# Patient Record
Sex: FEMALE | Race: WHITE | NOT HISPANIC OR LATINO | Employment: OTHER | URBAN - METROPOLITAN AREA
[De-identification: names, ages, dates, MRNs, and addresses within clinical notes are randomized per-mention and may not be internally consistent; named-entity substitution may affect disease eponyms.]

---

## 2017-02-06 ENCOUNTER — OFFICE VISIT (OUTPATIENT)
Dept: MEDICAL GROUP | Facility: PHYSICIAN GROUP | Age: 72
End: 2017-02-06
Payer: MEDICARE

## 2017-02-06 ENCOUNTER — HOSPITAL ENCOUNTER (OUTPATIENT)
Dept: LAB | Facility: MEDICAL CENTER | Age: 72
End: 2017-02-06
Attending: FAMILY MEDICINE
Payer: MEDICARE

## 2017-02-06 VITALS
BODY MASS INDEX: 35.53 KG/M2 | SYSTOLIC BLOOD PRESSURE: 138 MMHG | TEMPERATURE: 97 F | HEART RATE: 63 BPM | DIASTOLIC BLOOD PRESSURE: 70 MMHG | HEIGHT: 60 IN | OXYGEN SATURATION: 95 % | WEIGHT: 181 LBS

## 2017-02-06 DIAGNOSIS — C91.10 CLL (CHRONIC LYMPHOCYTIC LEUKEMIA) (HCC): ICD-10-CM

## 2017-02-06 DIAGNOSIS — F51.01 PRIMARY INSOMNIA: ICD-10-CM

## 2017-02-06 DIAGNOSIS — I10 ESSENTIAL HYPERTENSION: ICD-10-CM

## 2017-02-06 DIAGNOSIS — Z12.31 ENCOUNTER FOR SCREENING MAMMOGRAM FOR BREAST CANCER: ICD-10-CM

## 2017-02-06 DIAGNOSIS — N95.1 HOT FLASHES, MENOPAUSAL: ICD-10-CM

## 2017-02-06 DIAGNOSIS — F13.20 SEDATIVE, HYPNOTIC OR ANXIOLYTIC DEPENDENCE, UNSPECIFIED: ICD-10-CM

## 2017-02-06 DIAGNOSIS — E66.9 OBESITY (BMI 30-39.9): ICD-10-CM

## 2017-02-06 LAB
25(OH)D3 SERPL-MCNC: 38 NG/ML (ref 30–100)
ALBUMIN SERPL BCP-MCNC: 4.1 G/DL (ref 3.2–4.9)
ALBUMIN/GLOB SERPL: 1.6 G/DL
ALP SERPL-CCNC: 62 U/L (ref 30–99)
ALT SERPL-CCNC: 16 U/L (ref 2–50)
ANION GAP SERPL CALC-SCNC: 8 MMOL/L (ref 0–11.9)
AST SERPL-CCNC: 14 U/L (ref 12–45)
BASOPHILS # BLD AUTO: 0 K/UL (ref 0–0.12)
BASOPHILS NFR BLD AUTO: 0 % (ref 0–1.8)
BILIRUB SERPL-MCNC: 0.4 MG/DL (ref 0.1–1.5)
BUN SERPL-MCNC: 19 MG/DL (ref 8–22)
CALCIUM SERPL-MCNC: 9.6 MG/DL (ref 8.5–10.5)
CHLORIDE SERPL-SCNC: 106 MMOL/L (ref 96–112)
CHOLEST SERPL-MCNC: 154 MG/DL (ref 100–199)
CO2 SERPL-SCNC: 27 MMOL/L (ref 20–33)
CREAT SERPL-MCNC: 0.87 MG/DL (ref 0.5–1.4)
CREAT UR-MCNC: 140.4 MG/DL
EOSINOPHIL # BLD: 0 K/UL (ref 0–0.51)
EOSINOPHIL NFR BLD AUTO: 0 % (ref 0–6.9)
ERYTHROCYTE [DISTWIDTH] IN BLOOD BY AUTOMATED COUNT: 53 FL (ref 35.9–50)
GIANT PLATELETS BLD QL SMEAR: NORMAL
GLOBULIN SER CALC-MCNC: 2.5 G/DL (ref 1.9–3.5)
GLUCOSE SERPL-MCNC: 84 MG/DL (ref 65–99)
HCT VFR BLD AUTO: 42.3 % (ref 37–47)
HDLC SERPL-MCNC: 67 MG/DL
HGB BLD-MCNC: 13.1 G/DL (ref 12–16)
LDLC SERPL CALC-MCNC: 58 MG/DL
LYMPHOCYTES # BLD: 22.74 K/UL (ref 1–4.8)
LYMPHOCYTES NFR BLD AUTO: 82.1 % (ref 22–41)
MANUAL DIFF BLD: NORMAL
MCH RBC QN AUTO: 31.6 PG (ref 27–33)
MCHC RBC AUTO-ENTMCNC: 31 G/DL (ref 33.6–35)
MCV RBC AUTO: 101.9 FL (ref 81.4–97.8)
MICROALBUMIN UR-MCNC: <0.7 MG/DL
MICROALBUMIN/CREAT UR: NORMAL MG/G (ref 0–30)
MONOCYTES # BLD: 0.75 K/UL (ref 0–0.85)
MONOCYTES NFR BLD AUTO: 2.7 % (ref 0–13.4)
MORPHOLOGY BLD-IMP: NORMAL
NEUTROPHILS # BLD: 4.21 K/UL (ref 2–7.15)
NEUTROPHILS NFR BLD AUTO: 14.3 % (ref 44–72)
NEUTS BAND NFR BLD MANUAL: 0.9 % (ref 0–10)
NRBC # BLD AUTO: 0 K/UL
NRBC BLD-RTO: 0 /100 WBC
PLATELET # BLD AUTO: 206 K/UL (ref 164–446)
PMV BLD AUTO: 11.1 FL (ref 9–12.9)
POTASSIUM SERPL-SCNC: 4.2 MMOL/L (ref 3.6–5.5)
PROT SERPL-MCNC: 6.6 G/DL (ref 6–8.2)
RBC # BLD AUTO: 4.15 M/UL (ref 4.2–5.4)
RBC BLD AUTO: PRESENT
SMUDGE CELLS BLD QL SMEAR: NORMAL
SODIUM SERPL-SCNC: 141 MMOL/L (ref 135–145)
TRIGL SERPL-MCNC: 144 MG/DL (ref 0–149)
VARIANT LYMPHS BLD QL SMEAR: NORMAL
WBC # BLD AUTO: 27.7 K/UL (ref 4.8–10.8)

## 2017-02-06 PROCEDURE — 1101F PT FALLS ASSESS-DOCD LE1/YR: CPT | Mod: 8P | Performed by: FAMILY MEDICINE

## 2017-02-06 PROCEDURE — 80061 LIPID PANEL: CPT

## 2017-02-06 PROCEDURE — 82306 VITAMIN D 25 HYDROXY: CPT

## 2017-02-06 PROCEDURE — 85007 BL SMEAR W/DIFF WBC COUNT: CPT

## 2017-02-06 PROCEDURE — 99214 OFFICE O/P EST MOD 30 MIN: CPT | Performed by: FAMILY MEDICINE

## 2017-02-06 PROCEDURE — G8432 DEP SCR NOT DOC, RNG: HCPCS | Performed by: FAMILY MEDICINE

## 2017-02-06 PROCEDURE — 82043 UR ALBUMIN QUANTITATIVE: CPT

## 2017-02-06 PROCEDURE — 82570 ASSAY OF URINE CREATININE: CPT

## 2017-02-06 PROCEDURE — 1036F TOBACCO NON-USER: CPT | Performed by: FAMILY MEDICINE

## 2017-02-06 PROCEDURE — 3014F SCREEN MAMMO DOC REV: CPT | Performed by: FAMILY MEDICINE

## 2017-02-06 PROCEDURE — 3017F COLORECTAL CA SCREEN DOC REV: CPT | Performed by: FAMILY MEDICINE

## 2017-02-06 PROCEDURE — 85027 COMPLETE CBC AUTOMATED: CPT

## 2017-02-06 PROCEDURE — 4040F PNEUMOC VAC/ADMIN/RCVD: CPT | Mod: 8P | Performed by: FAMILY MEDICINE

## 2017-02-06 PROCEDURE — 80053 COMPREHEN METABOLIC PANEL: CPT

## 2017-02-06 PROCEDURE — G8419 CALC BMI OUT NRM PARAM NOF/U: HCPCS | Performed by: FAMILY MEDICINE

## 2017-02-06 PROCEDURE — G8484 FLU IMMUNIZE NO ADMIN: HCPCS | Performed by: FAMILY MEDICINE

## 2017-02-06 PROCEDURE — 36415 COLL VENOUS BLD VENIPUNCTURE: CPT

## 2017-02-06 RX ORDER — ZOLPIDEM TARTRATE 5 MG/1
5 TABLET ORAL NIGHTLY PRN
Qty: 45 TAB | Refills: 3 | Status: SHIPPED
Start: 2017-02-06 | End: 2017-11-09 | Stop reason: SDUPTHER

## 2017-02-06 RX ORDER — ESTRADIOL 0.5 MG/1
0.5 TABLET ORAL DAILY
Qty: 90 TAB | Refills: 3 | Status: ON HOLD | OUTPATIENT
Start: 2017-02-06 | End: 2018-01-17

## 2017-02-06 ASSESSMENT — PAIN SCALES - GENERAL: PAINLEVEL: 1=MINIMAL PAIN

## 2017-02-06 NOTE — ASSESSMENT & PLAN NOTE
Ongoing issue. As stated above, patient currently is on Ambien 2.5 mg at night to help with sleep. She has been maintained on his current dosage for greater than one year. She is here today for follow-up and refill of her prescription.

## 2017-02-06 NOTE — ASSESSMENT & PLAN NOTE
Ongoing issue. Patient reports 100% compliance with medication; she is currently on Ambien 2.5 mg at night. She states that this manages both her sleep maintenance and sleep induction. She denies any side effects such as grogginess the next day.

## 2017-02-06 NOTE — ASSESSMENT & PLAN NOTE
Ongoing issue. The patient reports 100% compliance with medication; she is currently on atenolol 50 mg daily. Patient denies any issues with headache, dizziness, chest pain. Chart review shows that her blood pressure and heart rate both are in the normal range.

## 2017-02-06 NOTE — ASSESSMENT & PLAN NOTE
Ongoing issue. Patient reports 100% compliance with medication; she is currently on estradiol 0.5 mg once daily. She reports that she has had 2 episodes of hot flashes since our last visit. She states that otherwise she is doing well.

## 2017-02-06 NOTE — MR AVS SNAPSHOT
Shanice Vaz Chino   2017 12:20 PM   Office Visit   MRN: [id number]    Department:  [facility]   Dept Phone:  [phone]    Description:  Female : 1945   Provider:  Luz Joshi D.O.           Reason for Visit     Follow-Up 6 mo      Allergies as of 2017     Allergen Noted Reactions    Other Misc 2011       All household chemicals, soap powders    Sulfa Drugs 10/27/2015       Wheat 2011         You were diagnosed with     Primary insomnia   [068355]       CLL (chronic lymphocytic leukemia) (CMS-[facility])   [092140]       Essential hypertension   [3589301]       Hot flashes, menopausal   [320425]       Sedative, hypnotic or anxiolytic dependence, unspecified (CMS-[facility])   [304.10.ICD-9-CM]       Encounter for screening mammogram for breast cancer   [2626082]       Obesity (BMI 30-39.9)   [266185]         Vital Signs     Blood Pressure Pulse Temperature Height Weight Body Mass Index    138/70 mmHg 63 36.1 °C (97 °F) 1.524 m (5') 82.101 kg (181 lb) 35.35 kg/m2    Oxygen Saturation Breastfeeding? Smoking Status             95% No Never Smoker          Basic Information     Date Of Birth Sex Race Ethnicity Preferred Language    1945 Female White Non- English      Your appointments     2018 11:20 AM   Established Patient with Luz Joshi D.O.   [facility] [address] [id number] [phone]           You will be receiving a confirmation call a few days before your appointment from our automated call confirmation system.              Problem List              ICD-10-CM Priority Class Noted - Resolved    CLL (chronic lymphocytic leukemia) (CMS-[facility]) C91.10 High  3/3/2011 - Present    Hypertension I10   3/3/2011 - Present    GERD (gastroesophageal reflux disease) K21.9   3/3/2011 - Present    Hiatal hernia K44.9   3/3/2011 - Present    Insomnia G47.00   3/3/2011 - Present    NORMAN (iron deficiency anemia) D50.9    3/3/2011 - Present    Routine physical examination Z00.00   3/3/2011 - Present    Hot flashes, menopausal N95.1   3/3/2011 - Present    Routine health maintenance Z00.00   6/11/2012 - Present    Hip pain M25.559   6/27/2013 - Present    Cervical radiculopathy M54.12   6/27/2013 - Present    Skin lesions, generalized L98.9   6/27/2013 - Present    BMI 31.0-31.9,adult Z68.31   9/11/2014 - Present    Vaginal vault prolapse N81.9   4/23/2015 - Present    Hypertrophy of breast N62   11/3/2015 - Present    Multiple allergies Z88.9   4/12/2016 - Present    Knee pain, left M25.562   4/12/2016 - Present    Sedative, hypnotic or anxiolytic dependence, unspecified (CMS-HCC) F13.20   2/6/2017 - Present    Obesity (BMI 30-39.9) E66.9   2/6/2017 - Present      Health Maintenance        Date Due Completion Dates    IMM PNEUMOCOCCAL 65+ (ADULT) HIGH/HIGHEST RISK SERIES (1 of 2 - PCV13) 10/27/2010 ---    MAMMOGRAM 8/26/2016 8/26/2015, 8/20/2014, 6/28/2013, 6/27/2012    IMM INFLUENZA (1) 9/1/2016 ---    IMM DTaP/Tdap/Td Vaccine (2 - Td) 3/3/2021 3/3/2011    BONE DENSITY 8/19/2021 8/19/2016, 3/25/2011    COLONOSCOPY 2/3/2025 2/3/2015            Current Immunizations     SHINGLES VACCINE 3/3/2011    Tdap Vaccine 3/3/2011      Below and/or attached are the medications your provider expects you to take. Review all of your home medications and newly ordered medications with your provider and/or pharmacist. Follow medication instructions as directed by your provider and/or pharmacist. Please keep your medication list with you and share with your provider. Update the information when medications are discontinued, doses are changed, or new medications (including over-the-counter products) are added; and carry medication information at all times in the event of emergency situations     Allergies:  OTHER MISC - (reactions not documented)     SULFA DRUGS - (reactions not documented)     WHEAT - (reactions not documented)                  Medications  Valid as of: February 06, 2017 -  1:08 PM    Generic Name Brand Name Tablet Size Instructions for use    Albuterol Sulfate   Inhale  by mouth as needed.        Ascorbic Acid (Tab) Vitamin C 1000 MG Take  by mouth every day.        Atenolol (Tab) TENORMIN 50 MG TAKE 1 TABLET BY MOUTH TWICE A DAY        Atenolol (Tab) TENORMIN 50 MG TAKE 1 TABLET BY MOUTH TWICE A DAY        Cetirizine HCl   Take  by mouth every day.        Estradiol (Tab) ESTRACE 0.5 MG Take 1 Tab by mouth every day.        Estrogens, Conjugated (Cream) PREMARIN 0.625 MG/GM 1.0gms per vagina three times/week        Ferrous Gluconate (Tab) Iron 240 (27 FE) MG Take  by mouth every day.        Lansoprazole (TABLET DISPERSIBLE) PREVACID 30 MG Take 1 Tab by mouth 2 Times a Day.        Multiple Vitamins-Minerals   Take  by mouth every day.        Zolpidem Tartrate (Tab) AMBIEN 5 MG Take 1 Tab by mouth at bedtime as needed for Sleep.        .                 Medicines prescribed today were sent to:     [facility] # [address] [place] [address] - [address] [place] [address]    Phone: [phone] Fax: [phone]    Open 24 Hours?: No      Medication refill instructions:       If your prescription bottle indicates you have medication refills left, it is not necessary to call your provider’s office. Please contact your pharmacy and they will refill your medication.    If your prescription bottle indicates you do not have any refills left, you may request refills at any time through one of the following ways: The online [employer] system (except Urgent Care), by calling your provider’s office, or by asking your pharmacy to contact your provider’s office with a refill request. Medication refills are processed only during regular business hours and may not be available until the next business day. Your provider may request additional information or to have a follow-up visit with you prior to refilling your medication.    *Please Note: Medication refills are assigned a new Rx number when refilled electronically. Your pharmacy may indicate that no refills were authorized even though a new prescription for the same medication is available at the pharmacy. Please request the medicine by name with the pharmacy before contacting your provider for a refill.        Your To Do List     Future Labs/Procedures Complete By Expires    CBC WITH DIFFERENTIAL  As directed 2/7/2018    COMP METABOLIC PANEL  As directed 2/7/2018    LIPID PROFILE  As directed 2/7/2018    MA-SCREEN MAMMO W/CAD-BILAT  As directed 3/10/2018    MICROALBUMIN CREAT RATIO URINE  As directed 2/7/2018    [facility] PAIN MANAGEMENT SCREEN  As directed 2/6/2018    Comments:    Current Meds (name, sig, last dose):   Current outpatient prescriptions:   •  estradiol, 0.5 mg, Oral, DAILY  •  zolpidem, 5 mg, Oral, HS PRN  •  atenolol, TAKE 1 TABLET BY MOUTH TWICE A DAY  •  atenolol, TAKE 1 TABLET BY MOUTH TWICE A DAY  •  Albuterol Sulfate (PROAIR HFA INH), Inhale  by mouth as needed., prn  •  conjugated estrogen, 1.0gms per vagina three times/week  •  Cetirizine HCl (KLS ALLER-JAYE PO), Take  by mouth every day.  •  Vitamin C, Take  by mouth every day.  •  Multiple Vitamins-Minerals (MULTIVITAMIN PO), Take  by mouth every day.  •  Iron, Take  by mouth every day.  •  lansoprazole, 30 mg, Oral, BID          VITAMIN D,25 HYDROXY  As directed 2/7/2018         Oversi Access Code: Activation code not generated  Current Oversi Status: Active

## 2017-02-06 NOTE — PROGRESS NOTES
Subjective:   Shanice Magana is a 71 y.o. female here today for HTN; CLL; menopause; insomnia; obesity    CLL (chronic lymphocytic leukemia)  Long-standing history. Patient denies any symptoms such as fever, chills, enlarged lymph nodes. She is currently being followed with blood work and by a hematologist every 6 months.    Hot flashes, menopausal  Ongoing issue. Patient reports 100% compliance with medication; she is currently on estradiol 0.5 mg once daily. She reports that she has had 2 episodes of hot flashes since our last visit. She states that otherwise she is doing well.    Hypertension  Ongoing issue. The patient reports 100% compliance with medication; she is currently on atenolol 50 mg daily. Patient denies any issues with headache, dizziness, chest pain. Chart review shows that her blood pressure and heart rate both are in the normal range.    Insomnia  Ongoing issue. Patient reports 100% compliance with medication; she is currently on Ambien 2.5 mg at night. She states that this manages both her sleep maintenance and sleep induction. She denies any side effects such as grogginess the next day.    Obesity (BMI 30-39.9)  Ongoing issue. Patient reports that she is working diligently to a healthy get some regular exercise.    Sedative, hypnotic or anxiolytic dependence, unspecified (CMS-HCC)  Ongoing issue. As stated above, patient currently is on Ambien 2.5 mg at night to help with sleep. She has been maintained on his current dosage for greater than one year. She is here today for follow-up and refill of her prescription.         Current medicines (including changes today)  Current Outpatient Prescriptions   Medication Sig Dispense Refill   • estradiol (ESTRACE) 0.5 MG tablet Take 1 Tab by mouth every day. 90 Tab 3   • zolpidem (AMBIEN) 5 MG Tab Take 1 Tab by mouth at bedtime as needed for Sleep. 45 Tab 3   • atenolol (TENORMIN) 50 MG Tab TAKE 1 TABLET BY MOUTH TWICE A  Tab 3   • atenolol  (TENORMIN) 50 MG Tab TAKE 1 TABLET BY MOUTH TWICE A  Tab 3   • Albuterol Sulfate (PROAIR HFA INH) Inhale  by mouth as needed.     • conjugated estrogen (PREMARIN) 0.625 MG/GM CREA 1.0gms per vagina three times/week 1 Tube 3   • Cetirizine HCl (KLS ALLER-JAYE PO) Take  by mouth every day.     • Ascorbic Acid (VITAMIN C) 1000 MG TABS Take  by mouth every day.     • Multiple Vitamins-Minerals (MULTIVITAMIN PO) Take  by mouth every day.     • Ferrous Gluconate (IRON) 240 (27 FE) MG TABS Take  by mouth every day.     • lansoprazole (PREVACID) 30 MG TBDP Take 1 Tab by mouth 2 Times a Day. 90 Each 3     No current facility-administered medications for this visit.     She  has a past medical history of Hiatal hernia (3/3/2011); Insomnia (3/3/2011); NORMAN (iron deficiency anemia) (3/3/2011); Hot flashes, menopausal (3/3/2011); Hypertension; Indigestion; Dental disorder; BMI 34.0-34.9,adult (9/11/2014); Arthritis; Pain; Heart burn; Cancer (CMS-HCC) (2010); Renal disorder; Celiac disease; and CATARACT. She also has no past medical history of Diabetes (CMS-HCC).    ROS   No chest pain, no shortness of breath, no abdominal pain  +hot flashes     Objective:     Blood pressure 138/70, pulse 63, temperature 36.1 °C (97 °F), height 1.524 m (5'), weight 82.101 kg (181 lb), SpO2 95 %, not currently breastfeeding. Body mass index is 35.35 kg/(m^2).   Physical Exam:  Alert, oriented in no acute distress.  Eye contact is good, speech goal directed, affect calm  HEENT: conjunctiva non-injected, sclera non-icteric.  Pinna normal. TM pearly gray.   Oral mucous membranes pink and moist with no lesions.  Neck No adenopathy or masses in the neck or supraclavicular regions.  Lungs: clear to auscultation bilaterally with good excursion.  CV: regular rate and rhythm. Moderate systolic ejection murmur noted  Abdomen: soft, nontender, No CVAT; obese  Ext: no edema, color normal, vascularity normal, temperature normal        Assessment and Plan:    The following treatment plan was discussed     1. Primary insomnia      Stable. Continue current medications; refill provided. Monitor   2. CLL (chronic lymphocytic leukemia) (CMS-HCC)  CBC WITH DIFFERENTIAL    Stable. Sent for blood work; continue follow with hematology. Monitor   3. Essential hypertension  COMP METABOLIC PANEL    LIPID PROFILE    VITAMIN D,25 HYDROXY    MICROALBUMIN CREAT RATIO URINE    Stable. Continue current medication; monitor   4. Sedative, hypnotic or anxiolytic dependence, unspecified (CMS-HCC)  [facility] PAIN MANAGEMENT SCREEN    Controlled Substance Treatment Agreement    Stable. Patient assigned control substance agreement; urine collected for urine drug screen; monitor for results.   5. Hot flashes, menopausal      Stable. Continue current medication; refill provided. Monitor   6. Encounter for screening mammogram for breast cancer  MA-SCREEN MAMMO W/CAD-BILAT    Screening mammogram ordered; patient denies any breast issues today.   7. Obesity (BMI 30-39.9)  Patient identified as having weight management issue.  Appropriate orders and counseling given.    Uncontrolled. Continue to encourage healthy eating and regular daily exercise. Monitor       Followup: Return in about 1 year (around 2/6/2018) for HTN/insomnia/labs, Short.

## 2017-02-06 NOTE — ASSESSMENT & PLAN NOTE
Ongoing issue. Patient reports that she is working diligently to a healthy get some regular exercise.

## 2017-02-06 NOTE — ASSESSMENT & PLAN NOTE
Long-standing history. Patient denies any symptoms such as fever, chills, enlarged lymph nodes. She is currently being followed with blood work and by a hematologist every 6 months.

## 2017-02-07 ENCOUNTER — TELEPHONE (OUTPATIENT)
Dept: MEDICAL GROUP | Facility: PHYSICIAN GROUP | Age: 72
End: 2017-02-07

## 2017-02-07 NOTE — Clinical Note
February 7, 2017         Shanice Vaz Chino  [address] [place] [address]        Dear Shanice:      Below are the results from your recent visit:    Resulted Orders   COMP METABOLIC PANEL   Result Value Ref Range    Sodium 141 135 - 145 mmol/L    Potassium 4.2 3.6 - 5.5 mmol/L    Chloride 106 96 - 112 mmol/L    Co2 27 20 - 33 mmol/L    Anion Gap 8.0 0.0 - 11.9    Glucose 84 65 - 99 mg/dL    Bun 19 8 - 22 mg/dL    Creatinine 0.87 0.50 - 1.40 mg/dL    Calcium 9.6 8.5 - 10.5 mg/dL    AST(SGOT) 14 12 - 45 U/L    ALT(SGPT) 16 2 - 50 U/L    Alkaline Phosphatase 62 30 - 99 U/L    Total Bilirubin 0.4 0.1 - 1.5 mg/dL    Albumin 4.1 3.2 - 4.9 g/dL    Total Protein 6.6 6.0 - 8.2 g/dL    Globulin 2.5 1.9 - 3.5 g/dL    A-G Ratio 1.6 g/dL    Narrative    Request patient fasting?->Yes   LIPID PROFILE   Result Value Ref Range    Cholesterol,Tot 154 100 - 199 mg/dL    Triglycerides 144 0 - 149 mg/dL    HDL 67 >=40 mg/dL    LDL 58 <100 mg/dL    Narrative    Request patient fasting?->Yes   VITAMIN D,25 HYDROXY   Result Value Ref Range    25-Hydroxy   Vitamin D 25 38 30 - 100 ng/mL      Comment:      Adult Ranges:   <20 ng/mL - Deficiency  20-29 ng/mL - Insufficiency   ng/mL - Sufficiency  The [employer]ia Centaur Vitamin D Assay is standardized to the  [facility] reference measurement procedures, a  reference method for the Vitamin D Standardization Program  (VDSP).  The VDSP aligns patient results among 25 (OH)  Vitamin D methods.      Narrative    Request patient fasting?->Yes   MICROALBUMIN CREAT RATIO URINE   Result Value Ref Range    Creatinine, Urine 140.40 mg/dL    Microalbumin, Urine Random <0.7 mg/dL    Micro Alb Creat Ratio see below 0 - 30 mg/g      Comment:      Unable to calculate the microalbumin/creatinine ratio due to  the microalbumin result or the urine creatinine result being  outside the measurement range of the analyzer.     CBC WITH DIFFERENTIAL   Result Value Ref Range    WBC 27.7 (H) 4.8 - 10.8  K/uL    RBC 4.15 (L) 4.20 - 5.40 M/uL    Hemoglobin 13.1 12.0 - 16.0 g/dL    Hematocrit 42.3 37.0 - 47.0 %    .9 (H) 81.4 - 97.8 fL    MCH 31.6 27.0 - 33.0 pg    MCHC 31.0 (L) 33.6 - 35.0 g/dL    RDW 53.0 (H) 35.9 - 50.0 fL    Platelet Count 206 164 - 446 K/uL    MPV 11.1 9.0 - 12.9 fL    Nucleated RBC 0.00 /100 WBC    NRBC (Absolute) 0.00 K/uL    Neutrophils-Polys 14.30 (L) 44.00 - 72.00 %    Lymphocytes 82.10 (H) 22.00 - 41.00 %    Monocytes 2.70 0.00 - 13.40 %    Eosinophils 0.00 0.00 - 6.90 %    Basophils 0.00 0.00 - 1.80 %    Neutrophils (Absolute) 4.21 2.00 - 7.15 K/uL      Comment:      Includes immature neutrophils, if present.    Lymphs (Absolute) 22.74 (H) 1.00 - 4.80 K/uL    Monos (Absolute) 0.75 0.00 - 0.85 K/uL    Eos (Absolute) 0.00 0.00 - 0.51 K/uL    Baso (Absolute) 0.00 0.00 - 0.12 K/uL   ESTIMATED GFR   Result Value Ref Range    GFR If African American >60 >60 mL/min/1.73 m 2    GFR If Non African American >60 >60 mL/min/1.73 m 2    Narrative    Request patient fasting?->Yes   DIFFERENTIAL MANUAL   Result Value Ref Range    Bands-Stabs 0.90 0.00 - 10.00 %    Manual Diff Status PERFORMED    PERIPHERAL SMEAR REVIEW   Result Value Ref Range    Peripheral Smear Review see below       Comment:      Due to instrument suspect flags, further review of peripheral smear is  indicated on this patient sample. This review may or may not result in  abnormal findings.     MORPHOLOGY   Result Value Ref Range    RBC Morphology Present       Comment:      WBC: Moderate Atypical Lymphocyte  See  Previous Pathology Report    Giant Platelets 1+     Smudge Cells Many     Reactive Lymphocytes Moderate      The test results show that all the labs that I have ordered are in a normal/acceptable range.  Please keep your appointment with Dr. Davidson to go over the labs that he has ordered. .    If you have any questions or concerns, please don't hesitate to call.        Sincerely,      Luz Joshi,  [email or web address].    Electronically Signed

## 2017-02-07 NOTE — TELEPHONE ENCOUNTER
----- Message from Luz Joshi D.O. sent at 2/7/2017  7:32 AM PST -----  Please advise pt that all the labs that I have ordered are in a normal/acceptable range.  Please keep your appointment with Dr. Davidson to go over the labs that he has ordered.    Luz Joshi D.O.

## 2017-05-12 ENCOUNTER — TELEPHONE (OUTPATIENT)
Dept: OBGYN | Facility: CLINIC | Age: 72
End: 2017-05-12

## 2017-05-12 NOTE — TELEPHONE ENCOUNTER
Patient called and has a bladder infection wants to know if we can call in RX and then scheduled a follow up next week. Please call

## 2017-06-06 ENCOUNTER — HOSPITAL ENCOUNTER (OUTPATIENT)
Dept: LAB | Facility: MEDICAL CENTER | Age: 72
End: 2017-06-06
Attending: INTERNAL MEDICINE
Payer: MEDICARE

## 2017-06-06 LAB
ALBUMIN SERPL BCP-MCNC: 4.1 G/DL (ref 3.2–4.9)
ALBUMIN/GLOB SERPL: 1.7 G/DL
ALP SERPL-CCNC: 58 U/L (ref 30–99)
ALT SERPL-CCNC: 12 U/L (ref 2–50)
ANION GAP SERPL CALC-SCNC: 7 MMOL/L (ref 0–11.9)
AST SERPL-CCNC: 18 U/L (ref 12–45)
BASOPHILS # BLD AUTO: 0 % (ref 0–1.8)
BASOPHILS # BLD: 0 K/UL (ref 0–0.12)
BILIRUB SERPL-MCNC: 0.5 MG/DL (ref 0.1–1.5)
BUN SERPL-MCNC: 19 MG/DL (ref 8–22)
CALCIUM SERPL-MCNC: 9.6 MG/DL (ref 8.5–10.5)
CHLORIDE SERPL-SCNC: 106 MMOL/L (ref 96–112)
CO2 SERPL-SCNC: 27 MMOL/L (ref 20–33)
CREAT SERPL-MCNC: 0.94 MG/DL (ref 0.5–1.4)
EOSINOPHIL # BLD AUTO: 0 K/UL (ref 0–0.51)
EOSINOPHIL NFR BLD: 0 % (ref 0–6.9)
ERYTHROCYTE [DISTWIDTH] IN BLOOD BY AUTOMATED COUNT: 42.8 FL (ref 35.9–50)
GFR SERPL CREATININE-BSD FRML MDRD: 59 ML/MIN/1.73 M 2
GLOBULIN SER CALC-MCNC: 2.4 G/DL (ref 1.9–3.5)
GLUCOSE SERPL-MCNC: 91 MG/DL (ref 65–99)
HCT VFR BLD AUTO: 39.9 % (ref 37–47)
HGB BLD-MCNC: 12.9 G/DL (ref 12–16)
LYMPHOCYTES # BLD AUTO: 15.23 K/UL (ref 1–4.8)
LYMPHOCYTES NFR BLD: 87 % (ref 22–41)
MANUAL DIFF BLD: NORMAL
MCH RBC QN AUTO: 30.6 PG (ref 27–33)
MCHC RBC AUTO-ENTMCNC: 32.3 G/DL (ref 33.6–35)
MCV RBC AUTO: 94.8 FL (ref 81.4–97.8)
MONOCYTES # BLD AUTO: 0.75 K/UL (ref 0–0.85)
MONOCYTES NFR BLD AUTO: 4.3 % (ref 0–13.4)
MORPHOLOGY BLD-IMP: NORMAL
NEUTROPHILS # BLD AUTO: 1.52 K/UL (ref 2–7.15)
NEUTROPHILS NFR BLD: 8.7 % (ref 44–72)
NRBC # BLD AUTO: 0 K/UL
NRBC BLD AUTO-RTO: 0 /100 WBC
PLATELET # BLD AUTO: 211 K/UL (ref 164–446)
PLATELET BLD QL SMEAR: NORMAL
PMV BLD AUTO: 10.7 FL (ref 9–12.9)
POTASSIUM SERPL-SCNC: 4.1 MMOL/L (ref 3.6–5.5)
PROT SERPL-MCNC: 6.5 G/DL (ref 6–8.2)
RBC # BLD AUTO: 4.21 M/UL (ref 4.2–5.4)
RBC BLD AUTO: NORMAL
SODIUM SERPL-SCNC: 140 MMOL/L (ref 135–145)
WBC # BLD AUTO: 17.5 K/UL (ref 4.8–10.8)

## 2017-06-06 PROCEDURE — 36415 COLL VENOUS BLD VENIPUNCTURE: CPT

## 2017-06-06 PROCEDURE — 85007 BL SMEAR W/DIFF WBC COUNT: CPT

## 2017-06-06 PROCEDURE — 80053 COMPREHEN METABOLIC PANEL: CPT

## 2017-06-06 PROCEDURE — 85027 COMPLETE CBC AUTOMATED: CPT

## 2017-09-22 ENCOUNTER — PATIENT MESSAGE (OUTPATIENT)
Dept: HEALTH INFORMATION MANAGEMENT | Facility: OTHER | Age: 72
End: 2017-09-22

## 2017-09-27 ENCOUNTER — PATIENT OUTREACH (OUTPATIENT)
Dept: HEALTH INFORMATION MANAGEMENT | Facility: OTHER | Age: 72
End: 2017-09-27

## 2017-09-27 NOTE — PROGRESS NOTES
WebIZ Checked & Epic Updated: Yes  · WebIZ Recommendations: FLU and PREVNAR (PCV13)   · Is patient due for Tdap? NO  · Is patient due for Shingles? NO  [facility]Connect Verified: yes  Verify PCP: yes    Communication Preference Obtained: yes     Review Care Team: yes    Annual Wellness Visit Scheduling  1. Scheduling Status:Scheduled     Care Gap Scheduling (Attempt to Schedule EACH Overdue Care Gap!)     Health Maintenance Due   Topic Date Due   • Annual Wellness Visit  1945   • IMM PNEUMOCOCCAL 65+ (ADULT) HIGH/HIGHEST RISK SERIES (1 of 2 - PCV13) 10/27/2010   • MAMMOGRAM  08/26/2016   • IMM INFLUENZA (1) 09/01/2017        Scheduled patient for Annual Wellness Visit, flu shot and Pneumococcal vaccine.       Glanse Activation: already active  Glanse Nithya: yes  Virtual Visits: yes  Opt In to Text Messages: yes

## 2017-10-04 DIAGNOSIS — I10 ESSENTIAL HYPERTENSION: ICD-10-CM

## 2017-10-04 NOTE — TELEPHONE ENCOUNTER
Please change medication. Thank you    Was the patient seen in the last year in this department? Yes     Does patient have an active prescription for medications requested? No     Received Request Via: Pharmacy

## 2017-10-05 RX ORDER — ATENOLOL 50 MG/1
TABLET ORAL
Qty: 180 TAB | Refills: 1 | Status: SHIPPED | OUTPATIENT
Start: 2017-10-05 | End: 2017-10-17

## 2017-10-06 DIAGNOSIS — I10 ESSENTIAL HYPERTENSION: ICD-10-CM

## 2017-10-06 RX ORDER — ATENOLOL 50 MG/1
TABLET ORAL
Qty: 180 TAB | Refills: 3 | Status: SHIPPED | OUTPATIENT
Start: 2017-10-06 | End: 2017-10-17

## 2017-10-11 ENCOUNTER — TELEPHONE (OUTPATIENT)
Dept: MEDICAL GROUP | Facility: PHYSICIAN GROUP | Age: 72
End: 2017-10-11

## 2017-10-11 NOTE — TELEPHONE ENCOUNTER
ANNUAL WELLNESS VISIT PRE-VISIT PLANNING     1.  Reviewed note from last office visit with PCP: YES    2.  If any orders were placed at last visit, do we have Results/Consult Notes?        •  Labs - Labs ordered, completed on 02/06/2017 and results are in chart.   Note: If patient appointment is for lab review and patient did not complete labs,                check with provider if OK to reschedule patient until labs completed.       •  Imaging - Imaging ordered, NOT completed. Patient advised to complete prior to next appointment.       •  Referrals - No referrals were ordered at last office visit.    3.  Immunizations were updated in [place] using WebIZ?: Yes       •  WebIZ Recommendations: FLU and PREVNAR (PCV13)        •  Is patient due for Tdap? NO       •  Is patient due for Shingles? NO     4.  Patient is due for the following Health Maintenance Topics:   Health Maintenance Due   Topic Date Due   • Annual Wellness Visit  1945   • IMM PNEUMOCOCCAL 65+ (ADULT) HIGH/HIGHEST RISK SERIES (1 of 2 - PCV13) 10/27/2010   • MAMMOGRAM  08/26/2016   • IMM INFLUENZA (1) 09/01/2017       - Patient already has appointment scheduled for Mammogram.      5.  Reviewed/Updated the following with patient:       •   Preferred Pharmacy? YES       •   Preferred Lab? YES       •   Preferred Communication? YES       •   Allergies? YES       •   Medications? YES. Was Abstract Encounter opened and chart updated? YES       •   Social History? YES. Was Abstract Encounter opened and chart updated? YES       •   Family History (document living status of immediate family members and if + hx of cancer, diabetes, hypertension, hyperlipidemia, heart attack, stroke) YES. Was Abstract Encounter opened and chart updated? YES    6.  Care Team Updated:       •   DME Company (gait device, O2, CPAP, etc.): N\A       •   Other Specialists (eye doctor, derm, GYN, cardiology, endo, etc): N\A    7.  Patient has the following Care Path diagnoses on  Problem List:  NONE    8.  Specialty Comments was updated with diagnosis information provided by SCP: N\A    9.  Patient was advised: “This is a free wellness visit. The provider will screen for medical conditions to help you stay healthy. If you have other concerns to address you may be asked to discuss these at a separate visit or there may be an additional fee.”     6.  Patient was informed to arrive 15 min prior to their scheduled appointment and bring in their medication bottles.

## 2017-10-17 ENCOUNTER — OFFICE VISIT (OUTPATIENT)
Dept: MEDICAL GROUP | Facility: PHYSICIAN GROUP | Age: 72
End: 2017-10-17
Payer: MEDICARE

## 2017-10-17 VITALS
OXYGEN SATURATION: 96 % | HEART RATE: 64 BPM | SYSTOLIC BLOOD PRESSURE: 138 MMHG | BODY MASS INDEX: 34.36 KG/M2 | DIASTOLIC BLOOD PRESSURE: 76 MMHG | HEIGHT: 60 IN | WEIGHT: 175 LBS | TEMPERATURE: 97.8 F

## 2017-10-17 DIAGNOSIS — K21.9 GASTROESOPHAGEAL REFLUX DISEASE WITHOUT ESOPHAGITIS: ICD-10-CM

## 2017-10-17 DIAGNOSIS — Z88.9 MULTIPLE ALLERGIES: ICD-10-CM

## 2017-10-17 DIAGNOSIS — N81.9 VAGINAL VAULT PROLAPSE: ICD-10-CM

## 2017-10-17 DIAGNOSIS — Z23 NEED FOR VACCINATION: ICD-10-CM

## 2017-10-17 DIAGNOSIS — F13.20 SEDATIVE, HYPNOTIC OR ANXIOLYTIC DEPENDENCE (HCC): ICD-10-CM

## 2017-10-17 DIAGNOSIS — M54.12 CERVICAL RADICULOPATHY: ICD-10-CM

## 2017-10-17 DIAGNOSIS — I10 ESSENTIAL HYPERTENSION: ICD-10-CM

## 2017-10-17 DIAGNOSIS — E66.9 OBESITY (BMI 30-39.9): ICD-10-CM

## 2017-10-17 DIAGNOSIS — Z00.00 MEDICARE ANNUAL WELLNESS VISIT, SUBSEQUENT: ICD-10-CM

## 2017-10-17 DIAGNOSIS — C91.10 CLL (CHRONIC LYMPHOCYTIC LEUKEMIA) (HCC): ICD-10-CM

## 2017-10-17 DIAGNOSIS — M25.562 CHRONIC PAIN OF LEFT KNEE: ICD-10-CM

## 2017-10-17 DIAGNOSIS — G89.29 CHRONIC PAIN OF LEFT KNEE: ICD-10-CM

## 2017-10-17 DIAGNOSIS — F51.01 PRIMARY INSOMNIA: ICD-10-CM

## 2017-10-17 DIAGNOSIS — N95.1 HOT FLASHES, MENOPAUSAL: ICD-10-CM

## 2017-10-17 DIAGNOSIS — R79.89 LOW SERUM VITAMIN D: ICD-10-CM

## 2017-10-17 PROCEDURE — G0439 PPPS, SUBSEQ VISIT: HCPCS | Mod: 25 | Performed by: FAMILY MEDICINE

## 2017-10-17 PROCEDURE — G0008 ADMIN INFLUENZA VIRUS VAC: HCPCS | Performed by: FAMILY MEDICINE

## 2017-10-17 PROCEDURE — G0009 ADMIN PNEUMOCOCCAL VACCINE: HCPCS | Performed by: FAMILY MEDICINE

## 2017-10-17 PROCEDURE — 90662 IIV NO PRSV INCREASED AG IM: CPT | Performed by: FAMILY MEDICINE

## 2017-10-17 PROCEDURE — 90670 PCV13 VACCINE IM: CPT | Performed by: FAMILY MEDICINE

## 2017-10-17 ASSESSMENT — PAIN SCALES - GENERAL: PAINLEVEL: NO PAIN

## 2017-10-17 ASSESSMENT — PATIENT HEALTH QUESTIONNAIRE - PHQ9: CLINICAL INTERPRETATION OF PHQ2 SCORE: 0

## 2017-10-17 NOTE — PROGRESS NOTES
Chief Complaint   Patient presents with   • Annual Wellness Visit         HPI:  Shanice Magana is a 71 y.o. here for Medicare Annual Wellness Visit     Patient Active Problem List    Diagnosis Date Noted   • CLL (chronic lymphocytic leukemia) (CMS-[facility]) 03/03/2011     Priority: High   • Low serum vitamin D 10/17/2017   • Sedative, hypnotic or anxiolytic dependence, unspecified 02/06/2017   • Obesity (BMI 30-39.9) 02/06/2017   • Multiple allergies 04/12/2016   • Knee pain, left 04/12/2016   • Vaginal vault prolapse 04/23/2015   • Cervical radiculopathy 06/27/2013   • Routine health maintenance 06/11/2012   • Hypertension 03/03/2011   • GERD (gastroesophageal reflux disease) 03/03/2011   • Insomnia 03/03/2011   • Routine physical examination 03/03/2011   • Hot flashes, menopausal 03/03/2011       Current Outpatient Prescriptions   Medication Sig Dispense Refill   • estradiol (ESTRACE) 0.5 MG tablet Take 1 Tab by mouth every day. 90 Tab 3   • zolpidem (AMBIEN) 5 MG Tab Take 1 Tab by mouth at bedtime as needed for Sleep. 45 Tab 3   • atenolol (TENORMIN) 50 MG Tab TAKE 1 TABLET BY MOUTH TWICE A  Tab 3   • Albuterol Sulfate (PROAIR HFA INH) Inhale  by mouth as needed.     • Cetirizine HCl (KLS ALLER-JAYE PO) Take  by mouth every day.     • lansoprazole (PREVACID) 30 MG TBDP Take 1 Tab by mouth 2 Times a Day. 90 Each 3   • conjugated estrogen (PREMARIN) 0.625 MG/GM CREA 1.0gms per vagina three times/week 1 Tube 3     No current facility-administered medications for this visit.             Current supplements as per medication list.       Allergies: Other misc; Sulfa drugs; and Wheat    Current social contact/activities: Family dinners, Friend     She  reports that she has never smoked. She has never used smokeless tobacco. She reports that she drinks alcohol. She reports that she does not use drugs.  Counseling given: Not Answered        DPA/Advanced Directive:  Patient does not have an Advanced Directive.  A  packet and workshop information was given on Advanced Directives.      ROS:    Gait: Uses no assistive device   Ostomy: yes   Other tubes: no   Amputations: no   Chronic oxygen use: no   Last eye exam:06/05/17  : Denies incontinence.           Depression Screening    Little interest or pleasure in doing things?  0 - not at all  Feeling down, depressed , or hopeless? 0 - not at all  Patient Health Questionnaire Score: 0     If depressive symptoms identified deferred to follow up visit unless specifically addressed in assessment and plan.    Interpretation of PHQ-9 Total Score   Score Severity   1-4 No Depression   5-9 Mild Depression   10-14 Moderate Depression   15-19 Moderately Severe Depression   20-27 Severe Depression    Screening for Cognitive Impairment    Three Minute Recall (apple, watch, aditi)  3/3    Draw clock face with all 12 numbers set to the hand to show 10 minutes past 11 o'clock  1    Cognitive concerns identified deferred for follow up unless specifically addressed in assessment and plan.    Fall Risk Assessment    Has the patient had two or more falls in the last year or any fall with injury in the last year?  No    Safety Assessment    Throw rugs on floor.  Yes  Handrails on all stairs.  No  Good lighting in all hallways.  Yes  Difficulty hearing.  No  Patient counseled about all safety risks that were identified.    Functional Assessment ADLs    Are there any barriers preventing you from cooking for yourself or meeting nutritional needs?  No.    Are there any barriers preventing you from driving safely or obtaining transportation?  No.    Are there any barriers preventing you from using a telephone or calling for help?  No.    Are there any barriers preventing you from shopping?  No.    Are there any barriers preventing you from taking care of your own finances?  No.    Are there any barriers preventing you from managing your medications?  No.    Are currently engaging any exercise or  physical activity?  Yes.  Walking some what    Health Maintenance Summary                Annual Wellness Visit Overdue 1945     IMM PNEUMOCOCCAL 65+ (ADULT) HIGH/HIGHEST RISK SERIES Overdue 10/27/2010     MAMMOGRAM Overdue 8/26/2016      Done 8/26/2015 MA-SCREEN MAMMO W/CAD-BILAT     Patient has more history with this topic...    IMM INFLUENZA Overdue 9/1/2017     IMM DTaP/Tdap/Td Vaccine Next Due 3/3/2021      Done 3/3/2011 Imm Admin: Tdap Vaccine    BONE DENSITY Next Due 8/19/2021      Done 8/19/2016 DS-BONE DENSITY STUDY (DEXA)     Patient has more history with this topic...    COLONOSCOPY Next Due 2/3/2025      Done 2/3/2015 AMB REFERRAL TO GI FOR COLONOSCOPY          Patient Care Team:  Luz Joshi D.O. as PCP - General (Family Medicine)  Mg Evans M.D. as Consulting Physician (Gastroenterology)  Brian Davidson M.D. as Consulting Physician (Oncology)  Jamar Anderson O.D. as Consulting Physician (Optometry)      Social History   Substance Use Topics   • Smoking status: Never Smoker   • Smokeless tobacco: Never Used   • Alcohol use 0.0 oz/week      Comment: twice a month     Family History   Problem Relation Age of Onset   • Heart Disease Mother    • Hypertension Mother    • Hyperlipidemia Mother    • Arthritis Father    • Hypertension     • Cancer       She  has a past medical history of Arthritis; BMI 34.0-34.9,adult (9/11/2014); Cancer (CMS-[facility]) (2010); CATARACT; Celiac disease; Dental disorder; Heart burn; Hiatal hernia (3/3/2011); Hot flashes, menopausal (3/3/2011); Hypertension; NORMAN (iron deficiency anemia) (3/3/2011); Indigestion; Insomnia (3/3/2011); Pain; and Renal disorder. She also has no past medical history of Diabetes (CMS-HCC).   Past Surgical History:   Procedure Laterality Date   • MAMMOPLASTY REDUCTION Bilateral 11/3/2015    Procedure: MAMMOPLASTY REDUCTION;  Surgeon: Talia Barton M.D.;  Location: SURGERY [facility];  Service:    • KNEE ARTHROSCOPY   9/17/2014    Performed by Dany Owens M.D. at [facility]   • MENISCECTOMY  9/17/2014    Performed by Dany Owens M.D. at [facility]   • SHOULDER ARTHROSCOPY W/ ROTATOR CUFF REPAIR  8/22/2013    Performed by Dany Owens M.D. at [facility]   • SHOULDER DECOMPRESSION ARTHROSCOPIC  8/22/2013    Performed by Dany Owens M.D. at [facility]   • CLAVICLE DISTAL EXCISION  8/22/2013    Performed by Dany Owens M.D. at [facility]   • OTHER  7/2013    laser procedure right eye   • CATARACT EXTRACTION WITH IOL  5/29/13    right eye   • ABDOMINAL HYSTERECTOMY TOTAL  1975   • CHOLECYSTECTOMY  1973    open   • TONSILLECTOMY  1950   • BLADDER SUSPENSION  1973, 1978    x2       Exam:     Blood pressure 138/76, pulse 64, temperature 36.6 °C (97.8 °F), height 1.524 m (5'), weight 79.4 kg (175 lb), SpO2 96 %, not currently breastfeeding. Body mass index is 34.18 kg/m².    Hearing excellent.    Dentition good  Alert, oriented in no acute distress.  Eye contact is good, speech goal directed, affect calm      Assessment and Plan. The following treatment and monitoring plan is recommended:    1. Medicare annual wellness visit, subsequent      Chart reviewed and updated with the patient.   2. Need for vaccination  INFLUENZA VACCINE, HIGH DOSE (65+ ONLY)    Pneumococcal Conjugate Vaccine 13-Valent <4yo IM    Age appropriate immunization (flu and pneumonia) provided; patient tolerated procedure well.   3. Vaginal vault prolapse      Stable. Monitor   4. Obesity (BMI 30-39.9)  LIPID PROFILE    Stable. Monitor   5. Multiple allergies      Stable. Monitor   6. Chronic pain of left knee      Stable. Monitor   7. Primary insomnia      Stable. Monitor   8. Hot flashes, menopausal      Stable. Monitor   9. Essential hypertension  COMP METABOLIC PANEL    MICROALBUMIN CREAT RATIO URINE    Stable. Monitor   10. Gastroesophageal reflux disease without esophagitis       Stable. Monitor   11. CLL (chronic lymphocytic leukemia) (CMS-[facility])      Stable. Monitor   12. Cervical radiculopathy      Stable. Monitor   13. Sedative, hypnotic or anxiolytic dependence, unspecified      Stable. Monitor   14. Low serum vitamin D  VITAMIN D,25 HYDROXY    Stable. Monitor         Services suggested: No services needed at this time  Health Care Screening: Age-appropriate preventive services Medicare covers discussed today and ordered if indicated.  Referrals offered: Community-based lifestyle interventions to reduce health risks and promote self-management and wellness, fall prevention, nutrition, physical activity, tobacco-use cessation, weight loss, and mental health services as per orders if indicated.    Discussion today about general wellness and lifestyle habits:    · Prevent falls and reduce trip hazards; Cautioned about securing or removing rugs.  · Have a working fire alarm and carbon monoxide detector;   · Engage in regular physical activity and social activities       Follow-up: Return if symptoms worsen or fail to improve.

## 2017-10-17 NOTE — LETTER
[facility]  Luz Joshi D.O.  [address] [place] [address]  Fax: [phone]   Authorization for Release/Disclosure of   Protected Health Information   Name: FÁTIMA COOPER : 1945 SSN: [id number]   Address: [address]  [place] [address] Phone:    [phone] (home)    I authorize the entity listed below to release/disclose the PHI below to:   [facility]/Luz Joshi D.O. and Luz Joshi D.O.   Provider or Entity Name:  Jamar Anderson   [facility]   Address   City, State, [facility] Phone:[phone]      Fax:     Reason for request: continuity of care   Information to be released:    [  ] LAST COLONOSCOPY,  including any PATH REPORT and follow-up  [  ] LAST FIT/COLOGUARD RESULT [  ] LAST DEXA  [  ] LAST MAMMOGRAM  [  ] LAST PAP  [  ] LAST LABS [  xx] RETINA EXAM REPORT  [  ] IMMUNIZATION RECORDS  [  ] Release all info      [  ] Check here and initial the line next to each item to release ALL health information INCLUDING  _____ Care and treatment for drug and / or alcohol abuse  _____ HIV testing, infection status, or AIDS  _____ Genetic Testing    DATES OF SERVICE OR TIME PERIOD TO BE DISCLOSED: _____________  I understand and acknowledge that:  * This Authorization may be revoked at any time by you in writing, except if your health information has already been used or disclosed.  * Your health information that will be used or disclosed as a result of you signing this authorization could be re-disclosed by the recipient. If this occurs, your re-disclosed health information may no longer be protected by State or Federal laws.  * You may refuse to sign this Authorization. Your refusal will not affect your ability to obtain treatment.  * This Authorization becomes effective upon signing and will  on (date) __________.      If no date is indicated, this Authorization will  one (1) year from the signature date.    Name: Fátima Vaz  [place]    Signature:   Date:     10/17/2017       PLEASE FAX REQUESTED RECORDS BACK TO: [phone]

## 2017-10-19 ENCOUNTER — HOSPITAL ENCOUNTER (OUTPATIENT)
Dept: RADIOLOGY | Facility: MEDICAL CENTER | Age: 72
End: 2017-10-19
Attending: FAMILY MEDICINE
Payer: MEDICARE

## 2017-10-19 DIAGNOSIS — Z12.31 ENCOUNTER FOR SCREENING MAMMOGRAM FOR BREAST CANCER: ICD-10-CM

## 2017-10-19 PROCEDURE — G0202 SCR MAMMO BI INCL CAD: HCPCS

## 2017-10-20 ENCOUNTER — TELEPHONE (OUTPATIENT)
Dept: MEDICAL GROUP | Facility: PHYSICIAN GROUP | Age: 72
End: 2017-10-20

## 2017-10-20 NOTE — TELEPHONE ENCOUNTER
----- Message from Luz Joshi D.O. sent at 10/19/2017  6:15 PM PDT -----  Please advise pt that the mammogram does show dense breast tissue but is otherwise normal. Recommend repeat mammogram in one year.    Luz Joshi D.O.

## 2017-11-09 RX ORDER — ZOLPIDEM TARTRATE 5 MG/1
5 TABLET ORAL NIGHTLY PRN
Qty: 45 TAB | Refills: 0 | Status: SHIPPED
Start: 2017-11-09 | End: 2018-11-13

## 2017-12-12 ENCOUNTER — HOSPITAL ENCOUNTER (OUTPATIENT)
Dept: LAB | Facility: MEDICAL CENTER | Age: 72
End: 2017-12-12
Attending: FAMILY MEDICINE
Payer: MEDICARE

## 2017-12-12 ENCOUNTER — HOSPITAL ENCOUNTER (OUTPATIENT)
Dept: LAB | Facility: MEDICAL CENTER | Age: 72
End: 2017-12-12
Attending: INTERNAL MEDICINE
Payer: MEDICARE

## 2017-12-12 DIAGNOSIS — E66.9 OBESITY (BMI 30-39.9): ICD-10-CM

## 2017-12-12 DIAGNOSIS — I10 ESSENTIAL HYPERTENSION: ICD-10-CM

## 2017-12-12 DIAGNOSIS — R79.89 LOW SERUM VITAMIN D: ICD-10-CM

## 2017-12-12 LAB
ALBUMIN SERPL BCP-MCNC: 4.5 G/DL (ref 3.2–4.9)
ALBUMIN/GLOB SERPL: 1.9 G/DL
ALP SERPL-CCNC: 58 U/L (ref 30–99)
ALT SERPL-CCNC: 14 U/L (ref 2–50)
ANION GAP SERPL CALC-SCNC: 10 MMOL/L (ref 0–11.9)
AST SERPL-CCNC: 21 U/L (ref 12–45)
BILIRUB SERPL-MCNC: 0.6 MG/DL (ref 0.1–1.5)
BUN SERPL-MCNC: 14 MG/DL (ref 8–22)
CALCIUM SERPL-MCNC: 9.9 MG/DL (ref 8.5–10.5)
CHLORIDE SERPL-SCNC: 107 MMOL/L (ref 96–112)
CHOLEST SERPL-MCNC: 118 MG/DL (ref 100–199)
CO2 SERPL-SCNC: 26 MMOL/L (ref 20–33)
CREAT SERPL-MCNC: 0.87 MG/DL (ref 0.5–1.4)
CREAT UR-MCNC: 85.6 MG/DL
FERRITIN SERPL-MCNC: 439.1 NG/ML (ref 10–291)
GFR SERPL CREATININE-BSD FRML MDRD: >60 ML/MIN/1.73 M 2
GLOBULIN SER CALC-MCNC: 2.4 G/DL (ref 1.9–3.5)
GLUCOSE SERPL-MCNC: 87 MG/DL (ref 65–99)
HDLC SERPL-MCNC: 77 MG/DL
IRON SATN MFR SERPL: 30 % (ref 15–55)
IRON SERPL-MCNC: 103 UG/DL (ref 40–170)
LDH SERPL-CCNC: 137 U/L (ref 107–266)
LDLC SERPL CALC-MCNC: 27 MG/DL
MAGNESIUM SERPL-MCNC: 2.1 MG/DL (ref 1.5–2.5)
MICROALBUMIN UR-MCNC: <0.7 MG/DL
MICROALBUMIN/CREAT UR: NORMAL MG/G (ref 0–30)
POTASSIUM SERPL-SCNC: 4.1 MMOL/L (ref 3.6–5.5)
PROT SERPL-MCNC: 6.9 G/DL (ref 6–8.2)
SODIUM SERPL-SCNC: 143 MMOL/L (ref 135–145)
TIBC SERPL-MCNC: 343 UG/DL (ref 250–450)
TRIGL SERPL-MCNC: 69 MG/DL (ref 0–149)
VIT B12 SERPL-MCNC: 239 PG/ML (ref 211–911)

## 2017-12-12 PROCEDURE — 85007 BL SMEAR W/DIFF WBC COUNT: CPT

## 2017-12-12 PROCEDURE — 36415 COLL VENOUS BLD VENIPUNCTURE: CPT

## 2017-12-12 PROCEDURE — 83615 LACTATE (LD) (LDH) ENZYME: CPT

## 2017-12-12 PROCEDURE — 82570 ASSAY OF URINE CREATININE: CPT

## 2017-12-12 PROCEDURE — 83735 ASSAY OF MAGNESIUM: CPT

## 2017-12-12 PROCEDURE — 80053 COMPREHEN METABOLIC PANEL: CPT

## 2017-12-12 PROCEDURE — 80061 LIPID PANEL: CPT

## 2017-12-12 PROCEDURE — 82607 VITAMIN B-12: CPT

## 2017-12-12 PROCEDURE — 82728 ASSAY OF FERRITIN: CPT

## 2017-12-12 PROCEDURE — 83550 IRON BINDING TEST: CPT

## 2017-12-12 PROCEDURE — 82306 VITAMIN D 25 HYDROXY: CPT

## 2017-12-12 PROCEDURE — 85027 COMPLETE CBC AUTOMATED: CPT

## 2017-12-12 PROCEDURE — 83540 ASSAY OF IRON: CPT

## 2017-12-12 PROCEDURE — 80500 HCHG CLINICAL PATH CONSULT-LIMITED: CPT

## 2017-12-12 PROCEDURE — 82043 UR ALBUMIN QUANTITATIVE: CPT

## 2017-12-13 LAB
25(OH)D3 SERPL-MCNC: 40 NG/ML (ref 30–100)
BASOPHILS # BLD AUTO: 0 % (ref 0–1.8)
BASOPHILS # BLD: 0 K/UL (ref 0–0.12)
EOSINOPHIL # BLD AUTO: 0.31 K/UL (ref 0–0.51)
EOSINOPHIL NFR BLD: 1.8 % (ref 0–6.9)
ERYTHROCYTE [DISTWIDTH] IN BLOOD BY AUTOMATED COUNT: 49.6 FL (ref 35.9–50)
HCT VFR BLD AUTO: 41 % (ref 37–47)
HGB BLD-MCNC: 13.2 G/DL (ref 12–16)
LYMPHOCYTES # BLD AUTO: 13.68 K/UL (ref 1–4.8)
LYMPHOCYTES NFR BLD: 80 % (ref 22–41)
MANUAL DIFF BLD: NORMAL
MCH RBC QN AUTO: 31.2 PG (ref 27–33)
MCHC RBC AUTO-ENTMCNC: 32.2 G/DL (ref 33.6–35)
MCV RBC AUTO: 96.9 FL (ref 81.4–97.8)
MONOCYTES # BLD AUTO: 0.29 K/UL (ref 0–0.85)
MONOCYTES NFR BLD AUTO: 1.7 % (ref 0–13.4)
MORPHOLOGY BLD-IMP: NORMAL
NEUTROPHILS # BLD AUTO: 1.78 K/UL (ref 2–7.15)
NEUTROPHILS NFR BLD: 10.4 % (ref 44–72)
NRBC # BLD AUTO: 0 K/UL
NRBC BLD AUTO-RTO: 0 /100 WBC
PATH REV: NORMAL
PATH REV: NORMAL
PLATELET # BLD AUTO: 204 K/UL (ref 164–446)
PLATELET BLD QL SMEAR: NORMAL
PMV BLD AUTO: 10.9 FL (ref 9–12.9)
RBC # BLD AUTO: 4.23 M/UL (ref 4.2–5.4)
RBC BLD AUTO: PRESENT
SMUDGE CELLS BLD QL SMEAR: NORMAL
WBC # BLD AUTO: 17.1 K/UL (ref 4.8–10.8)
WBC OTHER NFR BLD MANUAL: 6.1 %

## 2018-01-17 ENCOUNTER — HOSPITAL ENCOUNTER (OUTPATIENT)
Facility: MEDICAL CENTER | Age: 73
End: 2018-01-17
Attending: OPHTHALMOLOGY | Admitting: OPHTHALMOLOGY
Payer: MEDICARE

## 2018-01-17 VITALS
BODY MASS INDEX: 32.88 KG/M2 | HEIGHT: 61 IN | RESPIRATION RATE: 16 BRPM | WEIGHT: 174.16 LBS | OXYGEN SATURATION: 94 % | TEMPERATURE: 97.5 F | SYSTOLIC BLOOD PRESSURE: 145 MMHG | HEART RATE: 67 BPM | DIASTOLIC BLOOD PRESSURE: 73 MMHG

## 2018-01-17 LAB
ANION GAP SERPL CALC-SCNC: 8 MMOL/L (ref 0–11.9)
ANISOCYTOSIS BLD QL SMEAR: ABNORMAL
BASOPHILS # BLD AUTO: 0 % (ref 0–1.8)
BASOPHILS # BLD: 0 K/UL (ref 0–0.12)
BUN SERPL-MCNC: 19 MG/DL (ref 8–22)
CALCIUM SERPL-MCNC: 9.4 MG/DL (ref 8.5–10.5)
CHLORIDE SERPL-SCNC: 110 MMOL/L (ref 96–112)
CO2 SERPL-SCNC: 20 MMOL/L (ref 20–33)
CREAT SERPL-MCNC: 0.88 MG/DL (ref 0.5–1.4)
EKG IMPRESSION: NORMAL
EOSINOPHIL # BLD AUTO: 0 K/UL (ref 0–0.51)
EOSINOPHIL NFR BLD: 0 % (ref 0–6.9)
ERYTHROCYTE [DISTWIDTH] IN BLOOD BY AUTOMATED COUNT: 46.1 FL (ref 35.9–50)
GLUCOSE SERPL-MCNC: 95 MG/DL (ref 65–99)
HCT VFR BLD AUTO: 39.6 % (ref 37–47)
HGB BLD-MCNC: 12.9 G/DL (ref 12–16)
LYMPHOCYTES # BLD AUTO: 16.13 K/UL (ref 1–4.8)
LYMPHOCYTES NFR BLD: 83.6 % (ref 22–41)
MANUAL DIFF BLD: NORMAL
MCH RBC QN AUTO: 30.9 PG (ref 27–33)
MCHC RBC AUTO-ENTMCNC: 32.6 G/DL (ref 33.6–35)
MCV RBC AUTO: 94.7 FL (ref 81.4–97.8)
MICROCYTES BLD QL SMEAR: ABNORMAL
MONOCYTES # BLD AUTO: 0 K/UL (ref 0–0.85)
MONOCYTES NFR BLD AUTO: 0 % (ref 0–13.4)
MORPHOLOGY BLD-IMP: NORMAL
NEUTROPHILS # BLD AUTO: 3.17 K/UL (ref 2–7.15)
NEUTROPHILS NFR BLD: 16.4 % (ref 44–72)
NRBC # BLD AUTO: 0 K/UL
NRBC BLD-RTO: 0 /100 WBC
PLATELET # BLD AUTO: 172 K/UL (ref 164–446)
PMV BLD AUTO: 10.3 FL (ref 9–12.9)
POTASSIUM SERPL-SCNC: 4.2 MMOL/L (ref 3.6–5.5)
RBC # BLD AUTO: 4.18 M/UL (ref 4.2–5.4)
RBC BLD AUTO: PRESENT
SMUDGE CELLS BLD QL SMEAR: NORMAL
SODIUM SERPL-SCNC: 138 MMOL/L (ref 135–145)
WBC # BLD AUTO: 19.3 K/UL (ref 4.8–10.8)

## 2018-01-17 PROCEDURE — 85027 COMPLETE CBC AUTOMATED: CPT

## 2018-01-17 PROCEDURE — 160009 HCHG ANES TIME/MIN: Performed by: OPHTHALMOLOGY

## 2018-01-17 PROCEDURE — 160002 HCHG RECOVERY MINUTES (STAT): Performed by: OPHTHALMOLOGY

## 2018-01-17 PROCEDURE — 160036 HCHG PACU - EA ADDL 30 MINS PHASE I: Performed by: OPHTHALMOLOGY

## 2018-01-17 PROCEDURE — 85007 BL SMEAR W/DIFF WBC COUNT: CPT

## 2018-01-17 PROCEDURE — A9270 NON-COVERED ITEM OR SERVICE: HCPCS

## 2018-01-17 PROCEDURE — A6402 STERILE GAUZE <= 16 SQ IN: HCPCS | Performed by: OPHTHALMOLOGY

## 2018-01-17 PROCEDURE — 500558 HCHG EYE SHIELD W/GARTER (FOX): Performed by: OPHTHALMOLOGY

## 2018-01-17 PROCEDURE — 80048 BASIC METABOLIC PNL TOTAL CA: CPT

## 2018-01-17 PROCEDURE — 501836 HCHG SUTURE EYE: Performed by: OPHTHALMOLOGY

## 2018-01-17 PROCEDURE — 160041 HCHG SURGERY MINUTES - EA ADDL 1 MIN LEVEL 4: Performed by: OPHTHALMOLOGY

## 2018-01-17 PROCEDURE — A6410 STERILE EYE PAD: HCPCS | Performed by: OPHTHALMOLOGY

## 2018-01-17 PROCEDURE — 160048 HCHG OR STATISTICAL LEVEL 1-5: Performed by: OPHTHALMOLOGY

## 2018-01-17 PROCEDURE — 160029 HCHG SURGERY MINUTES - 1ST 30 MINS LEVEL 4: Performed by: OPHTHALMOLOGY

## 2018-01-17 PROCEDURE — 700111 HCHG RX REV CODE 636 W/ 250 OVERRIDE (IP)

## 2018-01-17 PROCEDURE — 93010 ELECTROCARDIOGRAM REPORT: CPT | Performed by: INTERNAL MEDICINE

## 2018-01-17 PROCEDURE — 700101 HCHG RX REV CODE 250

## 2018-01-17 PROCEDURE — 93005 ELECTROCARDIOGRAM TRACING: CPT | Performed by: OPHTHALMOLOGY

## 2018-01-17 PROCEDURE — 500290 HCHG CAUTERY, DISP (EYE): Performed by: OPHTHALMOLOGY

## 2018-01-17 PROCEDURE — 700102 HCHG RX REV CODE 250 W/ 637 OVERRIDE(OP)

## 2018-01-17 PROCEDURE — 160035 HCHG PACU - 1ST 60 MINS PHASE I: Performed by: OPHTHALMOLOGY

## 2018-01-17 RX ORDER — OXYCODONE HCL 5 MG/5 ML
SOLUTION, ORAL ORAL
Status: COMPLETED
Start: 2018-01-17 | End: 2018-01-17

## 2018-01-17 RX ORDER — DEXAMETHASONE SODIUM PHOSPHATE 4 MG/ML
INJECTION, SOLUTION INTRA-ARTICULAR; INTRALESIONAL; INTRAMUSCULAR; INTRAVENOUS; SOFT TISSUE
Status: DISCONTINUED | OUTPATIENT
Start: 2018-01-17 | End: 2018-01-17 | Stop reason: HOSPADM

## 2018-01-17 RX ORDER — SODIUM CHLORIDE, SODIUM LACTATE, POTASSIUM CHLORIDE, CALCIUM CHLORIDE 600; 310; 30; 20 MG/100ML; MG/100ML; MG/100ML; MG/100ML
INJECTION, SOLUTION INTRAVENOUS CONTINUOUS
Status: DISCONTINUED | OUTPATIENT
Start: 2018-01-17 | End: 2018-01-17 | Stop reason: HOSPADM

## 2018-01-17 RX ORDER — CEFAZOLIN SODIUM 1 G/3ML
INJECTION, POWDER, FOR SOLUTION INTRAMUSCULAR; INTRAVENOUS
Status: DISCONTINUED | OUTPATIENT
Start: 2018-01-17 | End: 2018-01-17 | Stop reason: HOSPADM

## 2018-01-17 RX ORDER — BALANCED SALT SOLUTION 6.4; .75; .48; .3; 3.9; 1.7 MG/ML; MG/ML; MG/ML; MG/ML; MG/ML; MG/ML
SOLUTION OPHTHALMIC
Status: DISCONTINUED | OUTPATIENT
Start: 2018-01-17 | End: 2018-01-17 | Stop reason: HOSPADM

## 2018-01-17 RX ORDER — BALANCED SALT SOLUTION ENRICHED WITH BICARBONATE, DEXTROSE, AND GLUTATHIONE
KIT INTRAOCULAR
Status: DISCONTINUED | OUTPATIENT
Start: 2018-01-17 | End: 2018-01-17 | Stop reason: HOSPADM

## 2018-01-17 RX ORDER — CYCLOPENTOLATE HYDROCHLORIDE 10 MG/ML
SOLUTION/ DROPS OPHTHALMIC
Status: COMPLETED
Start: 2018-01-17 | End: 2018-01-17

## 2018-01-17 RX ORDER — BALANCED SALT SOLUTION ENRICHED WITH BICARBONATE, DEXTROSE, AND GLUTATHIONE
KIT INTRAOCULAR
Status: DISCONTINUED
Start: 2018-01-17 | End: 2018-01-17 | Stop reason: HOSPADM

## 2018-01-17 RX ORDER — NEOMYCIN SULFATE, POLYMYXIN B SULFATE, AND DEXAMETHASONE 3.5; 10000; 1 MG/G; [USP'U]/G; MG/G
OINTMENT OPHTHALMIC
Status: DISCONTINUED | OUTPATIENT
Start: 2018-01-17 | End: 2018-01-17 | Stop reason: HOSPADM

## 2018-01-17 RX ORDER — PHENYLEPHRINE HYDROCHLORIDE 25 MG/ML
SOLUTION/ DROPS OPHTHALMIC
Status: COMPLETED
Start: 2018-01-17 | End: 2018-01-17

## 2018-01-17 RX ADMIN — CYCLOPENTOLATE HYDROCHLORIDE 3 DROP: 10 SOLUTION/ DROPS OPHTHALMIC at 11:20

## 2018-01-17 RX ADMIN — SODIUM CHLORIDE, SODIUM LACTATE, POTASSIUM CHLORIDE, CALCIUM CHLORIDE: 600; 310; 30; 20 INJECTION, SOLUTION INTRAVENOUS at 11:30

## 2018-01-17 RX ADMIN — PHENYLEPHRINE HYDROCHLORIDE 3 DROP: 2.5 SOLUTION/ DROPS OPHTHALMIC at 11:20

## 2018-01-17 RX ADMIN — OXYCODONE HYDROCHLORIDE 5 MG: 5 SOLUTION ORAL at 14:00

## 2018-01-17 ASSESSMENT — PAIN SCALES - GENERAL
PAINLEVEL_OUTOF10: 3
PAINLEVEL_OUTOF10: 0
PAINLEVEL_OUTOF10: 0
PAINLEVEL_OUTOF10: 2
PAINLEVEL_OUTOF10: 3
PAINLEVEL_OUTOF10: 0
PAINLEVEL_OUTOF10: 2
PAINLEVEL_OUTOF10: 2
PAINLEVEL_OUTOF10: 0

## 2018-01-17 NOTE — DISCHARGE INSTRUCTIONS
ACTIVITY: Rest and take it easy for the first 24 hours.  A responsible adult is recommended to remain with you during that time.  It is normal to feel sleepy.  We encourage you to not do anything that requires balance, judgment or coordination.    MILD FLU-LIKE SYMPTOMS ARE NORMAL. YOU MAY EXPERIENCE GENERALIZED MUSCLE ACHES, THROAT IRRITATION, HEADACHE AND/OR SOME NAUSEA.    FOR 24 HOURS DO NOT:  Drive, operate machinery or run household appliances.  Drink beer or alcoholic beverages.   Make important decisions or sign legal documents.    SPECIAL INSTRUCTIONS: **LEFT SIDE DOWN WHILE ASLEEP, FACE DOWN WHILE AWAKE  continue medications as previously ordered    DIET: To avoid nausea, slowly advance diet as tolerated, avoiding spicy or greasy foods for the first day.  Add more substantial food to your diet according to your physician's instructions.  Babies can be fed formula or breast milk as soon as they are hungry.  INCREASE FLUIDS AND FIBER TO AVOID CONSTIPATION.    SURGICAL DRESSING/BATHING: *keep eye dry    FOLLOW-UP APPOINTMENT:  A follow-up appointment should be arranged with your doctor in **8:45am 1/18/18*; call to schedule.    You should CALL YOUR PHYSICIAN if you develop:  Fever greater than 101 degrees F.  Pain not relieved by medication, or persistent nausea or vomiting.  Excessive bleeding (blood soaking through dressing) or unexpected drainage from the wound.  Extreme redness or swelling around the incision site, drainage of pus or foul smelling drainage.  Inability to urinate or empty your bladder within 8 hours.  Problems with breathing or chest pain.    You should call 911 if you develop problems with breathing or chest pain.  If you are unable to contact your doctor or surgical center, you should go to the nearest emergency room or urgent care center.  Physician's telephone #: *  Dr Gupta [phone]**    If any questions arise, call your doctor.  If your doctor is not available, please feel free to  call the Surgical Center at [phone].  The Center is open Monday through Friday from 7AM to 7PM.  You can also call the HEALTH HOTLINE open 24 hours/day, 7 days/week and speak to a nurse at   [phone], or toll free at [phone].    A registered nurse may call you a few days after your surgery to see how you are doing after your procedure.    MEDICATIONS: Resume taking daily medication.  Take prescribed pain medication with food.  If no medication is prescribed, you may take non-aspirin pain medication if needed.  PAIN MEDICATION CAN BE VERY CONSTIPATING.  Take a stool softener or laxative such as senokot, pericolace, or milk of magnesia if needed.    Prescription given for *none**.  Last pain medication given at **2pm .    If your physician has prescribed pain medication that includes Acetaminophen (Tylenol), do not take additional Acetaminophen (Tylenol) while taking the prescribed medication.    Depression / Suicide Risk    As you are discharged from this [facility] facility, it is important to learn how to keep safe from harming yourself.    Recognize the warning signs:  · Abrupt changes in personality, positive or negative- including increase in energy   · Giving away possessions  · Change in eating patterns- significant weight changes-  positive or negative  · Change in sleeping patterns- unable to sleep or sleeping all the time   · Unwillingness or inability to communicate  · Depression  · Unusual sadness, discouragement and loneliness  · Talk of wanting to die  · Neglect of personal appearance   · Rebelliousness- reckless behavior  · Withdrawal from people/activities they love  · Confusion- inability to concentrate     If you or a loved one observes any of these behaviors or has concerns about self-harm, here's what you can do:  · Talk about it- your feelings and reasons for harming yourself  · Remove any means that you might use to hurt yourself (examples: pills, rope, extension cords,  firearm)  · Get professional help from the community (Mental Health, Substance Abuse, psychological counseling)  · Do not be alone:Call your Safe Contact- someone whom you trust who will be there for you.  · Call your local CRISIS HOTLINE 775-0128 or 415-860-7963  · Call your local Children's Mobile Crisis Response Team [place] (544) 915-6064 or www.[employer]  · Call the toll free National Suicide Prevention Hotlines   · National Suicide Prevention Lifeline 839-040-JNTQ (4848)  · National Hope Line Network 800-SUICIDE (795-3969)

## 2018-01-17 NOTE — OR NURSING
1400 more awake, c/o scratchy discomfort, meds given, tolerating sips of water, daughter at bedside  1500 pt expressing readiness to go home & that she has had very good care today, instructions given, iv d/c'd, home ambulatory & steady on feet

## 2018-01-17 NOTE — OR NURSING
1312 pt adm to PACU from OR via [facility] by RN and , report received and care assumed. Rt eye covered with tape and shield. VSS - breathing even and unlabored, denies pain or nausea

## 2018-01-17 NOTE — OP REPORT
DATE OF SERVICE:  01/17/2018    PREOPERATIVE DIAGNOSIS:  Vitreous hemorrhage and retinal detachment involving   the macula, right eye.    POSTOPERATIVE DIAGNOSES:  1.  Vitreous hemorrhage and retinal detachment involving the macula, right   eye.  2.  Giant tear, right eye.    SURGEON:  Sea Franklin MD    ASSISTANT:  None.    ANESTHESIA:  General local.    ANESTHESIOLOGIST:  Jose Samuels MD    PROCEDURE PERFORMED:  Pars plana vitrectomy, air fluid gas exchange with 20%   SF6, endolaser, cryotherapy.    INDICATION FOR SURGERY:  This patient presented with the findings noted above.    We discussed in detail the risks, benefits and alternatives regarding   surgery.  The risks include but are not limited to the following, high eye   pressure, low eye pressure, bleeding, infection, lens changes, persistent   detachment, recurrent detachment, infection, need for further surgery, pain,   induced refractive error, complete and irreversible loss of all vision, loss   of the eye.  The patient understood there were no structural or visual   guarantees.  She understood that the postoperative positioning would be   important to the success of the surgery.  She understood that he would be   prohibited from going up in elevation as long as any gas remained in the eye.    She had a chance of all of her questions answered.  She consented to   procedure.    PROCEDURE IN DETAIL:  The patient was prepped and draped in the usual sterile   manner.  Attention was directed toward the right eye.  A 23 gauge trocars were   placed 3.5 mm posterior to limbus at 8:30, 9:30, and 2:30 o'clock.  The   infusion cannula was placed in the inferotemporal sclerotomy and secured and   once it was verified to be correctly positioned, central vitrectomy was then   performed.  We could see the patient had at the equatorial level an area of   different lattice resulting in a giant tear and another large horseshoe tear   adjacent to it.  These were  at the superior quadrants.  We made sure we   trimmed the vitreous around this area to the greatest extent possible.  We   then performed a drainage retinotomy superior to the optic nerve with   endodiathermy.  An air fluid exchange was performed and endolaser was applied   to the retinotomy site, endolaser and cryotherapy were applied to the giant   tear as the adjacent horseshoe tears.  In addition, we applied a little bit of   barrier laser inferiorly as there was still some blood in the vitreous base   inferiorly, we could determine first with certainty that there were not any   inferior tears.  The air was then exchanged with approximately 15 mL of 20%   SF6 through the infusion cannula.  There was a slight leak at the   inferotemporal sclerotomy site.  This was closed with 6-0 gut.  The pressure   was approximately 20 at the end of the case.  Subconjunctival Kefzol and   Decadron were injected.  Sub-Tenon Marcaine was instilled.  The patient   tolerated the procedure well and was taken to the recovery room in good and   stable condition.       ____________________________________     MD LAKESHA BENDER / JHONNY    DD:  01/17/2018 13:33:31  DT:  01/17/2018 14:07:11    D#:  [id number]  Job#:  [id number]

## 2018-01-18 NOTE — DISCHARGE PLANNING
Late Entry from 1/17/18 - Received referral to see patient re: Advance Directives from [place], [facility]. Met with patient - answered questions, completed document and notarized. Will scan into Epic. VIK Sarabia - [phone] or [phone]

## 2018-01-31 ENCOUNTER — PATIENT OUTREACH (OUTPATIENT)
Dept: HEALTH INFORMATION MANAGEMENT | Facility: OTHER | Age: 73
End: 2018-01-31

## 2018-01-31 NOTE — PROGRESS NOTES
1. Attempt #: 1    2. HealthConnect Verified: yes    3. Verify PCP: yes    4. Care Team Updated:       •   DME Company (gait device, O2, CPAP, etc.): YES       •   Other Specialists (eye doctor, derm, GYN, cardiology, endo, etc): YES    5.  Reviewed/Updated the following with patient:       •   Communication Preference Obtained? YES       •   Preferred Pharmacy? YES       •   Preferred Lab? YES       •   Family History (document living status of immediate family members and if + hx of cancer, diabetes, hypertension, hyperlipidemia, heart attack, stroke) YES. Was Abstract Encounter opened and chart updated? YES    6. [employer] Activation: already active    7. [employer] Nithya: no    8. Annual Wellness Visit Scheduling  Scheduling Status:Scheduled      9. Care Gap Scheduling (Attempt to Schedule EACH Overdue Care Gap!)     Health Maintenance Due   Topic Date Due   • IMM PNEUMOCOCCAL 65+ (ADULT) HIGH/HIGHEST RISK SERIES (2 of 2 - PPSV23) 12/12/2017        Scheduled patient for Annual Wellness Visit    10. Patient was advised: “This is a free wellness visit. The provider will screen for medical conditions to help you stay healthy. If you have other concerns to address you may be asked to discuss these at a separate visit or there may be an additional fee.”     11. Patient was informed to arrive 15 min prior to their scheduled appointment and bring in their medication bottles.

## 2018-02-06 ENCOUNTER — OFFICE VISIT (OUTPATIENT)
Dept: MEDICAL GROUP | Facility: PHYSICIAN GROUP | Age: 73
End: 2018-02-06
Payer: MEDICARE

## 2018-02-06 VITALS
SYSTOLIC BLOOD PRESSURE: 124 MMHG | DIASTOLIC BLOOD PRESSURE: 82 MMHG | OXYGEN SATURATION: 94 % | WEIGHT: 175 LBS | BODY MASS INDEX: 32.2 KG/M2 | HEIGHT: 62 IN | TEMPERATURE: 98.7 F | HEART RATE: 69 BPM

## 2018-02-06 DIAGNOSIS — F13.20 SEDATIVE, HYPNOTIC OR ANXIOLYTIC DEPENDENCE (HCC): ICD-10-CM

## 2018-02-06 DIAGNOSIS — I10 ESSENTIAL HYPERTENSION: ICD-10-CM

## 2018-02-06 DIAGNOSIS — H33.21 DETACHED RETINA, RIGHT: ICD-10-CM

## 2018-02-06 DIAGNOSIS — F51.01 PRIMARY INSOMNIA: ICD-10-CM

## 2018-02-06 DIAGNOSIS — K21.9 GASTROESOPHAGEAL REFLUX DISEASE WITHOUT ESOPHAGITIS: ICD-10-CM

## 2018-02-06 DIAGNOSIS — C91.10 CLL (CHRONIC LYMPHOCYTIC LEUKEMIA) (HCC): ICD-10-CM

## 2018-02-06 PROCEDURE — 99214 OFFICE O/P EST MOD 30 MIN: CPT | Performed by: FAMILY MEDICINE

## 2018-02-06 NOTE — ASSESSMENT & PLAN NOTE
New problem. Patient reports that the day after [place] she started having issues with her vision in her right eye. She has been evaluated and was determined to have a detached retina. She has had surgery. She is doing better. She does have close follow-up with ophthalmology.

## 2018-02-06 NOTE — ASSESSMENT & PLAN NOTE
Ongoing issue. Patient continues to take Ambien 2.5 mg to help with sleep. She is working to wean herself off of this medication.

## 2018-02-06 NOTE — ASSESSMENT & PLAN NOTE
Ongoing issue. Patient continues to take atenolol 50 mg daily; she denies any new issues with headache, dizziness, chest pain. Chart review shows that her blood pressure and heart rate both are in a normal range

## 2018-02-06 NOTE — ASSESSMENT & PLAN NOTE
Ongoing issue. Patient continues to take Prevacid 30 mg twice a day; she denies any new issues with heartburn. She continues to see a GI specialist.

## 2018-02-06 NOTE — ASSESSMENT & PLAN NOTE
Ongoing issue. Patient reports she continues to take Ambien 2.5 mg at night to help with sleep. She is interested in weaning herself off of this medication. She has been adding melatonin to help with sleep and this has been successful.

## 2018-02-06 NOTE — ASSESSMENT & PLAN NOTE
Ongoing issue. Patient continues to see hematology for this issues. She currently denies any enlarged lymph nodes, fatigue, weakness.

## 2018-02-06 NOTE — PROGRESS NOTES
Chief Complaint   Patient presents with   • Annual Exam         HPI:  Shanice is a 72 y.o. here for Medicare Annual Wellness Visit    Patient Active Problem List    Diagnosis Date Noted   • CLL (chronic lymphocytic leukemia) (CMS-[facility]) 03/03/2011     Priority: High   • Low serum vitamin D 10/17/2017   • Sedative, hypnotic or anxiolytic dependence, unspecified 02/06/2017   • Obesity (BMI 30-39.9) 02/06/2017   • Multiple allergies 04/12/2016   • Knee pain, left 04/12/2016   • Vaginal vault prolapse 04/23/2015   • Cervical radiculopathy 06/27/2013   • Hypertension 03/03/2011   • GERD (gastroesophageal reflux disease) 03/03/2011   • Insomnia 03/03/2011   • Hot flashes, menopausal 03/03/2011       Current Outpatient Prescriptions   Medication Sig Dispense Refill   • zolpidem (AMBIEN) 5 MG Tab Take 1 Tab by mouth at bedtime as needed for Sleep. 45 Tab 0   • atenolol (TENORMIN) 50 MG Tab TAKE 1 TABLET BY MOUTH TWICE A  Tab 3   • conjugated estrogen (PREMARIN) 0.625 MG/GM CREA 1.0gms per vagina three times/week 1 Tube 3   • Cetirizine HCl (KLS ALLER-JAYE PO) Take  by mouth every day.     • lansoprazole (PREVACID) 30 MG TBDP Take 1 Tab by mouth 2 Times a Day. 90 Each 3     No current facility-administered medications for this visit.         Patient is taking medications as noted in medication list.  Current supplements as per medication list.     Allergies: Other misc; Sulfa drugs; and Wheat             Patient Care Team:  Luz Joshi D.O. as PCP - General (Family Medicine)  Mg Evans M.D. as Consulting Physician (Gastroenterology)  Brian Davidson M.D. as Consulting Physician (Oncology)  Jamar Anderson O.D. as Consulting Physician (Optometry)  Sea Franklin M.D. as Consulting Physician (Ophthalmology)    Social History   Substance Use Topics   • Smoking status: Never Smoker   • Smokeless tobacco: Never Used   • Alcohol use 0.0 oz/week      Comment: twice a month     Family History   Problem  "Relation Age of Onset   • Heart Disease Mother    • Hypertension Mother    • Arthritis Father    • Cancer Father    • Hyperlipidemia Father    • Hypertension     • Cancer       She  has a past medical history of Arthritis; BMI 34.0-34.9,adult (9/11/2014); Cancer (CMS-[facility]) (2010); CATARACT; Celiac disease; Dental disorder; Heart burn; Hiatal hernia (3/3/2011); Hot flashes, menopausal (3/3/2011); Hypertension; NORMAN (iron deficiency anemia) (3/3/2011); Indigestion; Insomnia (3/3/2011); Pain; and Renal disorder. She also has no past medical history of Diabetes (CMS-HCC).   Past Surgical History:   Procedure Laterality Date   • VITRECTOMY POSTERIOR Right 1/17/2018    Procedure: VITRECTOMY POSTERIOR W/AIR FLUID EXCHANGE, ENDO LASER, 23 GAUGE SF6 GAS, CRYOTHERAPY;  Surgeon: Sea Franklin M.D.;  Location: SURGERY SAME DAY [facility];  Service: Ophthalmology   • MAMMOPLASTY REDUCTION Bilateral 11/3/2015    Procedure: MAMMOPLASTY REDUCTION;  Surgeon: Talia Barton M.D.;  Location: SURGERY [facility];  Service:    • KNEE ARTHROSCOPY  9/17/2014    Performed by Dany Owens M.D. at [facility]   • MENISCECTOMY  9/17/2014    Performed by Dany Owens M.D. at [facility]   • SHOULDER ARTHROSCOPY W/ ROTATOR CUFF REPAIR  8/22/2013    Performed by Dany Owens M.D. at [facility]   • SHOULDER DECOMPRESSION ARTHROSCOPIC  8/22/2013    Performed by Dany Owens M.D. at [facility]   • CLAVICLE DISTAL EXCISION  8/22/2013    Performed by Dany Owens M.D. at [facility]   • OTHER  7/2013    laser procedure right eye   • CATARACT EXTRACTION WITH IOL  5/29/13    right eye   • ABDOMINAL HYSTERECTOMY TOTAL  1975   • CHOLECYSTECTOMY  1973    open   • TONSILLECTOMY  1950   • BLADDER SUSPENSION  1973, 1978    x2           Exam:     Blood pressure 124/82, pulse 69, temperature 37.1 °C (98.7 °F), height 1.562 m (5' 1.5\"), weight 79.4 kg (175 lb), " SpO2 94 %. Body mass index is 32.53 kg/m².    Hearing {GOOD/FAIR/POOR/EXCELLENT:24874}.    Dentition {DENTITION:56005}  Alert, oriented in no acute distress.  Eye contact is good, speech goal directed, affect calm  ***    Assessment and Plan. The following treatment and monitoring plan is recommended:  ***  No diagnosis found.      Services suggested: { AWV COORDINATION OF SERVICES:31384}  Health Care Screening recommendations as per orders if indicated.  Referrals offered: PT/OT/Nutrition counseling/Behavioral Health/Smoking cessation as per orders if indicated.    Discussion today about general wellness and lifestyle habits:    · Prevent falls and reduce trip hazards; Cautioned about securing or removing rugs.  · Have a working fire alarm and carbon monoxide detector;   · Engage in regular physical activity and social activities       Follow-up: No Follow-up on file.

## 2018-02-07 NOTE — PROGRESS NOTES
Subjective:   Shanice Magana is a 72 y.o. female here today for leukemia, hypertension, reflux, insomnia, detached retina    CLL (chronic lymphocytic leukemia)  Ongoing issue. Patient continues to see hematology for this issues. She currently denies any enlarged lymph nodes, fatigue, weakness.    Hypertension  Ongoing issue. Patient continues to take atenolol 50 mg daily; she denies any new issues with headache, dizziness, chest pain. Chart review shows that her blood pressure and heart rate both are in a normal range    GERD (gastroesophageal reflux disease)  Ongoing issue. Patient continues to take Prevacid 30 mg twice a day; she denies any new issues with heartburn. She continues to see a GI specialist.    Insomnia  Ongoing issue. Patient reports she continues to take Ambien 2.5 mg at night to help with sleep. She is interested in weaning herself off of this medication. She has been adding melatonin to help with sleep and this has been successful.    Sedative, hypnotic or anxiolytic dependence, unspecified  Ongoing issue. Patient continues to take Ambien 2.5 mg to help with sleep. She is working to wean herself off of this medication.    Detached retina, right  New problem. Patient reports that the day after [place] she started having issues with her vision in her right eye. She has been evaluated and was determined to have a detached retina. She has had surgery. She is doing better. She does have close follow-up with ophthalmology.         Current medicines (including changes today)  Current Outpatient Prescriptions   Medication Sig Dispense Refill   • lansoprazole (PREVACID) 30 MG TABLET DISPERSIBLE Take 1 Tab by mouth 2 Times a Day. 90 Tab 3   • zolpidem (AMBIEN) 5 MG Tab Take 1 Tab by mouth at bedtime as needed for Sleep. 45 Tab 0   • atenolol (TENORMIN) 50 MG Tab TAKE 1 TABLET BY MOUTH TWICE A  Tab 3   • conjugated estrogen (PREMARIN) 0.625 MG/GM CREA 1.0gms per vagina three times/week 1  "Tube 3   • Cetirizine HCl (KLS ALLER-JAYE PO) Take  by mouth every day.       No current facility-administered medications for this visit.      She  has a past medical history of Arthritis; BMI 34.0-34.9,adult (9/11/2014); Cancer (CMS-HCC) (2010); CATARACT; Celiac disease; Dental disorder; Heart burn; Hiatal hernia (3/3/2011); Hot flashes, menopausal (3/3/2011); Hypertension; NORMAN (iron deficiency anemia) (3/3/2011); Indigestion; Insomnia (3/3/2011); Pain; and Renal disorder. She also has no past medical history of Diabetes (CMS-HCC).    ROS   No chest pain, no shortness of breath, no abdominal pain  + Decreased vision in right eye     Objective:     Blood pressure 124/82, pulse 69, temperature 37.1 °C (98.7 °F), height 1.562 m (5' 1.5\"), weight 79.4 kg (175 lb), SpO2 94 %. Body mass index is 32.53 kg/m².   Physical Exam:  Alert, oriented in no acute distress.  Eye contact is good, speech goal directed, affect calm  HEENT: conjunctiva non-injected, sclera non-icteric.  Pinna normal. TM pearly gray.   Oral mucous membranes pink and moist with no lesions.  Neck No adenopathy or masses in the neck or supraclavicular regions.  Lungs: clear to auscultation bilaterally with good excursion.  CV: regular rate and rhythm.  Abdomen: soft, nontender, No CVAT  Ext: no edema, color normal, vascularity normal, temperature normal        Assessment and Plan:   The following treatment plan was discussed     1. Detached retina, right      Improving. Continue follow with ophthalmology as directed   2. Essential hypertension      Stable. Continue current medications; monitor   3. CLL (chronic lymphocytic leukemia) (CMS-HCC)      Stable. Continue follow-up with hematology as directed   4. Gastroesophageal reflux disease without esophagitis      Stable. Continue current medications; continue follow-up with GI as directed   5. Primary insomnia      Stable. Continue to wean off Ambien; monitor   6. Sedative, hypnotic or anxiolytic " dependence, unspecified      Weaning off Ambien       Followup: Return in about 8 months (around 10/6/2018) for HRA.

## 2018-04-13 DIAGNOSIS — I10 ESSENTIAL HYPERTENSION: ICD-10-CM

## 2018-04-13 RX ORDER — ATENOLOL 50 MG/1
TABLET ORAL
Qty: 180 TAB | Refills: 1 | Status: SHIPPED | OUTPATIENT
Start: 2018-04-13 | End: 2018-10-18 | Stop reason: SDUPTHER

## 2018-04-26 RX ORDER — ESTRADIOL 0.5 MG/1
TABLET ORAL
Qty: 90 TAB | Refills: 0 | Status: SHIPPED | OUTPATIENT
Start: 2018-04-26 | End: 2018-11-01 | Stop reason: SDUPTHER

## 2018-04-26 NOTE — TELEPHONE ENCOUNTER
Was the patient seen in the last year in this department? Yes     Does patient have an active prescription for medications requested? No  Discontinued    Received Request Via: Pharmacy

## 2018-06-21 ENCOUNTER — HOSPITAL ENCOUNTER (OUTPATIENT)
Dept: LAB | Facility: MEDICAL CENTER | Age: 73
End: 2018-06-21
Attending: OPHTHALMOLOGY
Payer: MEDICARE

## 2018-06-21 LAB
EST. AVERAGE GLUCOSE BLD GHB EST-MCNC: 114 MG/DL
HBA1C MFR BLD: 5.6 % (ref 0–5.6)
T3 SERPL-MCNC: 77.5 NG/DL (ref 60–181)
T4 FREE SERPL-MCNC: 0.81 NG/DL (ref 0.53–1.43)
TSH SERPL DL<=0.005 MIU/L-ACNC: 2.71 UIU/ML (ref 0.38–5.33)

## 2018-06-21 PROCEDURE — 83036 HEMOGLOBIN GLYCOSYLATED A1C: CPT

## 2018-06-21 PROCEDURE — 84480 ASSAY TRIIODOTHYRONINE (T3): CPT

## 2018-06-21 PROCEDURE — 84439 ASSAY OF FREE THYROXINE: CPT

## 2018-06-21 PROCEDURE — 83520 IMMUNOASSAY QUANT NOS NONAB: CPT

## 2018-06-21 PROCEDURE — 36415 COLL VENOUS BLD VENIPUNCTURE: CPT

## 2018-06-21 PROCEDURE — 84443 ASSAY THYROID STIM HORMONE: CPT

## 2018-06-21 PROCEDURE — 84445 ASSAY OF TSI GLOBULIN: CPT

## 2018-06-21 PROCEDURE — 83519 RIA NONANTIBODY: CPT | Mod: XU

## 2018-06-23 LAB
ACHR BIND AB SER-SCNC: 0 NMOL/L (ref 0–0.4)
TSH RECEP AB SER-ACNC: <0.9 IU/L

## 2018-06-27 LAB — TSI ACT/NOR SER: 91 %

## 2018-09-27 ENCOUNTER — TELEPHONE (OUTPATIENT)
Dept: MEDICAL GROUP | Facility: PHYSICIAN GROUP | Age: 73
End: 2018-09-27

## 2018-10-01 ENCOUNTER — PATIENT OUTREACH (OUTPATIENT)
Dept: HEALTH INFORMATION MANAGEMENT | Facility: OTHER | Age: 73
End: 2018-10-01

## 2018-10-01 NOTE — PROGRESS NOTES
Outcome: No Answer / No Voicemail     Please transfer to Patient Outreach Team at [phone] when patient returns call.    WebIZ Checked & Epic Updated:  yes    HealthConnect Verified: yes    Attempt # 1

## 2018-10-18 DIAGNOSIS — I10 ESSENTIAL HYPERTENSION: ICD-10-CM

## 2018-10-18 RX ORDER — ATENOLOL 50 MG/1
TABLET ORAL
Qty: 180 TAB | Refills: 0 | Status: SHIPPED | OUTPATIENT
Start: 2018-10-18 | End: 2018-11-13

## 2018-10-18 NOTE — TELEPHONE ENCOUNTER
Was the patient seen in the last year in this department? Yes LOV 02/06/18 Pt is scheduled to establish with Dr. Anderson on 11/05/18    Does patient have an active prescription for medications requested? No     Received Request Via: Pharmacy

## 2018-10-26 NOTE — PROGRESS NOTES
Outcome: Left Message    Please transfer to Patient Outreach Team at [phone] when patient returns call.      Attempt # 2

## 2018-11-01 RX ORDER — ESTRADIOL 0.5 MG/1
TABLET ORAL
Qty: 60 TAB | Refills: 0 | Status: SHIPPED | OUTPATIENT
Start: 2018-11-01 | End: 2018-11-13

## 2018-11-08 NOTE — PROGRESS NOTES
Outcome: Left Message    Please transfer to Patient Outreach Team at [phone] when patient returns call.    Attempt # 3

## 2018-11-09 ENCOUNTER — HOSPITAL ENCOUNTER (OUTPATIENT)
Dept: LAB | Facility: MEDICAL CENTER | Age: 73
End: 2018-11-09
Attending: INTERNAL MEDICINE
Payer: MEDICARE

## 2018-11-09 LAB
ALBUMIN SERPL BCP-MCNC: 4.3 G/DL (ref 3.2–4.9)
ALBUMIN/GLOB SERPL: 1.8 G/DL
ALP SERPL-CCNC: 62 U/L (ref 30–99)
ALT SERPL-CCNC: 13 U/L (ref 2–50)
ANION GAP SERPL CALC-SCNC: 8 MMOL/L (ref 0–11.9)
AST SERPL-CCNC: 18 U/L (ref 12–45)
BASOPHILS # BLD AUTO: 0 % (ref 0–1.8)
BASOPHILS # BLD: 0 K/UL (ref 0–0.12)
BILIRUB SERPL-MCNC: 0.5 MG/DL (ref 0.1–1.5)
BUN SERPL-MCNC: 19 MG/DL (ref 8–22)
CALCIUM SERPL-MCNC: 9.5 MG/DL (ref 8.5–10.5)
CHLORIDE SERPL-SCNC: 109 MMOL/L (ref 96–112)
CO2 SERPL-SCNC: 26 MMOL/L (ref 20–33)
CREAT SERPL-MCNC: 0.92 MG/DL (ref 0.5–1.4)
EOSINOPHIL # BLD AUTO: 0 K/UL (ref 0–0.51)
EOSINOPHIL NFR BLD: 0 % (ref 0–6.9)
ERYTHROCYTE [DISTWIDTH] IN BLOOD BY AUTOMATED COUNT: 49.2 FL (ref 35.9–50)
FERRITIN SERPL-MCNC: 269.5 NG/ML (ref 10–291)
GLOBULIN SER CALC-MCNC: 2.4 G/DL (ref 1.9–3.5)
GLUCOSE SERPL-MCNC: 89 MG/DL (ref 65–99)
HCT VFR BLD AUTO: 38.7 % (ref 37–47)
HGB BLD-MCNC: 12.4 G/DL (ref 12–16)
IRON SATN MFR SERPL: 35 % (ref 15–55)
IRON SERPL-MCNC: 127 UG/DL (ref 40–170)
LYMPHOCYTES # BLD AUTO: 15.54 K/UL (ref 1–4.8)
LYMPHOCYTES NFR BLD: 79.3 % (ref 22–41)
MAGNESIUM SERPL-MCNC: 2.1 MG/DL (ref 1.5–2.5)
MANUAL DIFF BLD: NORMAL
MCH RBC QN AUTO: 30.8 PG (ref 27–33)
MCHC RBC AUTO-ENTMCNC: 32 G/DL (ref 33.6–35)
MCV RBC AUTO: 96.3 FL (ref 81.4–97.8)
MONOCYTES # BLD AUTO: 0.35 K/UL (ref 0–0.85)
MONOCYTES NFR BLD AUTO: 1.8 % (ref 0–13.4)
MORPHOLOGY BLD-IMP: NORMAL
NEUTROPHILS # BLD AUTO: 3.7 K/UL (ref 2–7.15)
NEUTROPHILS NFR BLD: 18.9 % (ref 44–72)
NRBC # BLD AUTO: 0.04 K/UL
NRBC BLD-RTO: 0.2 /100 WBC
PLATELET # BLD AUTO: 197 K/UL (ref 164–446)
PLATELET BLD QL SMEAR: NORMAL
PMV BLD AUTO: 10.2 FL (ref 9–12.9)
POTASSIUM SERPL-SCNC: 4.1 MMOL/L (ref 3.6–5.5)
PROT SERPL-MCNC: 6.7 G/DL (ref 6–8.2)
RBC # BLD AUTO: 4.02 M/UL (ref 4.2–5.4)
RBC BLD AUTO: PRESENT
SMUDGE CELLS BLD QL SMEAR: NORMAL
SODIUM SERPL-SCNC: 143 MMOL/L (ref 135–145)
TIBC SERPL-MCNC: 358 UG/DL (ref 250–450)
VIT B12 SERPL-MCNC: 193 PG/ML (ref 211–911)
WBC # BLD AUTO: 19.6 K/UL (ref 4.8–10.8)

## 2018-11-09 PROCEDURE — 85007 BL SMEAR W/DIFF WBC COUNT: CPT

## 2018-11-09 PROCEDURE — 85027 COMPLETE CBC AUTOMATED: CPT

## 2018-11-09 PROCEDURE — 82728 ASSAY OF FERRITIN: CPT

## 2018-11-09 PROCEDURE — 80053 COMPREHEN METABOLIC PANEL: CPT

## 2018-11-09 PROCEDURE — 36415 COLL VENOUS BLD VENIPUNCTURE: CPT

## 2018-11-09 PROCEDURE — 83735 ASSAY OF MAGNESIUM: CPT

## 2018-11-09 PROCEDURE — 85027 COMPLETE CBC AUTOMATED: CPT | Mod: 91

## 2018-11-09 PROCEDURE — 82607 VITAMIN B-12: CPT

## 2018-11-09 PROCEDURE — 83540 ASSAY OF IRON: CPT

## 2018-11-09 PROCEDURE — 83615 LACTATE (LD) (LDH) ENZYME: CPT

## 2018-11-09 PROCEDURE — 85007 BL SMEAR W/DIFF WBC COUNT: CPT | Mod: 91

## 2018-11-09 PROCEDURE — 83550 IRON BINDING TEST: CPT

## 2018-11-09 NOTE — PROGRESS NOTES
Outcome: Left Message    Please transfer to Patient Outreach Team at [phone] when patient returns call.      Attempt # 4th and final

## 2018-11-10 LAB
BASOPHILS # BLD AUTO: 0 % (ref 0–1.8)
BASOPHILS # BLD: 0 K/UL (ref 0–0.12)
EOSINOPHIL # BLD AUTO: 0.36 K/UL (ref 0–0.51)
EOSINOPHIL NFR BLD: 1.8 % (ref 0–6.9)
ERYTHROCYTE [DISTWIDTH] IN BLOOD BY AUTOMATED COUNT: 49.1 FL (ref 35.9–50)
HCT VFR BLD AUTO: 39.1 % (ref 37–47)
HGB BLD-MCNC: 12.5 G/DL (ref 12–16)
LDH SERPL L TO P-CCNC: 163 U/L (ref 107–266)
LYMPHOCYTES # BLD AUTO: 16.81 K/UL (ref 1–4.8)
LYMPHOCYTES NFR BLD: 84.9 % (ref 22–41)
MANUAL DIFF BLD: NORMAL
MCH RBC QN AUTO: 30.8 PG (ref 27–33)
MCHC RBC AUTO-ENTMCNC: 32 G/DL (ref 33.6–35)
MCV RBC AUTO: 96.3 FL (ref 81.4–97.8)
MONOCYTES # BLD AUTO: 0.36 K/UL (ref 0–0.85)
MONOCYTES NFR BLD AUTO: 1.8 % (ref 0–13.4)
MORPHOLOGY BLD-IMP: NORMAL
NEUTROPHILS # BLD AUTO: 2.28 K/UL (ref 2–7.15)
NEUTROPHILS NFR BLD: 11.5 % (ref 44–72)
NRBC # BLD AUTO: 0 K/UL
NRBC BLD-RTO: 0 /100 WBC
PLATELET # BLD AUTO: 197 K/UL (ref 164–446)
PLATELET BLD QL SMEAR: NORMAL
PMV BLD AUTO: 10.4 FL (ref 9–12.9)
RBC # BLD AUTO: 4.06 M/UL (ref 4.2–5.4)
RBC BLD AUTO: PRESENT
SMUDGE CELLS BLD QL SMEAR: NORMAL
WBC # BLD AUTO: 19.8 K/UL (ref 4.8–10.8)

## 2018-11-13 ENCOUNTER — APPOINTMENT (OUTPATIENT)
Dept: ADMISSIONS | Facility: MEDICAL CENTER | Age: 73
End: 2018-11-13
Attending: OPHTHALMOLOGY
Payer: MEDICARE

## 2018-11-13 DIAGNOSIS — Z01.810 PRE-OPERATIVE CARDIOVASCULAR EXAMINATION: ICD-10-CM

## 2018-11-13 LAB — EKG IMPRESSION: NORMAL

## 2018-11-13 RX ORDER — CETIRIZINE HYDROCHLORIDE 10 MG/1
10 TABLET ORAL
COMMUNITY
End: 2022-05-05

## 2018-11-13 RX ORDER — ESTRADIOL 0.5 MG/1
0.5 TABLET ORAL
COMMUNITY
End: 2018-12-19

## 2018-11-15 ENCOUNTER — HOSPITAL ENCOUNTER (OUTPATIENT)
Facility: MEDICAL CENTER | Age: 73
End: 2018-11-15
Attending: OPHTHALMOLOGY | Admitting: OPHTHALMOLOGY
Payer: MEDICARE

## 2018-11-15 VITALS
OXYGEN SATURATION: 91 % | DIASTOLIC BLOOD PRESSURE: 79 MMHG | HEIGHT: 62 IN | HEART RATE: 73 BPM | BODY MASS INDEX: 32.46 KG/M2 | TEMPERATURE: 97.2 F | SYSTOLIC BLOOD PRESSURE: 143 MMHG | RESPIRATION RATE: 15 BRPM | WEIGHT: 176.37 LBS

## 2018-11-15 PROCEDURE — 160048 HCHG OR STATISTICAL LEVEL 1-5: Performed by: OPHTHALMOLOGY

## 2018-11-15 PROCEDURE — 160025 RECOVERY II MINUTES (STATS): Performed by: OPHTHALMOLOGY

## 2018-11-15 PROCEDURE — 160036 HCHG PACU - EA ADDL 30 MINS PHASE I: Performed by: OPHTHALMOLOGY

## 2018-11-15 PROCEDURE — 502585 HCHG PACK, MUSCLE: Performed by: OPHTHALMOLOGY

## 2018-11-15 PROCEDURE — 700102 HCHG RX REV CODE 250 W/ 637 OVERRIDE(OP)

## 2018-11-15 PROCEDURE — A9270 NON-COVERED ITEM OR SERVICE: HCPCS | Performed by: ANESTHESIOLOGY

## 2018-11-15 PROCEDURE — 700102 HCHG RX REV CODE 250 W/ 637 OVERRIDE(OP): Performed by: ANESTHESIOLOGY

## 2018-11-15 PROCEDURE — 160035 HCHG PACU - 1ST 60 MINS PHASE I: Performed by: OPHTHALMOLOGY

## 2018-11-15 PROCEDURE — 160029 HCHG SURGERY MINUTES - 1ST 30 MINS LEVEL 4: Performed by: OPHTHALMOLOGY

## 2018-11-15 PROCEDURE — 500447 HCHG DRESSING, TEGADERM 8X12: Performed by: OPHTHALMOLOGY

## 2018-11-15 PROCEDURE — 160041 HCHG SURGERY MINUTES - EA ADDL 1 MIN LEVEL 4: Performed by: OPHTHALMOLOGY

## 2018-11-15 PROCEDURE — 700101 HCHG RX REV CODE 250

## 2018-11-15 PROCEDURE — 160046 HCHG PACU - 1ST 60 MINS PHASE II: Performed by: OPHTHALMOLOGY

## 2018-11-15 PROCEDURE — 700111 HCHG RX REV CODE 636 W/ 250 OVERRIDE (IP)

## 2018-11-15 PROCEDURE — 160009 HCHG ANES TIME/MIN: Performed by: OPHTHALMOLOGY

## 2018-11-15 PROCEDURE — 501425 HCHG SPONGE, WECKCEL SPEAR: Performed by: OPHTHALMOLOGY

## 2018-11-15 PROCEDURE — 501836 HCHG SUTURE EYE: Performed by: OPHTHALMOLOGY

## 2018-11-15 PROCEDURE — 160002 HCHG RECOVERY MINUTES (STAT): Performed by: OPHTHALMOLOGY

## 2018-11-15 PROCEDURE — A9270 NON-COVERED ITEM OR SERVICE: HCPCS

## 2018-11-15 PROCEDURE — 160047 HCHG PACU  - EA ADDL 30 MINS PHASE II: Performed by: OPHTHALMOLOGY

## 2018-11-15 RX ORDER — HALOPERIDOL 5 MG/ML
1 INJECTION INTRAMUSCULAR
Status: DISCONTINUED | OUTPATIENT
Start: 2018-11-15 | End: 2018-11-15 | Stop reason: HOSPADM

## 2018-11-15 RX ORDER — BALANCED SALT SOLUTION 6.4; .75; .48; .3; 3.9; 1.7 MG/ML; MG/ML; MG/ML; MG/ML; MG/ML; MG/ML
SOLUTION OPHTHALMIC
Status: DISCONTINUED | OUTPATIENT
Start: 2018-11-15 | End: 2018-11-15 | Stop reason: HOSPADM

## 2018-11-15 RX ORDER — TOBRAMYCIN AND DEXAMETHASONE 3; 1 MG/ML; MG/ML
SUSPENSION/ DROPS OPHTHALMIC
Status: DISCONTINUED
Start: 2018-11-15 | End: 2018-11-15 | Stop reason: HOSPADM

## 2018-11-15 RX ORDER — ONDANSETRON 2 MG/ML
4 INJECTION INTRAMUSCULAR; INTRAVENOUS
Status: DISCONTINUED | OUTPATIENT
Start: 2018-11-15 | End: 2018-11-15 | Stop reason: HOSPADM

## 2018-11-15 RX ORDER — SODIUM CHLORIDE, SODIUM LACTATE, POTASSIUM CHLORIDE, CALCIUM CHLORIDE 600; 310; 30; 20 MG/100ML; MG/100ML; MG/100ML; MG/100ML
INJECTION, SOLUTION INTRAVENOUS CONTINUOUS
Status: DISCONTINUED | OUTPATIENT
Start: 2018-11-15 | End: 2018-11-15 | Stop reason: HOSPADM

## 2018-11-15 RX ORDER — GABAPENTIN 100 MG/1
100 CAPSULE ORAL ONCE
Status: DISCONTINUED | OUTPATIENT
Start: 2018-11-15 | End: 2018-11-15

## 2018-11-15 RX ORDER — PROPARACAINE HYDROCHLORIDE 5 MG/ML
1 SOLUTION/ DROPS OPHTHALMIC ONCE
Status: DISCONTINUED | OUTPATIENT
Start: 2018-11-15 | End: 2018-11-15 | Stop reason: HOSPADM

## 2018-11-15 RX ORDER — LABETALOL HYDROCHLORIDE 5 MG/ML
5 INJECTION, SOLUTION INTRAVENOUS
Status: DISCONTINUED | OUTPATIENT
Start: 2018-11-15 | End: 2018-11-15 | Stop reason: HOSPADM

## 2018-11-15 RX ORDER — TETRACAINE HYDROCHLORIDE 5 MG/ML
1 SOLUTION OPHTHALMIC ONCE
Status: DISCONTINUED | OUTPATIENT
Start: 2018-11-15 | End: 2018-11-15 | Stop reason: HOSPADM

## 2018-11-15 RX ORDER — ERYTHROMYCIN 5 MG/G
OINTMENT OPHTHALMIC
Status: DISCONTINUED
Start: 2018-11-15 | End: 2018-11-15 | Stop reason: HOSPADM

## 2018-11-15 RX ORDER — ACETAMINOPHEN 500 MG
1000 TABLET ORAL ONCE
Status: COMPLETED | OUTPATIENT
Start: 2018-11-15 | End: 2018-11-15

## 2018-11-15 RX ORDER — PHENYLEPHRINE HYDROCHLORIDE 25 MG/ML
SOLUTION/ DROPS OPHTHALMIC
Status: COMPLETED
Start: 2018-11-15 | End: 2018-11-15

## 2018-11-15 RX ORDER — BALANCED SALT SOLUTION 6.4; .75; .48; .3; 3.9; 1.7 MG/ML; MG/ML; MG/ML; MG/ML; MG/ML; MG/ML
SOLUTION OPHTHALMIC ONCE
Status: DISCONTINUED | OUTPATIENT
Start: 2018-11-15 | End: 2018-11-15 | Stop reason: HOSPADM

## 2018-11-15 RX ORDER — ACETAMINOPHEN 325 MG/1
650 TABLET ORAL
Status: DISCONTINUED | OUTPATIENT
Start: 2018-11-15 | End: 2018-11-15 | Stop reason: HOSPADM

## 2018-11-15 RX ORDER — CELECOXIB 200 MG/1
200 CAPSULE ORAL ONCE
Status: COMPLETED | OUTPATIENT
Start: 2018-11-15 | End: 2018-11-15

## 2018-11-15 RX ORDER — ERYTHROMYCIN 5 MG/G
OINTMENT OPHTHALMIC
Status: DISCONTINUED | OUTPATIENT
Start: 2018-11-15 | End: 2018-11-15 | Stop reason: HOSPADM

## 2018-11-15 RX ORDER — HYDRALAZINE HYDROCHLORIDE 20 MG/ML
5 INJECTION INTRAMUSCULAR; INTRAVENOUS
Status: DISCONTINUED | OUTPATIENT
Start: 2018-11-15 | End: 2018-11-15 | Stop reason: HOSPADM

## 2018-11-15 RX ORDER — SODIUM CHLORIDE, SODIUM LACTATE, POTASSIUM CHLORIDE, CALCIUM CHLORIDE 600; 310; 30; 20 MG/100ML; MG/100ML; MG/100ML; MG/100ML
INJECTION, SOLUTION INTRAVENOUS ONCE
Status: COMPLETED | OUTPATIENT
Start: 2018-11-15 | End: 2018-11-15

## 2018-11-15 RX ADMIN — HYDROCODONE BITARTRATE AND ACETAMINOPHEN 15 ML: 2.5; 108 SOLUTION ORAL at 19:35

## 2018-11-15 RX ADMIN — CELECOXIB 200 MG: 200 CAPSULE ORAL at 13:50

## 2018-11-15 RX ADMIN — PHENYLEPHRINE HYDROCHLORIDE 1 DROP: 2.5 SOLUTION/ DROPS OPHTHALMIC at 13:20

## 2018-11-15 RX ADMIN — Medication 15 ML: at 19:35

## 2018-11-15 RX ADMIN — SODIUM CHLORIDE, SODIUM LACTATE, POTASSIUM CHLORIDE, CALCIUM CHLORIDE: 600; 310; 30; 20 INJECTION, SOLUTION INTRAVENOUS at 13:49

## 2018-11-15 RX ADMIN — ACETAMINOPHEN 1000 MG: 500 TABLET, FILM COATED ORAL at 13:50

## 2018-11-15 ASSESSMENT — PAIN SCALES - GENERAL
PAINLEVEL_OUTOF10: 0
PAINLEVEL_OUTOF10: 0
PAINLEVEL_OUTOF10: 2
PAINLEVEL_OUTOF10: 4
PAINLEVEL_OUTOF10: 2
PAINLEVEL_OUTOF10: 0
PAINLEVEL_OUTOF10: 2

## 2018-11-16 NOTE — OR NURSING
1903 Report from ELIEL Pizarro  1930 Dr. Larsen done with procedure. Eye patch no longer in place, incision open to air, no drainage noted. Verbal order with readback for hycet 7.5 mg/325 mg per 15 mL, verbal order with readback for discharge today.  1935 pt c/o 4/10 pain, Hycet given to patient  2019 D/c criteria met. D/c instructions reviewed with pt and daughter, copy given. PIV out, discharged via wheelchair.

## 2018-11-16 NOTE — OR NURSING
Pt to PACU from OR. Report from anesthesia and OR RN. Pt maintaining own airway. Respirations even and unlabored. VSS. Dressing R eye CDI.

## 2018-11-16 NOTE — OR NURSING
Took pt to the bathroom via gurney. Able to void, tolerated well. Moved pt and all belongings to cubicle 10.

## 2018-11-16 NOTE — DISCHARGE INSTRUCTIONS
ACTIVITY: Rest and take it easy for the first 24 hours.  A responsible adult is recommended to remain with you during that time.  It is normal to feel sleepy.  We encourage you to not do anything that requires balance, judgment or coordination.    MILD FLU-LIKE SYMPTOMS ARE NORMAL. YOU MAY EXPERIENCE GENERALIZED MUSCLE ACHES, THROAT IRRITATION, HEADACHE AND/OR SOME NAUSEA.    FOR 24 HOURS DO NOT:  Drive, operate machinery or run household appliances.  Drink beer or alcoholic beverages.   Make important decisions or sign legal documents.    SPECIAL INSTRUCTIONS: Keep incision clean, dry, intact. Follow doctor Larsen's instructions regarding eye ointment. Avoid heavy lifting (>15 lbs), avoid strenous activities until cleared by doctor.     DIET: To avoid nausea, slowly advance diet as tolerated, avoiding spicy or greasy foods for the first day.  Add more substantial food to your diet according to your physician's instructions.  Babies can be fed formula or breast milk as soon as they are hungry.  INCREASE FLUIDS AND FIBER TO AVOID CONSTIPATION.    SURGICAL DRESSING/BATHING: May shower tomorrow after follow-up appointment    FOLLOW-UP APPOINTMENT:  A follow-up appointment should be arranged with your doctor in tomorrow; call to schedule.    You should CALL YOUR PHYSICIAN if you develop:  Fever greater than 101 degrees F.  Pain not relieved by medication, or persistent nausea or vomiting.  Excessive bleeding (blood soaking through dressing) or unexpected drainage from the wound.  Extreme redness or swelling around the incision site, drainage of pus or foul smelling drainage.  Inability to urinate or empty your bladder within 8 hours.  Problems with breathing or chest pain.    You should call 911 if you develop problems with breathing or chest pain.  If you are unable to contact your doctor or surgical center, you should go to the nearest emergency room or urgent care center.  Physician's telephone #: Dr. Larsen  [phone]    If any questions arise, call your doctor.  If your doctor is not available, please feel free to call the Surgical Center at [phone].  The Center is open Monday through Friday from 7AM to 7PM.  You can also call the HEALTH HOTLINE open 24 hours/day, 7 days/week and speak to a nurse at [phone], or toll free at [phone].    A registered nurse may call you a few days after your surgery to see how you are doing after your procedure.    MEDICATIONS: Resume taking daily medication.  Take prescribed pain medication with food.  If no medication is prescribed, you may take non-aspirin pain medication if needed.  PAIN MEDICATION CAN BE VERY CONSTIPATING.  Take a stool softener or laxative such as senokot, pericolace, or milk of magnesia if needed.    Last pain medication given at 7:35 pm.    If your physician has prescribed pain medication that includes Acetaminophen (Tylenol), do not take additional Acetaminophen (Tylenol) while taking the prescribed medication.    Depression / Suicide Risk    As you are discharged from this [facility] facility, it is important to learn how to keep safe from harming yourself.    Recognize the warning signs:  · Abrupt changes in personality, positive or negative- including increase in energy   · Giving away possessions  · Change in eating patterns- significant weight changes-  positive or negative  · Change in sleeping patterns- unable to sleep or sleeping all the time   · Unwillingness or inability to communicate  · Depression  · Unusual sadness, discouragement and loneliness  · Talk of wanting to die  · Neglect of personal appearance   · Rebelliousness- reckless behavior  · Withdrawal from people/activities they love  · Confusion- inability to concentrate     If you or a loved one observes any of these behaviors or has concerns about self-harm, here's what you can do:  · Talk about it- your feelings and reasons for harming yourself  · Remove any means  that you might use to hurt yourself (examples: pills, rope, extension cords, firearm)  · Get professional help from the community (Mental Health, Substance Abuse, psychological counseling)  · Do not be alone:Call your Safe Contact- someone whom you trust who will be there for you.  · Call your local CRISIS HOTLINE 413-9522 or 545-401-4894  · Call your local Children's Mobile Crisis Response Team [place] (452) 585-3574 or www.[place]  · Call the toll free National Suicide Prevention Hotlines   · National Suicide Prevention Lifeline 965-432-WKXT (4876)  · National Hope Line Network 800-SUICIDE (864-7897)

## 2018-11-16 NOTE — OR NURSING
Pt resting, appears comfortable. Respirations even and unlabored. No needs at this time. Dr. Larsen will return at 1830 to adjust suture R eye.

## 2018-11-28 NOTE — OP REPORT
DATE OF SERVICE:  11/15/2018    SURGEON:  Jane Larsen MD    ASSISTANT:  None.    ANESTHESIA:  General.    ANESTHESIOLOGIST:  ____.    COMPLICATIONS:  None.    PREOPERATIVE DIAGNOSES:  1.  Right hypertropia.  2.  Exotropia.    POSTOPERATIVE DIAGNOSES:  1.  Right hypertropia.  2.  Exotropia.    PROCEDURE PERFORMED:  1.  Forced ductions, right eye.  2.  Right superior rectus recession, 8.0 mm.  3.  Right inferior rectus plication, 4.0 mm.    DESCRIPTION OF PROCEDURE:  The risks, benefits, and alternatives to the   procedure were discussed with the patient and she elected to proceed.  The   patient was brought to the operating room suite in stable condition and   inducted under general anesthesia without complications.  Both eyes were   prepped and draped in the usual sterile ophthalmic fashion.  Forced ductions   were carried down showing limitation to depression for the right eye.  A   superotemporal fornix incision was created to enter conjunctiva and Tenon's   capsule of the right eye.  A Mooney hook followed by a [place] hook was used   to hook the superior rectus muscle.  Anterior Tenon's capsule was sharply   dissected against the muscle belly going 14 mm from the insertion site.  A 6-0   Vicryl double-armed suture on S29 spatula needles was used to imbricate the   muscle belly at its insertion site using a locking bite at either edge of the   muscle.  The muscle was disinserted from the globe, then recessed 8 mm and   nasally transpose 1/4 tendon width.  A 6-0 plain gut sutures were used to   close the Tenon's capsule and conjunctiva.  An inferotemporal fornix incision   was created to enter conjunctiva and Tenon's capsule of the right eye.  A   Mooney hook followed by a Tushar hook was used to hook the inferior rectus   muscle.  Anterior Tenon's capsule was sharply dissected against the muscle   belly going 14 mm posterior to the insertion site.  A 5-0 M[place]ilene suture on   S14 spatula needles was used  to imbricate the muscle belly 4 mm posterior to   the insertion site.  The muscle was then plicated 4 mm.  However, the temporal   aspect of the muscle was nasally transposed.  A 6-0 plain gut sutures were   used to close Tenon's capsule and conjunctiva.  Maxitrol ointment was placed   on both eyes.  Patient tolerated the procedure well.  There were no   complications.       ____________________________________     SELMA CORBIN MD    [facility]    DD:  11/28/2018 13:00:20  DT:  11/28/2018 13:17:42    D#:  [id number]  Job#:  [id number]

## 2018-12-18 ENCOUNTER — TELEPHONE (OUTPATIENT)
Dept: MEDICAL GROUP | Facility: PHYSICIAN GROUP | Age: 73
End: 2018-12-18

## 2018-12-18 NOTE — TELEPHONE ENCOUNTER
Future Appointments       Provider Department Center    12/19/2018 10:55 AM Barrington Anderson M.D. [facility]        ESTABLISHED PATIENT PRE-VISIT PLANNING     Patient was NOT contacted to complete PVP.       1.  Reviewed notes from the last few office visits within the medical group: Yes    2.  If any orders were placed at last visit or intended to be done for this visit (i.e. 6 mos follow-up), do we have Results/Consult Notes?        •  Labs - Labs ordered, completed on 11/09/2018 and results are in chart.       •  Imaging - Imaging was not ordered at last office visit.       •  Referrals - No referrals were ordered at last office visit.    3. Is this appointment scheduled as a Hospital Follow-Up? No    4.  Immunizations were updated in whodoyou using WebIZ?: Yes       •  Web Iz Recommendations: FLU, PNEUMOVAX (PPSV23), TD and SHINGRIX (Shingles)    5.  Patient is due for the following Health Maintenance Topics:   Health Maintenance Due   Topic Date Due   • IMM HEP B VACCINE (1 of 3 - Risk 3-dose series) 10/27/1964   • IMM ZOSTER VACCINES (2 of 3) 04/28/2011   • IMM PNEUMOCOCCAL 65+ (ADULT) HIGH/HIGHEST RISK SERIES (2 of 2 - PPSV23) 12/12/2017   • IMM INFLUENZA (1) 09/01/2018   • Annual Wellness Visit  10/18/2018   • MAMMOGRAM  10/19/2018     6.  MDX printed for Provider? NO    7.  Patient was NOT informed to arrive 15 min prior to their scheduled appointment and bring in their medication bottles.

## 2018-12-19 ENCOUNTER — OFFICE VISIT (OUTPATIENT)
Dept: MEDICAL GROUP | Facility: PHYSICIAN GROUP | Age: 73
End: 2018-12-19
Payer: MEDICARE

## 2018-12-19 VITALS
OXYGEN SATURATION: 97 % | RESPIRATION RATE: 16 BRPM | HEIGHT: 62 IN | BODY MASS INDEX: 32.2 KG/M2 | TEMPERATURE: 99.1 F | DIASTOLIC BLOOD PRESSURE: 74 MMHG | WEIGHT: 175 LBS | SYSTOLIC BLOOD PRESSURE: 114 MMHG | HEART RATE: 60 BPM

## 2018-12-19 DIAGNOSIS — C91.10 CLL (CHRONIC LYMPHOCYTIC LEUKEMIA) (HCC): ICD-10-CM

## 2018-12-19 DIAGNOSIS — Z23 NEED FOR VACCINATION: ICD-10-CM

## 2018-12-19 DIAGNOSIS — N95.1 HOT FLASHES, MENOPAUSAL: ICD-10-CM

## 2018-12-19 DIAGNOSIS — K21.9 GASTROESOPHAGEAL REFLUX DISEASE WITHOUT ESOPHAGITIS: ICD-10-CM

## 2018-12-19 DIAGNOSIS — I10 ESSENTIAL HYPERTENSION: ICD-10-CM

## 2018-12-19 DIAGNOSIS — L98.9 SKIN LESIONS: ICD-10-CM

## 2018-12-19 DIAGNOSIS — Z12.39 BREAST CANCER SCREENING: ICD-10-CM

## 2018-12-19 PROCEDURE — G0009 ADMIN PNEUMOCOCCAL VACCINE: HCPCS | Performed by: INTERNAL MEDICINE

## 2018-12-19 PROCEDURE — 90732 PPSV23 VACC 2 YRS+ SUBQ/IM: CPT | Performed by: INTERNAL MEDICINE

## 2018-12-19 PROCEDURE — 99204 OFFICE O/P NEW MOD 45 MIN: CPT | Mod: 25 | Performed by: INTERNAL MEDICINE

## 2018-12-19 ASSESSMENT — PATIENT HEALTH QUESTIONNAIRE - PHQ9: CLINICAL INTERPRETATION OF PHQ2 SCORE: 0

## 2018-12-19 NOTE — PROGRESS NOTES
PRIMARY CARE CLINIC NEW PATIENT H&P  Chief Complaint   Patient presents with   • Hypertension     atenolol (TENORMIN)   • Menopause     estradiol (ESTRACE)   • Gastrophageal Reflux     lansoprazole (PREVACID)   • Skin Discoloration     History of Present Illness     GERD (gastroesophageal reflux disease)  On prevacid. Has B12 deficiency from the prevacid and is now on B12 shots. Has only 1 B12 shot with plans for monthly injections.     Hot flashes, menopausal  Taking only 1/2 tablet of estradiol 0.5 mg daily. Has a history of hysterectomy.     CLL (chronic lymphocytic leukemia)  Following with Dr. Davidson, on surveillance.     Hypertension  Controlled on atenolol 50 mg daily.     Skin lesions  Has been noting enlarging and changing skin lesions of her upper extremities.     Current Outpatient Prescriptions   Medication Sig Dispense Refill   • Cyanocobalamin (B-12) 1000 MCG/ML Kit by Injection route.     • estradiol (ESTRACE) 0.5 MG tablet TAKE ONE TABLET BY MOUTH ONE TIME DAILY  60 Tab 0   • cetirizine (ZYRTEC) 10 MG Tab Take 10 mg by mouth every bedtime.     • Cholecalciferol (VITAMIN D3) 5000 units Cap Take 1 Cap by mouth every morning.     • lansoprazole (PREVACID) 30 MG TABLET DISPERSIBLE Take 1 Tab by mouth 2 Times a Day. 90 Tab 3   • atenolol (TENORMIN) 50 MG Tab TAKE 1 TABLET BY MOUTH TWICE A  Tab 3   • Cetirizine HCl (KLS ALLER-JAYE PO) Take  by mouth every day.       No current facility-administered medications for this visit.        Past Medical History:   Diagnosis Date   • Arthritis     slight   • BMI 34.0-34.9,adult 9/11/2014   • CATARACT     surgical correction margo    • Celiac disease    • CLL (chronic lymphocytic leukemia) (HCC) 3/3/2011    Asymptomatic being monitored/Dr Davidson, Q 6 months. Not on medications.    • GERD (gastroesophageal reflux disease) 3/3/2011    Sees GI specialist    • Hiatal hernia 3/3/2011   • Hot flashes, menopausal 3/3/2011   • Hypertension 3/3/2011     Past Surgical  History:   Procedure Laterality Date   • RECTUS REPAIR Right 11/15/2018    Procedure: RECTUS REPAIR- SUPERIOR RECTUS RECESSION, ADJUSTABLE SUTURE INFERIOR RECTUS PLICATION/RESECTION;  Surgeon: Jane Larsen M.D.;  Location: SURGERY SAME DAY [facility];  Service: Ophthalmology   • VITRECTOMY POSTERIOR Right 1/17/2018    Procedure: VITRECTOMY POSTERIOR W/AIR FLUID EXCHANGE, ENDO LASER, 23 GAUGE SF6 GAS, CRYOTHERAPY;  Surgeon: Sea Franklin M.D.;  Location: SURGERY SAME DAY [facility];  Service: Ophthalmology   • MAMMOPLASTY REDUCTION Bilateral 11/3/2015    Procedure: MAMMOPLASTY REDUCTION;  Surgeon: Talia Barton M.D.;  Location: SURGERY [facility];  Service:    • KNEE ARTHROSCOPY  9/17/2014    Performed by Dany Owens M.D. at [facility]   • MENISCECTOMY  9/17/2014    Performed by Dany Owens M.D. at [facility]   • SHOULDER ARTHROSCOPY W/ ROTATOR CUFF REPAIR  8/22/2013    Performed by Dany Owens M.D. at [facility]   • SHOULDER DECOMPRESSION ARTHROSCOPIC  8/22/2013    Performed by Dany Oewns M.D. at [facility]   • CLAVICLE DISTAL EXCISION  8/22/2013    Performed by Dany Owens M.D. at [facility]   • OTHER  7/2013    laser procedure right eye   • CATARACT EXTRACTION WITH IOL  5/29/13    right eye   • ABDOMINAL HYSTERECTOMY TOTAL  1975   • CHOLECYSTECTOMY  1973    open   • TONSILLECTOMY  1950   • BLADDER SUSPENSION  1973, 1978    x2     Social History   Substance Use Topics   • Smoking status: Former Smoker     Packs/day: 0.00     Years: 2.00     Quit date: 1/1/1969   • Smokeless tobacco: Never Used   • Alcohol use 0.0 oz/week      Comment: twice a month     Social History     Social History Narrative    Retired from medical billing      Family History   Problem Relation Age of Onset   • Heart Disease Mother    • Hypertension Mother    • Arthritis Father    • Cancer Father    • Hyperlipidemia Father    •  "Hypertension Unknown    • Cancer Unknown      Family Status   Relation Status   • Mo         pancreatitis   • Fa    • Unknown (Not Specified)   • Sis Alive     Allergies: Other misc and Wheat    ROS  Constitutional: Negative for fatigue/generalized weakness.   HEENT: Negative for  vision changes, hearing changes    Respiratory: Negative for shortness of breath  Cardiovascular: Negative for chest pain, palpitations  Gastrointestinal: Negative for blood in stool, constipation, diarrhea  Genitourinary: Negative for dysuria, polyuria  Musculoskeletal: Negative for myalgias, back pain, and joint pain.   Skin: Negative for rash  Neurological: Negative for numbness, tingling  Psychiatric/Behavioral: Negative for depression, anxiety       Objective   Blood pressure 114/74, pulse 60, temperature 37.3 °C (99.1 °F), resp. rate 16, height 1.562 m (5' 1.5\"), weight 79.4 kg (175 lb), SpO2 97 %, not currently breastfeeding. Body mass index is 32.53 kg/m².    General: Alert, oriented. In no acute distress   HEET: EOMI, PERRL, conjunctiva non-injected, sclera non-icteric.  Nares patent with no significant congestion or drainage.  Nighat pinnae, external auditory canals, TM pearly gray with normal light reflex bilaterally.Oral mucous membranes pink and moist with no lesions.  Neck: supple with no cervical, subclavicular lymphadenopathy, JVD, palpable thyroid nodules   Lungs: clear to auscultation bilaterally with good excursion.  CV: regular rate and rhythm.  Abdomen soft, non-distended, non-tender with normal bowel sounds. No hepatosplenomegaly, no masses palpated  Skin: flesh colored symmetric lesions of bilateral forearms   Psychiatric: appropriate mood and affect       Assessment and Plan   The following treatment plan was discussed     1. Gastroesophageal reflux disease without esophagitis  Controlled on prevacid.     2. Hot flashes, menopausal  Taking 1/2 tab of estradiol 0.5 mg daily.     3. CLL (chronic " lymphocytic leukemia) (HCC)  On surveillance with Dr. Davidson.     4. Essential hypertension  At goal on atenolol 50 mg daily.     5. Skin lesions  - REFERRAL TO DERMATOLOGY    6. Need for vaccination  - PneumoVax PPV23 =>3yo    7. Breast cancer screening  - MA-SCREENING MAMMO BILAT W/TOMOSYNTHESIS W/CAD; Future    8. BMI 32.0-32.9,adult  - Patient identified as having weight management issue.  Appropriate orders and counseling given.      Return in about 6 months (around 6/19/2019).    Health Maintenance      Health Maintenance Due   Topic Date Due   • IMM HEP B VACCINE (1 of 3 - Risk 3-dose series) 10/27/1964   • IMM PNEUMOCOCCAL 65+ (ADULT) HIGH/HIGHEST RISK SERIES (2 of 2 - PPSV23) 12/12/2017   • Annual Wellness Visit  10/18/2018   • MAMMOGRAM  10/19/2018       Barrington Anderson MD  Internal Medicine  [facility]

## 2018-12-19 NOTE — ASSESSMENT & PLAN NOTE
On prevacid. Has B12 deficiency from the prevacid and is now on B12 shots. Has only 1 B12 shot with plans for monthly injections.

## 2019-04-10 ENCOUNTER — TELEPHONE (OUTPATIENT)
Dept: MEDICAL GROUP | Facility: PHYSICIAN GROUP | Age: 74
End: 2019-04-10

## 2019-04-10 NOTE — TELEPHONE ENCOUNTER
1. Caller Name: Shanice                                           Call Back Number: [phone] (home)         Patient approves a detailed voicemail message: no    Spoke with pt. to schedule AWV and reminded that her mammo that her ordered still needs to be completed. Pt is now scheduled for her AWV and pt stated they will be calling to schedule mammo

## 2019-04-10 NOTE — TELEPHONE ENCOUNTER
ANNUAL WELLNESS VISIT PRE-VISIT PLANNING WITHOUT OUTREACH    1.  Reviewed note from last office visit with PCP: YES    2.  If any orders were placed at last visit, do we have Results/Consult Notes?        •  Labs - Labs were not ordered at last office visit.       •  Imaging - Imaging ordered, NOT completed. Patient advised to complete prior to next appointment.       •  Referrals - No referrals were ordered at last office visit.    3.  Immunizations were updated in [facility] using WebIZ?: Yes       •  WebIZ Recommendations: TD, SHINGRIX (Shingles) and Varicella        •  Is patient due for Tdap? NO       •  Is patient due for Shingrix? YES. Patient was not notified of copay/out of pocket cost.     4.  Patient is due for the following Health Maintenance Topics:   Health Maintenance Due   Topic Date Due   • IMM ZOSTER VACCINES (2 of 3) 04/28/2011   • Annual Wellness Visit  10/18/2018   • MAMMOGRAM  10/19/2018     5.  Reviewed/Updated the following with patient:       •   Preferred Pharmacy? YES       •   Preferred Lab? YES       •   Preferred Communication? YES       •   Allergies? YES       •   Medications? YES. Was Abstract Encounter opened and chart updated? YES       •   Social History? YES. Was Abstract Encounter opened and chart updated? YES       •   Family History (document living status of immediate family members and if + hx of  cancer, diabetes, hypertension, hyperlipidemia, heart attack, stroke) YES. Was Abstract Encounter opened and chart updated? YES    6.  Care Team Updated:       •   DME Company (gait device, O2, CPAP, etc.): YES       •   Other Specialists (eye doctor, derm, GYN, cardiology, endo, etc): YES    7. Orders for overdue Health Maintenance topics pended in Pre-Charting? NO    8.  Patient has the following Care Path diagnoses on Problem List:  NONE    9.  Patient was advised: “This is a free wellness visit. The provider will screen for medical conditions to help you stay healthy. If you have  other concerns to address you may be asked to discuss these at a separate visit or there may be an additional fee.”     10.  Patient was informed to arrive 15 min prior to their scheduled appointment and bring in their medication bottles.

## 2019-04-10 NOTE — LETTER
[employer] [facility]  Barrington Anderson M.D.  [address]  [place] [address]  Fax: [phone]   Authorization for Release/Disclosure of   Protected Health Information   Name: SHANICE COOPER : 1945 SSN: [id number]   Address: [address] [place], [place] [address]  [place] [address] Phone:    [phone] (home)    I authorize the entity listed below to release/disclose the PHI below to:   [facility]/Barrington Anderson M.D. and Barrington Anderson M.D.   Provider or Entity Name:  [facility]   Address   City, [facility], [facility]   Phone:      Fax:  [phone]      Reason for request: continuity of care   Information to be released:    [  ] LAST COLONOSCOPY,  including any PATH REPORT and follow-up  [  ] LAST FIT/COLOGUARD RESULT [  ] LAST DEXA  [  ] LAST MAMMOGRAM  [  ] LAST PAP  [  ] LAST LABS [  ] RETINA EXAM REPORT  [  ] IMMUNIZATION RECORDS  [ xx ] Release all info      [  ] Check here and initial the line next to each item to release ALL health information INCLUDING  _____ Care and treatment for drug and / or alcohol abuse  _____ HIV testing, infection status, or AIDS  _____ Genetic Testing    DATES OF SERVICE OR TIME PERIOD TO BE DISCLOSED: _____________  I understand and acknowledge that:  * This Authorization may be revoked at any time by you in writing, except if your health information has already been used or disclosed.  * Your health information that will be used or disclosed as a result of you signing this authorization could be re-disclosed by the recipient. If this occurs, your re-disclosed health information may no longer be protected by State or Federal laws.  * You may refuse to sign this Authorization. Your refusal will not affect your ability to obtain treatment.  * This Authorization becomes effective upon signing and will  on (date) __________.      If no date is indicated, this Authorization will  one (1) year from the signature date.    Name: Shanice Vaz  [place]    Signature: Continuity of Care   Date:     4/10/2019       PLEASE FAX REQUESTED RECORDS BACK TO: [phone]

## 2019-04-19 ENCOUNTER — PATIENT OUTREACH (OUTPATIENT)
Dept: HEALTH INFORMATION MANAGEMENT | Facility: OTHER | Age: 74
End: 2019-04-19

## 2019-04-19 NOTE — PROGRESS NOTES
Outcome: no answer no VM    Please transfer to Patient Outreach Team at [phone] when patient returns call.    WebIZ Checked & Epic Updated:  yes  Td (adult), adsorbed   CPOX (Varicella)   Zoster Gama (Shingrix)       [facility]Connect Verified: yes    Attempt # 1

## 2019-04-25 DIAGNOSIS — I10 ESSENTIAL HYPERTENSION: ICD-10-CM

## 2019-04-25 RX ORDER — ATENOLOL 50 MG/1
TABLET ORAL
Qty: 180 TAB | Refills: 1 | Status: SHIPPED | OUTPATIENT
Start: 2019-04-25 | End: 2019-10-09 | Stop reason: SDUPTHER

## 2019-04-25 NOTE — TELEPHONE ENCOUNTER
Was the patient seen in the last year in this department? Yes    Does patient have an active prescription for medications requested? No     Received Request Via: Pharmacy      Pt met protocol?: Yes, OV 12/18   BP Readings from Last 1 Encounters:   12/19/18 114/74

## 2019-04-25 NOTE — TELEPHONE ENCOUNTER
Refill X 6 months, sent to pharmacy.Pt. Seen in the last 6 months per protocol.   Lab Results   Component Value Date/Time    SODIUM 143 11/09/2018 11:47 AM    POTASSIUM 4.1 11/09/2018 11:47 AM    CHLORIDE 109 11/09/2018 11:47 AM    CO2 26 11/09/2018 11:47 AM    GLUCOSE 89 11/09/2018 11:47 AM    BUN 19 11/09/2018 11:47 AM    CREATININE 0.92 11/09/2018 11:47 AM    CREATININE 0.83 03/25/2011 11:05 AM    BUNCREATRAT 20 03/25/2011 11:05 AM    GLOMRATE >59 03/25/2011 11:05 AM

## 2019-04-30 ENCOUNTER — TELEPHONE (OUTPATIENT)
Dept: MEDICAL GROUP | Facility: PHYSICIAN GROUP | Age: 74
End: 2019-04-30

## 2019-04-30 DIAGNOSIS — I10 ESSENTIAL HYPERTENSION: ICD-10-CM

## 2019-04-30 RX ORDER — LANSOPRAZOLE 30 MG/1
CAPSULE, DELAYED RELEASE ORAL
COMMUNITY
Start: 2019-02-16 | End: 2019-04-15

## 2019-04-30 NOTE — TELEPHONE ENCOUNTER
Was the patient seen in the last year in this department? Yes    Does patient have an active prescription for medications requested? Yes    Received Request Via: Patient     Please send RX to Atenolol to [facility] in [place].

## 2019-04-30 NOTE — TELEPHONE ENCOUNTER
ANNUAL WELLNESS VISIT PRE-VISIT PLANNING WITHOUT OUTREACH    1.  Reviewed note from last office visit with PCP: YES    2.  If any orders were placed at last visit, do we have Results/Consult Notes?        •  Labs - Labs were not ordered at last office visit.       •  Imaging - Imaging ordered, NOT completed. Patient advised to complete prior to next appointment.       •  Referrals - Referral ordered, patient has NOT been seen.    3.  Immunizations were updated in [facility] using WebIZ?: Yes       •  WebIZ Recommendations: TD, SHINGRIX (Shingles) and Varicella.        •  Is patient due for Tdap? NO       •  Is patient due for Shingrix? YES. Patient was not notified of copay/out of pocket cost.     4.  Patient is due for the following Health Maintenance Topics:   Health Maintenance Due   Topic Date Due   • IMM ZOSTER VACCINES (2 of 3) 04/28/2011   • Annual Wellness Visit  10/18/2018   • MAMMOGRAM  10/19/2018     5.  Reviewed/Updated the following with patient:       •   Preferred Pharmacy? YES       •   Preferred Lab? YES       •   Preferred Communication? YES       •   Allergies? YES       •   Medications? YES. Was Abstract Encounter opened and chart updated? YES       •   Social History? YES. Was Abstract Encounter opened and chart updated? YES       •   Family History (document living status of immediate family members and if + hx of  cancer, diabetes, hypertension, hyperlipidemia, heart attack, stroke) YES. Was Abstract Encounter opened and chart updated? YES    6.  Care Team Updated:       •   DME Company (gait device, O2, CPAP, etc.): YES       •   Other Specialists (eye doctor, derm, GYN, cardiology, endo, etc): YES    7. Orders for overdue Health Maintenance topics pended in Pre-Charting? NO    8.  Patient has the following Care Path diagnoses on Problem List:  NONE    9.  Patient was advised: “This is a free wellness visit. The provider will screen for medical conditions to help you stay healthy. If you have other  concerns to address you may be asked to discuss these at a separate visit or there may be an additional fee.”     10.  Patient was informed to arrive 15 min prior to their scheduled appointment and bring in their medication bottles.

## 2019-05-01 RX ORDER — ATENOLOL 50 MG/1
TABLET ORAL
Qty: 200 TAB | Refills: 0 | OUTPATIENT
Start: 2019-05-01

## 2019-05-09 ENCOUNTER — OFFICE VISIT (OUTPATIENT)
Dept: MEDICAL GROUP | Facility: PHYSICIAN GROUP | Age: 74
End: 2019-05-09
Payer: MEDICARE

## 2019-05-09 VITALS
TEMPERATURE: 98 F | WEIGHT: 184.2 LBS | DIASTOLIC BLOOD PRESSURE: 70 MMHG | HEART RATE: 63 BPM | BODY MASS INDEX: 33.9 KG/M2 | RESPIRATION RATE: 16 BRPM | HEIGHT: 62 IN | OXYGEN SATURATION: 96 % | SYSTOLIC BLOOD PRESSURE: 122 MMHG

## 2019-05-09 DIAGNOSIS — C91.10 CLL (CHRONIC LYMPHOCYTIC LEUKEMIA) (HCC): ICD-10-CM

## 2019-05-09 DIAGNOSIS — K21.9 GASTROESOPHAGEAL REFLUX DISEASE WITHOUT ESOPHAGITIS: ICD-10-CM

## 2019-05-09 DIAGNOSIS — Z12.39 BREAST CANCER SCREENING: ICD-10-CM

## 2019-05-09 DIAGNOSIS — R79.89 LOW SERUM VITAMIN D: ICD-10-CM

## 2019-05-09 DIAGNOSIS — I10 ESSENTIAL HYPERTENSION: ICD-10-CM

## 2019-05-09 DIAGNOSIS — N95.1 HOT FLASHES, MENOPAUSAL: ICD-10-CM

## 2019-05-09 DIAGNOSIS — M79.672 PAIN IN BOTH FEET: ICD-10-CM

## 2019-05-09 DIAGNOSIS — M79.671 PAIN IN BOTH FEET: ICD-10-CM

## 2019-05-09 PROCEDURE — G0439 PPPS, SUBSEQ VISIT: HCPCS | Performed by: INTERNAL MEDICINE

## 2019-05-09 PROCEDURE — 8041 PR SCP AHA: Performed by: INTERNAL MEDICINE

## 2019-05-09 RX ORDER — LANSOPRAZOLE 30 MG/1
1 CAPSULE, DELAYED RELEASE ORAL 2 TIMES DAILY
COMMUNITY
Start: 2019-05-07 | End: 2019-05-09

## 2019-05-09 ASSESSMENT — PATIENT HEALTH QUESTIONNAIRE - PHQ9: CLINICAL INTERPRETATION OF PHQ2 SCORE: 0

## 2019-05-09 ASSESSMENT — ACTIVITIES OF DAILY LIVING (ADL): BATHING_REQUIRES_ASSISTANCE: 0

## 2019-05-09 ASSESSMENT — ENCOUNTER SYMPTOMS: GENERAL WELL-BEING: GOOD

## 2019-05-09 NOTE — PROGRESS NOTES
Chief Complaint   Patient presents with   • Annual Wellness Visit         HPI:  Shanice is a 73 y.o. here for Medicare Annual Wellness Visit      Patient Active Problem List    Diagnosis Date Noted   • CLL (chronic lymphocytic leukemia) ([facility]) 03/03/2011     Priority: High   • Skin lesions 12/19/2018   • Detached retina, right 02/06/2018   • Low serum vitamin D 10/17/2017   • Obesity (BMI 30-39.9) 02/06/2017   • Multiple allergies 04/12/2016   • Cervical radiculopathy 06/27/2013   • Hypertension 03/03/2011   • GERD (gastroesophageal reflux disease) 03/03/2011   • Insomnia 03/03/2011   • Hot flashes, menopausal 03/03/2011       Current Outpatient Prescriptions   Medication Sig Dispense Refill   • atenolol (TENORMIN) 50 MG Tab TAKE 1 TABLET BY MOUTH TWICE A  Tab 1   • Cyanocobalamin (B-12) 1000 MCG/ML Kit by Injection route.     • estradiol (ESTRACE) 0.5 MG tablet TAKE ONE TABLET BY MOUTH ONE TIME DAILY  60 Tab 0   • cetirizine (ZYRTEC) 10 MG Tab Take 10 mg by mouth every bedtime.     • Cholecalciferol (VITAMIN D3) 5000 units Cap Take 1 Cap by mouth every morning.     • lansoprazole (PREVACID) 30 MG TABLET DISPERSIBLE Take 1 Tab by mouth 2 Times a Day. 90 Tab 3   • lansoprazole (PREVACID) 30 MG CAPSULE DELAYED RELEASE Take 1 Cap by mouth 2 Times a Day.     • Cetirizine HCl (KLS ALLER-JAYE PO) Take  by mouth every day.       No current facility-administered medications for this visit.         Patient is taking medications as noted in medication list.  Current supplements as per medication list.     Allergies: Other misc and Wheat    Current social contact/activities: Visiting with Friends and Family, Knitting Group, Lunch Dates, [employer], Classes, Travel Club, Cruises     Is patient current with immunizations? No, due for SHINGRIX (Shingles). Patient is interested in receiving NONE today.    She  reports that she quit smoking about 50 years ago. Her smoking use included Cigarettes. She smoked 0.00 packs per  day for 2.00 years. She has never used smokeless tobacco. She reports that she drinks about 0.6 - 1.2 oz of alcohol per week . She reports that she does not use drugs.  Counseling given: Not Answered        DPA/Advanced directive: Patient has Advanced Directive on file.     ROS:    Gait: Uses no assistive device   Ostomy: No   Other tubes: No   Amputations: No   Chronic oxygen use No   Last eye exam 03/2019   Wears hearing aids: No   : Denies any urinary leakage during the last 6 months    Annual Health Assessment Questions:    1.  Are you currently engaging in any exercise or physical activity? No    2.  How would you describe your mood or emotional well-being today? good    3.  Have you had any falls in the last year? No    4.  Have you noticed any problems with your balance or had difficulty walking? No    5.  In the last six months have you experienced any leakage of urine? No    6. DPA/Advanced Directive: Patient has Advanced Directive on file.     Screening:        Depression Screening    Little interest or pleasure in doing things?  0 - not at all  Feeling down, depressed, or hopeless? 0 - not at all  Patient Health Questionnaire Score: 0    If depressive symptoms identified deferred to follow up visit unless specifically addressed in assessment and plan.    Interpretation of PHQ-9 Total Score   Score Severity   1-4 No Depression   5-9 Mild Depression   10-14 Moderate Depression   15-19 Moderately Severe Depression   20-27 Severe Depression    Screening for Cognitive Impairment    Three Minute Recall (village, kitchen, baby)  3/3 Village Kitchen Baby  Mihai clock face with all 12 numbers and set the hands to show 10 past 10.  Yes 10:10  5/5    If cognitive concerns identified, deferred for follow up unless specifically addressed in assessment and plan.    Fall Risk Assessment    Has the patient had two or more falls in the last year or any fall with injury in the last year?  No  If fall risk identified,  deferred for follow up unless specifically addressed in assessment and plan.    Safety Assessment    Throw rugs on floor.  Yes  Handrails on all stairs.  Yes  Good lighting in all hallways.  Yes  Difficulty hearing.  No  Patient counseled about all safety risks that were identified.    Functional Assessment ADLs    Are there any barriers preventing you from cooking for yourself or meeting nutritional needs?  No.    Are there any barriers preventing you from driving safely or obtaining transportation?  No.    Are there any barriers preventing you from using a telephone or calling for help?  No.    Are there any barriers preventing you from shopping?  No.    Are there any barriers preventing you from taking care of your own finances?  No.    Are there any barriers preventing you from managing your medications?  No.    Are there any barriers preventing you from showering, bathing or dressing yourself?  No.    Are you currently engaging in any exercise or physical activity?  No.     What is your perception of your health?  Good.    Health Maintenance Summary                IMM ZOSTER VACCINES Overdue 4/28/2011      Done 3/3/2011 Imm Admin: Zoster Vaccine Live (ZVL) (Zostavax)     Patient has more history with this topic...    Annual Wellness Visit Overdue 10/18/2018      Done 10/17/2017 Visit Dx: Medicare annual wellness visit, subsequent    MAMMOGRAM Overdue 10/19/2018      Done 10/19/2017 MA-MAMMO SCREENING BILAT W/TOMOSYNTHESIS W/CAD     Patient has more history with this topic...    IMM DTaP/Tdap/Td Vaccine Next Due 3/3/2021      Done 3/3/2011 Imm Admin: Tdap Vaccine    BONE DENSITY Next Due 8/19/2021      Done 8/19/2016 DS-BONE DENSITY STUDY (DEXA)     Patient has more history with this topic...    COLONOSCOPY Next Due 2/3/2025      Done 2/3/2015 AMB REFERRAL TO GI FOR COLONOSCOPY          Patient Care Team:  Barrington Anderson M.D. as PCP - General (Internal Medicine)  Mg Evans M.D. as Consulting Physician  (Gastroenterology)  Brian Davidson M.D. as Consulting Physician (Oncology)  Jamar Anderson O.D. as Consulting Physician (Optometry)  Sea Franklin M.D. as Consulting Physician (Ophthalmology)  Jane Larsen M.D. (Ophthalmology)  Leeann Atkins M.D. as Consulting Physician (OB/Gyn)  Carlos Baum Ass't as      Social History   Substance Use Topics   • Smoking status: Former Smoker     Packs/day: 0.00     Years: 2.00     Types: Cigarettes     Quit date: 1/1/1969   • Smokeless tobacco: Never Used   • Alcohol use 0.6 - 1.2 oz/week     1 - 2 Glasses of wine per week      Comment: Several times a week     Family History   Problem Relation Age of Onset   • Heart Disease Mother    • Hypertension Mother    • Other Mother         pancreatitis   • Arthritis Father    • Cancer Father         Lymphoma, colon, skin   • Hyperlipidemia Father    • Other Father         Clogged artery in leg   • Heart Disease Father         A Fib   • Diabetes Sister         Pre   • Other Sister         Obesity   • Other Daughter         Gluten allergies   • Other Son         Suicide     She  has a past medical history of Arthritis; BMI 34.0-34.9,adult (9/11/2014); CATARACT; Celiac disease; CLL (chronic lymphocytic leukemia) (H[facility]) (3/3/2011); GERD (gastroesophageal reflux disease) (3/3/2011); Hiatal hernia (3/3/2011); Hot flashes, menopausal (3/3/2011); and Hypertension (3/3/2011).   Past Surgical History:   Procedure Laterality Date   • RECTUS REPAIR Right 11/15/2018    Procedure: RECTUS REPAIR- SUPERIOR RECTUS RECESSION, ADJUSTABLE SUTURE INFERIOR RECTUS PLICATION/RESECTION;  Surgeon: Jane Larsen M.D.;  Location: SURGERY SAME DAY [facility];  Service: Ophthalmology   • VITRECTOMY POSTERIOR Right 1/17/2018    Procedure: VITRECTOMY POSTERIOR W/AIR FLUID EXCHANGE, ENDO LASER, 23 GAUGE SF6 GAS, CRYOTHERAPY;  Surgeon: Sea Franklin M.D.;  Location: SURGERY SAME DAY [facility];  Service:  Ophthalmology   • MAMMOPLASTY REDUCTION Bilateral 11/3/2015    Procedure: MAMMOPLASTY REDUCTION;  Surgeon: Talia Barton M.D.;  Location: [facility];  Service:    • KNEE ARTHROSCOPY  9/17/2014    Performed by Dany Owens M.D. at [facility]   • MENISCECTOMY  9/17/2014    Performed by Dany Owens M.D. at [facility]   • SHOULDER ARTHROSCOPY W/ ROTATOR CUFF REPAIR  8/22/2013    Performed by Dany Owens M.D. at [facility]   • SHOULDER DECOMPRESSION ARTHROSCOPIC  8/22/2013    Performed by Dany Owens M.D. at [facility]   • CLAVICLE DISTAL EXCISION  8/22/2013    Performed by Dany Owens M.D. at [facility]   • OTHER  7/2013    laser procedure right eye   • CATARACT EXTRACTION WITH IOL  5/29/13    right eye   • ABDOMINAL HYSTERECTOMY TOTAL  1975   • CHOLECYSTECTOMY  1973    open   • TONSILLECTOMY  1950   • BLADDER SUSPENSION  1973, 1978    x2           Exam:     There were no vitals taken for this visit. There is no height or weight on file to calculate BMI.    Hearing good.    Dentition good  Alert, oriented in no acute distress.  Eye contact is good, speech goal directed, affect calm      Assessment and Plan. The following treatment and monitoring plan is recommended:    CLL (chronic lymphocytic leukemia)  Following with Dr. Davidson, q6 months.     GERD (gastroesophageal reflux disease)  Following with Dr. Evans, on prevacid. She has been taking oral B12 supplementation while being on the prevacid.     Hypertension  Controlled on atenolol 50 mg daily.     Hot flashes, menopausal  Take 1/2 tablet of estradiol 0.5 mg daily, has a history of a hysterectomy.     Low serum vitamin D  Being checked by Dr. Evans, her gastroenterologist.     Services suggested: No services needed at this time  Health Care Screening recommendations as per orders if indicated.  Referrals offered: PT/OT/Nutrition  counseling/Behavioral Health/Smoking cessation as per orders if indicated.    Discussion today about general wellness and lifestyle habits:    · Prevent falls and reduce trip hazards; Cautioned about securing or removing rugs.  · Have a working fire alarm and carbon monoxide detector;   · Engage in regular physical activity and social activities       Follow-up: No Follow-up on file.

## 2019-05-09 NOTE — ASSESSMENT & PLAN NOTE
Following with Dr. Evans, on prevacid. She has been taking oral B12 supplementation while being on the prevacid.

## 2019-05-15 ENCOUNTER — TELEPHONE (OUTPATIENT)
Dept: MEDICAL GROUP | Facility: PHYSICIAN GROUP | Age: 74
End: 2019-05-15

## 2019-05-15 NOTE — TELEPHONE ENCOUNTER
Future Appointments       Provider Department Center    5/16/2019 2:15 PM Barrington Anderson M.D. [facility]        ESTABLISHED PATIENT PRE-VISIT PLANNING     Patient was contacted to complete PVP.       1.  Reviewed notes from the last few office visits within the medical group: Yes    2.  If any orders were placed at last visit or intended to be done for this visit (i.e. 6 mos follow-up), do we have Results/Consult Notes?        •  Labs - Labs were not ordered at last office visit.       •  Imaging - Imaging ordered, NOT completed. Patient advised to complete prior to next appointment. - Pt will call and schedule.        •  Referrals - Referral ordered, patient has NOT been seen.    3. Is this appointment scheduled as a Hospital Follow-Up? No    4.  Immunizations were updated in [place] using WebIZ?: Yes       •  Web Iz Recommendations: TD, VARICELLA (Chicken Pox)  and SHINGRIX (Shingles)    5.  Patient is due for the following Health Maintenance Topics:   Health Maintenance Due   Topic Date Due   • MAMMOGRAM  10/19/2018     6. Orders for overdue Health Maintenance topics pended in Pre-Charting? NO    7.  AHA (MDX) form printed for Provider? No, already completed    8.  Patient was informed to arrive 15 min prior to their scheduled appointment and bring in their medication bottles.  Called and talked with pt, she has not had time to schedule mammo yet. Also let her know the referral for Podiatry went through, pt will check her my chart.

## 2019-05-16 ENCOUNTER — OFFICE VISIT (OUTPATIENT)
Dept: MEDICAL GROUP | Facility: PHYSICIAN GROUP | Age: 74
End: 2019-05-16
Payer: MEDICARE

## 2019-05-16 VITALS
SYSTOLIC BLOOD PRESSURE: 128 MMHG | OXYGEN SATURATION: 96 % | RESPIRATION RATE: 12 BRPM | HEIGHT: 62 IN | HEART RATE: 69 BPM | TEMPERATURE: 98.6 F | BODY MASS INDEX: 32.9 KG/M2 | DIASTOLIC BLOOD PRESSURE: 64 MMHG | WEIGHT: 178.8 LBS

## 2019-05-16 DIAGNOSIS — E66.9 OBESITY (BMI 30-39.9): ICD-10-CM

## 2019-05-16 DIAGNOSIS — H43.11 VITREOUS HEMORRHAGE OF RIGHT EYE (HCC): ICD-10-CM

## 2019-05-16 DIAGNOSIS — I10 ESSENTIAL HYPERTENSION: ICD-10-CM

## 2019-05-16 PROCEDURE — 99213 OFFICE O/P EST LOW 20 MIN: CPT | Performed by: INTERNAL MEDICINE

## 2019-05-16 PROCEDURE — 8041 PR SCP AHA: Performed by: INTERNAL MEDICINE

## 2019-05-16 NOTE — ASSESSMENT & PLAN NOTE
Has been gluten free and has been trying to abide to nutritional guidelines. Has foot pain which precludes her from being able to walk. Isn't sure if she has bursitis of her feet joints, spent 40 years in 4 inch heels. Her foot pain does limit her physical activity.

## 2019-05-16 NOTE — PROGRESS NOTES
PRIMARY CARE CLINIC FOLLOW UP VISIT  Chief Complaint   Patient presents with   • Hypertension     f/v   • Nutrition Counseling     History of Present Illness     Hypertension  Well controlled on atenolol 50 mg daily. Has a bit of a hemorrhage of her right eye and her ophthalmologist had noted that HTN can contribute to this issue.     Obesity (BMI 30-39.9)  Has been gluten free and has been trying to abide to nutritional guidelines. Has foot pain which precludes her from being able to walk. Isn't sure if she has bursitis of her feet joints, spent 40 years in 4 inch heels. Her foot pain does limit her physical activity.     Vitreous hemorrhage of right eye (HCC)  Blood pressure well controlled, following with ophthalmology.     Current Outpatient Prescriptions   Medication Sig Dispense Refill   • atenolol (TENORMIN) 50 MG Tab TAKE 1 TABLET BY MOUTH TWICE A  Tab 1   • Cyanocobalamin (B-12) 1000 MCG/ML Kit by Injection route.     • estradiol (ESTRACE) 0.5 MG tablet TAKE ONE TABLET BY MOUTH ONE TIME DAILY  60 Tab 0   • Cholecalciferol (VITAMIN D3) 5000 units Cap Take 1 Cap by mouth every morning.     • lansoprazole (PREVACID) 30 MG TABLET DISPERSIBLE Take 1 Tab by mouth 2 Times a Day. 90 Tab 3   • cetirizine (ZYRTEC) 10 MG Tab Take 10 mg by mouth every bedtime.     • Cetirizine HCl (KLS ALLER-JAYE PO) Take  by mouth every day.       No current facility-administered medications for this visit.      Past Medical History:   Diagnosis Date   • Arthritis     slight   • BMI 34.0-34.9,adult 9/11/2014   • CATARACT     surgical correction margo    • Celiac disease    • CLL (chronic lymphocytic leukemia) ([facility]) 3/3/2011    Asymptomatic being monitored/Dr Davidson, Q 6 months. Not on medications.    • GERD (gastroesophageal reflux disease) 3/3/2011    Sees GI specialist    • Hiatal hernia 3/3/2011   • Hot flashes, menopausal 3/3/2011   • Hypertension 3/3/2011     Past Surgical History:   Procedure Laterality Date   • RECTUS  REPAIR Right 11/15/2018    Procedure: RECTUS REPAIR- SUPERIOR RECTUS RECESSION, ADJUSTABLE SUTURE INFERIOR RECTUS PLICATION/RESECTION;  Surgeon: Jane Larsen M.D.;  Location: SURGERY SAME DAY [facility];  Service: Ophthalmology   • VITRECTOMY POSTERIOR Right 1/17/2018    Procedure: VITRECTOMY POSTERIOR W/AIR FLUID EXCHANGE, ENDO LASER, 23 GAUGE SF6 GAS, CRYOTHERAPY;  Surgeon: Sea Franklin M.D.;  Location: SURGERY SAME DAY [facility];  Service: Ophthalmology   • MAMMOPLASTY REDUCTION Bilateral 11/3/2015    Procedure: MAMMOPLASTY REDUCTION;  Surgeon: Talia Barton M.D.;  Location: SURGERY [facility];  Service:    • KNEE ARTHROSCOPY  9/17/2014    Performed by Dany Owens M.D. at [facility]   • MENISCECTOMY  9/17/2014    Performed by Dany Owens M.D. at [facility]   • SHOULDER ARTHROSCOPY W/ ROTATOR CUFF REPAIR  8/22/2013    Performed by Dany Owens M.D. at [facility]   • SHOULDER DECOMPRESSION ARTHROSCOPIC  8/22/2013    Performed by Dany Owens M.D. at [facility]   • CLAVICLE DISTAL EXCISION  8/22/2013    Performed by Dany Owens M.D. at [facility]   • OTHER  7/2013    laser procedure right eye   • CATARACT EXTRACTION WITH IOL  5/29/13    right eye   • ABDOMINAL HYSTERECTOMY TOTAL  1975   • CHOLECYSTECTOMY  1973    open   • TONSILLECTOMY  1950   • BLADDER SUSPENSION  1973, 1978    x2     Social History   Substance Use Topics   • Smoking status: Former Smoker     Packs/day: 0.00     Years: 2.00     Types: Cigarettes     Quit date: 1/1/1969   • Smokeless tobacco: Never Used   • Alcohol use 0.6 - 1.2 oz/week     1 - 2 Glasses of wine per week      Comment: Several times a week     Social History     Social History Narrative    Retired from medical billing      Family History   Problem Relation Age of Onset   • Heart Disease Mother    • Hypertension Mother    • Other Mother         pancreatitis   • Arthritis  "Father    • Cancer Father         Lymphoma, colon, skin   • Hyperlipidemia Father    • Other Father         Clogged artery in leg   • Heart Disease Father         A Fib   • Diabetes Sister         Pre   • Other Sister         Obesity   • Other Daughter         Gluten allergies   • Other Son         Suicide     Family Status   Relation Status   • Mo         pancreatitis   • Fa    • Sis Alive   • Misty Alive   • Son      Allergies: Other misc and Wheat    ROS  As per HPI above. All other systems reviewed and negative.        Objective   /64 (BP Location: Right arm, Patient Position: Sitting, BP Cuff Size: Large adult)   Pulse 69   Temp 37 °C (98.6 °F) (Temporal)   Resp 12   Ht 1.562 m (5' 1.5\")   Wt 81.1 kg (178 lb 12.8 oz)   SpO2 96%  Body mass index is 33.24 kg/m².    General: alert and oriented, pleasant, cooperative  HEENT: Normocephalic, atraumatic.   Psychiatric: appropriate mood and affect. Good insight and appropriate judgment     Assessment and Plan   The following treatment plan was discussed     1. Essential hypertension  Well controlled on present regimen.     2. Obesity (BMI 30-39.9)  Suggested water aerobics, recumbent bike. She does like swimming, will look into water aerobics. Also interested in RD consultation.   - REFERRAL TO [facility] HEALTH IMPROVEMENT PROGRAMS (HIP) Services Requested: Registered Dietitian for Medical Nutrition Therapy; Reason for Visit: Overweight/Obesity    3. Vitreous hemorrhage of right eye (HCC)  Blood pressure well controlled, following with Dr. Franklin.     Healthcare maintenance     Health Maintenance Due   Topic Date Due   • MAMMOGRAM  10/19/2018       No Follow-up on file.    Barrington Anderson MD  Internal Medicine  [facility]                   "

## 2019-05-16 NOTE — ASSESSMENT & PLAN NOTE
Well controlled on atenolol 50 mg daily. Has a bit of a hemorrhage of her right eye and her ophthalmologist had noted that HTN can contribute to this issue.

## 2019-05-30 ENCOUNTER — TELEPHONE (OUTPATIENT)
Dept: MEDICAL GROUP | Facility: PHYSICIAN GROUP | Age: 74
End: 2019-05-30

## 2019-05-30 NOTE — TELEPHONE ENCOUNTER
----- Message from Dennise Wells R.D. sent at 5/30/2019 11:01 AM PDT -----  Regarding: New Referral  Dr. Anderson,    Could you please place a new referral to Health Improvement Programs for Medicare Obesity Counseling using the dx code Z68.33?  Medicare/[facility] requires this specific dx code.    Thank you!  Dennise Wells RD, LD, CDE  [phone]

## 2019-06-20 NOTE — TELEPHONE ENCOUNTER
Was the patient seen in the last year in this department? Yes    Does patient have an active prescription for medications requested? No     Received Request Via: Pharmacy      Pt met protocol?: Yes  Ov pcp 5/19      Jocelynnds

## 2019-06-21 RX ORDER — ESTRADIOL 0.5 MG/1
TABLET ORAL
Qty: 90 TAB | Refills: 1 | Status: SHIPPED | OUTPATIENT
Start: 2019-06-21 | End: 2019-10-09 | Stop reason: SDUPTHER

## 2019-09-20 ENCOUNTER — OFFICE VISIT (OUTPATIENT)
Dept: URGENT CARE | Facility: PHYSICIAN GROUP | Age: 74
End: 2019-09-20
Payer: MEDICARE

## 2019-09-20 VITALS
BODY MASS INDEX: 32.76 KG/M2 | DIASTOLIC BLOOD PRESSURE: 72 MMHG | HEIGHT: 62 IN | OXYGEN SATURATION: 94 % | HEART RATE: 57 BPM | WEIGHT: 178 LBS | RESPIRATION RATE: 18 BRPM | TEMPERATURE: 97.8 F | SYSTOLIC BLOOD PRESSURE: 124 MMHG

## 2019-09-20 DIAGNOSIS — H66.001 NON-RECURRENT ACUTE SUPPURATIVE OTITIS MEDIA OF RIGHT EAR WITHOUT SPONTANEOUS RUPTURE OF TYMPANIC MEMBRANE: ICD-10-CM

## 2019-09-20 PROCEDURE — 99214 OFFICE O/P EST MOD 30 MIN: CPT | Performed by: FAMILY MEDICINE

## 2019-09-20 RX ORDER — AMOXICILLIN 500 MG/1
500 CAPSULE ORAL 3 TIMES DAILY
Qty: 30 CAP | Refills: 0 | Status: SHIPPED | OUTPATIENT
Start: 2019-09-20 | End: 2019-10-09

## 2019-09-20 RX ORDER — AMOXICILLIN 500 MG/1
500 CAPSULE ORAL 3 TIMES DAILY
Qty: 30 CAP | Refills: 0 | Status: SHIPPED | OUTPATIENT
Start: 2019-09-20 | End: 2019-09-20 | Stop reason: SDUPTHER

## 2019-09-20 ASSESSMENT — ENCOUNTER SYMPTOMS
NAUSEA: 0
SORE THROAT: 0
VOMITING: 0
MYALGIAS: 0
COUGH: 1
CHILLS: 0
SHORTNESS OF BREATH: 0
EYE PAIN: 0
WHEEZING: 0
FEVER: 0
DIZZINESS: 0

## 2019-09-21 NOTE — PROGRESS NOTES
"  Subjective:     Shanice Magana is a 73 y.o. female who presents for Cough (with congestion, \"Head Cold\", loss of voice and sore throat x 1 week )       Cough   This is a new problem. The current episode started 1 to 4 weeks ago. The problem has been gradually worsening. The problem occurs every few minutes. The cough is non-productive. Associated symptoms include ear pain and nasal congestion. Pertinent negatives include no chest pain, chills, fever, myalgias, rash, sore throat, shortness of breath or wheezing.     Past Medical History:   Diagnosis Date   • Arthritis     slight   • BMI 34.0-34.9,adult 9/11/2014   • CATARACT     surgical correction margo    • Celiac disease    • CLL (chronic lymphocytic leukemia) (HCC) 3/3/2011    Asymptomatic being monitored/Dr Davidson, Q 6 months. Not on medications.    • GERD (gastroesophageal reflux disease) 3/3/2011    Sees GI specialist    • Hiatal hernia 3/3/2011   • Hot flashes, menopausal 3/3/2011   • Hypertension 3/3/2011     Past Surgical History:   Procedure Laterality Date   • RECTUS REPAIR Right 11/15/2018    Procedure: RECTUS REPAIR- SUPERIOR RECTUS RECESSION, ADJUSTABLE SUTURE INFERIOR RECTUS PLICATION/RESECTION;  Surgeon: Jane Larsen M.D.;  Location: SURGERY SAME DAY [facility];  Service: Ophthalmology   • VITRECTOMY POSTERIOR Right 1/17/2018    Procedure: VITRECTOMY POSTERIOR W/AIR FLUID EXCHANGE, ENDO LASER, 23 GAUGE SF6 GAS, CRYOTHERAPY;  Surgeon: Sea Franklin M.D.;  Location: SURGERY SAME DAY [facility];  Service: Ophthalmology   • MAMMOPLASTY REDUCTION Bilateral 11/3/2015    Procedure: MAMMOPLASTY REDUCTION;  Surgeon: Talia Barton M.D.;  Location: SURGERY [facility];  Service:    • KNEE ARTHROSCOPY  9/17/2014    Performed by Dany Owens M.D. at [facility]   • MENISCECTOMY  9/17/2014    Performed by Dany Owens M.D. at [facility]   • SHOULDER ARTHROSCOPY W/ ROTATOR CUFF REPAIR  8/22/2013    " Performed by Dany Owens M.D. at SURGERY [facility]   • SHOULDER DECOMPRESSION ARTHROSCOPIC  2013    Performed by Dany Owens M.D. at SURGERY [facility]   • CLAVICLE DISTAL EXCISION  2013    Performed by Dany Owens M.D. at SURGERY [facility]   • OTHER  2013    laser procedure right eye   • CATARACT EXTRACTION WITH IOL  13    right eye   • ABDOMINAL HYSTERECTOMY TOTAL     • CHOLECYSTECTOMY      open   • TONSILLECTOMY     • BLADDER SUSPENSION  1973, 1978    x2     Social History     Socioeconomic History   • Marital status: Single     Spouse name: Not on file   • Number of children: Not on file   • Years of education: Not on file   • Highest education level: Not on file   Occupational History   • Not on file   Social Needs   • Financial resource strain: Not on file   • Food insecurity:     Worry: Not on file     Inability: Not on file   • Transportation needs:     Medical: Not on file     Non-medical: Not on file   Tobacco Use   • Smoking status: Former Smoker     Packs/day: 0.00     Years: 2.00     Pack years: 0.00     Types: Cigarettes     Last attempt to quit: 1969     Years since quittin.7   • Smokeless tobacco: Never Used   Substance and Sexual Activity   • Alcohol use: Yes     Alcohol/week: 0.6 - 1.2 oz     Types: 1 - 2 Glasses of wine per week     Comment: Several times a week   • Drug use: No   • Sexual activity: Not Currently   Lifestyle   • Physical activity:     Days per week: Not on file     Minutes per session: Not on file   • Stress: Not on file   Relationships   • Social connections:     Talks on phone: Not on file     Gets together: Not on file     Attends Je[employer]'s witness service: Not on file     Active member of club or organization: Not on file     Attends meetings of clubs or organizations: Not on file     Relationship status: Not on file   • Intimate partner violence:     Fear of current or ex partner: Not on file     Emotionally  "abused: Not on file     Physically abused: Not on file     Forced sexual activity: Not on file   Other Topics Concern   • Not on file   Social History Narrative    Retired from medical billing       Family History   Problem Relation Age of Onset   • Heart Disease Mother    • Hypertension Mother    • Other Mother         pancreatitis   • Arthritis Father    • Cancer Father         Lymphoma, colon, skin   • Hyperlipidemia Father    • Other Father         Clogged artery in leg   • Heart Disease Father         A Fib   • Diabetes Sister         Pre   • Other Sister         Obesity   • Other Daughter         Gluten allergies   • Other Son         Suicide    Review of Systems   Constitutional: Negative for chills and fever.   HENT: Positive for ear pain. Negative for sore throat.    Eyes: Negative for pain.   Respiratory: Positive for cough. Negative for shortness of breath and wheezing.    Cardiovascular: Negative for chest pain.   Gastrointestinal: Negative for nausea and vomiting.   Genitourinary: Negative for hematuria.   Musculoskeletal: Negative for myalgias.   Skin: Negative for rash.   Neurological: Negative for dizziness.     Allergies   Allergen Reactions   • Other Misc      All household chemicals, soap powders, perfumes. Tongue and lips go numb, itching.   • Wheat      Gluten. Stomach ache.       Objective:   /72 (BP Location: Right arm, Patient Position: Sitting, BP Cuff Size: Large adult)   Pulse (!) 57   Temp 36.6 °C (97.8 °F) (Temporal)   Resp 18   Ht 1.562 m (5' 1.5\")   Wt 80.7 kg (178 lb)   SpO2 94%   BMI 33.09 kg/m²   Physical Exam   Constitutional: She is oriented to person, place, and time. She appears well-developed and well-nourished. No distress.   HENT:   Head: Normocephalic and atraumatic.   Right Ear: There is tenderness. No drainage. A middle ear effusion is present.   Eyes: Pupils are equal, round, and reactive to light. Conjunctivae and EOM are normal.   Cardiovascular: Normal " rate and regular rhythm.   No murmur heard.  Pulmonary/Chest: Effort normal and breath sounds normal. No respiratory distress.   Abdominal: Soft. She exhibits no distension. There is no tenderness.   Neurological: She is alert and oriented to person, place, and time. She has normal reflexes. No sensory deficit.   Skin: Skin is warm and dry.   Psychiatric: She has a normal mood and affect.         Assessment/Plan:   Assessment    1. Non-recurrent acute suppurative otitis media of right ear without spontaneous rupture of tympanic membrane  - amoxicillin (AMOXIL) 500 MG Cap; Take 1 Cap by mouth 3 times a day.  Dispense: 30 Cap; Refill: 0    Differential diagnosis, natural history, supportive care, and indications for immediate follow-up discussed.

## 2019-09-21 NOTE — PATIENT INSTRUCTIONS
"Upper Respiratory Infection, Adult  Most upper respiratory infections (URIs) are a viral infection of the air passages leading to the lungs. A URI affects the nose, throat, and upper air passages. The most common type of URI is nasopharyngitis and is typically referred to as \"the common cold.\"  URIs run their course and usually go away on their own. Most of the time, a URI does not require medical attention, but sometimes a bacterial infection in the upper airways can follow a viral infection. This is called a secondary infection. Sinus and middle ear infections are common types of secondary upper respiratory infections.  Bacterial pneumonia can also complicate a URI. A URI can worsen asthma and chronic obstructive pulmonary disease (COPD). Sometimes, these complications can require emergency medical care and may be life threatening.  What are the causes?  Almost all URIs are caused by viruses. A virus is a type of germ and can spread from one person to another.  What increases the risk?  You may be at risk for a URI if:  · You smoke.  · You have chronic heart or lung disease.  · You have a weakened defense (immune) system.  · You are very young or very old.  · You have nasal allergies or asthma.  · You work in crowded or poorly ventilated areas.  · You work in health care facilities or schools.  What are the signs or symptoms?  Symptoms typically develop 2-3 days after you come in contact with a cold virus. Most viral URIs last 7-10 days. However, viral URIs from the influenza virus (flu virus) can last 14-18 days and are typically more severe. Symptoms may include:  · Runny or stuffy (congested) nose.  · Sneezing.  · Cough.  · Sore throat.  · Headache.  · Fatigue.  · Fever.  · Loss of appetite.  · Pain in your forehead, behind your eyes, and over your cheekbones (sinus pain).  · Muscle aches.  How is this diagnosed?  Your health care provider may diagnose a URI by:  · Physical exam.  · Tests to check that your " symptoms are not due to another condition such as:  ¨ Strep throat.  ¨ Sinusitis.  ¨ Pneumonia.  ¨ Asthma.  How is this treated?  A URI goes away on its own with time. It cannot be cured with medicines, but medicines may be prescribed or recommended to relieve symptoms. Medicines may help:  · Reduce your fever.  · Reduce your cough.  · Relieve nasal congestion.  Follow these instructions at home:  · Take medicines only as directed by your health care provider.  · Gargle warm saltwater or take cough drops to comfort your throat as directed by your health care provider.  · Use a warm mist humidifier or inhale steam from a shower to increase air moisture. This may make it easier to breathe.  · Drink enough fluid to keep your urine clear or pale yellow.  · Eat soups and other clear broths and maintain good nutrition.  · Rest as needed.  · Return to work when your temperature has returned to normal or as your health care provider advises. You may need to stay home longer to avoid infecting others. You can also use a face mask and careful hand washing to prevent spread of the virus.  · Increase the usage of your inhaler if you have asthma.  · Do not use any tobacco products, including cigarettes, chewing tobacco, or electronic cigarettes. If you need help quitting, ask your health care provider.  How is this prevented?  The best way to protect yourself from getting a cold is to practice good hygiene.  · Avoid oral or hand contact with people with cold symptoms.  · Wash your hands often if contact occurs.  There is no clear evidence that vitamin C, vitamin E, echinacea, or exercise reduces the chance of developing a cold. However, it is always recommended to get plenty of rest, exercise, and practice good nutrition.  Contact a health care provider if:  · You are getting worse rather than better.  · Your symptoms are not controlled by medicine.  · You have chills.  · You have worsening shortness of breath.  · You have brown  or red mucus.  · You have yellow or brown nasal discharge.  · You have pain in your face, especially when you bend forward.  · You have a fever.  · You have swollen neck glands.  · You have pain while swallowing.  · You have white areas in the back of your throat.  Get help right away if:  · You have severe or persistent:  ¨ Headache.  ¨ Ear pain.  ¨ Sinus pain.  ¨ Chest pain.  · You have chronic lung disease and any of the following:  ¨ Wheezing.  ¨ Prolonged cough.  ¨ Coughing up blood.  ¨ A change in your usual mucus.  · You have a stiff neck.  · You have changes in your:  ¨ Vision.  ¨ Hearing.  ¨ Thinking.  ¨ Mood.  This information is not intended to replace advice given to you by your health care provider. Make sure you discuss any questions you have with your health care provider.  Document Released: 06/13/2002 Document Revised: 08/20/2017 Document Reviewed: 03/25/2015  [employer] Interactive Patient Education © 2017 [employer]

## 2019-10-07 ENCOUNTER — TELEPHONE (OUTPATIENT)
Dept: MEDICAL GROUP | Facility: PHYSICIAN GROUP | Age: 74
End: 2019-10-07

## 2019-10-07 NOTE — TELEPHONE ENCOUNTER
Future Appointments       Provider Department Center    10/9/2019 11:30 AM Devante Mcrae M.D. [facility]    1/30/2020 1:30 PM Devante Mcrae M.D. [facility]        ESTABLISHED PATIENT PRE-VISIT PLANNING     Patient was contacted to complete PVP.       1.  Reviewed notes from the last few office visits within the medical group: Yes    2.  If any orders were placed at last visit or intended to be done for this visit (i.e. 6 mos follow-up), do we have Results/Consult Notes?        •  Labs - Labs were not ordered at last office visit.       •  Imaging - Imaging ordered, NOT completed. Patient advised to complete prior to next appointment.- Reminded pt to have Mammogram done.       •  Referrals - Referral ordered, patient has NOT been seen. -Pt No showed Appointment    3. Is this appointment scheduled as a Hospital Follow-Up? No    4.  Immunizations were updated in [employer] using Web[employer]?: Yes       •  Web [employer] Recommendations: FLU, TD, VARICELLA (Chicken Pox)  and SHINGRIX (Shingles)    5.  Patient is due for the following Health Maintenance Topics:   Health Maintenance Due   Topic Date Due   • HEPATITIS C SCREENING  1945   • MAMMOGRAM  10/19/2018   • IMM INFLUENZA (1) 09/01/2019     6. Orders for overdue Health Maintenance topics pended in Pre-Charting? NO    7.  AHA (MDX) form printed for Provider? No, already completed    8.  Patient was informed to arrive 15 min prior to their scheduled appointment and bring in their medication bottles.

## 2019-10-09 ENCOUNTER — TELEPHONE (OUTPATIENT)
Dept: MEDICAL GROUP | Facility: PHYSICIAN GROUP | Age: 74
End: 2019-10-09

## 2019-10-09 ENCOUNTER — OFFICE VISIT (OUTPATIENT)
Dept: MEDICAL GROUP | Facility: PHYSICIAN GROUP | Age: 74
End: 2019-10-09
Payer: MEDICARE

## 2019-10-09 VITALS
DIASTOLIC BLOOD PRESSURE: 74 MMHG | HEART RATE: 76 BPM | BODY MASS INDEX: 32.76 KG/M2 | SYSTOLIC BLOOD PRESSURE: 122 MMHG | OXYGEN SATURATION: 95 % | HEIGHT: 62 IN | TEMPERATURE: 97.3 F | RESPIRATION RATE: 16 BRPM | WEIGHT: 178 LBS

## 2019-10-09 DIAGNOSIS — I10 ESSENTIAL HYPERTENSION: ICD-10-CM

## 2019-10-09 DIAGNOSIS — Z13.220 SCREENING FOR HYPERLIPIDEMIA: ICD-10-CM

## 2019-10-09 DIAGNOSIS — Z13.21 ENCOUNTER FOR VITAMIN DEFICIENCY SCREENING: ICD-10-CM

## 2019-10-09 DIAGNOSIS — N95.1 HOT FLASHES, MENOPAUSAL: ICD-10-CM

## 2019-10-09 DIAGNOSIS — K21.9 GASTROESOPHAGEAL REFLUX DISEASE WITHOUT ESOPHAGITIS: ICD-10-CM

## 2019-10-09 PROCEDURE — 99213 OFFICE O/P EST LOW 20 MIN: CPT | Performed by: INTERNAL MEDICINE

## 2019-10-09 RX ORDER — ESTRADIOL 0.5 MG/1
TABLET ORAL
Qty: 45 TAB | Refills: 3 | Status: SHIPPED | OUTPATIENT
Start: 2019-10-09 | End: 2020-10-07

## 2019-10-09 RX ORDER — ATENOLOL 50 MG/1
TABLET ORAL
Qty: 180 TAB | Refills: 3 | Status: SHIPPED | OUTPATIENT
Start: 2019-10-09 | End: 2020-10-07

## 2019-10-09 NOTE — PROGRESS NOTES
PRIMARY CARE CLINIC FOLLOW UP VISIT  Chief Complaint   Patient presents with   • Gastrophageal Reflux     med management   • Other     Hot flashes    • Hypertension     History of Present Illness     Hypertension  Well controlled on 50 mg twice daily.     Hot flashes, menopausal  Stable on 1/2 tablet of estradiol 0.5 mg daily.     GERD (gastroesophageal reflux disease)  Chronic, stable on prevacid.     Current Outpatient Medications   Medication Sig Dispense Refill   • estradiol (ESTRACE) 0.5 MG tablet TAKE ONE HALF TABLET BY MOUTH ONE TIME DAILY 45 Tab 3   • atenolol (TENORMIN) 50 MG Tab TAKE 1 TABLET BY MOUTH TWICE A  Tab 3   • lansoprazole (PREVACID) 30 MG TABLET DISPERSIBLE Take 1 Tab by mouth 2 Times a Day. 90 Tab 3   • Cyanocobalamin (B-12) 1000 MCG/ML Kit by Injection route.     • cetirizine (ZYRTEC) 10 MG Tab Take 10 mg by mouth every bedtime.     • Cholecalciferol (VITAMIN D3) 5000 units Cap Take 1 Cap by mouth every morning.     • Cetirizine HCl (KLS ALLER-JAYE PO) Take  by mouth every day.       No current facility-administered medications for this visit.      Past Medical History:   Diagnosis Date   • Arthritis     slight   • BMI 34.0-34.9,adult 9/11/2014   • CATARACT     surgical correction margo    • Celiac disease    • CLL (chronic lymphocytic leukemia) (HCC) 3/3/2011    Asymptomatic being monitored/Dr Davidson, Q 6 months. Not on medications.    • GERD (gastroesophageal reflux disease) 3/3/2011    Sees GI specialist    • Hiatal hernia 3/3/2011   • Hot flashes, menopausal 3/3/2011   • Hypertension 3/3/2011     Past Surgical History:   Procedure Laterality Date   • RECTUS REPAIR Right 11/15/2018    Procedure: RECTUS REPAIR- SUPERIOR RECTUS RECESSION, ADJUSTABLE SUTURE INFERIOR RECTUS PLICATION/RESECTION;  Surgeon: Jane Larsen M.D.;  Location: SURGERY SAME DAY [facility];  Service: Ophthalmology   • VITRECTOMY POSTERIOR Right 1/17/2018    Procedure: VITRECTOMY POSTERIOR W/AIR FLUID EXCHANGE,  ENDO LASER, 23 GAUGE SF6 GAS, CRYOTHERAPY;  Surgeon: Sea Franklin M.D.;  Location: SURGERY SAME DAY [facility];  Service: Ophthalmology   • MAMMOPLASTY REDUCTION Bilateral 11/3/2015    Procedure: MAMMOPLASTY REDUCTION;  Surgeon: Talia Barton M.D.;  Location: SURGERY [facility];  Service:    • KNEE ARTHROSCOPY  2014    Performed by Dany Owens M.D. at [facility]   • MENISCECTOMY  2014    Performed by Dany Owens M.D. at [facility]   • SHOULDER ARTHROSCOPY W/ ROTATOR CUFF REPAIR  2013    Performed by Dany Owens M.D. at [facility]   • SHOULDER DECOMPRESSION ARTHROSCOPIC  2013    Performed by Dany Owens M.D. at [facility]   • CLAVICLE DISTAL EXCISION  2013    Performed by Dany Owens M.D. at [facility]   • OTHER  2013    laser procedure right eye   • CATARACT EXTRACTION WITH IOL  13    right eye   • ABDOMINAL HYSTERECTOMY TOTAL     • CHOLECYSTECTOMY      open   • TONSILLECTOMY     • BLADDER SUSPENSION  1973, 1978    x2     Social History     Tobacco Use   • Smoking status: Former Smoker     Packs/day: 0.00     Years: 2.00     Pack years: 0.00     Types: Cigarettes     Last attempt to quit: 1969     Years since quittin.8   • Smokeless tobacco: Never Used   Substance Use Topics   • Alcohol use: Yes     Alcohol/week: 0.6 - 1.2 oz     Types: 1 - 2 Glasses of wine per week     Comment: Several times a week   • Drug use: No     Social History     Social History Narrative    Retired from medical billing      Family History   Problem Relation Age of Onset   • Heart Disease Mother    • Hypertension Mother    • Other Mother         pancreatitis   • Arthritis Father    • Cancer Father         Lymphoma, colon, skin   • Hyperlipidemia Father    • Other Father         Clogged artery in leg   • Heart Disease Father         A Fib   • Diabetes Sister         Pre   • Other  "Sister         Obesity   • Other Daughter         Gluten allergies   • Other Son         Suicide     Family Status   Relation Name Status   • Mo 86         pancreatitis   • Fa 92    • Sis 75 Alive   • Misty 50 Alive   • Son 17      Allergies: Other misc and Wheat    ROS  As per HPI above. All other systems reviewed and negative.        Objective   /74 (BP Cuff Size: Large adult)   Pulse 76   Temp 36.3 °C (97.3 °F)   Resp 16   Ht 1.562 m (5' 1.5\")   Wt 80.7 kg (178 lb)   SpO2 95%  Body mass index is 33.09 kg/m².    General: alert and oriented, pleasant, cooperative  HEENT: Normocephalic, atraumatic. No thyroid masses. Oropharynx clear without exudate or injection.   Cardiovascular: regular rate and rhythm, normal S1/S2  Pulmonary: lungs clear to auscultation bilaterally  Gastrointestinal: no tenderness to palpation. No hepatosplenomegaly. Bowel sounds normoactive  Lymphatics: no cervical or supraclavicular lymphadenopathy   Skin: warm and dry, no lesions or rashes  Psychiatric: appropriate mood and affect. Good insight and appropriate judgment     Assessment and Plan   The following treatment plan was discussed     1. Essential hypertension    - atenolol (TENORMIN) 50 MG Tab; TAKE 1 TABLET BY MOUTH TWICE A DAY  Dispense: 180 Tab; Refill: 3    2. Hot flashes, menopausal  Chronic, stable on 1/2 tab of estradiol 0.5 mg daily.     3. Gastroesophageal reflux disease without esophagitis  Chronic, stable on prevacid.     4. Screening for hyperlipidemia    - CBC WITH DIFFERENTIAL; Future  - Comp Metabolic Panel; Future  - Lipid Profile; Future    5. Encounter for vitamin deficiency screening    - VITAMIN D,25 HYDROXY; Future    Healthcare maintenance     Health Maintenance Due   Topic Date Due   • HEPATITIS C SCREENING  1945   • MAMMOGRAM  10/19/2018   • IMM INFLUENZA (1) 2019     No follow-ups on file.    Barrington Anderson MD  Internal Medicine  [facility] "

## 2019-10-09 NOTE — TELEPHONE ENCOUNTER
DOCUMENTATION OF PAR STATUS:    1. Name of Medication & Dose: lansoprazole     2. Name of Prescription Coverage Company & phone #: [id number]    3. Date Prior Auth Submitted: 10/09/19    4. What information was given to obtain insurance decision? Cover My Meds    5. Prior Auth Status? Pending    6. Patient Notified: yes

## 2019-10-14 NOTE — TELEPHONE ENCOUNTER
Dr Mcrae- Prevacid not covered by insurance, not sure if you would like to do a PA or change to another PPI.

## 2019-10-14 NOTE — TELEPHONE ENCOUNTER
Pt has rx on file at pharmacy from 10/9/19. Pharmacy is asking for alternative drug as the rx requires a prior auth

## 2019-10-15 NOTE — TELEPHONE ENCOUNTER
I have never seen this patient before so she would need to establish care with me or have an appointment to see if she could take another proton pump inhibitor or if we need to do a prior Auth.  Thanks.

## 2019-10-15 NOTE — TELEPHONE ENCOUNTER
FINAL PRIOR AUTHORIZATION STATUS:    1.  Name of Medication & Dose: lansoprazole 30 mg     2. Prior Auth Status: Denied.  Reason: pt needs to have 2 tried formulary alternative such as omeprazole oral capsule, pantoprazole oral tablet or famotidine oral tablet.    3. Action Taken: Pharmacy Notified: no Patient Notified: yes

## 2019-10-30 NOTE — PROGRESS NOTES
Called member to wish her a happy birthday and introduce [employer] intro. She let me know that her lansoprazole was no longer being covered and she needed an alternative. Looking at the formulary it looks like the medication is covered but only at 60 capsules per 30 days. I will call her pharmacy to clarify as she was told she needed an alternative medication

## 2019-10-30 NOTE — PROGRESS NOTES
Spoke with [facility] pharmacy and I was told the reason the pt is no longer able to stay on Prevacid is because it is not on the formulary. I was able to pull up the medication on the 2019 and 2020 formulary. I will speak with Yadi ISAACS And have her look at the medication to see why it wouldn't be covered. The pharmacy does have an alternative Pantoprazole ready for her to , I called the pt to let her know and she had no other questions or concerns at this time.

## 2020-01-02 ENCOUNTER — TELEPHONE (OUTPATIENT)
Dept: MEDICAL GROUP | Facility: PHYSICIAN GROUP | Age: 75
End: 2020-01-02

## 2020-02-20 ENCOUNTER — HOSPITAL ENCOUNTER (OUTPATIENT)
Dept: LAB | Facility: MEDICAL CENTER | Age: 75
End: 2020-02-20
Attending: INTERNAL MEDICINE
Payer: MEDICARE

## 2020-02-20 ENCOUNTER — OFFICE VISIT (OUTPATIENT)
Dept: MEDICAL GROUP | Facility: PHYSICIAN GROUP | Age: 75
End: 2020-02-20
Payer: MEDICARE

## 2020-02-20 VITALS
DIASTOLIC BLOOD PRESSURE: 68 MMHG | HEIGHT: 62 IN | HEART RATE: 62 BPM | OXYGEN SATURATION: 92 % | WEIGHT: 183 LBS | TEMPERATURE: 97 F | RESPIRATION RATE: 16 BRPM | BODY MASS INDEX: 33.68 KG/M2 | SYSTOLIC BLOOD PRESSURE: 120 MMHG

## 2020-02-20 DIAGNOSIS — Z80.8 FAMILY HISTORY OF SKIN CANCER: ICD-10-CM

## 2020-02-20 DIAGNOSIS — H43.11 VITREOUS HEMORRHAGE OF RIGHT EYE (HCC): ICD-10-CM

## 2020-02-20 DIAGNOSIS — I10 ESSENTIAL HYPERTENSION: ICD-10-CM

## 2020-02-20 DIAGNOSIS — C91.10 CLL (CHRONIC LYMPHOCYTIC LEUKEMIA) (HCC): ICD-10-CM

## 2020-02-20 DIAGNOSIS — L85.3 DRY SKIN: ICD-10-CM

## 2020-02-20 DIAGNOSIS — H33.21 DETACHED RETINA, RIGHT: ICD-10-CM

## 2020-02-20 DIAGNOSIS — S09.90XA CLOSED HEAD INJURY, INITIAL ENCOUNTER: ICD-10-CM

## 2020-02-20 DIAGNOSIS — R51.9 NONINTRACTABLE HEADACHE, UNSPECIFIED CHRONICITY PATTERN, UNSPECIFIED HEADACHE TYPE: ICD-10-CM

## 2020-02-20 LAB
ALBUMIN SERPL BCP-MCNC: 4.7 G/DL (ref 3.2–4.9)
ALBUMIN/GLOB SERPL: 1.8 G/DL
ALP SERPL-CCNC: 66 U/L (ref 30–99)
ALT SERPL-CCNC: 14 U/L (ref 2–50)
ANION GAP SERPL CALC-SCNC: 7 MMOL/L (ref 0–11.9)
AST SERPL-CCNC: 17 U/L (ref 12–45)
BASOPHILS # BLD AUTO: 0 % (ref 0–1.8)
BASOPHILS # BLD: 0 K/UL (ref 0–0.12)
BILIRUB SERPL-MCNC: 0.5 MG/DL (ref 0.1–1.5)
BUN SERPL-MCNC: 25 MG/DL (ref 8–22)
CALCIUM SERPL-MCNC: 9.6 MG/DL (ref 8.5–10.5)
CHLORIDE SERPL-SCNC: 107 MMOL/L (ref 96–112)
CO2 SERPL-SCNC: 28 MMOL/L (ref 20–33)
CREAT SERPL-MCNC: 1.22 MG/DL (ref 0.5–1.4)
EOSINOPHIL # BLD AUTO: 0.42 K/UL (ref 0–0.51)
EOSINOPHIL NFR BLD: 1.8 % (ref 0–6.9)
ERYTHROCYTE [DISTWIDTH] IN BLOOD BY AUTOMATED COUNT: 45.7 FL (ref 35.9–50)
FASTING STATUS PATIENT QL REPORTED: NORMAL
GLOBULIN SER CALC-MCNC: 2.6 G/DL (ref 1.9–3.5)
GLUCOSE SERPL-MCNC: 92 MG/DL (ref 65–99)
HCT VFR BLD AUTO: 41.3 % (ref 37–47)
HGB BLD-MCNC: 13 G/DL (ref 12–16)
IRON SATN MFR SERPL: 25 % (ref 15–55)
IRON SERPL-MCNC: 99 UG/DL (ref 40–170)
LDH SERPL L TO P-CCNC: 143 U/L (ref 107–266)
LYMPHOCYTES # BLD AUTO: 17.86 K/UL (ref 1–4.8)
LYMPHOCYTES NFR BLD: 77 % (ref 22–41)
MAGNESIUM SERPL-MCNC: 2 MG/DL (ref 1.5–2.5)
MANUAL DIFF BLD: NORMAL
MCH RBC QN AUTO: 30.7 PG (ref 27–33)
MCHC RBC AUTO-ENTMCNC: 31.5 G/DL (ref 33.6–35)
MCV RBC AUTO: 97.6 FL (ref 81.4–97.8)
MONOCYTES # BLD AUTO: 0.81 K/UL (ref 0–0.85)
MONOCYTES NFR BLD AUTO: 3.5 % (ref 0–13.4)
MORPHOLOGY BLD-IMP: NORMAL
NEUTROPHILS # BLD AUTO: 4.11 K/UL (ref 2–7.15)
NEUTROPHILS NFR BLD: 17.7 % (ref 44–72)
NRBC # BLD AUTO: 0 K/UL
NRBC BLD-RTO: 0 /100 WBC
PLATELET # BLD AUTO: 173 K/UL (ref 164–446)
PLATELET BLD QL SMEAR: NORMAL
PMV BLD AUTO: 11.5 FL (ref 9–12.9)
POTASSIUM SERPL-SCNC: 4.1 MMOL/L (ref 3.6–5.5)
PROT SERPL-MCNC: 7.3 G/DL (ref 6–8.2)
RBC # BLD AUTO: 4.23 M/UL (ref 4.2–5.4)
RBC BLD AUTO: PRESENT
SMUDGE CELLS BLD QL SMEAR: NORMAL
SODIUM SERPL-SCNC: 142 MMOL/L (ref 135–145)
TIBC SERPL-MCNC: 402 UG/DL (ref 250–450)
WBC # BLD AUTO: 23.2 K/UL (ref 4.8–10.8)

## 2020-02-20 PROCEDURE — 85027 COMPLETE CBC AUTOMATED: CPT

## 2020-02-20 PROCEDURE — 82728 ASSAY OF FERRITIN: CPT

## 2020-02-20 PROCEDURE — 83615 LACTATE (LD) (LDH) ENZYME: CPT

## 2020-02-20 PROCEDURE — 82607 VITAMIN B-12: CPT

## 2020-02-20 PROCEDURE — 99214 OFFICE O/P EST MOD 30 MIN: CPT | Performed by: FAMILY MEDICINE

## 2020-02-20 PROCEDURE — 36415 COLL VENOUS BLD VENIPUNCTURE: CPT

## 2020-02-20 PROCEDURE — 82306 VITAMIN D 25 HYDROXY: CPT

## 2020-02-20 PROCEDURE — 80053 COMPREHEN METABOLIC PANEL: CPT

## 2020-02-20 PROCEDURE — 8041 PR SCP AHA: Performed by: FAMILY MEDICINE

## 2020-02-20 PROCEDURE — 85007 BL SMEAR W/DIFF WBC COUNT: CPT

## 2020-02-20 PROCEDURE — 83735 ASSAY OF MAGNESIUM: CPT

## 2020-02-20 PROCEDURE — 83550 IRON BINDING TEST: CPT

## 2020-02-20 PROCEDURE — 83540 ASSAY OF IRON: CPT

## 2020-02-20 RX ORDER — CIPROFLOXACIN 500 MG/1
TABLET, FILM COATED ORAL
COMMUNITY
Start: 2020-02-18 | End: 2020-02-19

## 2020-02-20 RX ORDER — AMOXICILLIN AND CLAVULANATE POTASSIUM 875; 125 MG/1; MG/1
TABLET, FILM COATED ORAL
COMMUNITY
Start: 2020-02-18 | End: 2020-02-18

## 2020-02-20 RX ORDER — AMOXICILLIN 500 MG/1
CAPSULE ORAL
COMMUNITY
Start: 2019-12-17 | End: 2020-02-19

## 2020-02-20 RX ORDER — CEPHALEXIN 500 MG/1
CAPSULE ORAL
COMMUNITY
Start: 2020-02-18 | End: 2020-02-19

## 2020-02-20 ASSESSMENT — PATIENT HEALTH QUESTIONNAIRE - PHQ9
SUM OF ALL RESPONSES TO PHQ QUESTIONS 1-9: 14
5. POOR APPETITE OR OVEREATING: 3 - NEARLY EVERY DAY
CLINICAL INTERPRETATION OF PHQ2 SCORE: 5

## 2020-02-20 NOTE — PROGRESS NOTES
Chief Complaint   Patient presents with   • Headache       HISTORY OF PRESENT ILLNESS: Patient is a 74 y.o. female new patient who presents today to discuss the following issues:    Nonintractable headache, unspecified chronicity pattern, unspecified headache type  Closed head injury, initial encounter  Vitreous hemorrhage of right eye (HCC)  Detached retina, right    She presents today for evaluation of worsening, intermittent moderate to severe headaches onset July, 2019. She reports a bad ground level fall in July, 2019, at which time she hit the right side of her head on the corner of a [employer] building. She says she has recently been stressed secondary to losing her family dog, which may be exacerbating her headache symptoms. She notes constant head pressure to her right forehead, and a small linear depression on her right frontal skull. She has medicated with Tylenol with mild alleviation. She denies loss of consciousness at the time of her fall, but did have a bloody nose. She notes history of eye surgery, detached retina in her right eye, and left eye retina tear. She is followed by Dr. Franklin (Opthamologist), who she is following up with today. She denies frequent headaches prior to her fall. She denies dizziness. She denies history of diabetes.     Essential hypertension    Patient has a history of chronic hypertension and is taking atenolol 50 mg tab daily. No medication side effects were reported. She reportedly monitors blood pressure regularly at home. Blood pressure is 120/68 today. She denies any related complaints including chronic cough, chest pain, leg swelling, light-headedness or dizziness.      CLL (chronic lymphocytic leukemia) (HCC)    The patient has chronic history of CLL. She denies treatment. She is followed by Dr. Davidson (Oncology). I reviewed recent blood work with the patient in clinic today.      Results for FÁTIMA COOPER (MRN [id number]) as of 2/20/2020 09:46   Ref. Range  1/17/2018 11:12 11/9/2018 11:42 11/9/2018 11:47   WBC Latest Ref Range: 4.8 - 10.8 K/uL 19.3 (H) 19.6 (H) 19.8 (H)     Results for FÁTIMA COOPER (MRN [id number]) as of 2/20/2020 09:46   Ref. Range 1/17/2018 11:12 11/9/2018 11:42 11/9/2018 11:47   Lymphocytes Latest Ref Range: 22.00 - 41.00 % 83.60 (H) 79.30 (H) 84.90 (H)   Lymphs (Absolute) Latest Ref Range: 1.00 - 4.80 K/uL 16.13 (H) 15.54 (H) 16.81 (H)        Dry skin  Family history of skin cancer    She reports chronic history of dry skin. She would like referral to Dermatology for further treatment and evaluation for skin cancer. She denies personal history of skin cancer, however notes melanoma in her father.    Active Ambulatory Problems     Diagnosis Date Noted   • CLL (chronic lymphocytic leukemia) ([facility]) 03/03/2011   • Hypertension 03/03/2011   • GERD (gastroesophageal reflux disease) 03/03/2011   • Insomnia 03/03/2011   • Hot flashes, menopausal 03/03/2011   • Cervical radiculopathy 06/27/2013   • Multiple allergies 04/12/2016   • Obesity (BMI 30-39.9) 02/06/2017   • Low serum vitamin D 10/17/2017   • Detached retina, right 02/06/2018   • Vitreous hemorrhage of right eye ([facility]) 05/16/2019     Resolved Ambulatory Problems     Diagnosis Date Noted   • Hiatal hernia 03/03/2011   • NORMAN (iron deficiency anemia) 03/03/2011   • Routine physical examination 03/03/2011   • Routine health maintenance 06/11/2012   • Hip pain 06/27/2013   • Shoulder pain 06/27/2013   • Skin lesions, generalized 06/27/2013   • Rotator cuff tear 08/22/2013   • BMI 31.0-31.9,adult 09/11/2014   • Tear of medial cartilage or meniscus of knee, current 09/17/2014   • Tear of lateral cartilage or meniscus of knee, current 09/17/2014   • Chronic kidney disease, stage III (moderate) ([facility]) 04/16/2015   • Large breasts 04/23/2015   • Hypertrophy of breast 11/03/2015     Past Medical History:   Diagnosis Date   • Arthritis    • BMI 34.0-34.9,adult 9/11/2014   • CATARACT    • Celiac disease         Allergies:Other misc and Wheat    Current Outpatient Medications   Medication Sig Dispense Refill   • estradiol (ESTRACE) 0.5 MG tablet TAKE ONE HALF TABLET BY MOUTH ONE TIME DAILY 45 Tab 3   • atenolol (TENORMIN) 50 MG Tab TAKE 1 TABLET BY MOUTH TWICE A  Tab 3   • lansoprazole (PREVACID) 30 MG TABLET DISPERSIBLE Take 1 Tab by mouth 2 Times a Day. 90 Tab 3   • Cyanocobalamin (B-12) 1000 MCG/ML Kit by Injection route.     • cetirizine (ZYRTEC) 10 MG Tab Take 10 mg by mouth every bedtime.     • Cholecalciferol (VITAMIN D3) 5000 units Cap Take 1 Cap by mouth every morning.     • Cetirizine HCl (KLS ALLER-JAYE PO) Take  by mouth every day.       No current facility-administered medications for this visit.        Social History     Tobacco Use   • Smoking status: Former Smoker     Packs/day: 0.00     Years: 2.00     Pack years: 0.00     Types: Cigarettes     Last attempt to quit: 1969     Years since quittin.1   • Smokeless tobacco: Never Used   Substance Use Topics   • Alcohol use: Yes     Alcohol/week: 0.6 - 1.2 oz     Types: 1 - 2 Glasses of wine per week     Comment: Several times a week   • Drug use: No       Family Status   Relation Name Status   • Mo 86         pancreatitis   • Fa 92    • Sis 75 Alive   • Misty 50 Alive   • Son 17      Family History   Problem Relation Age of Onset   • Heart Disease Mother    • Hypertension Mother    • Other Mother         pancreatitis   • Arthritis Father    • Cancer Father         Lymphoma, colon, skin   • Hyperlipidemia Father    • Other Father         Clogged artery in leg   • Heart Disease Father         A Fib   • Diabetes Sister         Pre   • Other Sister         Obesity   • Other Daughter         Gluten allergies   • Other Son         Suicide     Health Maintenance Due   Topic Date Due   • HEPATITIS C SCREENING  1945   • IMM ZOSTER VACCINES (2 of 3) 2011   • MAMMOGRAM  10/19/2018       ROS:  Review of Systems  "  Constitutional: Negative for fever, chills, weight loss and malaise/fatigue.   HENT: Positive for a small linear depression on her right frontal skull. Negative for ear pain, nosebleeds, congestion, sore throat and neck pain.    Eyes: Negative for blurred vision.   Respiratory: Negative for cough, sputum production, shortness of breath and wheezing.    Cardiovascular: Negative for chest pain, palpitations, orthopnea and leg swelling.   Gastrointestinal: Negative for heartburn, nausea, vomiting and abdominal pain.   Genitourinary: Negative for dysuria, urgency and frequency.   Musculoskeletal: Negative for myalgias, back pain and joint pain.   Skin: Negative for rash and itching.   Neurological: Positive for headache, and right sided head pressure. Negative for dizziness, tingling, tremors, sensory change, focal weakness and headaches.   Endo/Heme/Allergies: Does not bruise/bleed easily.   Psychiatric/Behavioral: Negative for depression, suicidal ideas and memory loss.  The patient is not nervous/anxious and does not have insomnia.      Exam:  /68 (BP Location: Right arm, Patient Position: Sitting, BP Cuff Size: Large adult)   Pulse 62   Temp 36.1 °C (97 °F)   Resp 16   Ht 1.562 m (5' 1.5\")   Wt 83 kg (183 lb)   SpO2 92%   General:  Well nourished, well developed female in NAD  HEENT: Linear depression on the fontal aspect of the skull.TMs clear bilaterally. Oropharynx is clear      without erythema and no tonsillar exudate. Nasal mucosa pink with minimal      Rhinorrhea.  EYES: EOMI.  Conjunctiva clear.    Neck: Supple without JVD or bruit. Thyroid is not enlarged.  Pulmonary: Clear to ausculation and percussion.  Normal effort. No rales, ronchi, or wheezing.  Cardiovascular: Regular rate and rhythm without murmur, no peripheral edema  Abdomen: positive bowel sounds.  Non tender, non distended, no hepatosplenomegaly.   MSK: normal ROM in upper and lower extremities bilaterally  Psych: appropriate " affect and concentration, oriented to person and place  Neuro: no unilateral weakness in upper or lower extremities.  No gait disturbance    Please note that this dictation was created using voice recognition software. I have made every reasonable attempt to correct obvious errors, but I expect that there are errors of grammar and possibly content that I did not discover before finalizing the note.    Assessment/Plan:    Nonintractable headache, unspecified chronicity pattern, unspecified headache type  Closed head injury, initial encounter  Vitreous hemorrhage of right eye (HCC)  Detached retina, right    She will receive a CT-Head for further evaluation. She will follow up with Dr. Franklin (Opthamologist) today.     - CT-HEAD W/O; Future      CLL (chronic lymphocytic leukemia) (HCC)    Patient has been stable with current management. She is followed by Dr. Davidson (Oncology).     Essential hypertension    Chronic, stable history. Under good control with current medication regimen: atenolol 50 mg tab daily. No changes in dosage today. Blood pressure today was 120/68. Patient was asked to continue monitoring blood pressure at home. She was encouraged to follow a low sodium diet. Additionally, patient was asked to exercise regularly including frequent cardio.     Dry skin  Family history of skin cancer    She has been referred to Dermatology for further evaluation and treatment.     - REFERRAL TO DERMATOLOGY    No follow-ups on file.       IVarun (Susana), am scribing for, and in the presence of, Linsey Bonds M.D.    Electronically signed by: Varun Vera (Susana), 2/20/2020     Linsey KAPLAN M.D. personally performed the services described in this documentation, as scribed by Varun Vera in my presence, and it is both accurate and complete.               Annual Health Assessment Questions:    1.  Are you currently engaging in any exercise or physical activity? No    2.  How would you  describe your mood or emotional well-being today? good    3.  Have you had any falls in the last year? Yes    4.  Have you noticed any problems with your balance or had difficulty walking? No    5.  In the last six months have you experienced any leakage of urine? No    6. DPA/Advanced Directive: Patient has Advanced Directive on file.

## 2020-02-21 LAB
25(OH)D3 SERPL-MCNC: 78 NG/ML (ref 30–100)
FERRITIN SERPL-MCNC: 99.5 NG/ML (ref 10–291)
VIT B12 SERPL-MCNC: >1500 PG/ML (ref 211–911)

## 2020-02-28 ENCOUNTER — HOSPITAL ENCOUNTER (OUTPATIENT)
Dept: RADIOLOGY | Facility: MEDICAL CENTER | Age: 75
End: 2020-02-28
Attending: INTERNAL MEDICINE
Payer: MEDICARE

## 2020-02-28 DIAGNOSIS — Z12.31 VISIT FOR SCREENING MAMMOGRAM: ICD-10-CM

## 2020-02-28 DIAGNOSIS — Z12.39 BREAST CANCER SCREENING: ICD-10-CM

## 2020-02-28 PROCEDURE — 77067 SCR MAMMO BI INCL CAD: CPT

## 2020-03-02 ENCOUNTER — HOSPITAL ENCOUNTER (OUTPATIENT)
Dept: RADIOLOGY | Facility: MEDICAL CENTER | Age: 75
End: 2020-03-02
Attending: FAMILY MEDICINE
Payer: MEDICARE

## 2020-03-02 DIAGNOSIS — S09.90XA CLOSED HEAD INJURY, INITIAL ENCOUNTER: ICD-10-CM

## 2020-03-02 DIAGNOSIS — R51.9 NONINTRACTABLE HEADACHE, UNSPECIFIED CHRONICITY PATTERN, UNSPECIFIED HEADACHE TYPE: ICD-10-CM

## 2020-03-02 PROCEDURE — 70450 CT HEAD/BRAIN W/O DYE: CPT

## 2020-03-03 ENCOUNTER — TELEPHONE (OUTPATIENT)
Dept: MEDICAL GROUP | Facility: PHYSICIAN GROUP | Age: 75
End: 2020-03-03

## 2020-03-03 DIAGNOSIS — E66.9 CLASS 1 OBESITY WITH BODY MASS INDEX (BMI) OF 30.0 TO 30.9 IN ADULT, UNSPECIFIED OBESITY TYPE, UNSPECIFIED WHETHER SERIOUS COMORBIDITY PRESENT: ICD-10-CM

## 2020-03-03 NOTE — TELEPHONE ENCOUNTER
1. Caller Name: Shanice Vaz Chino                          Call Back Number: [phone] (home)         How would the patient prefer to be contacted with a response:     2. SPECIFIC Action To Be Taken: Referral pending, please sign.    3. Diagnosis/Clinical Reason for Request: 30-39.9 BMI    4. Specialty & Provider Name/Lab/Imaging Location: Dr Navas    5. Is appointment scheduled for requested order/referral: yes - 03/17/2020    Patient was informed they will receive a return phone call from the office ONLY if there are any questions before processing their request. Advised to call back if they haven't received a call from the referral department in 5 days.

## 2020-07-21 ENCOUNTER — APPOINTMENT (RX ONLY)
Dept: URBAN - METROPOLITAN AREA CLINIC 22 | Facility: CLINIC | Age: 75
Setting detail: DERMATOLOGY
End: 2020-07-21

## 2020-07-21 DIAGNOSIS — Z71.89 OTHER SPECIFIED COUNSELING: ICD-10-CM

## 2020-07-21 DIAGNOSIS — D18.0 HEMANGIOMA: ICD-10-CM

## 2020-07-21 DIAGNOSIS — D22 MELANOCYTIC NEVI: ICD-10-CM

## 2020-07-21 DIAGNOSIS — L82.1 OTHER SEBORRHEIC KERATOSIS: ICD-10-CM

## 2020-07-21 DIAGNOSIS — L81.4 OTHER MELANIN HYPERPIGMENTATION: ICD-10-CM

## 2020-07-21 DIAGNOSIS — L57.0 ACTINIC KERATOSIS: ICD-10-CM

## 2020-07-21 DIAGNOSIS — L85.3 XEROSIS CUTIS: ICD-10-CM

## 2020-07-21 PROBLEM — D18.01 HEMANGIOMA OF SKIN AND SUBCUTANEOUS TISSUE: Status: ACTIVE | Noted: 2020-07-21

## 2020-07-21 PROBLEM — D22.5 MELANOCYTIC NEVI OF TRUNK: Status: ACTIVE | Noted: 2020-07-21

## 2020-07-21 PROCEDURE — 17003 DESTRUCT PREMALG LES 2-14: CPT

## 2020-07-21 PROCEDURE — 99203 OFFICE O/P NEW LOW 30 MIN: CPT | Mod: 25

## 2020-07-21 PROCEDURE — 17000 DESTRUCT PREMALG LESION: CPT

## 2020-07-21 PROCEDURE — ? LIQUID NITROGEN

## 2020-07-21 PROCEDURE — ? COUNSELING

## 2020-07-21 ASSESSMENT — LOCATION SIMPLE DESCRIPTION DERM
LOCATION SIMPLE: LEFT FOREHEAD
LOCATION SIMPLE: UPPER BACK
LOCATION SIMPLE: LEFT TEMPLE
LOCATION SIMPLE: RIGHT FOREARM
LOCATION SIMPLE: LEFT FOREARM
LOCATION SIMPLE: CHEST
LOCATION SIMPLE: INFERIOR FOREHEAD

## 2020-07-21 ASSESSMENT — LOCATION ZONE DERM
LOCATION ZONE: TRUNK
LOCATION ZONE: FACE
LOCATION ZONE: ARM

## 2020-07-21 ASSESSMENT — LOCATION DETAILED DESCRIPTION DERM
LOCATION DETAILED: INFERIOR MID FOREHEAD
LOCATION DETAILED: LEFT INFERIOR FOREHEAD
LOCATION DETAILED: LEFT MEDIAL TEMPLE
LOCATION DETAILED: LOWER STERNUM
LOCATION DETAILED: INFERIOR THORACIC SPINE
LOCATION DETAILED: SUPERIOR THORACIC SPINE
LOCATION DETAILED: LEFT VENTRAL PROXIMAL FOREARM
LOCATION DETAILED: RIGHT VENTRAL PROXIMAL FOREARM

## 2020-12-16 DIAGNOSIS — I10 ESSENTIAL HYPERTENSION: ICD-10-CM

## 2020-12-18 RX ORDER — ESTRADIOL 0.5 MG/1
TABLET ORAL
Qty: 45 TAB | Refills: 0 | Status: SHIPPED | OUTPATIENT
Start: 2020-12-18 | End: 2021-03-30

## 2020-12-18 RX ORDER — ATENOLOL 50 MG/1
TABLET ORAL
Qty: 180 TAB | Refills: 0 | Status: SHIPPED | OUTPATIENT
Start: 2020-12-18 | End: 2021-03-30

## 2020-12-18 NOTE — TELEPHONE ENCOUNTER
Last seen by PCP 02/20/2020. Will send 3 month(s) to the pharmacy.  Pt to make appt prior to more refills.

## 2021-01-11 DIAGNOSIS — Z23 NEED FOR VACCINATION: ICD-10-CM

## 2021-01-15 ENCOUNTER — IMMUNIZATION (OUTPATIENT)
Dept: FAMILY PLANNING/WOMEN'S HEALTH CLINIC | Facility: IMMUNIZATION CENTER | Age: 76
End: 2021-01-15
Attending: INTERNAL MEDICINE
Payer: MEDICARE

## 2021-01-15 DIAGNOSIS — Z23 NEED FOR VACCINATION: ICD-10-CM

## 2021-01-15 DIAGNOSIS — Z23 ENCOUNTER FOR VACCINATION: Primary | ICD-10-CM

## 2021-01-15 PROCEDURE — 91300 PFIZER SARS-COV-2 VACCINE: CPT

## 2021-01-15 PROCEDURE — 0001A PFIZER SARS-COV-2 VACCINE: CPT

## 2021-02-05 ENCOUNTER — IMMUNIZATION (OUTPATIENT)
Dept: FAMILY PLANNING/WOMEN'S HEALTH CLINIC | Facility: IMMUNIZATION CENTER | Age: 76
End: 2021-02-05
Attending: INTERNAL MEDICINE
Payer: MEDICARE

## 2021-02-05 DIAGNOSIS — Z23 ENCOUNTER FOR VACCINATION: Primary | ICD-10-CM

## 2021-02-05 PROCEDURE — 91300 PFIZER SARS-COV-2 VACCINE: CPT | Performed by: NURSE PRACTITIONER

## 2021-02-05 PROCEDURE — 0002A PFIZER SARS-COV-2 VACCINE: CPT | Performed by: NURSE PRACTITIONER

## 2021-03-28 DIAGNOSIS — I10 ESSENTIAL HYPERTENSION: ICD-10-CM

## 2021-03-30 ENCOUNTER — TELEPHONE (OUTPATIENT)
Dept: MEDICAL GROUP | Facility: PHYSICIAN GROUP | Age: 76
End: 2021-03-30

## 2021-03-30 RX ORDER — ESTRADIOL 0.5 MG/1
TABLET ORAL
Qty: 45 TABLET | Refills: 0 | Status: SHIPPED | OUTPATIENT
Start: 2021-03-30 | End: 2021-04-01 | Stop reason: SDUPTHER

## 2021-03-30 RX ORDER — ATENOLOL 50 MG/1
TABLET ORAL
Qty: 60 TABLET | Refills: 0 | Status: SHIPPED | OUTPATIENT
Start: 2021-03-30 | End: 2021-04-01 | Stop reason: SDUPTHER

## 2021-03-30 NOTE — TELEPHONE ENCOUNTER
NEW TO YOU ESTABLISHED PATIENT PRE-VISIT PLANNING     Patient was NOT contacted to complete PVP.     Note: Patient will not be contacted if there is no indication to call.     1.  Reviewed notes from the last few office visits within the medical group: Yes    2.  If any orders were placed at last visit or intended to be done for this visit (i.e. 6 mos follow-up), do we have Results/Consult Notes?         •  Labs - Labs were not ordered at last office visit.  Note: If patient appointment is for lab review and patient did not complete labs, check with provider if OK to reschedule patient until labs completed.       •  Imaging - Imaging was not ordered at last office visit.       •  Referrals - No referrals were ordered at last office visit.    3. Is this appointment scheduled as a Hospital Follow-Up? No    4.  Immunizations were updated in Epic using Reconcile Outside Information activity? Yes    5.  Patient is due for the following Health Maintenance Topics:   Health Maintenance Due   Topic Date Due   • Annual Wellness Visit  05/09/2020   • MAMMOGRAM  02/28/2021   • IMM DTaP/Tdap/Td Vaccine (2 - Td) 03/03/2021       6.  AHA (Pulse[employer]) form printed for Provider? Email sent to [place] [facility] requesting form

## 2021-03-30 NOTE — TELEPHONE ENCOUNTER
Pt was told 12/20 to make appt and that has not been done. Please advise pt only 30 days will be sent to pharmacy and next request will be denied.

## 2021-03-30 NOTE — TELEPHONE ENCOUNTER
Spoke to pt on the phone, pt states she is moving to [facility] next month and would like to establish with a provider down there. Sent a message to Meaghan JULIAN.

## 2021-04-01 ENCOUNTER — OFFICE VISIT (OUTPATIENT)
Dept: MEDICAL GROUP | Facility: PHYSICIAN GROUP | Age: 76
End: 2021-04-01
Payer: MEDICARE

## 2021-04-01 VITALS
RESPIRATION RATE: 15 BRPM | WEIGHT: 183.4 LBS | HEART RATE: 73 BPM | BODY MASS INDEX: 33.75 KG/M2 | TEMPERATURE: 99.3 F | SYSTOLIC BLOOD PRESSURE: 130 MMHG | OXYGEN SATURATION: 95 % | DIASTOLIC BLOOD PRESSURE: 60 MMHG | HEIGHT: 62 IN

## 2021-04-01 DIAGNOSIS — E66.9 OBESITY (BMI 30-39.9): ICD-10-CM

## 2021-04-01 DIAGNOSIS — I10 ESSENTIAL (PRIMARY) HYPERTENSION: ICD-10-CM

## 2021-04-01 DIAGNOSIS — N95.1 HOT FLASHES, MENOPAUSAL: ICD-10-CM

## 2021-04-01 DIAGNOSIS — Z12.31 ENCOUNTER FOR SCREENING MAMMOGRAM FOR MALIGNANT NEOPLASM OF BREAST: ICD-10-CM

## 2021-04-01 DIAGNOSIS — Z86.69 HISTORY OF EYE PROBLEM: ICD-10-CM

## 2021-04-01 DIAGNOSIS — R79.9 ABNORMAL FINDING OF BLOOD CHEMISTRY, UNSPECIFIED: ICD-10-CM

## 2021-04-01 DIAGNOSIS — F51.01 PRIMARY INSOMNIA: ICD-10-CM

## 2021-04-01 DIAGNOSIS — C91.11 CHRONIC LYMPHOID LEUKEMIA IN REMISSION (HCC): ICD-10-CM

## 2021-04-01 DIAGNOSIS — Z23 NEED FOR DIPHTHERIA-TETANUS-PERTUSSIS (TDAP) VACCINE: ICD-10-CM

## 2021-04-01 DIAGNOSIS — K21.9 GASTROESOPHAGEAL REFLUX DISEASE WITHOUT ESOPHAGITIS: ICD-10-CM

## 2021-04-01 DIAGNOSIS — I10 ESSENTIAL HYPERTENSION: ICD-10-CM

## 2021-04-01 DIAGNOSIS — R60.0 LEG EDEMA, LEFT: ICD-10-CM

## 2021-04-01 PROBLEM — H43.11 VITREOUS HEMORRHAGE OF RIGHT EYE (HCC): Status: RESOLVED | Noted: 2019-05-16 | Resolved: 2021-04-01

## 2021-04-01 PROCEDURE — 99215 OFFICE O/P EST HI 40 MIN: CPT | Mod: 25 | Performed by: FAMILY MEDICINE

## 2021-04-01 PROCEDURE — 90715 TDAP VACCINE 7 YRS/> IM: CPT | Performed by: FAMILY MEDICINE

## 2021-04-01 PROCEDURE — 90471 IMMUNIZATION ADMIN: CPT | Performed by: FAMILY MEDICINE

## 2021-04-01 RX ORDER — ESTRADIOL 0.5 MG/1
TABLET ORAL
Qty: 45 TABLET | Refills: 5 | Status: SHIPPED | OUTPATIENT
Start: 2021-04-01 | End: 2021-07-08 | Stop reason: SDUPTHER

## 2021-04-01 RX ORDER — ATENOLOL 50 MG/1
TABLET ORAL
Qty: 60 TABLET | Refills: 0 | Status: SHIPPED | OUTPATIENT
Start: 2021-04-01 | End: 2021-04-13

## 2021-04-01 RX ORDER — LANSOPRAZOLE 30 MG/1
30 CAPSULE, DELAYED RELEASE ORAL 2 TIMES DAILY
Qty: 180 CAPSULE | Refills: 3 | Status: ON HOLD | OUTPATIENT
Start: 2021-04-01 | End: 2021-04-16

## 2021-04-01 ASSESSMENT — PATIENT HEALTH QUESTIONNAIRE - PHQ9: CLINICAL INTERPRETATION OF PHQ2 SCORE: 0

## 2021-04-01 ASSESSMENT — FIBROSIS 4 INDEX: FIB4 SCORE: 1.97

## 2021-04-01 NOTE — PROGRESS NOTES
Annual Health Assessment Questions:    1.  Are you currently engaging in any exercise or physical activity? Yes    2.  How would you describe your mood or emotional well-being today? good    3.  Have you had any falls in the last year? Yes    4.  Have you noticed any problems with your balance or had difficulty walking? No    5.  In the last six months have you experienced any leakage of urine? Yes    6. DPA/Advanced Directive: Patient has Advanced Directive, Living Will and Durable Power of  on file.

## 2021-04-01 NOTE — PROGRESS NOTES
"Subjective:     CC:    Chief Complaint   Patient presents with   • Establish Care       HISTORY OF THE PRESENT ILLNESS: Patient is a 75 y.o. female. This pleasant patient is here today to establish care and discuss routine PMH + edema (r leg < r foot at times). Her prior PCP was Dr. Bonds, she is moving from [place] to [facility] to be closer to her daughter.    Problem   Chronic lymphoid leukemia in remission (HCC)    Asymptomatic being monitored/Dr Davidson, Q 6 months.  Not on medications.  Stable, continue current plan of care       Leg Edema, Left    Onset around 10/20, unsure of any triggers other than more sendentary w/ covid. No CP, has noticed that she feels a little JACOB eg w/ walking on an incline.      History of Eye Problem    H/o vitreous hemorrhage R eye, had a detached retina prior. No longer an issue. Sees optho regularly.      Obesity (Bmi 30-39.9)    Gained a few lbs w/ covid. Trying to buy less sweets.      Hypertension    wnl on atenolol 50mg/d     Gerd (Gastroesophageal Reflux Disease)    Sees GI specialist. Needs the prevacid 30mg bid.      Insomnia    Controlled w/ behavior techniques, no meds. 1yr old dog hasn't helped sleep.      Hot Flashes, Menopausal    Managed with estrace 0.25mg tab daily. Manages her hot flashes. Has been on this \"forever,\" has tried to wean off but \"it doesn't work.\" has had a breast reduction and BEN. Will refill today.      Vitreous Hemorrhage of Right Eye (Hcc) (Resolved)   Cervical Radiculopathy (Resolved)       Allergies: Other misc and Wheat    Current Outpatient Medications Ordered in Epic   Medication Sig Dispense Refill   • estradiol (ESTRACE) 0.5 MG tablet TAKE 1/2 TABLET BY MOUTH EVERY DAY. 45 tablet 5   • atenolol (TENORMIN) 50 MG Tab TAKE 1 TABLET BY MOUTH TWICE A DAY 60 tablet 0   • lansoprazole (PREVACID) 30 MG CAPSULE DELAYED RELEASE Take 1 capsule by mouth 2 times a day. 180 capsule 3   • cetirizine (ZYRTEC) 10 MG Tab Take 10 mg by mouth every bedtime.   "   • Cetirizine HCl (KLS ALLER-JAYE PO) Take  by mouth every day.     • Cyanocobalamin (B-12) 1000 MCG/ML Kit by Injection route.     • Cholecalciferol (VITAMIN D3) 5000 units Cap Take 1 Cap by mouth every morning.       No current [facility]-ordered facility-administered medications on file.       Past Medical History:   Diagnosis Date   • Arthritis     slight   • BMI 34.0-34.9,adult 9/11/2014   • CATARACT     surgical correction margo    • Celiac disease    • CLL (chronic lymphocytic leukemia) ([facility]) 3/3/2011    Asymptomatic being monitored/Dr Davidson, Q 6 months. Not on medications.    • GERD (gastroesophageal reflux disease) 3/3/2011    Sees GI specialist    • Hiatal hernia 3/3/2011   • Hot flashes, menopausal 3/3/2011   • Hypertension 3/3/2011       Past Surgical History:   Procedure Laterality Date   • RECTUS REPAIR Right 11/15/2018    Procedure: RECTUS REPAIR- SUPERIOR RECTUS RECESSION, ADJUSTABLE SUTURE INFERIOR RECTUS PLICATION/RESECTION;  Surgeon: Jane Larsen M.D.;  Location: SURGERY SAME DAY [facility];  Service: Ophthalmology   • VITRECTOMY POSTERIOR Right 1/17/2018    Procedure: VITRECTOMY POSTERIOR W/AIR FLUID EXCHANGE, ENDO LASER, 23 GAUGE SF6 GAS, CRYOTHERAPY;  Surgeon: Sea Franklin M.D.;  Location: SURGERY SAME DAY [facility];  Service: Ophthalmology   • MAMMOPLASTY REDUCTION Bilateral 11/3/2015    Procedure: MAMMOPLASTY REDUCTION;  Surgeon: Talia Barton M.D.;  Location: [facility];  Service:    • KNEE ARTHROSCOPY  9/17/2014    Performed by Dany Owens M.D. at [facility]   • MENISCECTOMY  9/17/2014    Performed by Dany Owens M.D. at [facility]   • SHOULDER ARTHROSCOPY W/ ROTATOR CUFF REPAIR  8/22/2013    Performed by Dany Owens M.D. at [facility]   • SHOULDER DECOMPRESSION ARTHROSCOPIC  8/22/2013    Performed by Dany Owens M.D. at [facility]   • CLAVICLE DISTAL EXCISION  8/22/2013    Performed by  Dany Owens M.D. at SURGERY [facility] ORS   • OTHER  2013    laser procedure right eye   • CATARACT EXTRACTION WITH IOL  13    right eye   • ABDOMINAL HYSTERECTOMY TOTAL     • CHOLECYSTECTOMY      open   • TONSILLECTOMY     • BLADDER SUSPENSION  , 1978    x2       Social History     Tobacco Use   • Smoking status: Former Smoker     Packs/day: 0.00     Years: 2.00     Pack years: 0.00     Types: Cigarettes     Quit date: 1969     Years since quittin.2   • Smokeless tobacco: Never Used   Substance Use Topics   • Alcohol use: Yes     Alcohol/week: 0.6 - 1.2 oz     Types: 1 - 2 Glasses of wine per week     Comment: Several times a week   • Drug use: No       Social History     Social History Narrative    Retired from medical billing. She has one daughter, 2 grandkids (one is starting med school), son in law is an ICU hospitalist at [employer]. She enjoys knitting and traveling, belongs to a travel group.  She is  after 30y of marriage, lives alone, has a good support group.        Family History   Problem Relation Age of Onset   • Heart Disease Mother    • Hypertension Mother    • Other Mother         pancreatitis   • Arthritis Father    • Cancer Father         Lymphoma, colon, skin   • Hyperlipidemia Father    • Other Father         Clogged artery in leg   • Heart Disease Father         A Fib   • Diabetes Sister         Pre   • Other Sister         Obesity   • Other Daughter         Gluten allergies   • Other Son         Suicide       Health Maintenance: Completed    ROS:   See HPI  Gen: no fevers/chills, no nightsweats, no changes in weight  Eyes: no changes in vision, no dry eyes  ENT: no sore throat, no dysphagia, no hearing loss, no bloody nose  Pulm: no sob, no cough, no wheezing  CV: no chest pain, no palpitations, no syncope  GI: no nausea/vomiting, no diarrhea/constipation, no abdominal pain, no blood in stool  : no dysuria, no incontinence  MSk: no myalgias, no  "weakness, no falls  Skin: no rash  Neuro: no headaches, no numbness/tingling  Heme/Lymph: no easy bruising  Psych: no depression, no anxiety, no hallucinations, no insomnia, no memory concerns      Objective:     Exam:   /60 (BP Location: Right arm, Patient Position: Sitting, BP Cuff Size: Adult)   Pulse 73   Temp 37.4 °C (99.3 °F) (Temporal)   Resp 15   Ht 1.575 m (5' 2\")   Wt 83.2 kg (183 lb 6.4 oz)   SpO2 95%   BMI 33.54 kg/m²   Body mass index is 33.54 kg/m².  Wt Readings from Last 4 Encounters:   04/01/21 83.2 kg (183 lb 6.4 oz)   02/20/20 83 kg (183 lb)   10/09/19 80.7 kg (178 lb)   09/20/19 80.7 kg (178 lb)     General: Normal appearing. No distress. Appropriately groomed.  HEENT: Normocephalic. Eyes conjunctiva clear lids without ptosis, pupils equal and reactive to light accommodation, ears normal shape and contour, canals are clear bilaterally, tympanic membranes are benign, nasal mucosa benign, oropharynx is without erythema, edema or exudates.   Neck: Supple without JVD or bruit. Thyroid is not enlarged.   Pulmonary: Clear to ausculation.  Normal effort. No rales, ronchi, or wheezing.  Cardiovascular: Regular rate and rhythm without murmur. Carotid and radial pulses are intact and equal bilaterally.  Abdomen: Soft, nontender, nondistended. Normal bowel sounds. Liver and spleen are not palpable  Neurologic: Grossly nonfocal  Lymph: No cervical or supraclavicular lymph nodes are palpable  Skin: Warm and dry.  No obvious lesions.  Musculoskeletal: Normal gait. No extremity cyanosis, clubbing, or edema.good pedal pulses.  Psych: Normal mood and affect. Alert and oriented x3. Judgment and insight is normal.      Assessment & Plan:   75 y.o. female with the following -  Refill meds today x 1y  I spent a total of 40 minutes with record review, exam, communication with the patient, communication with other providers, and documentation of this encounter.    1. Essential hypertension  - atenolol " (TENORMIN) 50 MG Tab; TAKE 1 TABLET BY MOUTH TWICE A DAY  Dispense: 60 tablet; Refill: 0    2. Gastroesophageal reflux disease without esophagitis  - lansoprazole (PREVACID) 30 MG CAPSULE DELAYED RELEASE; Take 1 capsule by mouth 2 times a day.  Dispense: 180 capsule; Refill: 3    3. Primary insomnia    4. Hot flashes, menopausal  - estradiol (ESTRACE) 0.5 MG tablet; TAKE 1/2 TABLET BY MOUTH EVERY DAY.  Dispense: 45 tablet; Refill: 5    5. Need for diphtheria-tetanus-pertussis (Tdap) vaccine  - Tdap =>8yo IM    6. Encounter for screening mammogram for malignant neoplasm of breast  - MA-SCREENING MAMMO BILAT W/TOMOSYNTHESIS W/CAD; Future    7. Leg edema, left  No edema noted today - no pain in calf, neg lydia's. Could wear compression hose. No worrisome signs for DVT.     8. Obesity (BMI 30-39.9)  Cont w/ diet and exercise.    9. History of eye problem       Return in about 6 months (around 10/1/2021), or awv.    Please note that this dictation was created using voice recognition software. I have made every reasonable attempt to correct obvious errors, but I expect that there are errors of grammar and possibly content that I did not discover before finalizing the note.

## 2021-04-12 ENCOUNTER — PATIENT MESSAGE (OUTPATIENT)
Dept: HEALTH INFORMATION MANAGEMENT | Facility: OTHER | Age: 76
End: 2021-04-12

## 2021-04-13 DIAGNOSIS — I10 ESSENTIAL HYPERTENSION: ICD-10-CM

## 2021-04-13 RX ORDER — ATENOLOL 50 MG/1
TABLET ORAL
Qty: 100 TABLET | Refills: 0 | Status: SHIPPED | OUTPATIENT
Start: 2021-04-13 | End: 2021-06-14

## 2021-04-13 NOTE — TELEPHONE ENCOUNTER
Received request via: Pharmacy    Was the patient seen in the last year in this department? Yes    Does the patient have an active prescription (recently filled or refills available) for medication(s) requested? No     Insurance wants a 90 day supply that is why pharmacy sent refill over again.

## 2021-04-15 ENCOUNTER — TELEPHONE (OUTPATIENT)
Dept: MEDICAL GROUP | Facility: PHYSICIAN GROUP | Age: 76
End: 2021-04-15

## 2021-04-15 ENCOUNTER — HOSPITAL ENCOUNTER (OUTPATIENT)
Dept: LAB | Facility: MEDICAL CENTER | Age: 76
End: 2021-04-15
Attending: FAMILY MEDICINE
Payer: MEDICARE

## 2021-04-15 ENCOUNTER — HOSPITAL ENCOUNTER (OUTPATIENT)
Dept: LAB | Facility: MEDICAL CENTER | Age: 76
End: 2021-04-15
Attending: INTERNAL MEDICINE
Payer: MEDICARE

## 2021-04-15 DIAGNOSIS — R79.9 ABNORMAL FINDING OF BLOOD CHEMISTRY, UNSPECIFIED: ICD-10-CM

## 2021-04-15 DIAGNOSIS — I10 ESSENTIAL (PRIMARY) HYPERTENSION: ICD-10-CM

## 2021-04-15 DIAGNOSIS — N95.1 HOT FLASHES, MENOPAUSAL: ICD-10-CM

## 2021-04-15 DIAGNOSIS — I10 ESSENTIAL HYPERTENSION: ICD-10-CM

## 2021-04-15 LAB
ALBUMIN SERPL BCP-MCNC: 4.3 G/DL (ref 3.2–4.9)
ALBUMIN SERPL BCP-MCNC: 4.3 G/DL (ref 3.2–4.9)
ALBUMIN/GLOB SERPL: 2 G/DL
ALBUMIN/GLOB SERPL: 2 G/DL
ALP SERPL-CCNC: 61 U/L (ref 30–99)
ALP SERPL-CCNC: 64 U/L (ref 30–99)
ALT SERPL-CCNC: 8 U/L (ref 2–50)
ALT SERPL-CCNC: 9 U/L (ref 2–50)
ANION GAP SERPL CALC-SCNC: 9 MMOL/L (ref 7–16)
ANION GAP SERPL CALC-SCNC: 9 MMOL/L (ref 7–16)
ANISOCYTOSIS BLD QL SMEAR: ABNORMAL
AST SERPL-CCNC: 14 U/L (ref 12–45)
AST SERPL-CCNC: 15 U/L (ref 12–45)
BASOPHILS # BLD AUTO: 0 % (ref 0–1.8)
BASOPHILS # BLD AUTO: 0 % (ref 0–1.8)
BASOPHILS # BLD: 0 K/UL (ref 0–0.12)
BASOPHILS # BLD: 0 K/UL (ref 0–0.12)
BILIRUB SERPL-MCNC: 0.4 MG/DL (ref 0.1–1.5)
BILIRUB SERPL-MCNC: 0.4 MG/DL (ref 0.1–1.5)
BUN SERPL-MCNC: 13 MG/DL (ref 8–22)
BUN SERPL-MCNC: 13 MG/DL (ref 8–22)
CALCIUM SERPL-MCNC: 8.9 MG/DL (ref 8.5–10.5)
CALCIUM SERPL-MCNC: 9 MG/DL (ref 8.5–10.5)
CHLORIDE SERPL-SCNC: 111 MMOL/L (ref 96–112)
CHLORIDE SERPL-SCNC: 111 MMOL/L (ref 96–112)
CHOLEST SERPL-MCNC: 106 MG/DL (ref 100–199)
CO2 SERPL-SCNC: 22 MMOL/L (ref 20–33)
CO2 SERPL-SCNC: 22 MMOL/L (ref 20–33)
CREAT SERPL-MCNC: 1.02 MG/DL (ref 0.5–1.4)
CREAT SERPL-MCNC: 1.04 MG/DL (ref 0.5–1.4)
EOSINOPHIL # BLD AUTO: 0 K/UL (ref 0–0.51)
EOSINOPHIL # BLD AUTO: 0.37 K/UL (ref 0–0.51)
EOSINOPHIL NFR BLD: 0 % (ref 0–6.9)
EOSINOPHIL NFR BLD: 1.7 % (ref 0–6.9)
ERYTHROCYTE [DISTWIDTH] IN BLOOD BY AUTOMATED COUNT: 45.1 FL (ref 35.9–50)
ERYTHROCYTE [DISTWIDTH] IN BLOOD BY AUTOMATED COUNT: 45.3 FL (ref 35.9–50)
EST. AVERAGE GLUCOSE BLD GHB EST-MCNC: 111 MG/DL
FASTING STATUS PATIENT QL REPORTED: NORMAL
FASTING STATUS PATIENT QL REPORTED: NORMAL
GLOBULIN SER CALC-MCNC: 2.1 G/DL (ref 1.9–3.5)
GLOBULIN SER CALC-MCNC: 2.1 G/DL (ref 1.9–3.5)
GLUCOSE SERPL-MCNC: 82 MG/DL (ref 65–99)
GLUCOSE SERPL-MCNC: 82 MG/DL (ref 65–99)
HBA1C MFR BLD: 5.5 % (ref 4–5.6)
HCT VFR BLD AUTO: 22.3 % (ref 37–47)
HCT VFR BLD AUTO: 22.7 % (ref 37–47)
HDLC SERPL-MCNC: 71 MG/DL
HGB BLD-MCNC: 6.6 G/DL (ref 12–16)
HGB BLD-MCNC: 7 G/DL (ref 12–16)
LDH SERPL L TO P-CCNC: 210 U/L (ref 107–266)
LDLC SERPL CALC-MCNC: 27 MG/DL
LYMPHOCYTES # BLD AUTO: 18.2 K/UL (ref 1–4.8)
LYMPHOCYTES # BLD AUTO: 18.69 K/UL (ref 1–4.8)
LYMPHOCYTES NFR BLD: 83.5 % (ref 22–41)
LYMPHOCYTES NFR BLD: 84.2 % (ref 22–41)
MANUAL DIFF BLD: NORMAL
MANUAL DIFF BLD: NORMAL
MCH RBC QN AUTO: 23.5 PG (ref 27–33)
MCH RBC QN AUTO: 25.3 PG (ref 27–33)
MCHC RBC AUTO-ENTMCNC: 29.1 G/DL (ref 33.6–35)
MCHC RBC AUTO-ENTMCNC: 31.4 G/DL (ref 33.6–35)
MCV RBC AUTO: 80.5 FL (ref 81.4–97.8)
MCV RBC AUTO: 80.8 FL (ref 81.4–97.8)
MONOCYTES # BLD AUTO: 0.2 K/UL (ref 0–0.85)
MONOCYTES # BLD AUTO: 0.2 K/UL (ref 0–0.85)
MONOCYTES NFR BLD AUTO: 0.9 % (ref 0–13.4)
MONOCYTES NFR BLD AUTO: 0.9 % (ref 0–13.4)
MORPHOLOGY BLD-IMP: NORMAL
MORPHOLOGY BLD-IMP: NORMAL
NEUTROPHILS # BLD AUTO: 3.03 K/UL (ref 2–7.15)
NEUTROPHILS # BLD AUTO: 3.31 K/UL (ref 2–7.15)
NEUTROPHILS NFR BLD: 13.9 % (ref 44–72)
NEUTROPHILS NFR BLD: 14.9 % (ref 44–72)
NRBC # BLD AUTO: 0 K/UL
NRBC # BLD AUTO: 0.02 K/UL
NRBC BLD-RTO: 0 /100 WBC
NRBC BLD-RTO: 0.1 /100 WBC
OVALOCYTES BLD QL SMEAR: NORMAL
OVALOCYTES BLD QL SMEAR: NORMAL
PLATELET # BLD AUTO: 273 K/UL (ref 164–446)
PLATELET # BLD AUTO: 318 K/UL (ref 164–446)
PLATELET BLD QL SMEAR: NORMAL
PLATELET BLD QL SMEAR: NORMAL
PMV BLD AUTO: 10.1 FL (ref 9–12.9)
PMV BLD AUTO: 10.8 FL (ref 9–12.9)
POIKILOCYTOSIS BLD QL SMEAR: NORMAL
POTASSIUM SERPL-SCNC: 4.3 MMOL/L (ref 3.6–5.5)
POTASSIUM SERPL-SCNC: 4.3 MMOL/L (ref 3.6–5.5)
PROT SERPL-MCNC: 6.4 G/DL (ref 6–8.2)
PROT SERPL-MCNC: 6.4 G/DL (ref 6–8.2)
RBC # BLD AUTO: 2.77 M/UL (ref 4.2–5.4)
RBC # BLD AUTO: 2.81 M/UL (ref 4.2–5.4)
RBC BLD AUTO: PRESENT
RBC BLD AUTO: PRESENT
SODIUM SERPL-SCNC: 142 MMOL/L (ref 135–145)
SODIUM SERPL-SCNC: 142 MMOL/L (ref 135–145)
TRIGL SERPL-MCNC: 38 MG/DL (ref 0–149)
TSH SERPL DL<=0.005 MIU/L-ACNC: 2.41 UIU/ML (ref 0.38–5.33)
WBC # BLD AUTO: 21.8 K/UL (ref 4.8–10.8)
WBC # BLD AUTO: 22.2 K/UL (ref 4.8–10.8)

## 2021-04-15 PROCEDURE — 85027 COMPLETE CBC AUTOMATED: CPT

## 2021-04-15 PROCEDURE — 85007 BL SMEAR W/DIFF WBC COUNT: CPT

## 2021-04-15 PROCEDURE — 80061 LIPID PANEL: CPT

## 2021-04-15 PROCEDURE — 85007 BL SMEAR W/DIFF WBC COUNT: CPT | Mod: 91

## 2021-04-15 PROCEDURE — 83036 HEMOGLOBIN GLYCOSYLATED A1C: CPT

## 2021-04-15 PROCEDURE — 80053 COMPREHEN METABOLIC PANEL: CPT

## 2021-04-15 PROCEDURE — 36415 COLL VENOUS BLD VENIPUNCTURE: CPT

## 2021-04-15 PROCEDURE — 83615 LACTATE (LD) (LDH) ENZYME: CPT

## 2021-04-15 PROCEDURE — 84443 ASSAY THYROID STIM HORMONE: CPT

## 2021-04-15 PROCEDURE — 85027 COMPLETE CBC AUTOMATED: CPT | Mod: 91

## 2021-04-15 PROCEDURE — 80053 COMPREHEN METABOLIC PANEL: CPT | Mod: 91

## 2021-04-15 NOTE — TELEPHONE ENCOUNTER
New labs since 2/20  H/o hgb very low about 12 yra go  No abd pain or blood in stool  Is having JACOB with walking 10 feet  Limbs feel like they weight 1000 lb  Very tired  Onset sxs few months ago, didn't mention that at all to me at her appt on 4/1  had a blood txn in the past for this.  She'll call her onc to see if needs extra testing before a blood txn  I can either coordinate that in the am w/ infusion clinic if possible or she'll go via ER.  She'll update me first thing 4/16/21.           Results for orders placed or performed during the hospital encounter of 04/15/21   CBC WITH DIFFERENTIAL   Result Value Ref Range    WBC 21.8 (H) 4.8 - 10.8 K/uL    RBC 2.77 (L) 4.20 - 5.40 M/uL    Hemoglobin 7.0 (L) 12.0 - 16.0 g/dL    Hematocrit 22.3 (L) 37.0 - 47.0 %    MCV 80.5 (L) 81.4 - 97.8 fL    MCH 25.3 (L) 27.0 - 33.0 pg    MCHC 31.4 (L) 33.6 - 35.0 g/dL    RDW 45.3 35.9 - 50.0 fL    Platelet Count 318 164 - 446 K/uL    MPV 10.8 9.0 - 12.9 fL    Neutrophils-Polys 13.90 (L) 44.00 - 72.00 %    Lymphocytes 83.50 (H) 22.00 - 41.00 %    Monocytes 0.90 0.00 - 13.40 %    Eosinophils 1.70 0.00 - 6.90 %    Basophils 0.00 0.00 - 1.80 %    Nucleated RBC 0.10 /100 WBC    Neutrophils (Absolute) 3.03 2.00 - 7.15 K/uL    Lymphs (Absolute) 18.20 (H) 1.00 - 4.80 K/uL    Monos (Absolute) 0.20 0.00 - 0.85 K/uL    Eos (Absolute) 0.37 0.00 - 0.51 K/uL    Baso (Absolute) 0.00 0.00 - 0.12 K/uL    NRBC (Absolute) 0.02 K/uL   DIFFERENTIAL MANUAL   Result Value Ref Range    Manual Diff Status PERFORMED    PERIPHERAL SMEAR REVIEW   Result Value Ref Range    Peripheral Smear Review see below    PLATELET ESTIMATE   Result Value Ref Range    Plt Estimation Normal    MORPHOLOGY   Result Value Ref Range    RBC Morphology Present     Poikilocytosis 1+     Ovalocytes 1+

## 2021-04-16 ENCOUNTER — HOSPITAL ENCOUNTER (OUTPATIENT)
Facility: MEDICAL CENTER | Age: 76
End: 2021-04-17
Attending: HOSPITALIST | Admitting: HOSPITALIST
Payer: MEDICARE

## 2021-04-16 PROBLEM — D64.9 ANEMIA: Status: RESOLVED | Noted: 2021-04-16 | Resolved: 2021-04-16

## 2021-04-16 PROBLEM — D64.9 ANEMIA: Status: ACTIVE | Noted: 2021-04-16

## 2021-04-16 LAB
ABO + RH BLD: NORMAL
ABO GROUP BLD: NORMAL
APPEARANCE UR: CLEAR
APTT PPP: 27 SEC (ref 24.7–36)
BILIRUB UR QL STRIP.AUTO: NEGATIVE
BLD GP AB SCN SERPL QL: NORMAL
COLOR UR: YELLOW
EKG IMPRESSION: NORMAL
ERYTHROCYTE [DISTWIDTH] IN BLOOD BY AUTOMATED COUNT: 49.9 FL (ref 35.9–50)
FOLATE SERPL-MCNC: 5.1 NG/ML
GLUCOSE UR STRIP.AUTO-MCNC: NEGATIVE MG/DL
HCT VFR BLD AUTO: 21.9 % (ref 37–47)
HCT VFR BLD AUTO: 24.3 % (ref 37–47)
HGB BLD-MCNC: 6.4 G/DL (ref 12–16)
HGB BLD-MCNC: 7.4 G/DL (ref 12–16)
HGB RETIC QN AUTO: 21 PG/CELL (ref 29–35)
IMM RETICS NFR: 32.2 % (ref 9.3–17.4)
INR PPP: 1.02 (ref 0.87–1.13)
IRON SATN MFR SERPL: 3 % (ref 15–55)
IRON SERPL-MCNC: 13 UG/DL (ref 40–170)
KETONES UR STRIP.AUTO-MCNC: NEGATIVE MG/DL
LEUKOCYTE ESTERASE UR QL STRIP.AUTO: NEGATIVE
MCH RBC QN AUTO: 25.1 PG (ref 27–33)
MCHC RBC AUTO-ENTMCNC: 30.5 G/DL (ref 33.6–35)
MCV RBC AUTO: 82.4 FL (ref 81.4–97.8)
MICRO URNS: NORMAL
NITRITE UR QL STRIP.AUTO: NEGATIVE
PH UR STRIP.AUTO: 6 [PH] (ref 5–8)
PLATELET # BLD AUTO: 204 K/UL (ref 164–446)
PMV BLD AUTO: 10.8 FL (ref 9–12.9)
PROT UR QL STRIP: NEGATIVE MG/DL
PROTHROMBIN TIME: 13.7 SEC (ref 12–14.6)
RBC # BLD AUTO: 2.95 M/UL (ref 4.2–5.4)
RBC UR QL AUTO: NEGATIVE
RETICS # AUTO: 0.04 M/UL (ref 0.04–0.06)
RETICS/RBC NFR: 1.4 % (ref 0.8–2.1)
RH BLD: NORMAL
SP GR UR STRIP.AUTO: <=1.005
TIBC SERPL-MCNC: 418 UG/DL (ref 250–450)
TSH SERPL DL<=0.005 MIU/L-ACNC: 1.28 UIU/ML (ref 0.38–5.33)
UIBC SERPL-MCNC: 405 UG/DL (ref 110–370)
UROBILINOGEN UR STRIP.AUTO-MCNC: 0.2 MG/DL
VIT B12 SERPL-MCNC: 350 PG/ML (ref 211–911)
WBC # BLD AUTO: 17.6 K/UL (ref 4.8–10.8)

## 2021-04-16 PROCEDURE — 700102 HCHG RX REV CODE 250 W/ 637 OVERRIDE(OP): Performed by: HOSPITALIST

## 2021-04-16 PROCEDURE — 700111 HCHG RX REV CODE 636 W/ 250 OVERRIDE (IP): Performed by: HOSPITALIST

## 2021-04-16 PROCEDURE — G0378 HOSPITAL OBSERVATION PER HR: HCPCS

## 2021-04-16 PROCEDURE — 84443 ASSAY THYROID STIM HORMONE: CPT

## 2021-04-16 PROCEDURE — 96365 THER/PROPH/DIAG IV INF INIT: CPT

## 2021-04-16 PROCEDURE — 83540 ASSAY OF IRON: CPT

## 2021-04-16 PROCEDURE — 82746 ASSAY OF FOLIC ACID SERUM: CPT

## 2021-04-16 PROCEDURE — 700105 HCHG RX REV CODE 258: Performed by: HOSPITALIST

## 2021-04-16 PROCEDURE — 86923 COMPATIBILITY TEST ELECTRIC: CPT

## 2021-04-16 PROCEDURE — 85014 HEMATOCRIT: CPT | Mod: XU

## 2021-04-16 PROCEDURE — 86901 BLOOD TYPING SEROLOGIC RH(D): CPT

## 2021-04-16 PROCEDURE — 81003 URINALYSIS AUTO W/O SCOPE: CPT

## 2021-04-16 PROCEDURE — 85027 COMPLETE CBC AUTOMATED: CPT

## 2021-04-16 PROCEDURE — 36415 COLL VENOUS BLD VENIPUNCTURE: CPT

## 2021-04-16 PROCEDURE — P9016 RBC LEUKOCYTES REDUCED: HCPCS

## 2021-04-16 PROCEDURE — 93005 ELECTROCARDIOGRAM TRACING: CPT | Performed by: NURSE PRACTITIONER

## 2021-04-16 PROCEDURE — 99220 PR INITIAL OBSERVATION CARE,LEVL III: CPT | Performed by: HOSPITALIST

## 2021-04-16 PROCEDURE — 85018 HEMOGLOBIN: CPT | Mod: XU

## 2021-04-16 PROCEDURE — 85610 PROTHROMBIN TIME: CPT

## 2021-04-16 PROCEDURE — 96372 THER/PROPH/DIAG INJ SC/IM: CPT | Mod: XU

## 2021-04-16 PROCEDURE — 86850 RBC ANTIBODY SCREEN: CPT

## 2021-04-16 PROCEDURE — A9270 NON-COVERED ITEM OR SERVICE: HCPCS | Performed by: HOSPITALIST

## 2021-04-16 PROCEDURE — 83550 IRON BINDING TEST: CPT

## 2021-04-16 PROCEDURE — 85046 RETICYTE/HGB CONCENTRATE: CPT

## 2021-04-16 PROCEDURE — 86900 BLOOD TYPING SEROLOGIC ABO: CPT

## 2021-04-16 PROCEDURE — 85730 THROMBOPLASTIN TIME PARTIAL: CPT

## 2021-04-16 PROCEDURE — 36430 TRANSFUSION BLD/BLD COMPNT: CPT

## 2021-04-16 PROCEDURE — 93010 ELECTROCARDIOGRAM REPORT: CPT | Performed by: INTERNAL MEDICINE

## 2021-04-16 PROCEDURE — 82607 VITAMIN B-12: CPT

## 2021-04-16 RX ORDER — DIPHENHYDRAMINE HYDROCHLORIDE 50 MG/ML
25 INJECTION INTRAMUSCULAR; INTRAVENOUS ONCE
Status: COMPLETED | OUTPATIENT
Start: 2021-04-16 | End: 2021-04-16

## 2021-04-16 RX ORDER — BISACODYL 10 MG
10 SUPPOSITORY, RECTAL RECTAL
Status: DISCONTINUED | OUTPATIENT
Start: 2021-04-16 | End: 2021-04-17 | Stop reason: HOSPADM

## 2021-04-16 RX ORDER — ONDANSETRON 2 MG/ML
4 INJECTION INTRAMUSCULAR; INTRAVENOUS EVERY 6 HOURS PRN
Status: DISCONTINUED | OUTPATIENT
Start: 2021-04-16 | End: 2021-04-17 | Stop reason: HOSPADM

## 2021-04-16 RX ORDER — POLYETHYLENE GLYCOL 3350 17 G/17G
1 POWDER, FOR SOLUTION ORAL
Status: DISCONTINUED | OUTPATIENT
Start: 2021-04-16 | End: 2021-04-17 | Stop reason: HOSPADM

## 2021-04-16 RX ORDER — DIPHENHYDRAMINE HCL 25 MG
25 TABLET ORAL ONCE
Status: COMPLETED | OUTPATIENT
Start: 2021-04-16 | End: 2021-04-16

## 2021-04-16 RX ORDER — AMOXICILLIN 250 MG
2 CAPSULE ORAL 2 TIMES DAILY
Status: DISCONTINUED | OUTPATIENT
Start: 2021-04-16 | End: 2021-04-17 | Stop reason: HOSPADM

## 2021-04-16 RX ORDER — CYANOCOBALAMIN 1000 UG/ML
1000 INJECTION, SOLUTION INTRAMUSCULAR; SUBCUTANEOUS
Status: DISCONTINUED | OUTPATIENT
Start: 2021-04-16 | End: 2021-04-17 | Stop reason: HOSPADM

## 2021-04-16 RX ORDER — ACETAMINOPHEN 325 MG/1
650 TABLET ORAL ONCE
Status: COMPLETED | OUTPATIENT
Start: 2021-04-16 | End: 2021-04-16

## 2021-04-16 RX ORDER — IBUPROFEN 200 MG
400 TABLET ORAL EVERY 4 HOURS PRN
COMMUNITY
End: 2021-04-17

## 2021-04-16 RX ORDER — PANTOPRAZOLE SODIUM 40 MG/1
40 TABLET, DELAYED RELEASE ORAL 2 TIMES DAILY
Status: SHIPPED | COMMUNITY
End: 2022-07-22 | Stop reason: SDUPTHER

## 2021-04-16 RX ADMIN — DIPHENHYDRAMINE HYDROCHLORIDE 25 MG: 25 TABLET ORAL at 22:14

## 2021-04-16 RX ADMIN — SODIUM CHLORIDE 25 MG: 9 INJECTION, SOLUTION INTRAVENOUS at 22:41

## 2021-04-16 RX ADMIN — SODIUM CHLORIDE 1575 MG: 0.9 INJECTION, SOLUTION INTRAVENOUS at 23:27

## 2021-04-16 RX ADMIN — ACETAMINOPHEN 650 MG: 325 TABLET, FILM COATED ORAL at 22:14

## 2021-04-16 RX ADMIN — CYANOCOBALAMIN 1000 MCG: 1000 INJECTION, SOLUTION INTRAMUSCULAR; SUBCUTANEOUS at 22:14

## 2021-04-16 ASSESSMENT — PATIENT HEALTH QUESTIONNAIRE - PHQ9
SUM OF ALL RESPONSES TO PHQ9 QUESTIONS 1 AND 2: 0
2. FEELING DOWN, DEPRESSED, IRRITABLE, OR HOPELESS: NOT AT ALL
1. LITTLE INTEREST OR PLEASURE IN DOING THINGS: NOT AT ALL

## 2021-04-16 ASSESSMENT — PAIN DESCRIPTION - PAIN TYPE
TYPE: ACUTE PAIN

## 2021-04-16 ASSESSMENT — LIFESTYLE VARIABLES
TOTAL SCORE: 0
TOTAL SCORE: 0
DOES PATIENT WANT TO STOP DRINKING: NO
EVER HAD A DRINK FIRST THING IN THE MORNING TO STEADY YOUR NERVES TO GET RID OF A HANGOVER: NO
HOW MANY TIMES IN THE PAST YEAR HAVE YOU HAD 5 OR MORE DRINKS IN A DAY: 0
AVERAGE NUMBER OF DAYS PER WEEK YOU HAVE A DRINK CONTAINING ALCOHOL: 0
CONSUMPTION TOTAL: NEGATIVE
HAVE PEOPLE ANNOYED YOU BY CRITICIZING YOUR DRINKING: NO
HAVE YOU EVER FELT YOU SHOULD CUT DOWN ON YOUR DRINKING: NO
EVER FELT BAD OR GUILTY ABOUT YOUR DRINKING: NO
ON A TYPICAL DAY WHEN YOU DRINK ALCOHOL HOW MANY DRINKS DO YOU HAVE: 0
TOTAL SCORE: 0
ALCOHOL_USE: NO

## 2021-04-16 ASSESSMENT — ENCOUNTER SYMPTOMS
WEAKNESS: 1
SHORTNESS OF BREATH: 1
CARDIOVASCULAR NEGATIVE: 1
CHILLS: 0
HEADACHES: 1
MYALGIAS: 1
FEVER: 0
EYES NEGATIVE: 1
PSYCHIATRIC NEGATIVE: 1
GASTROINTESTINAL NEGATIVE: 1

## 2021-04-16 ASSESSMENT — FIBROSIS 4 INDEX: FIB4 SCORE: 1.17

## 2021-04-16 NOTE — TELEPHONE ENCOUNTER
I called the infusion center on the morning of April 16.  I was able to obtain information on how to refer the patient but the patient's daughter stated they would rather have the patient go to the emergency room to have this infusion done today as compared to possibly waiting through the weekend.

## 2021-04-16 NOTE — H&P
Hospital Medicine History & Physical Note    Date of Service  4/16/2021    Primary Care Physician  Keyla Paige M.D.    Consultants  NONE    Code Status  Full Code    Chief Complaint  Anemia    History of Presenting Illness  Ms. Magana is a 75-year-old female with a PMHx of CLL, HTN, obesity, anemia, who presented on 4/16/2020 after seeing her primary care due to anemia.  Patient reports that she has felt tired, short of breath, and felt not herself for the past 4-6 months.  Patient  endorses that she has been moving to a new apartment where she became very short of breath and severely tired.  Patient denies any nausea, no vomiting.  Patient denies any chest pain, no abdominal pain.  Patient endorses mild sore throat that started overnight, some weakness, and trouble ambulating.  Patient also endorses mild headache.  Patient reports that she is felt the same symptoms approximately 12 years ago of which she had fatigue, shortness of breath, and tiredness.  She was followed by gastroenterology, Dr. Estrada of which they did an endoscopy and found source of anemia.    Upon admission into the observation unit, patient had normal vital signs.  We will order INR, PTT, iron panel, TSH, B12.  We will also order FOBT see and urine analysis.  EKG will also be obtained.  1 unit of PRBC ordered.  We will recheck Hgb level 1 hour after infusion of PRBC.  Once Hgb level is above 7.0, patient will be discharged.  Patient highly encouraged to follow-up with PCP, along with following up with GI, .     Review of Systems  Review of Systems   Constitutional: Positive for malaise/fatigue. Negative for chills and fever.   HENT: Negative.    Eyes: Negative.    Respiratory: Positive for shortness of breath.    Cardiovascular: Negative.    Gastrointestinal: Negative.    Genitourinary: Negative.    Musculoskeletal: Positive for myalgias.   Skin: Negative.    Neurological: Positive for weakness and headaches.    Endo/Heme/Allergies: Negative.    Psychiatric/Behavioral: Negative.        Past Medical History   has a past medical history of Arthritis, BMI 34.0-34.9,adult (9/11/2014), CATARACT, Celiac disease, CLL (chronic lymphocytic leukemia) (HCC) (3/3/2011), GERD (gastroesophageal reflux disease) (3/3/2011), Hiatal hernia (3/3/2011), Hot flashes, menopausal (3/3/2011), and Hypertension (3/3/2011).    Surgical History   has a past surgical history that includes cholecystectomy (1973); abdominal hysterectomy total (1975); bladder suspension (1973, 1978); cataract extraction with iol (5/29/13); other (7/2013); shoulder arthroscopy w/ rotator cuff repair (8/22/2013); shoulder decompression arthroscopic (8/22/2013); clavicle distal excision (8/22/2013); tonsillectomy (1950); knee arthroscopy (9/17/2014); meniscectomy (9/17/2014); mammoplasty reduction (Bilateral, 11/3/2015); vitrectomy posterior (Right, 1/17/2018); and rectus repair (Right, 11/15/2018).     Family History  family history includes Arthritis in her father; Cancer in her father; Diabetes in her sister; Heart Disease in her father and mother; Hyperlipidemia in her father; Hypertension in her mother; Other in her daughter, father, mother, sister, and son.     Social History   reports that she quit smoking about 52 years ago. Her smoking use included cigarettes. She smoked 0.00 packs per day for 2.00 years. She has never used smokeless tobacco. She reports current alcohol use of about 0.6 - 1.2 oz of alcohol per week. She reports that she does not use drugs.    Allergies  Allergies   Allergen Reactions   • Other Misc      All household chemicals, soap powders, perfumes. Tongue and lips go numb, itching.   • Wheat      Gluten. Stomach ache.        Medications  Prior to Admission Medications   Prescriptions Last Dose Informant Patient Reported? Taking?   atenolol (TENORMIN) 50 MG Tab 4/16/2021 at 0900 Patient No No   Sig: TAKE 1 TABLET BY MOUTH TWICE A DAY   Patient  taking differently: Take 50 mg by mouth 2 times a day.   cetirizine (ZYRTEC) 10 MG Tab 4/15/2021 at 2300 Patient Yes No   Sig: Take 10 mg by mouth every bedtime.   estradiol (ESTRACE) 0.5 MG tablet 4/15/2021 at 2300 Patient No No   Sig: TAKE 1/2 TABLET BY MOUTH EVERY DAY.   Patient taking differently: Take 0.25 mg by mouth at bedtime. 1/2 tablets = 0.25 mg.   ibuprofen (MOTRIN) 200 MG Tab 4/14/2021 at PRN Patient Yes Yes   Sig: Take 400 mg by mouth every four hours as needed for Mild Pain. 2 tablets = 400 mg.   pantoprazole (PROTONIX) 40 MG Tablet Delayed Response 4/16/2021 at 0900 Patient Yes Yes   Sig: Take 40 mg by mouth every morning.      Facility-Administered Medications: None       Physical Exam  Temp:  [36.1 °C (96.9 °F)] 36.1 °C (96.9 °F)  Pulse:  [67] 67  Resp:  [20] 20  BP: (148)/(65) 148/65  SpO2:  [98 %] 98 %    Physical Exam  Vitals and nursing note reviewed.   Constitutional:       Appearance: She is obese.   HENT:      Head: Normocephalic.      Nose: Nose normal.      Mouth/Throat:      Mouth: Mucous membranes are moist.      Pharynx: Oropharynx is clear.   Eyes:      Pupils: Pupils are equal, round, and reactive to light.   Cardiovascular:      Rate and Rhythm: Regular rhythm. Tachycardia present.      Pulses: Normal pulses.      Heart sounds: Normal heart sounds.   Pulmonary:      Effort: Pulmonary effort is normal.      Breath sounds: Normal breath sounds.   Abdominal:      General: Bowel sounds are normal.      Palpations: Abdomen is soft.   Musculoskeletal:         General: Tenderness present.      Cervical back: Neck supple.   Skin:     Capillary Refill: Capillary refill takes 2 to 3 seconds.      Coloration: Skin is pale.   Neurological:      Mental Status: She is alert. Mental status is at baseline.         Laboratory:  Recent Labs     04/15/21  1032 04/15/21  1034   WBC 22.2* 21.8*   RBC 2.81* 2.77*   HEMOGLOBIN 6.6* 7.0*   HEMATOCRIT 22.7* 22.3*   MCV 80.8* 80.5*   MCH 23.5* 25.3*   MCHC  29.1* 31.4*   RDW 45.1 45.3   PLATELETCT 273 318   MPV 10.1 10.8     Recent Labs     04/15/21  1032 04/15/21  1034   SODIUM 142 142   POTASSIUM 4.3 4.3   CHLORIDE 111 111   CO2 22 22   GLUCOSE 82 82   BUN 13 13   CREATININE 1.04 1.02   CALCIUM 9.0 8.9     Recent Labs     04/15/21  1032 04/15/21  1034   ALTSGPT 9 8   ASTSGOT 15 14   ALKPHOSPHAT 61 64   TBILIRUBIN 0.4 0.4   GLUCOSE 82 82         No results for input(s): NTPROBNP in the last 72 hours.  Recent Labs     04/15/21  1032   TRIGLYCERIDE 38   HDL 71   LDL 27     No results for input(s): TROPONINT in the last 72 hours.    Imaging:  No orders to display         Assessment/Plan:  I anticipate this patient is appropriate for observation status at this time.    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 Davidson  -Diagnosed 6 months ago    Anemia  Assessment & Plan  -Hgb 6.6-7.0  -Requires 1 unit of PRBC  -Patient fatigue, malaise, pale  -Has had a history of anemia from 12 years ago of which she followed gastroenterology and an endoscopy was performed  -INR, PTT, iron panel, B12, TSH, folate, ordered  -EKG, FOBT, and UA also ordered  -Recheck Hgb post 1 unit of PRBC      Leg edema, left- (present on admission)  Assessment & Plan  -History of trauma as a child    Hypertension- (present on admission)  Assessment & Plan  -Controlled  -Resume home medication      VTE Prophylaxis: SCDs (pharmacological intervention contraindicated due to low Hgb)    ============================================================================================================  Please note that this dictation was created using voice recognition software. I have made every reasonable attempt to correct obvious errors, but there may be errors of grammar and possibly content that I did not discover before finalizing the note.    Electronically signed by:  KENNY Mendoza, MSN, APRN, FNP-C  Hospitalist Services  [facility]  [facility]  [phone]  [email or web address]  04/16/21    [id number]

## 2021-04-16 NOTE — DISCHARGE SUMMARY
Discharge Summary    CHIEF COMPLAINT ON ADMISSION  No chief complaint on file.      Reason for Admission  Anemia     Admission Date  4/16/2021    CODE STATUS  Full Code    HPI & HOSPITAL COURSE    Ms. Magana is a 75-year-old female with a PMHx of CLL, HTN, obesity, anemia, who presented on 4/16/2020 after seeing her primary care due to anemia.  Patient reports that she has felt tired, short of breath, and felt not herself for the past 4-6 months.  Patient  endorses that she has been moving to a new apartment where she became very short of breath and severely tired.  Patient denies any nausea, no vomiting.  Patient denies any chest pain, no abdominal pain.  Patient endorses mild sore throat that started overnight, some weakness, and trouble ambulating.  Patient also endorses mild headache.  Patient reports that she is felt the same symptoms approximately 12 years ago of which she had fatigue, shortness of breath, and tiredness.  She was followed by gastroenterology, Dr. Estrada of which they did an endoscopy and found source of anemia.    Upon admission into the observation unit, patient had normal vital signs.  We will order INR, PTT, iron panel, TSH, B12.  We will also order FOBT see and urine analysis.  EKG will also be obtained.  1 unit of PRBC ordered.  We will recheck Hgb level 1 hour after infusion of PRBC.  Once Hgb level is above 7.0, patient will be discharged.  Patient highly encouraged to follow-up with PCP, along with following up with GI, .     During this course of stay, patient was given a total of 2 units of PRBC.  Patient was also given iron infusion of 2000 mg. Current Hgb at 7.4.  Patient given education in regards to diagnosis.  Patient highly recommended to follow-up with PCP along with GI Dr. Evans. All questions and concerns answered prior to being discharged.  Patient discharged home.      Therefore, she is discharged in good and stable condition to home with close  outpatient follow-up.    The patient recovered much more quickly than anticipated on admission.    Discharge Date  04/17/21        FOLLOW UP ITEMS POST DISCHARGE  Please call [phone] to schedule PCP appointment for patient.    Required specialty appointments include:       Discharge Instructions per KULWINDER Mtz  -Follow-up with PCP  -Follow-up with gastroenterology, Dr. Evans    DIET: As tolerated    ACTIVITY: As tolerated    DIAGNOSIS: Anemia    Return to ER if symptoms persist, chest pain, palpitations, shortness of breath, numbness, tingling, weakness, and high fevers.      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      Not on medications.      Stable, continue current plan of care          Hypertension POA: Yes      Overview: wnl on atenolol 50mg/d    Leg edema, left POA: Yes      Overview: Onset around 10/20, unsure of any triggers other than more sendentary w/       covid. No CP, has noticed that she feels a little JACOB eg w/ walking on an       incline.   Resolved Problems:    Anemia POA: Unknown      FOLLOW UP  Future Appointments   Date Time Provider Department Center   4/29/2021  1:00 PM [facility] [address] [facility] [address] [facility] [address]   9/27/2021 11:15 AM LAB [facility] None   10/1/2021 12:30 PM Keyla Paige M.D. [facility] None     Keyla Paige M.D.  [address]  [facility] [place] [address] [phone]    Schedule an appointment as soon as possible for a visit in 1 week      Mg Evans M.D.  [address] [address]  [phone]    Schedule an appointment as soon as possible for a visit in 1 week        MEDICATIONS ON DISCHARGE     Medication List      ASK your doctor about these medications      Instructions   atenolol 50 MG Tabs  Commonly known as: TENORMIN   TAKE 1 TABLET BY MOUTH TWICE A DAY     cetirizine 10 MG Tabs  Commonly known as:  ZYRTEC  Ask about: Which instructions should I use?   Take 10 mg by mouth every bedtime.  Dose: 10 mg     estradiol 0.5 MG tablet  Commonly known as: ESTRACE   Doctor's comments: Please refill when requested by the patient.  TAKE 1/2 TABLET BY MOUTH EVERY DAY.     ibuprofen 200 MG Tabs  Commonly known as: MOTRIN   Take 400 mg by mouth every four hours as needed for Mild Pain. 2 tablets = 400 mg.  Dose: 400 mg     pantoprazole 40 MG Tbec  Commonly known as: PROTONIX   Take 40 mg by mouth every morning.  Dose: 40 mg            Allergies  Allergies   Allergen Reactions   • Other Misc      All household chemicals, soap powders, perfumes. Tongue and lips go numb, itching.   • Wheat      Gluten. Stomach ache.        DIET  Orders Placed This Encounter   Procedures   • Diet NPO     Standing Status:   Standing     Number of Occurrences:   1     Order Specific Question:   Restrict to:     Answer:   Ice Chips [2]       ACTIVITY  As tolerated.  Weight bearing as tolerated    CONSULTATIONS  NONE    PROCEDURES  NONE    IMAGING  No orders to display         LABORATORY  Lab Results   Component Value Date    SODIUM 142 04/15/2021    POTASSIUM 4.3 04/15/2021    CHLORIDE 111 04/15/2021    CO2 22 04/15/2021    GLUCOSE 82 04/15/2021    BUN 13 04/15/2021    CREATININE 1.02 04/15/2021    CREATININE 0.83 03/25/2011    GLOMRATE >59 03/25/2011        Lab Results   Component Value Date    WBC 21.8 (H) 04/15/2021    WBC 10.8 (H) 03/25/2011    HEMOGLOBIN 7.0 (L) 04/15/2021    HEMATOCRIT 22.3 (L) 04/15/2021    PLATELETCT 318 04/15/2021        Total time of the discharge process exceeds 36 minutes.  ============================================================================================================  Please note that this dictation was created using voice recognition software. I have made every reasonable attempt to correct obvious errors, but there may be errors of grammar and possibly content that I did not discover before finalizing the  note.    Electronically signed by:  KENNY Mendoza, MSN, APRN, FNP-C  Hospitalist Services  [facility]  [phone]  [email or web address]  04/17/21     [id number]

## 2021-04-16 NOTE — HOSPITAL COURSE
This 75-year-old female with a past medical history significant for CLL being followed by Dr. Davidson, hypertension, obesity, GERD (status post colonoscopy/endoscopy obtained by Dr. Gonzalez) was seen by primary care physician after she was noted to be anemic.  Her hemoglobin will drop from 13 point 2/2022 6.6 on 4/15/2021.  Patient reports being weak, short of breath, lightheaded associated with this.  Iron studies consistent with iron deficiency with percent saturation of 3%.     Work-up of anemia including folic acid 10.1, vitamin B12 350; will provide  Im vitamin B12 followed by po.  She denies any hematemesis, melena, hematochezia, vaginal bleeding, any other source of active bleeding.     Symptomatic anemia:  -Provide 2 unit of PRBC during the stay in the hospital.  Patient received IV iron.  Provided IM vitamin B12.   I discussed the plan of the care with the patient in detail.  I discussed with Dr. Valadez of the GI consultant, he stated that patient is already scheduled outpatient endoscopy.  I discussed with the patient, she would like to add an outpatient.    Patient will be discharged home with follow-up appointment with GI consultant Dr. Gonzalez  as an outpatient.

## 2021-04-16 NOTE — TELEPHONE ENCOUNTER
Phone Number Called: [phone] (home)     Call outcome: Spoke to patient regarding message below.    Message: Her oncologist said she does not need further testing for the transfusion.

## 2021-04-16 NOTE — ASSESSMENT & PLAN NOTE
-Hgb 6.6-7.0  -Requires 1 unit of PRBC  -Patient fatigue, malaise, pale  -Has had a history of anemia from 12 years ago of which she followed gastroenterology and an endoscopy was performed  -INR, PTT, iron panel, B12, TSH, folate, ordered  -EKG, FOBT, and UA also ordered  -Recheck Hgb post 1 unit of PRBC

## 2021-04-17 VITALS
BODY MASS INDEX: 33.27 KG/M2 | TEMPERATURE: 98 F | DIASTOLIC BLOOD PRESSURE: 75 MMHG | RESPIRATION RATE: 15 BRPM | WEIGHT: 180.78 LBS | OXYGEN SATURATION: 92 % | HEART RATE: 70 BPM | SYSTOLIC BLOOD PRESSURE: 176 MMHG | HEIGHT: 62 IN

## 2021-04-17 LAB
BARCODED ABORH UBTYP: 5100
BARCODED ABORH UBTYP: 9500
BARCODED PRD CODE UBPRD: NORMAL
BARCODED PRD CODE UBPRD: NORMAL
BARCODED UNIT NUM UBUNT: NORMAL
BARCODED UNIT NUM UBUNT: NORMAL
COMPONENT R 8504R: NORMAL
COMPONENT R 8504R: NORMAL
HCT VFR BLD AUTO: 27.6 % (ref 37–47)
HEMOCCULT STL QL: NEGATIVE
HGB BLD-MCNC: 8.5 G/DL (ref 12–16)
PRODUCT TYPE UPROD: NORMAL
PRODUCT TYPE UPROD: NORMAL
UNIT STATUS USTAT: NORMAL
UNIT STATUS USTAT: NORMAL

## 2021-04-17 PROCEDURE — 96366 THER/PROPH/DIAG IV INF ADDON: CPT

## 2021-04-17 PROCEDURE — 82272 OCCULT BLD FECES 1-3 TESTS: CPT

## 2021-04-17 PROCEDURE — 36415 COLL VENOUS BLD VENIPUNCTURE: CPT

## 2021-04-17 PROCEDURE — G0378 HOSPITAL OBSERVATION PER HR: HCPCS

## 2021-04-17 PROCEDURE — 86923 COMPATIBILITY TEST ELECTRIC: CPT

## 2021-04-17 PROCEDURE — 99217 PR OBSERVATION CARE DISCHARGE: CPT | Performed by: HOSPITALIST

## 2021-04-17 PROCEDURE — 85014 HEMATOCRIT: CPT

## 2021-04-17 PROCEDURE — P9016 RBC LEUKOCYTES REDUCED: HCPCS

## 2021-04-17 PROCEDURE — 36430 TRANSFUSION BLD/BLD COMPNT: CPT

## 2021-04-17 PROCEDURE — 85018 HEMOGLOBIN: CPT

## 2021-04-17 RX ORDER — BISACODYL 10 MG
10 SUPPOSITORY, RECTAL RECTAL DAILY
Status: DISCONTINUED | OUTPATIENT
Start: 2021-04-17 | End: 2021-04-17 | Stop reason: HOSPADM

## 2021-04-17 ASSESSMENT — PAIN DESCRIPTION - PAIN TYPE
TYPE: ACUTE PAIN
TYPE: ACUTE PAIN

## 2021-04-17 NOTE — PROGRESS NOTES
Blood stopped and done at 1950.     HH drawn an hour later at 2050 per orders.    Will notify MD of HH results. Will discharge patient if HH > 7.

## 2021-04-17 NOTE — PROGRESS NOTES
IV Iron Per Pharmacy Note    Patient Lean Body Weight:  49 kg  Reason for Iron Replacement: Iron deficiency anemia      Lab Results   Component Value Date/Time    WBC 21.8 (H) 04/15/2021 10:34 AM    WBC 10.8 (H) 03/25/2011 11:05 AM    RBC 2.77 (L) 04/15/2021 10:34 AM    RBC 4.30 03/25/2011 11:05 AM    HEMOGLOBIN 6.4 (L) 04/16/2021 03:38 PM    HEMATOCRIT 21.9 (L) 04/16/2021 03:38 PM    MCV 80.5 (L) 04/15/2021 10:34 AM    MCV 95 03/25/2011 11:05 AM    MCH 25.3 (L) 04/15/2021 10:34 AM    MCH 31.2 03/25/2011 11:05 AM    MCHC 31.4 (L) 04/15/2021 10:34 AM    MPV 10.8 04/15/2021 10:34 AM       Recent Labs     04/16/21  1538   FOLATE 5.1   IRON 13*         Recent Labs     04/15/21  1034   CREATININE 1.02          Assessment/Plan:  1. IV Iron Indicated.   2. Following diphenhydramine/acetaminophen premeds, administer Iron Dextran 25 mg IV test dose over 30 minutes.  3. If no reaction (Anaphylaxis, Hypotension/Hypertension, N/V/D, Chest pain/Back Pain, Urticaria/Pruritis) in the next hour, proceed to full dose.   4. Full dose: Iron Dextran 1575 mg IV over 4 hours. Continue to monitor for delayed ADR including: Arthralgia/myalgia, Headache/backache, chills/dizziness/malaise, moderate to high fever and n/v.      Juan Winkler, PharmD

## 2021-04-17 NOTE — CARE PLAN
Problem: Safety  Goal: Will remain free from injury  Outcome: PROGRESSING AS EXPECTED  Goal: Will remain free from falls  Outcome: PROGRESSING AS EXPECTED     Pt instructed on use of call light. Call light and personal belongings within reach. Pt denies any needs. Will continue to monitor.

## 2021-04-17 NOTE — PROGRESS NOTES
Blood drawn and sent to lab. Discharge instructions, medications and follow-up reviewed with pt, pt verbalized understanding and denies questions. Discharge paperwork given to pt. PIV removed, TeleBox removed, armband removed. Pt transported off unit via wheelchair with hospital escort.

## 2021-04-17 NOTE — DISCHARGE INSTRUCTIONS
Discharge Instructions    Discharged to home by car with relative. Discharged via wheelchair, hospital escort: Yes.  Special equipment needed: Not Applicable    Be sure to schedule a follow-up appointment with your primary care doctor or any specialists as instructed.     Discharge Plan:   Diet Plan: Discussed  Activity Level: Discussed  Confirmed Follow up Appointment: Patient to Call and Schedule Appointment  Confirmed Symptoms Management: Discussed  Medication Reconciliation Updated: Yes    I understand that a diet low in cholesterol, fat, and sodium is recommended for good health. Unless I have been given specific instructions below for another diet, I accept this instruction as my diet prescription.   Other diet: Regular    Special Instructions: None    · Is patient discharged on Warfarin / Coumadin?   No     Depression / Suicide Risk    As you are discharged from this [facility] facility, it is important to learn how to keep safe from harming yourself.    Recognize the warning signs:  · Abrupt changes in personality, positive or negative- including increase in energy   · Giving away possessions  · Change in eating patterns- significant weight changes-  positive or negative  · Change in sleeping patterns- unable to sleep or sleeping all the time   · Unwillingness or inability to communicate  · Depression  · Unusual sadness, discouragement and loneliness  · Talk of wanting to die  · Neglect of personal appearance   · Rebelliousness- reckless behavior  · Withdrawal from people/activities they love  · Confusion- inability to concentrate     If you or a loved one observes any of these behaviors or has concerns about self-harm, here's what you can do:  · Talk about it- your feelings and reasons for harming yourself  · Remove any means that you might use to hurt yourself (examples: pills, rope, extension cords, firearm)  · Get professional help from the community (Mental Health, Substance Abuse, psychological  counseling)  · Do not be alone:Call your Safe Contact- someone whom you trust who will be there for you.  · Call your local CRISIS HOTLINE 903-3100 or 553-026-6213  · Call your local Children's Mobile Crisis Response Team [place] (331) 457-0396 or www.timeplazza  · Call the toll free National Suicide Prevention Hotlines   · National Suicide Prevention Lifeline 480-673-RNCG (2883)  · National Hope Line Network 800-SUICIDE (694-3529)    Discharge Instructions per Anshu Mendoza A.P.R.NLane  -Follow-up with PCP  -Follow-up with gastroenterology, Dr. Evans  -Updated Hgb level after 1 unit of PRBC     DIET: As tolerated    ACTIVITY: As tolerated    DIAGNOSIS: Anemia    Return to ER if symptoms persist, chest pain, palpitations, shortness of breath, numbness, tingling, weakness, and high fevers.

## 2021-04-20 ENCOUNTER — PATIENT OUTREACH (OUTPATIENT)
Dept: HEALTH INFORMATION MANAGEMENT | Facility: OTHER | Age: 76
End: 2021-04-20

## 2021-04-20 ENCOUNTER — PATIENT MESSAGE (OUTPATIENT)
Dept: HEALTH INFORMATION MANAGEMENT | Facility: OTHER | Age: 76
End: 2021-04-20

## 2021-04-20 NOTE — PROGRESS NOTES
Scheduled Comprehensive Geriatric Assessment   Thursday, May 20, 2021  10:00 am (1 hour)  AURE Adame

## 2021-04-27 ENCOUNTER — PRE-ADMISSION TESTING (OUTPATIENT)
Dept: ADMISSIONS | Facility: MEDICAL CENTER | Age: 76
End: 2021-04-27
Attending: INTERNAL MEDICINE
Payer: MEDICARE

## 2021-04-27 DIAGNOSIS — Z01.812 PRE-OPERATIVE LABORATORY EXAMINATION: ICD-10-CM

## 2021-04-27 LAB
ERYTHROCYTE [DISTWIDTH] IN BLOOD BY AUTOMATED COUNT: 67.7 FL (ref 35.9–50)
HCT VFR BLD AUTO: 37.6 % (ref 37–47)
HGB BLD-MCNC: 11.1 G/DL (ref 12–16)
MCH RBC QN AUTO: 26.3 PG (ref 27–33)
MCHC RBC AUTO-ENTMCNC: 29.8 G/DL (ref 33.6–35)
MCV RBC AUTO: 88.3 FL (ref 81.4–97.8)
PLATELET # BLD AUTO: 326 K/UL (ref 164–446)
PMV BLD AUTO: 10.8 FL (ref 9–12.9)
RBC # BLD AUTO: 4.26 M/UL (ref 4.2–5.4)
SARS-COV+SARS-COV-2 AG RESP QL IA.RAPID: NOTDETECTED
SPECIMEN SOURCE: NORMAL
WBC # BLD AUTO: 19.8 K/UL (ref 4.8–10.8)

## 2021-04-27 PROCEDURE — 36415 COLL VENOUS BLD VENIPUNCTURE: CPT

## 2021-04-27 PROCEDURE — 85027 COMPLETE CBC AUTOMATED: CPT

## 2021-04-27 PROCEDURE — 87426 SARSCOV CORONAVIRUS AG IA: CPT

## 2021-04-27 ASSESSMENT — FIBROSIS 4 INDEX: FIB4 SCORE: 1.82

## 2021-04-28 ENCOUNTER — ANESTHESIA (OUTPATIENT)
Dept: SURGERY | Facility: MEDICAL CENTER | Age: 76
End: 2021-04-28
Payer: MEDICARE

## 2021-04-28 ENCOUNTER — ANESTHESIA EVENT (OUTPATIENT)
Dept: SURGERY | Facility: MEDICAL CENTER | Age: 76
End: 2021-04-28
Payer: MEDICARE

## 2021-04-28 ENCOUNTER — HOSPITAL ENCOUNTER (OUTPATIENT)
Facility: MEDICAL CENTER | Age: 76
End: 2021-04-28
Attending: INTERNAL MEDICINE | Admitting: INTERNAL MEDICINE
Payer: MEDICARE

## 2021-04-28 VITALS
WEIGHT: 176.59 LBS | SYSTOLIC BLOOD PRESSURE: 154 MMHG | HEART RATE: 68 BPM | OXYGEN SATURATION: 99 % | HEIGHT: 61 IN | DIASTOLIC BLOOD PRESSURE: 73 MMHG | TEMPERATURE: 97.8 F | BODY MASS INDEX: 33.34 KG/M2 | RESPIRATION RATE: 16 BRPM

## 2021-04-28 LAB — PATHOLOGY CONSULT NOTE: NORMAL

## 2021-04-28 PROCEDURE — 700105 HCHG RX REV CODE 258: Performed by: INTERNAL MEDICINE

## 2021-04-28 PROCEDURE — A9270 NON-COVERED ITEM OR SERVICE: HCPCS | Performed by: INTERNAL MEDICINE

## 2021-04-28 PROCEDURE — 700101 HCHG RX REV CODE 250: Performed by: INTERNAL MEDICINE

## 2021-04-28 PROCEDURE — 160002 HCHG RECOVERY MINUTES (STAT): Performed by: INTERNAL MEDICINE

## 2021-04-28 PROCEDURE — 700111 HCHG RX REV CODE 636 W/ 250 OVERRIDE (IP): Performed by: ANESTHESIOLOGY

## 2021-04-28 PROCEDURE — 160046 HCHG PACU - 1ST 60 MINS PHASE II: Performed by: INTERNAL MEDICINE

## 2021-04-28 PROCEDURE — 160048 HCHG OR STATISTICAL LEVEL 1-5: Performed by: INTERNAL MEDICINE

## 2021-04-28 PROCEDURE — 160203 HCHG ENDO MINUTES - 1ST 30 MINS LEVEL 4: Performed by: INTERNAL MEDICINE

## 2021-04-28 PROCEDURE — 160035 HCHG PACU - 1ST 60 MINS PHASE I: Performed by: INTERNAL MEDICINE

## 2021-04-28 PROCEDURE — 88305 TISSUE EXAM BY PATHOLOGIST: CPT

## 2021-04-28 PROCEDURE — 160009 HCHG ANES TIME/MIN: Performed by: INTERNAL MEDICINE

## 2021-04-28 PROCEDURE — 88312 SPECIAL STAINS GROUP 1: CPT

## 2021-04-28 PROCEDURE — 160025 RECOVERY II MINUTES (STATS): Performed by: INTERNAL MEDICINE

## 2021-04-28 RX ORDER — HALOPERIDOL 5 MG/ML
1 INJECTION INTRAMUSCULAR
Status: DISCONTINUED | OUTPATIENT
Start: 2021-04-28 | End: 2021-04-28 | Stop reason: HOSPADM

## 2021-04-28 RX ORDER — OXYCODONE HCL 5 MG/5 ML
5 SOLUTION, ORAL ORAL
Status: DISCONTINUED | OUTPATIENT
Start: 2021-04-28 | End: 2021-04-28 | Stop reason: HOSPADM

## 2021-04-28 RX ORDER — HYDROMORPHONE HYDROCHLORIDE 1 MG/ML
0.2 INJECTION, SOLUTION INTRAMUSCULAR; INTRAVENOUS; SUBCUTANEOUS
Status: DISCONTINUED | OUTPATIENT
Start: 2021-04-28 | End: 2021-04-28 | Stop reason: HOSPADM

## 2021-04-28 RX ORDER — DIPHENHYDRAMINE HYDROCHLORIDE 50 MG/ML
12.5 INJECTION INTRAMUSCULAR; INTRAVENOUS
Status: DISCONTINUED | OUTPATIENT
Start: 2021-04-28 | End: 2021-04-28 | Stop reason: HOSPADM

## 2021-04-28 RX ORDER — HYDROMORPHONE HYDROCHLORIDE 1 MG/ML
0.4 INJECTION, SOLUTION INTRAMUSCULAR; INTRAVENOUS; SUBCUTANEOUS
Status: DISCONTINUED | OUTPATIENT
Start: 2021-04-28 | End: 2021-04-28 | Stop reason: HOSPADM

## 2021-04-28 RX ORDER — FAMOTIDINE 20 MG/1
TABLET, FILM COATED ORAL
Qty: 90 TABLET | Refills: 3 | Status: SHIPPED | OUTPATIENT
Start: 2021-04-28 | End: 2023-01-04

## 2021-04-28 RX ORDER — OXYCODONE HCL 5 MG/5 ML
10 SOLUTION, ORAL ORAL
Status: DISCONTINUED | OUTPATIENT
Start: 2021-04-28 | End: 2021-04-28 | Stop reason: HOSPADM

## 2021-04-28 RX ORDER — ONDANSETRON 2 MG/ML
4 INJECTION INTRAMUSCULAR; INTRAVENOUS
Status: DISCONTINUED | OUTPATIENT
Start: 2021-04-28 | End: 2021-04-28 | Stop reason: HOSPADM

## 2021-04-28 RX ORDER — HYDROMORPHONE HYDROCHLORIDE 1 MG/ML
0.1 INJECTION, SOLUTION INTRAMUSCULAR; INTRAVENOUS; SUBCUTANEOUS
Status: DISCONTINUED | OUTPATIENT
Start: 2021-04-28 | End: 2021-04-28 | Stop reason: HOSPADM

## 2021-04-28 RX ORDER — SODIUM CHLORIDE, SODIUM LACTATE, POTASSIUM CHLORIDE, CALCIUM CHLORIDE 600; 310; 30; 20 MG/100ML; MG/100ML; MG/100ML; MG/100ML
INJECTION, SOLUTION INTRAVENOUS CONTINUOUS
Status: DISCONTINUED | OUTPATIENT
Start: 2021-04-28 | End: 2021-04-28 | Stop reason: HOSPADM

## 2021-04-28 RX ORDER — MEPERIDINE HYDROCHLORIDE 25 MG/ML
6.25 INJECTION INTRAMUSCULAR; INTRAVENOUS; SUBCUTANEOUS
Status: DISCONTINUED | OUTPATIENT
Start: 2021-04-28 | End: 2021-04-28 | Stop reason: HOSPADM

## 2021-04-28 RX ADMIN — PROPOFOL 20 MG: 10 INJECTION, EMULSION INTRAVENOUS at 08:44

## 2021-04-28 RX ADMIN — POVIDONE IODINE 15 ML: 100 SOLUTION TOPICAL at 08:06

## 2021-04-28 RX ADMIN — PROPOFOL 20 MG: 10 INJECTION, EMULSION INTRAVENOUS at 08:45

## 2021-04-28 RX ADMIN — SODIUM CHLORIDE, POTASSIUM CHLORIDE, SODIUM LACTATE AND CALCIUM CHLORIDE 1000 ML: 600; 310; 30; 20 INJECTION, SOLUTION INTRAVENOUS at 08:06

## 2021-04-28 RX ADMIN — PROPOFOL 50 MG: 10 INJECTION, EMULSION INTRAVENOUS at 08:42

## 2021-04-28 ASSESSMENT — PAIN SCALES - GENERAL: PAIN_LEVEL: 0

## 2021-04-28 ASSESSMENT — FIBROSIS 4 INDEX
FIB4 SCORE: 1.14
FIB4 SCORE: 1.14

## 2021-04-28 ASSESSMENT — PAIN DESCRIPTION - PAIN TYPE
TYPE: SURGICAL PAIN
TYPE: SURGICAL PAIN

## 2021-04-28 NOTE — OR SURGEON
Immediate Post OP Note    PreOp Diagnosis: iron def anemia, hiatal hernia      PostOp Diagnosis: iron def anemia, hiatal hernia, gastritis       Procedure(s):  GASTROSCOPY - Wound Class: Clean Contaminated  GASTROSCOPY, WITH BIOPSY - Wound Class: Clean Contaminated    Surgeon(s):  Mg Evans M.D.    Anesthesiologist/Type of Anesthesia:  Anesthesiologist: Tatyana Gomez M.D./General    Surgical Staff:  Endoscopy Technician: Jyoti Mooney  Endoscopy Nurse: Bev Chauhan; Tom Thacker R.N.    Specimens removed if any:  ID Type Source Tests Collected by Time Destination   A : BIOPSY Tissue Duodenum PATHOLOGY SPECIMEN Mg Evans M.D. 4/28/2021  8:45 AM    B : BIOPSY Tissue Gastric PATHOLOGY SPECIMEN Mg Evans M.D. 4/28/2021  8:48 AM        Estimated Blood Loss: < 5 cc    Findings: 8 cm hiatal hernia, few erosions along rim of the hernia ('Matty's Erosions'), mile erythema in antrum.  Biopsies obtained from duodenum and stomach.    Complications: no immediate    See dictated procedure note.         4/28/2021 8:57 AM Mg Evans M.D.

## 2021-04-28 NOTE — ANESTHESIA PREPROCEDURE EVALUATION
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  Patient did not smoke on day of procedure.      Induction: intravenous          Informed Consent:    Anesthetic plan and risks discussed with patient.

## 2021-04-28 NOTE — RESULT ENCOUNTER NOTE
Called pt. Said she went to Hematology today 4/28. Said her Hemaglobin was at 11. Pt said she will probably make an appt. in a few weeks.

## 2021-04-28 NOTE — OR NURSING
0856 Received pt from OR, received report from OR RN and anesthesiologist. Pt sleeping, VS WNL.     0905 Pt waking up, titrated to RA.     0912 Report to Tania JULIAN.

## 2021-04-28 NOTE — ANESTHESIA PREPROCEDURE EVALUATION
Relevant Problems   NEURO   (+) History of eye problem      CARDIAC   (+) Hypertension      GI   (+) GERD (gastroesophageal reflux disease)      Other   (+) Chronic lymphoid leukemia in remission (HCC)       Physical Exam    Airway   Mallampati: II  TM distance: >3 FB  Neck ROM: full       Cardiovascular - normal exam  Rhythm: regular  Rate: normal  (-) murmur  Comments: EKG NSR   Dental - normal exam           Pulmonary - normal exam  Breath sounds clear to auscultation     Abdominal   (+) obese     Neurological - normal exam                 Anesthesia Plan

## 2021-04-28 NOTE — DISCHARGE INSTRUCTIONS
ACTIVITY: Rest and take it easy for the first 24 hours.  A responsible adult is recommended to remain with you during that time.  It is normal to feel sleepy.  We encourage you to not do anything that requires balance, judgment or coordination.    MILD FLU-LIKE SYMPTOMS ARE NORMAL. YOU MAY EXPERIENCE GENERALIZED MUSCLE ACHES, THROAT IRRITATION, HEADACHE AND/OR SOME NAUSEA.    FOR 24 HOURS DO NOT:  Drive, operate machinery or run household appliances.  Drink beer or alcoholic beverages.   Make important decisions or sign legal documents.    ENDOSCOPY HOME CARE INSTRUCTIONS    GASTROSCOPY OR ERCP  1. Don't eat or drink anything for about an hour after the test. You can then resume your regular diet.  2. Don't drive or drink alcohol for 24 hours. The medication you received will make you too drowsy.  3. Don't take any coffee, tea, or aspirin products until after you see your doctor. These can harm the lining of your stomach.  4. If you begin to vomit bloody material, or develop black or bloody stools, call your doctor as soon as possible.  5. If you have any neck, chest, abdominal pain or temp of 100 degrees, call your doctor.                    6. Prescriptions: *Pepcid--you may start tonight*    DIET: To avoid nausea, slowly advance diet as tolerated, avoiding spicy or greasy foods for the first day.  Add more substantial food to your diet according to your physician's instructions.  Babies can be fed formula or breast milk as soon as they are hungry.  INCREASE FLUIDS AND FIBER TO AVOID CONSTIPATION.    SURGICAL DRESSING/BATHING: May shower or bath today    FOLLOW-UP APPOINTMENT:  A follow-up appointment should be arranged with your doctor in call for problems--Dr. Evans should call with biopsy results.    You should CALL YOUR PHYSICIAN if you develop:  Fever greater than 101 degrees F.  Pain not relieved by medication, or persistent nausea or vomiting.  Excessive bleeding (blood soaking through dressing) or  unexpected drainage from the wound.  Extreme redness or swelling around the incision site, drainage of pus or foul smelling drainage.  Inability to urinate or empty your bladder within 8 hours.  Problems with breathing or chest pain.    You should call 911 if you develop problems with breathing or chest pain.  If you are unable to contact your doctor or surgical center, you should go to the nearest emergency room or urgent care center.    Physician's telephone #: Dr. Frazier [phone]    If any questions arise, call your doctor.  If your doctor is not available, please feel free to call the Surgical Center at [phone]. The Contact Center is open Monday through Friday 7AM to 5PM and may speak to a nurse at [phone], or toll free at [phone].     A registered nurse may call you a few days after your surgery to see how you are doing after your procedure.    MEDICATIONS: Resume taking daily medication.  Take prescribed pain medication with food.  If no medication is prescribed, you may take non-aspirin pain medication if needed.  PAIN MEDICATION CAN BE VERY CONSTIPATING.  Take a stool softener or laxative such as senokot, pericolace, or milk of magnesia if needed.    Prescription given for pepcid.  Last pain medication given at none given at hospital.    If your physician has prescribed pain medication that includes Acetaminophen (Tylenol), do not take additional Acetaminophen (Tylenol) while taking the prescribed medication.    Depression / Suicide Risk    As you are discharged from this [facility] facility, it is important to learn how to keep safe from harming yourself.    Recognize the warning signs:  · Abrupt changes in personality, positive or negative- including increase in energy   · Giving away possessions  · Change in eating patterns- significant weight changes-  positive or negative  · Change in sleeping patterns- unable to sleep or sleeping all the time   · Unwillingness or inability to  communicate  · Depression  · Unusual sadness, discouragement and loneliness  · Talk of wanting to die  · Neglect of personal appearance   · Rebelliousness- reckless behavior  · Withdrawal from people/activities they love  · Confusion- inability to concentrate     If you or a loved one observes any of these behaviors or has concerns about self-harm, here's what you can do:  · Talk about it- your feelings and reasons for harming yourself  · Remove any means that you might use to hurt yourself (examples: pills, rope, extension cords, firearm)  · Get professional help from the community (Mental Health, Substance Abuse, psychological counseling)  · Do not be alone:Call your Safe Contact- someone whom you trust who will be there for you.  · Call your local CRISIS HOTLINE 061-2334 or 111-668-3025  · Call your local Children's Mobile Crisis Response Team [place] (560) 020-6687 or www.[employer]  · Call the toll free National Suicide Prevention Hotlines   · National Suicide Prevention Lifeline 663-769-MHYK (0627)  National Hope Line Network 800-SUICIDE (283-8022)

## 2021-04-28 NOTE — PROCEDURES
DATE OF PROCEDURE:  04/28/2021     PROCEDURE PERFORMED:  Esophagogastroduodenoscopy with biopsy.     INSTRUMENT UTILIZED:  Olympus flexible forward-viewing gastroscope.     INDICATIONS:  The patient with a history of large hiatal hernia and iron   deficiency anemia, now presents with recurrent severe iron deficiency anemia   without overt gastrointestinal blood loss.     CONSENT:  Full RBA discussion held prior to the procedure and a signed and   witnessed consent form placed on the chart.  Risks including but not limited   to bleeding, complications of sedation and perforation were discussed.     SEDATION/ANESTHESIA:  Provided by Dr. Gomez.     TOLERANCE:  Excellent.     PATHOLOGY SPECIMENS:  A.  Duodenal biopsy to rule out celiac sprue/duodenitis.  B.  Gastric biopsies to rule out H. pylori/gastritis.     PROCEDURAL DETAIL:  After adequate sedation, the Olympus flexible   forward-viewing gastroscope was advanced per the oral route into the   esophagus.  The esophageal mucosa was carefully inspected and was unremarkable   with the gastroesophageal junction located approximately 28 cm from the   incisors.  There was noted to be a large hiatal hernia with the diaphragmatic   crux located approximately 36 cm from the incisors.  The instrument was passed   into the gastric body.  Air was insufflated and the gastric mucosa was   inspected.  There was mild gastric antral erythema with a few erosions.    Retroflexion revealed a few linear erosions along the rim of the hiatal hernia   consistent with Matty's erosions.  Retroflexion was taken down and the   instrument was passed through the patent pyloric channel into the duodenum.    The first, second and third portions of the duodenum were unremarkable.  There   was intermittent free flow of clear to yellow bile per the ampullary orifice.    Biopsies were obtained from the first, second and third portions of the   duodenum to evaluate for duodenitis/celiac sprue in  this patient with iron   deficiency anemia and reported allergy to gluten.  Instrument was pulled back   into the stomach.  Random biopsies were obtained from the antrum and body of   the stomach to evaluate for Helicobacter pylori infection or evidence of   gastritis.     No immediate complications.     IMPRESSIONS AND FINDINGS  1.  Hiatal hernia, large.  2.  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  3.  History of acid reflux.     PLAN AND RECOMMENDATIONS:  1.  Observe for any adverse events from this procedure.  2.  Continue proton pump inhibitor therapy twice daily.  3.  Add famotidine 20 mg orally once nightly at bedtime.  4.  Plan to monitor hemoglobin and iron studies closely going forward.  Should   the patient have recurrent iron deficiency anemia or certainly any overt   evidence of GI blood loss, would consider potential role for colonoscopy   versus small intestinal capsule endoscopy.  5.  I will plan to contact the patient with her pathology results in one to   two weeks.  I will plan to arrange for a clinic visit in approximately eight   weeks.        ______________________________  MD GABO SIMS/YOLANDA/GRACIE    DD:  04/28/2021 09:02  DT:  04/28/2021 10:22    Job#:  [id number]    CC:MD Keyla MARQUEZ MD(User)  AMELIA CARTER MD

## 2021-04-28 NOTE — ANESTHESIA TIME REPORT
Anesthesia Start and Stop Event Times     Date Time Event    4/28/2021 08:37 AM Ready for Procedure    4/28/2021 08:39 AM Anesthesia Start        Responsible Staff  04/28/21    Name Role Begin End    Tatyana Gomez M.D. Anesthesiologist 04/28/21 08:39 AM Not recorded        Preop Diagnosis (Free Text):  Pre-op Diagnosis     IRON DEFICIENCY ANEMIA        Preop Diagnosis (Codes):    Post op Diagnosis  Anemia      Premium Reason  Non-Premium    Comments:

## 2021-04-28 NOTE — ANESTHESIA POSTPROCEDURE EVALUATION
Patient: Shanice Magana    Procedure Summary     Date: 04/28/21 Room / Location: [facility] ROOM 26 / SURGERY SAME DAY [facility]    Anesthesia Start: 0839 Anesthesia Stop: 0856    Procedures:       GASTROSCOPY (N/A Esophagus)      GASTROSCOPY, WITH BIOPSY (N/A Esophagus) Diagnosis: (GASTRITIS WITH EROSIONS,LARGE HIATAL HERNIA)    Surgeons: Mg Evans M.D. Responsible Provider: Tatyana Gomez M.D.    Anesthesia Type: general ASA Status: 3          Final Anesthesia Type: general  Last vitals  BP   Blood Pressure : 154/73    Temp   36.6 °C (97.8 °F)    Pulse   68   Resp   16    SpO2   99 %      Anesthesia Post Evaluation    Patient location during evaluation: PACU  Patient participation: complete - patient participated  Level of consciousness: awake and alert  Pain score: 0    Airway patency: patent  Anesthetic complications: no  Cardiovascular status: hemodynamically stable  Respiratory status: acceptable  Hydration status: euvolemic    PONV: none          No complications documented.     Nurse Pain Score: 0 (NPRS)         Verified Results  TESTOSTERONE, BIO & SHBG - MALE [id number] 07:57AM HESTERALVARO     Test Name Result Flag Reference   TESTOSTERONE TOTAL 758 ng/dl  300-890   See Comment  REFERENCE INTERVAL: Testosterone, Adult Male  Access complete set of age- and/or gender-specific reference   intervals for this test in the [employer] Test Directory   ([email or web address]).   SEX HORMONE BINDING GLOBULIN 41 NMOL/L  11-80   See Comment  REFERENCE INTERVAL: Sex Hormone Binding Globulin  Access complete set of age- and/or gender-specific reference   intervals for this test in the [employer] Test Directory   ([employer]).   TESTOSTERONE, BIOAVAILABLE 369 ng/dl  131-682   See Comment  INTERPRETIVE INFORMATION: Testosterone, Bioavailable  Peter Stage IV    40 - 485  ng/dL  Peter Stage V     124 - 596 ng/dL  The concentrations of free and bioavailable testosterone are   derived from mathematical expressions based on constants for the   binding of testosterone to albumin and/or sex hormone binding   globulin.  Access complete set of age- and/or gender-specific reference   intervals for this test in the [facility] Test Directory   ([employer]).   TESTOSTERONE FREE, MALE 141 pg/ml     See Comment  INTERPRETIVE INFORMATION:  Testosterone, Free  Peter Stage IV    35 - 169 pg/mL  Peter Stage V     41 - 239 pg/mL  The concentration of Free Testosterone is derived from a   mathematical expression based on the constant for the binding of   testosterone to Sex Hormone Binding Globulin (SHBG).  Access complete set of age- and/or gender-specific reference   intervals for this test in the [employer] Test Directory   ([employer]).   TESTOSTERONE, % FREE 1.9 %  1.6-2.9   See Comment  Performed by [facility],  [address] [facility] [place] [address] [phone]  [email or web address], Nik Camp MD, Lab. Director       Message   Normal testosterone levels

## 2021-06-03 ENCOUNTER — HOSPITAL ENCOUNTER (OUTPATIENT)
Dept: LAB | Facility: MEDICAL CENTER | Age: 76
End: 2021-06-03
Attending: INTERNAL MEDICINE
Payer: MEDICARE

## 2021-06-07 ENCOUNTER — HOSPITAL ENCOUNTER (OUTPATIENT)
Dept: LAB | Facility: MEDICAL CENTER | Age: 76
End: 2021-06-07
Attending: INTERNAL MEDICINE
Payer: MEDICARE

## 2021-06-07 LAB
ANISOCYTOSIS BLD QL SMEAR: ABNORMAL
BASOPHILS # BLD AUTO: 0 % (ref 0–1.8)
BASOPHILS # BLD: 0 K/UL (ref 0–0.12)
EOSINOPHIL # BLD AUTO: 0.16 K/UL (ref 0–0.51)
EOSINOPHIL NFR BLD: 1 % (ref 0–6.9)
ERYTHROCYTE [DISTWIDTH] IN BLOOD BY AUTOMATED COUNT: 75.7 FL (ref 35.9–50)
HCT VFR BLD AUTO: 37.1 % (ref 37–47)
HGB BLD-MCNC: 11.4 G/DL (ref 12–16)
HYPOCHROMIA BLD QL SMEAR: ABNORMAL
IRON SATN MFR SERPL: 39 % (ref 15–55)
IRON SERPL-MCNC: 104 UG/DL (ref 40–170)
LYMPHOCYTES # BLD AUTO: 14.27 K/UL (ref 1–4.8)
LYMPHOCYTES NFR BLD: 87 % (ref 22–41)
MACROCYTES BLD QL SMEAR: ABNORMAL
MANUAL DIFF BLD: NORMAL
MCH RBC QN AUTO: 27.8 PG (ref 27–33)
MCHC RBC AUTO-ENTMCNC: 30.7 G/DL (ref 33.6–35)
MCV RBC AUTO: 90.5 FL (ref 81.4–97.8)
MICROCYTES BLD QL SMEAR: ABNORMAL
MONOCYTES # BLD AUTO: 0.33 K/UL (ref 0–0.85)
MONOCYTES NFR BLD AUTO: 2 % (ref 0–13.4)
NEUTROPHILS # BLD AUTO: 1.64 K/UL (ref 2–7.15)
NEUTROPHILS NFR BLD: 10 % (ref 44–72)
NRBC # BLD AUTO: 0 K/UL
NRBC BLD-RTO: 0 /100 WBC
PLATELET # BLD AUTO: 179 K/UL (ref 164–446)
PLATELET BLD QL SMEAR: NORMAL
PMV BLD AUTO: 10.1 FL (ref 9–12.9)
POIKILOCYTOSIS BLD QL SMEAR: NORMAL
RBC # BLD AUTO: 4.1 M/UL (ref 4.2–5.4)
RBC BLD AUTO: PRESENT
SMUDGE CELLS BLD QL SMEAR: NORMAL
TARGETS BLD QL SMEAR: NORMAL
TIBC SERPL-MCNC: 264 UG/DL (ref 250–450)
UIBC SERPL-MCNC: 160 UG/DL (ref 110–370)
VARIANT LYMPHS BLD QL SMEAR: NORMAL
WBC # BLD AUTO: 16.4 K/UL (ref 4.8–10.8)

## 2021-06-07 PROCEDURE — 83550 IRON BINDING TEST: CPT

## 2021-06-07 PROCEDURE — 85027 COMPLETE CBC AUTOMATED: CPT

## 2021-06-07 PROCEDURE — 85007 BL SMEAR W/DIFF WBC COUNT: CPT

## 2021-06-07 PROCEDURE — 36415 COLL VENOUS BLD VENIPUNCTURE: CPT

## 2021-06-07 PROCEDURE — 83540 ASSAY OF IRON: CPT

## 2021-06-12 DIAGNOSIS — I10 ESSENTIAL HYPERTENSION: ICD-10-CM

## 2021-06-14 RX ORDER — ATENOLOL 50 MG/1
TABLET ORAL
Qty: 100 TABLET | Refills: 3 | Status: SHIPPED | OUTPATIENT
Start: 2021-06-14 | End: 2021-10-31 | Stop reason: SDUPTHER

## 2021-06-21 ENCOUNTER — OFFICE VISIT (OUTPATIENT)
Dept: MEDICAL GROUP | Facility: PHYSICIAN GROUP | Age: 76
End: 2021-06-21
Payer: MEDICARE

## 2021-06-21 VITALS
WEIGHT: 185 LBS | HEIGHT: 62 IN | OXYGEN SATURATION: 98 % | SYSTOLIC BLOOD PRESSURE: 118 MMHG | HEART RATE: 72 BPM | DIASTOLIC BLOOD PRESSURE: 70 MMHG | RESPIRATION RATE: 20 BRPM | TEMPERATURE: 98.6 F | BODY MASS INDEX: 34.04 KG/M2

## 2021-06-21 DIAGNOSIS — N95.1 HOT FLASHES, MENOPAUSAL: ICD-10-CM

## 2021-06-21 DIAGNOSIS — I10 ESSENTIAL HYPERTENSION: ICD-10-CM

## 2021-06-21 DIAGNOSIS — C91.11 CHRONIC LYMPHOID LEUKEMIA IN REMISSION (HCC): ICD-10-CM

## 2021-06-21 DIAGNOSIS — F51.01 PRIMARY INSOMNIA: ICD-10-CM

## 2021-06-21 DIAGNOSIS — R79.89 LOW SERUM VITAMIN D: ICD-10-CM

## 2021-06-21 DIAGNOSIS — E66.9 OBESITY (BMI 30-39.9): ICD-10-CM

## 2021-06-21 DIAGNOSIS — Z86.69 HISTORY OF EYE PROBLEM: ICD-10-CM

## 2021-06-21 DIAGNOSIS — R60.0 LEG EDEMA, LEFT: ICD-10-CM

## 2021-06-21 DIAGNOSIS — H33.21 DETACHED RETINA, RIGHT: ICD-10-CM

## 2021-06-21 DIAGNOSIS — K21.9 GASTROESOPHAGEAL REFLUX DISEASE WITHOUT ESOPHAGITIS: ICD-10-CM

## 2021-06-21 DIAGNOSIS — Z88.9 MULTIPLE ALLERGIES: ICD-10-CM

## 2021-06-21 PROCEDURE — G0439 PPPS, SUBSEQ VISIT: HCPCS | Performed by: FAMILY MEDICINE

## 2021-06-21 RX ORDER — MOMETASONE FUROATE 50 UG/1
2 SPRAY, METERED NASAL PRN
COMMUNITY

## 2021-06-21 ASSESSMENT — ENCOUNTER SYMPTOMS: GENERAL WELL-BEING: GOOD

## 2021-06-21 ASSESSMENT — PATIENT HEALTH QUESTIONNAIRE - PHQ9: CLINICAL INTERPRETATION OF PHQ2 SCORE: 0

## 2021-06-21 ASSESSMENT — ACTIVITIES OF DAILY LIVING (ADL): BATHING_REQUIRES_ASSISTANCE: 0

## 2021-06-21 ASSESSMENT — FIBROSIS 4 INDEX: FIB4 SCORE: 2.07

## 2021-06-21 NOTE — ASSESSMENT & PLAN NOTE
Chronic. Managed with estrace 0.25 mg tab daily. Manages her hot flashes, patient stated she had hysterectomy at 33 so she did not go through menopause naturally.

## 2021-06-21 NOTE — PROGRESS NOTES
Chief Complaint   Patient presents with   • Annual Wellness Visit       HPI:  Shanice Magana is a 75 y.o. here for Medicare Annual Wellness Visit     Patient Active Problem List    Diagnosis Date Noted   • Leg edema, left 04/01/2021   • History of eye problem 04/01/2021   • Detached retina, right 02/06/2018   • Low serum vitamin D 10/17/2017   • Obesity (BMI 30-39.9) 02/06/2017   • Multiple allergies 04/12/2016   • Chronic lymphoid leukemia in remission (HCC) 03/03/2011   • Hypertension 03/03/2011   • GERD (gastroesophageal reflux disease) 03/03/2011   • Insomnia 03/03/2011   • Hot flashes, menopausal 03/03/2011       Current Outpatient Medications   Medication Sig Dispense Refill   • mometasone (NASONEX) 50 MCG/ACT nasal spray Administer 2 Sprays into affected nostril(S) as needed.     • atenolol (TENORMIN) 50 MG Tab TAKE 1 TABLET BY MOUTH TWICE A  tablet 3   • famotidine (PEPCID) 20 MG Tab One by mouth once daily at bedtime 90 tablet 3   • pantoprazole (PROTONIX) 40 MG Tablet Delayed Response Take 40 mg by mouth 2 times a day.     • estradiol (ESTRACE) 0.5 MG tablet TAKE 1/2 TABLET BY MOUTH EVERY DAY. (Patient taking differently: Take 0.25 mg by mouth at bedtime. 1/2 tablets = 0.25 mg.) 45 tablet 5   • cetirizine (ZYRTEC) 10 MG Tab Take 10 mg by mouth every bedtime.       No current facility-administered medications for this visit.          Current supplements as per medication list.     Allergies: Grass pollen(k-o-r-t-swt jamarcus), Other misc, and Wheat    Current social contact/activities: Walking dog, recently joined a [employer], she belongs to a [employer] group where she meets up once monthly for socialization.     She  reports that she quit smoking about 52 years ago. Her smoking use included cigarettes. She smoked 0.00 packs per day for 2.00 years. She has never used smokeless tobacco. She reports current alcohol use of about 0.6 - 1.2 oz of alcohol per week. She reports that she does not use  drugs.  Counseling given: Not Answered      DPA/Advanced Directive:  Patient has Advanced Directive on file.     ROS:    Gait: Uses no assistive device  Ostomy: No  Other tubes: No  Amputations: No  Chronic oxygen use: No  Last eye exam: 6/2020  Wears hearing aids: No   : Reports urinary leakage during the last 6 months that has not interfered at all with their daily activities or sleep.    Screening:  Annual  Depression Screening    Little interest or pleasure in doing things?  0 - not at all  Feeling down, depressed , or hopeless? 0 - not at all  Patient Health Questionnaire Score: 0     If depressive symptoms identified deferred to follow up visit unless specifically addressed in assessment and plan.    Interpretation of PHQ-9 Total Score   Score Severity   1-4 No Depression   5-9 Mild Depression   10-14 Moderate Depression   15-19 Moderately Severe Depression   20-27 Severe Depression    Screening for Cognitive Impairment    Three Minute Recall (captain, garden, picture) 2/3    Mihai clock face with all 12 numbers and set the hands to show 5 past 8.  Yes    Cognitive concerns identified deferred for follow up unless specifically addressed in assessment and plan.    Fall Risk Assessment    Has the patient had two or more falls in the last year or any fall with injury in the last year?  No    Safety Assessment    Throw rugs on floor.  Yes  Handrails on all stairs.  No  Good lighting in all hallways.  Yes  Difficulty hearing.  No  Patient counseled about all safety risks that were identified.    Functional Assessment ADLs    Are there any barriers preventing you from cooking for yourself or meeting nutritional needs?  No.    Are there any barriers preventing you from driving safely or obtaining transportation?  No.    Are there any barriers preventing you from using a telephone or calling for help?  No.    Are there any barriers preventing you from shopping?  No.    Are there any barriers preventing you from  taking care of your own finances?  No.    Are there any barriers preventing you from managing your medications?  No.    Are there any barriers preventing you from showering, bathing or dressing yourself?  No.    Are you currently engaging in any exercise or physical activity?  Yes.  Walk dog,   What is your perception of your health?  Good.      Health Maintenance Summary                Annual Wellness Visit Overdue 2020      Done 2019 INITIAL ANNUAL WELLNESS VISIT-INCLUDES PPPS ()     Patient has more history with this topic...    MAMMOGRAM Overdue 2021      Done 2020 MA-SCREENING MAMMO BILAT W/TOMOSYNTHESIS W/CAD     Patient has more history with this topic...    BONE DENSITY Next Due 2021      Done 2016 DS-BONE DENSITY STUDY (DEXA)     Patient has more history with this topic...    COLONOSCOPY Next Due 2029      Done 2019 REFERRAL TO GI FOR COLONOSCOPY     Patient has more history with this topic...    IMM DTaP/Tdap/Td Vaccine Next Due 2031      Done 2021 Imm Admin: Tdap Vaccine     Patient has more history with this topic...          Patient Care Team:  Keyla Paige M.D. as PCP - General (Family Medicine)  Mg Evans M.D. as Consulting Physician (Gastroenterology)  Brian Davidson M.D. as Consulting Physician (Oncology)  Jamar Anderson O.D. as Consulting Physician (Optometry)  Sea Franklin M.D. as Consulting Physician (Ophthalmology)  Jane Larsen M.D. (Ophthalmology)  Leeann Atkins M.D. as Consulting Physician (OB/Gyn)  Carlos Dillard Ass't (Inactive) as          Social History     Tobacco Use   • Smoking status: Former Smoker     Packs/day: 0.00     Years: 2.00     Pack years: 0.00     Types: Cigarettes     Quit date: 1969     Years since quittin.5   • Smokeless tobacco: Never Used   Vaping Use   • Vaping Use: Never used   Substance Use Topics   • Alcohol use: Yes     Alcohol/week:  0.6 - 1.2 oz     Types: 1 - 2 Glasses of wine per week     Comment: Several times a week.  4/27/21: 2-4/month   • Drug use: No     Family History   Problem Relation Age of Onset   • Heart Disease Mother    • Hypertension Mother    • Other Mother         pancreatitis   • Arthritis Father    • Cancer Father         Lymphoma, colon, skin   • Hyperlipidemia Father    • Other Father         Clogged artery in leg   • Heart Disease Father         A Fib   • Diabetes Sister         Pre   • Other Sister         Obesity   • Other Daughter         Gluten allergies   • Other Son         Suicide     She  has a past medical history of Arthritis, BMI 34.0-34.9,adult (9/11/2014), Breath shortness, Cancer ([facility]) (2011), CATARACT, Celiac disease, CLL (chronic lymphocytic leukemia) ([facility]) (3/3/2011), Family history of adverse effect to anesthesia, GERD (gastroesophageal reflux disease) (3/3/2011), Heart burn (04/27/2021), Hiatal hernia (3/3/2011), Hot flashes, menopausal (3/3/2011), and Hypertension (3/3/2011).   Past Surgical History:   Procedure Laterality Date   • PB UPPER GI ENDOSCOPY,DIAGNOSIS N/A 4/28/2021    Procedure: GASTROSCOPY;  Surgeon: Mg Evans M.D.;  Location: SURGERY SAME DAY [facility];  Service: Gastroenterology   • PB UPPER GI ENDOSCOPY,BIOPSY N/A 4/28/2021    Procedure: GASTROSCOPY, WITH BIOPSY;  Surgeon: Mg Evans M.D.;  Location: SURGERY SAME DAY [facility];  Service: Gastroenterology   • RECTUS REPAIR Right 11/15/2018    Procedure: RECTUS REPAIR- SUPERIOR RECTUS RECESSION, ADJUSTABLE SUTURE INFERIOR RECTUS PLICATION/RESECTION;  Surgeon: Jane Larsen M.D.;  Location: SURGERY SAME DAY [facility] ORS;  Service: Ophthalmology   • VITRECTOMY POSTERIOR Right 1/17/2018    Procedure: VITRECTOMY POSTERIOR W/AIR FLUID EXCHANGE, ENDO LASER, 23 GAUGE SF6 GAS, CRYOTHERAPY;  Surgeon: Sea Franklin M.D.;  Location: SURGERY SAME DAY [facility];  Service: Ophthalmology   • MAMMOPLASTY REDUCTION Bilateral  "11/3/2015    Procedure: MAMMOPLASTY REDUCTION;  Surgeon: Talia Barton M.D.;  Location: [facility];  Service:    • KNEE ARTHROSCOPY  9/17/2014    Performed by Dany Owens M.D. at [facility]   • MENISCECTOMY  9/17/2014    Performed by Dany Owens M.D. at [facility]   • SHOULDER ARTHROSCOPY W/ ROTATOR CUFF REPAIR  8/22/2013    Performed by Dany Owens M.D. at [facility]   • SHOULDER DECOMPRESSION ARTHROSCOPIC  8/22/2013    Performed by Dany Owens M.D. at [facility]   • CLAVICLE DISTAL EXCISION  8/22/2013    Performed by Dany Owens M.D. at [facility]   • OTHER  7/2013    laser procedure right eye   • CATARACT EXTRACTION WITH IOL  5/29/13    right eye   • ABDOMINAL HYSTERECTOMY TOTAL  1975   • CHOLECYSTECTOMY  1973    open   • TONSILLECTOMY  1950   • BLADDER SUSPENSION  1973, 1978    x2       Exam:   /70 (BP Location: Left arm, Patient Position: Sitting, BP Cuff Size: Adult)   Pulse 72   Temp 37 °C (98.6 °F) (Temporal)   Resp 20   Ht 1.575 m (5' 2\")   Wt 83.9 kg (185 lb)   SpO2 98%  Body mass index is 33.84 kg/m².    Hearing excellent.    Dentition good  Alert, oriented in no acute distress.  Eye contact is good, speech goal directed, affect calm    Assessment and Plan. The following treatment and monitoring plan is recommended:   Problem List Items Addressed This Visit     Chronic lymphoid leukemia in remission (HCC)     Asymptomatic being monitored/Dr Davidson, Q 6 months.  Not on medications, was anemic with last lab draw. Will be redrawn today in clinic and sent to Dr. Davidson and Nathan.               Hypertension     Chronic, controlled on Atenolol 50 mg twice daily         GERD (gastroesophageal reflux disease)     Chronic, stable on pepcid 20 mg once daily and pantroprazole 40 mg twice daily. Dr. Evans is following.          Insomnia     Chronic, reports she gets up 1-3 times a night " to take her 1.5 year old puppy out. Controlled w/ behavior techniques, no meds.          Hot flashes, menopausal     Chronic. Managed with estrace 0.25 mg tab daily. Manages her hot flashes, patient stated she had hysterectomy at 33 so she did not go through menopause naturally.          Multiple allergies     Chronic, patient is very allergic to perfumes and cleaning products. Patient stated this year she is having environmental allergies this year but is controlled with zyrtec and nasacort.          Obesity (BMI 30-39.9)     Patient stated her weight has gone up and down over the last ten years, patient is active with walking her dog and bowling. Patient is using [employer] service where she eats a lot of veggies and tofu and is gluten free. Eating lean protein only, such as chicken         Low serum vitamin D     Chronic, not on supplementation. Dr. Evans following         Detached retina, right     Old problem, patient had detached retina in 2018. Patient sees Dr. Yan ophthalmologist regularly for follow up.          Leg edema, left     Chronic, onset 10/20, patient stated when she sits on her couch for extended periods of time her ankle will swell. She resolves this with elevating her foot while sitting.          History of eye problem     Old problem, h/o vitreous hemorrhage R eye, had a detached retina prior. No longer an issue. Sees optho regularly.                      Services suggested: No services needed at this time  Health Care Screening: Age-appropriate preventive services recommended by USPTF and ACIP covered by Medicare were discussed today. Services ordered if indicated and agreed upon by the patient.  Referrals offered: Community-based lifestyle interventions to reduce health risks and promote self-management and wellness, fall prevention, nutrition, physical activity, tobacco-use cessation, weight loss, and mental health services as per orders if indicated.    Discussion today  about general wellness and lifestyle habits:    · Prevent falls and reduce trip hazards; Cautioned about securing or removing rugs.  · Have a working fire alarm and carbon monoxide detector;   · Engage in regular physical activity and social activities     Follow-up: No follow-ups on file.

## 2021-06-21 NOTE — ASSESSMENT & PLAN NOTE
Chronic, patient is very allergic to perfumes and cleaning products. Patient stated this year she is having environmental allergies this year but is controlled with zyrtec and nasacort.

## 2021-06-21 NOTE — ASSESSMENT & PLAN NOTE
Asymptomatic being monitored/Dr Davidson, Q 6 months.  Not on medications, was anemic with last lab draw. Will be redrawn today in clinic and sent to Dr. Davdison and Nathan.

## 2021-06-21 NOTE — ASSESSMENT & PLAN NOTE
Old problem, patient had detached retina in 2018. Patient sees Dr. Yan ophthalmologist regularly for follow up.

## 2021-06-21 NOTE — ASSESSMENT & PLAN NOTE
Chronic, onset 10/20, patient stated when she sits on her couch for extended periods of time her ankle will swell. She resolves this with elevating her foot while sitting.

## 2021-06-21 NOTE — ASSESSMENT & PLAN NOTE
Chronic, stable on pepcid 20 mg once daily and pantroprazole 40 mg twice daily. Dr. Evans is following.

## 2021-06-21 NOTE — ASSESSMENT & PLAN NOTE
Patient stated her weight has gone up and down over the last ten years, patient is active with walking her dog and bowling. Patient is using [employer] food delivery service where she eats a lot of veggies and tofu and is gluten free. Eating lean protein only, such as chicken

## 2021-06-21 NOTE — ASSESSMENT & PLAN NOTE
Old problem, h/o vitreous hemorrhage R eye, had a detached retina prior. No longer an issue. Sees optho regularly.

## 2021-07-08 DIAGNOSIS — N95.1 HOT FLASHES, MENOPAUSAL: ICD-10-CM

## 2021-07-09 RX ORDER — ESTRADIOL 0.5 MG/1
TABLET ORAL
Qty: 45 TABLET | Refills: 5 | Status: SHIPPED | OUTPATIENT
Start: 2021-07-09 | End: 2021-11-05 | Stop reason: SDUPTHER

## 2021-07-09 NOTE — TELEPHONE ENCOUNTER
Per my last note 4/1/21 I already refilled a 3m supply with 5 refills. Please see Why is this being requested again. Thank you.

## 2021-07-09 NOTE — TELEPHONE ENCOUNTER
Phone Number Called: [phone] (home) [phone] (work)  Shanice Vaz Chino    Call outcome: Spoke to patient regarding message below.    Message: Spoke to pt. She said she would like her remaining refills to be switched over from the [facility] [employer] to the [facility] on [address]. She poured her medication into an old bottle that said 0 refills but at the same time, [facility] needs an order to allow them to refill the script with them.

## 2021-08-26 ENCOUNTER — HOSPITAL ENCOUNTER (OUTPATIENT)
Dept: RADIOLOGY | Facility: MEDICAL CENTER | Age: 76
End: 2021-08-26
Attending: FAMILY MEDICINE
Payer: MEDICARE

## 2021-08-26 ENCOUNTER — OFFICE VISIT (OUTPATIENT)
Dept: MEDICAL GROUP | Facility: PHYSICIAN GROUP | Age: 76
End: 2021-08-26
Payer: MEDICARE

## 2021-08-26 ENCOUNTER — HOSPITAL ENCOUNTER (OUTPATIENT)
Facility: MEDICAL CENTER | Age: 76
End: 2021-08-26
Attending: FAMILY MEDICINE
Payer: MEDICARE

## 2021-08-26 VITALS
RESPIRATION RATE: 16 BRPM | SYSTOLIC BLOOD PRESSURE: 126 MMHG | BODY MASS INDEX: 33.13 KG/M2 | TEMPERATURE: 97.8 F | DIASTOLIC BLOOD PRESSURE: 70 MMHG | HEIGHT: 62 IN | HEART RATE: 63 BPM | WEIGHT: 180 LBS | OXYGEN SATURATION: 95 %

## 2021-08-26 DIAGNOSIS — M10.9 ACUTE GOUT INVOLVING TOE OF RIGHT FOOT, UNSPECIFIED CAUSE: ICD-10-CM

## 2021-08-26 DIAGNOSIS — M79.671 ACUTE FOOT PAIN, RIGHT: ICD-10-CM

## 2021-08-26 DIAGNOSIS — I10 ESSENTIAL HYPERTENSION: ICD-10-CM

## 2021-08-26 DIAGNOSIS — E66.9 OBESITY (BMI 30-39.9): ICD-10-CM

## 2021-08-26 DIAGNOSIS — C91.11 CHRONIC LYMPHOID LEUKEMIA IN REMISSION (HCC): ICD-10-CM

## 2021-08-26 PROCEDURE — 36415 COLL VENOUS BLD VENIPUNCTURE: CPT | Performed by: FAMILY MEDICINE

## 2021-08-26 PROCEDURE — 82565 ASSAY OF CREATININE: CPT

## 2021-08-26 PROCEDURE — 85007 BL SMEAR W/DIFF WBC COUNT: CPT

## 2021-08-26 PROCEDURE — 85027 COMPLETE CBC AUTOMATED: CPT

## 2021-08-26 PROCEDURE — 83540 ASSAY OF IRON: CPT

## 2021-08-26 PROCEDURE — 73630 X-RAY EXAM OF FOOT: CPT | Mod: RT

## 2021-08-26 PROCEDURE — 99215 OFFICE O/P EST HI 40 MIN: CPT | Performed by: FAMILY MEDICINE

## 2021-08-26 PROCEDURE — 99000 SPECIMEN HANDLING OFFICE-LAB: CPT | Performed by: FAMILY MEDICINE

## 2021-08-26 PROCEDURE — 83550 IRON BINDING TEST: CPT

## 2021-08-26 PROCEDURE — 84550 ASSAY OF BLOOD/URIC ACID: CPT

## 2021-08-26 PROCEDURE — 80500 HCHG CLINICAL PATH CONSULT-LIMITED: CPT

## 2021-08-26 RX ORDER — METHYLPREDNISOLONE 4 MG/1
TABLET ORAL
Qty: 21 TABLET | Refills: 0 | Status: SHIPPED | OUTPATIENT
Start: 2021-08-26 | End: 2021-11-05

## 2021-08-26 ASSESSMENT — FIBROSIS 4 INDEX: FIB4 SCORE: 2.07

## 2021-08-26 ASSESSMENT — ENCOUNTER SYMPTOMS
FEVER: 0
INSOMNIA: 0
DEPRESSION: 0
NAUSEA: 1
DIZZINESS: 0
COUGH: 0
VOMITING: 0
NERVOUS/ANXIOUS: 0
SHORTNESS OF BREATH: 0
WHEEZING: 0
DIARRHEA: 0
CHILLS: 0
HEADACHES: 0

## 2021-08-26 NOTE — PROGRESS NOTES
"Subjective:     CC:   Chief Complaint   Patient presents with   • Gout     (R) Foot x2 days ago. Feeling hot, swelling, burning sensation on big toe. Pressure in arches. Pain shooting upwards       HPI:   Shanice presents today with right foot pain 2 days ago. Pt is also inquiring about RD consult to assist with weight loss. She is also requesting that we draw the labs her Oncologist ordered since I have ordered labs to be done today so she does not have to go back to the lab to have others drawn, Iron/total iron bind added to labs and will send to Oncologist Stuart/Carlos once resulted.     Acute gout involving toe of right foot  Started 8/24/21: Pain in first right great toe, MTP joint. throbbing/shooting pains up foot and leg. MTP joint has been \"thickening\" over the years per patient and this is the first time it has caused pain. Pt confirms that she drinks alcohol, she had one drink Tuesday night prior to symptoms starting which was a sprite with whiskey. She does meet criteria risk factors for gout such as obesity, HTN, hx of hematologic cancer, and moderate alcohol intake. Pt denies diet high in purines other than alcohol. She has never had dx of gout in the past. Denies fever, chills, or signs of infection. Had tried Tylenol which helped slightly, movement or something against her joint makes the pain worse.  On exam pt has stiffness with pain over MTP joint, slight swelling. No redness observed or signs of infectious or cellulitis. Ordered uric acid, cbc, ESR, xray. Medrol dose pack sent to pharmacy, avoiding NSAIDS and colchicine due to pt decreased GFR.       Current Outpatient Medications Ordered in Epic   Medication Sig Dispense Refill   • methylPREDNISolone (MEDROL DOSEPAK) 4 MG Tablet Therapy Pack As directed on the packaging label. 21 Tablet 0   • estradiol (ESTRACE) 0.5 MG tablet TAKE 1/2 TABLET BY MOUTH EVERY DAY. 45 tablet 5   • mometasone (NASONEX) 50 MCG/ACT nasal spray Administer 2 Sprays into " "affected nostril(S) as needed.     • atenolol (TENORMIN) 50 MG Tab TAKE 1 TABLET BY MOUTH TWICE A  tablet 3   • famotidine (PEPCID) 20 MG Tab One by mouth once daily at bedtime 90 tablet 3   • pantoprazole (PROTONIX) 40 MG Tablet Delayed Response Take 40 mg by mouth 2 times a day.     • cetirizine (ZYRTEC) 10 MG Tab Take 10 mg by mouth every bedtime. (Patient not taking: Reported on 8/26/2021)       No current [facility]-ordered facility-administered medications on file.       Review of Systems   Constitutional: Negative for chills and fever.   Respiratory: Negative for cough, shortness of breath and wheezing.    Cardiovascular: Negative for chest pain and leg swelling.   Gastrointestinal: Positive for nausea. Negative for diarrhea and vomiting.   Genitourinary: Negative.    Musculoskeletal: Positive for joint pain.        Right great toe   Skin: Negative.    Neurological: Negative for dizziness and headaches.   Psychiatric/Behavioral: Negative for depression. The patient is not nervous/anxious and does not have insomnia.          Objective:     Exam:  /70 (BP Location: Right arm, Patient Position: Sitting, BP Cuff Size: Adult)   Pulse 63   Temp 36.6 °C (97.8 °F) (Temporal)   Resp 16   Ht 1.575 m (5' 2\")   Wt 81.6 kg (180 lb)   SpO2 95%   BMI 32.92 kg/m²  Body mass index is 32.92 kg/m².    Physical Exam  Vitals reviewed.   Constitutional:       Appearance: Normal appearance.   HENT:      Head: Normocephalic.   Eyes:      Conjunctiva/sclera: Conjunctivae normal.      Pupils: Pupils are equal, round, and reactive to light.   Cardiovascular:      Rate and Rhythm: Normal rate and regular rhythm.      Pulses: Normal pulses.      Heart sounds: Normal heart sounds. No murmur heard.     Pulmonary:      Effort: Pulmonary effort is normal. No respiratory distress.      Breath sounds: Normal breath sounds. No wheezing.   Musculoskeletal:         General: Swelling and tenderness present.      Comments: Right MTP " joint tenderness and slight swelling  Pulses on right foot present 2+   Skin:     General: Skin is warm and dry.      Coloration: Skin is not jaundiced.      Findings: No erythema.   Neurological:      Mental Status: She is alert and oriented to person, place, and time.   Psychiatric:         Mood and Affect: Mood normal.         Behavior: Behavior normal.          A chaperone was offered to the patient during today's exam. Patient declined chaperone.      Assessment & Plan:     75 y.o. female with the following -     1. 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 Pt has labs drawn every 3 months for oncology, she is requesting we draw the additional labs today so she can avoid an extra trip to lab. Ordered and will send results to oncology.   - CBC WITH DIFFERENTIAL; Future  - IRON/TOTAL IRON BIND; Future    2. Acute gout involving toe of right foot, unspecified cause Acute foot pain, right  New acute problem, decompensated. Started 2 days ago. No s/s of infection. Avoiding NSAIDs due to decreased GFR, will start medrol dosepak to help with swelling/pain. Discussed when to f/u or go to UC if unavailability at clinic for change in condition. Pt agreeable.   - URIC ACID; Future  - Sed Rate; Future  - CBC WITH DIFFERENTIAL; Future  - methylPREDNISolone (MEDROL DOSEPAK) 4 MG Tablet Therapy Pack; As directed on the packaging label.  Dispense: 21 Tablet; Refill: 0  - DX-FOOT-COMPLETE 3+ RIGHT; Future      4. Essential hypertension  Chronic, stable. Last GFR below 60, will recheck and avoid NSAIDs in the meantime.  - ESTIMATED GFR; Future    5. Obesity (BMI 30-39.9)  Chronic, pt would like to work on weight loss, discussed gym membership benefits with [employer] and gave patient address and number to gym close to her house that has free membership, pt interested and would like to see RD for nutritional services.   - REFERRAL TO NUTRITION SERVICES       I spent a total of 40 minutes with record review, exam,  communication with the patient, communication with other providers, and documentation of this encounter.      Return in about 2 months (around 10/26/2021), or See Dr. Paige in 2 months for.    Please note that this dictation was created using voice recognition software. I have made every reasonable attempt to correct obvious errors, but I expect that there are errors of grammar and possibly content that I did not discover before finalizing the note.

## 2021-08-26 NOTE — PROGRESS NOTES
Patient arrived for blood draw. Patient reports fasting for at least 8-10 hours.   Verified patient's name/date-of-birth and reason for visit before procedure was started. Patient's left antecubital cleansed. Venipuncture attempts X1. Blood draw completed on patient's left AC. Applied pressure afterwards and placed Coban on site of venipuncture with directions for patient to remove within the next hour. Patient tolerated procedure well, no adverse reactions. Patient ambulated safely upon leaving clinic.   Completed labels were placed on blood samples in draw room with patient present. Appropriate blood samples were centrifuged. Blood samples were then placed in locked lab box for  pick-up.

## 2021-08-26 NOTE — PATIENT INSTRUCTIONS
Ask about free membership with [employer]: [employer] --[id number] [place] [address] -- [phone]    Avoid alcohol  Increase fluids to 3 L a day   Rest/elevate joint  Avoid foods/drinks with high fructose corn syrup     Pt would like stress test results done yesterday

## 2021-08-26 NOTE — ASSESSMENT & PLAN NOTE
"Started 8/24/21: Pain in first right great toe, MTP joint. throbbing/shooting pains up foot and leg. MTP joint has been \"thickening\" over the years per patient and this is the first time it has caused pain. Pt confirms that she drinks alcohol, she had one drink Tuesday night prior to symptoms starting which was a sprite with whiskey. She does meet criteria risk factors for gout such as obesity, HTN, hx of hematologic cancer, and moderate alcohol intake. Pt denies diet high in purines other than alcohol. She has never had dx of gout in the past. Denies fever, chills, or signs of infection. Had tried Tylenol which helped slightly, movement or something against her joint makes the pain worse.  On exam pt has stiffness with pain over MTP joint, slight swelling. No redness observed or signs of infectious or cellulitis. Ordered uric acid, cbc, ESR, xray. Medrol dose pack sent to pharmacy, avoiding NSAIDS and colchicine due to pt decreased GFR.   "

## 2021-08-27 ENCOUNTER — TELEPHONE (OUTPATIENT)
Dept: MEDICAL GROUP | Facility: PHYSICIAN GROUP | Age: 76
End: 2021-08-27

## 2021-08-27 DIAGNOSIS — M79.676 PAIN OF TOE, UNSPECIFIED LATERALITY: ICD-10-CM

## 2021-08-27 LAB
BASOPHILS # BLD AUTO: 0.9 % (ref 0–1.8)
BASOPHILS # BLD: 0.19 K/UL (ref 0–0.12)
BURR CELLS BLD QL SMEAR: NORMAL
CREAT SERPL-MCNC: 0.87 MG/DL (ref 0.5–1.4)
EOSINOPHIL # BLD AUTO: 0.17 K/UL (ref 0–0.51)
EOSINOPHIL NFR BLD: 0.8 % (ref 0–6.9)
ERYTHROCYTE [DISTWIDTH] IN BLOOD BY AUTOMATED COUNT: 51.5 FL (ref 35.9–50)
HCT VFR BLD AUTO: 43.4 % (ref 37–47)
HGB BLD-MCNC: 13.4 G/DL (ref 12–16)
IRON SATN MFR SERPL: 22 % (ref 15–55)
IRON SERPL-MCNC: 68 UG/DL (ref 40–170)
LYMPHOCYTES # BLD AUTO: 16.06 K/UL (ref 1–4.8)
LYMPHOCYTES NFR BLD: 76.1 % (ref 22–41)
MANUAL DIFF BLD: NORMAL
MCH RBC QN AUTO: 30.5 PG (ref 27–33)
MCHC RBC AUTO-ENTMCNC: 30.9 G/DL (ref 33.6–35)
MCV RBC AUTO: 98.9 FL (ref 81.4–97.8)
MONOCYTES # BLD AUTO: 0.72 K/UL (ref 0–0.85)
MONOCYTES NFR BLD AUTO: 3.4 % (ref 0–13.4)
MORPHOLOGY BLD-IMP: NORMAL
NEUTROPHILS # BLD AUTO: 2.34 K/UL (ref 2–7.15)
NEUTROPHILS NFR BLD: 11.1 % (ref 44–72)
NRBC # BLD AUTO: 0.03 K/UL
NRBC BLD-RTO: 0.1 /100 WBC
PATH REV: NORMAL
PATH REV: NORMAL
PLATELET # BLD AUTO: 195 K/UL (ref 164–446)
PLATELET BLD QL SMEAR: NORMAL
PMV BLD AUTO: 12 FL (ref 9–12.9)
POIKILOCYTOSIS BLD QL SMEAR: NORMAL
RBC # BLD AUTO: 4.39 M/UL (ref 4.2–5.4)
RBC BLD AUTO: PRESENT
TIBC SERPL-MCNC: 305 UG/DL (ref 250–450)
UIBC SERPL-MCNC: 237 UG/DL (ref 110–370)
URATE SERPL-MCNC: 7.3 MG/DL (ref 1.9–8.2)
WBC # BLD AUTO: 21.1 K/UL (ref 4.8–10.8)
WBC OTHER NFR BLD MANUAL: 7.7 %

## 2021-08-27 RX ORDER — NALOXONE HYDROCHLORIDE 4 MG/.1ML
1 SPRAY NASAL PRN
Qty: 1 EACH | Refills: 0 | Status: SHIPPED | OUTPATIENT
Start: 2021-08-27 | End: 2021-11-05

## 2021-08-27 RX ORDER — OXYCODONE HYDROCHLORIDE 5 MG/1
5 TABLET ORAL EVERY 4 HOURS PRN
Qty: 30 TABLET | Refills: 0 | Status: SHIPPED | OUTPATIENT
Start: 2021-08-27 | End: 2021-08-31

## 2021-08-27 NOTE — TELEPHONE ENCOUNTER
Phone Number Called: [phone] ([facility])    Call outcome: Spoke to     Message: Rep stated that Sed Rate was cancelled by lab due to not enough blood being collected for the vial. Rep stated that order would need to be recollected.

## 2021-08-27 NOTE — TELEPHONE ENCOUNTER
"----- Message from KULWINDER Perkins sent at 8/27/2021  8:28 AM PDT -----  Regarding: FW: Cancellation of Order # [id number]  Can you please call the pharmacy and ask why this lab was canceled? Thank you  ----- Message -----  From: [facility]  Sent: 8/27/2021   5:11 AM PDT  To: KULWINDER Perkins  Subject: Cancellation of Order # [id number]                Order number [id number] for the procedure SED RATE [[id number]]   has been canceled by [facility] [[id number]]. This procedure was ordered   by you on Aug 26, 2021 for the patient Shanice Magana   [[id number]]. The reason for cancellation was \"Cancelled by Lab\".    This was a future order.    "

## 2021-08-27 NOTE — TELEPHONE ENCOUNTER
Called patient and spoke to her regarding her CBC differential results. Blast cells present in peripheral smear, pathology pending evaluation. Sent results to PCP and Dr. Davidson and Carlos who are following her for Oncology. Instructed patient to call oncology to see if they want her to have further follow up or move up her next appointment.  Discussed stopping the steroid dose pack at this time and Dr. Paige will give Oxy 5's for when she has severe acute pain in first right MTP joint, discussed using this sparingly, and side effects such as constipation, somnolence, and dependence. Pt would like the pain medication for as needed and verbalized understanding of side effects.       Results for orders placed or performed during the hospital encounter of 08/26/21   ESTIMATED GFR   Result Value Ref Range    GFR If African American >60 >60 mL/min/1.73 m 2    GFR If Non African American >60 >60 mL/min/1.73 m 2   IRON/TOTAL IRON BIND   Result Value Ref Range    Iron 68 40 - 170 ug/dL    Total Iron Binding 305 250 - 450 ug/dL    Unsat Iron Binding 237 110 - 370 ug/dL    % Saturation 22 15 - 55 %   CBC WITH DIFFERENTIAL   Result Value Ref Range    WBC 21.1 (H) 4.8 - 10.8 K/uL    RBC 4.39 4.20 - 5.40 M/uL    Hemoglobin 13.4 12.0 - 16.0 g/dL    Hematocrit 43.4 37.0 - 47.0 %    MCV 98.9 (H) 81.4 - 97.8 fL    MCH 30.5 27.0 - 33.0 pg    MCHC 30.9 (L) 33.6 - 35.0 g/dL    RDW 51.5 (H) 35.9 - 50.0 fL    Platelet Count 195 164 - 446 K/uL    MPV 12.0 9.0 - 12.9 fL    Neutrophils-Polys 11.10 (L) 44.00 - 72.00 %    Lymphocytes 76.10 (H) 22.00 - 41.00 %    Monocytes 3.40 0.00 - 13.40 %    Eosinophils 0.80 0.00 - 6.90 %    Basophils 0.90 0.00 - 1.80 %    Nucleated RBC 0.10 /100 WBC    Neutrophils (Absolute) 2.34 2.00 - 7.15 K/uL    Lymphs (Absolute) 16.06 (H) 1.00 - 4.80 K/uL    Monos (Absolute) 0.72 0.00 - 0.85 K/uL    Eos (Absolute) 0.17 0.00 - 0.51 K/uL    Baso (Absolute) 0.19 (H) 0.00 - 0.12 K/uL    NRBC (Absolute) 0.03  K/uL   URIC ACID   Result Value Ref Range    Uric Acid 7.3 1.9 - 8.2 mg/dL   CREATININE   Result Value Ref Range    Creatinine 0.87 0.50 - 1.40 mg/dL   DIFFERENTIAL MANUAL   Result Value Ref Range    Other 7.70 %    Manual Diff Status PERFORMED    PERIPHERAL SMEAR REVIEW   Result Value Ref Range    Peripheral Smear Review see below    PLATELET ESTIMATE   Result Value Ref Range    Plt Estimation Normal    MORPHOLOGY   Result Value Ref Range    RBC Morphology Present     Poikilocytosis 2+     Echinocytes 2+

## 2021-10-31 DIAGNOSIS — I10 ESSENTIAL HYPERTENSION: ICD-10-CM

## 2021-11-02 RX ORDER — ATENOLOL 50 MG/1
TABLET ORAL
Qty: 100 TABLET | Refills: 0 | Status: SHIPPED | OUTPATIENT
Start: 2021-11-02 | End: 2022-06-30

## 2021-11-04 ENCOUNTER — TELEPHONE (OUTPATIENT)
Dept: MEDICAL GROUP | Facility: PHYSICIAN GROUP | Age: 76
End: 2021-11-04

## 2021-11-04 NOTE — TELEPHONE ENCOUNTER
ESTABLISHED PATIENT PRE-VISIT PLANNING     Patient was NOT contacted to complete PVP.     Note: Patient will not be contacted if there is no indication to call.     1.  Reviewed notes from the last few office visits within the medical group: Yes    2.  If any orders were placed at last visit or intended to be done for this visit (i.e. 6 mos follow-up), do we have Results/Consult Notes?         •  Labs - Labs ordered, completed on 08/27/21 and results are in chart.  Note: If patient appointment is for lab review and patient did not complete labs, check with provider if OK to reschedule patient until labs completed.       •  Imaging - Imaging ordered, completed and results are in chart.       •  Referrals - Referral ordered, patient has NOT been seen.    3. Is this appointment scheduled as a Hospital Follow-Up? No    4.  Immunizations were updated in Epic using Reconcile Outside Information activity? Yes    5.  Patient is due for the following Health Maintenance Topics:   Health Maintenance Due   Topic Date Due   • Annual Wellness Visit  05/09/2020   • MAMMOGRAM  02/28/2021   • BONE DENSITY  08/19/2021     6.  AHA (Pulse8) form printed for Provider? No, already completed

## 2021-11-05 ENCOUNTER — OFFICE VISIT (OUTPATIENT)
Dept: MEDICAL GROUP | Facility: PHYSICIAN GROUP | Age: 76
End: 2021-11-05
Payer: MEDICARE

## 2021-11-05 VITALS
HEART RATE: 72 BPM | SYSTOLIC BLOOD PRESSURE: 108 MMHG | BODY MASS INDEX: 30.62 KG/M2 | OXYGEN SATURATION: 96 % | RESPIRATION RATE: 16 BRPM | HEIGHT: 62 IN | DIASTOLIC BLOOD PRESSURE: 64 MMHG | TEMPERATURE: 96.9 F | WEIGHT: 166.4 LBS

## 2021-11-05 DIAGNOSIS — K21.9 GASTROESOPHAGEAL REFLUX DISEASE WITHOUT ESOPHAGITIS: ICD-10-CM

## 2021-11-05 DIAGNOSIS — L85.3 DRY SKIN: ICD-10-CM

## 2021-11-05 DIAGNOSIS — C91.11 CHRONIC LYMPHOID LEUKEMIA IN REMISSION (HCC): ICD-10-CM

## 2021-11-05 DIAGNOSIS — Z78.0 ASYMPTOMATIC MENOPAUSAL STATE: ICD-10-CM

## 2021-11-05 DIAGNOSIS — N95.1 HOT FLASHES, MENOPAUSAL: ICD-10-CM

## 2021-11-05 DIAGNOSIS — D64.9 ANEMIA, UNSPECIFIED TYPE: ICD-10-CM

## 2021-11-05 PROBLEM — M10.9 ACUTE GOUT INVOLVING TOE OF RIGHT FOOT: Status: RESOLVED | Noted: 2021-08-26 | Resolved: 2021-11-05

## 2021-11-05 PROCEDURE — 99214 OFFICE O/P EST MOD 30 MIN: CPT | Performed by: FAMILY MEDICINE

## 2021-11-05 RX ORDER — ESTRADIOL 0.5 MG/1
TABLET ORAL
Qty: 45 TABLET | Refills: 5 | Status: SHIPPED | OUTPATIENT
Start: 2021-11-05 | End: 2023-01-23 | Stop reason: SDUPTHER

## 2021-11-05 ASSESSMENT — FIBROSIS 4 INDEX: FIB4 SCORE: 1.93

## 2021-11-05 NOTE — PROGRESS NOTES
"Subjective:     CC:   Chief Complaint   Patient presents with   • Follow-Up     Referral Needed for Dermatologist   • Lab Results     Blood Work 8/20/21   • Loss Of Balance     Stairs         HPI:   Shanice presents today with routine f/u.  She has cut out all sugar and alcohol and limits her eating between 11a-7p and has lost about 14lb since the spring of '21.  Also walks her dog regularly.    Problem   Dry Skin    Has seen derm about this in the past and was told she has a hereditary condition, feels her skin flakes off and itches. Showers 3x/wk. Tries to avoid hot water. No help w/ otc \"greasy\" products. Coconut oil after a bath helps. But still w/ patches on her back.      Anemia    She sees Dr. Gonzalez for reflex who monitors her lab values. Has had 2 episodes of a drop in her hgb requiring a transfusion. She says her GI thinks this might be related to her hiatal hernia. Is on pepcid bid and has prn protonix uses on occasion.     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  Not on medications.  Stable, continue current plan of care  She sees Dr. Davidson (her heme/onc) yearly and monitors her cbc.  She had a wbc 21 8/26/21 and her onc said that is w/in her range. Was 16 6/21.      Gerd (Gastroesophageal Reflux Disease)    Sees GI specialist. Uses protonix prn breakthrough reflux. Also on pepcid 20mg bid.      Hiatal Hernia   Hot Flashes, Menopausal    Managed with estrace 0.25mg tab daily. Manages her hot flashes. Has been on this \"forever,\" has tried to wean off but \"it doesn't work.\" has had a breast reduction and BEN. Due for a refill on estrace next month - we have reviewed the risks and benefits, will refill now.      Acute Gout Involving Toe of Right Foot (Resolved)       Current Outpatient Medications Ordered in Epic   Medication Sig Dispense Refill   • estradiol (ESTRACE) 0.5 MG tablet TAKE 1/2 TABLET BY MOUTH EVERY DAY. 45 Tablet 5   • atenolol " "(TENORMIN) 50 MG Tab TAKE 1 TABLET BY MOUTH TWICE A  Tablet 0   • mometasone (NASONEX) 50 MCG/ACT nasal spray Administer 2 Sprays into affected nostril(S) as needed.     • famotidine (PEPCID) 20 MG Tab One by mouth once daily at bedtime 90 tablet 3   • pantoprazole (PROTONIX) 40 MG Tablet Delayed Response Take 40 mg by mouth 2 times a day.     • cetirizine (ZYRTEC) 10 MG Tab Take 10 mg by mouth at bedtime.       No current Epic-ordered facility-administered medications on file.       Past Medical History:   Diagnosis Date   • Arthritis     slight. 21: Knees \"Very little\"   • BMI 34.0-34.9,adult 2014   • Breath shortness    • Cancer (HCC)     CLL- \"Wait and watch\"   • CATARACT     surgical correction margo    • Celiac disease    • CLL (chronic lymphocytic leukemia) ([facility]) 3/3/2011    Asymptomatic being monitored/Dr Davidson, Q 6 months. Not on medications.    • Family history of adverse effect to anesthesia     Daughter/Hard time waking    • GERD (gastroesophageal reflux disease) 3/3/2011    Sees GI specialist    • Heart burn 2021    GERD   • Hiatal hernia 3/3/2011   • Hot flashes, menopausal 3/3/2011   • Hypertension 3/3/2011       Social History     Tobacco Use   • Smoking status: Former Smoker     Packs/day: 0.00     Years: 2.00     Pack years: 0.00     Types: Cigarettes     Quit date: 1969     Years since quittin.8   • Smokeless tobacco: Never Used   Vaping Use   • Vaping Use: Never used   Substance Use Topics   • Alcohol use: Yes     Alcohol/week: 0.6 - 1.2 oz     Types: 1 - 2 Glasses of wine per week     Comment: Several times a week.  21: 2-4/month   • Drug use: No       Allergies:  Grass pollen([id number] jamarcus), Other misc, and Wheat    Health Maintenance: Completed    ROS:  Per HPI      Objective:       Exam:  /64 (BP Location: Right arm, Patient Position: Sitting, BP Cuff Size: Adult)   Pulse 72   Temp 36.1 °C (96.9 °F) (Temporal)   Resp 16   Ht 1.575 m (5' 2\") "   Wt 75.5 kg (166 lb 6.4 oz)   SpO2 96%   BMI 30.43 kg/m²   Body mass index is 30.43 kg/m².  Wt Readings from Last 4 Encounters:   11/05/21 75.5 kg (166 lb 6.4 oz)   08/26/21 81.6 kg (180 lb)   06/21/21 83.9 kg (185 lb)   04/28/21 80.1 kg (176 lb 9.4 oz)       Gen: Alert and oriented, No apparent distress. Appropriately groomed.  Neck: Neck is supple without lymphadenopathy.No thyromegaly.   Lungs: Normal effort, CTA bilaterally, no wheezes, rhonchi, or rales.  CV: Regular rate and rhythm. No murmurs, rubs, or gallops.  ABD:  Soft, nontender, nondistended, NABSx4, no HSM or RBT or guarding or masses.  Ext: No clubbing, cyanosis, edema.  Skin: Dry skin    Assessment & Plan:     76 y.o. female with the following -     1. Asymptomatic menopausal state  - DS-BONE DENSITY STUDY (DEXA); Future    2. Chronic lymphoid leukemia in remission (HCC)    3. Anemia, unspecified type    4. Dry skin  - Referral to Dermatology    5. Hot flashes, menopausal  - estradiol (ESTRACE) 0.5 MG tablet; TAKE 1/2 TABLET BY MOUTH EVERY DAY.  Dispense: 45 Tablet; Refill: 5    6. Gastroesophageal reflux disease without esophagitis        No follow-ups on file.    Please note that this dictation was created using voice recognition software. I have made every reasonable attempt to correct obvious errors, but I expect that there are errors of grammar and possibly content that I did not discover before finalizing the note.

## 2021-12-07 ENCOUNTER — APPOINTMENT (OUTPATIENT)
Dept: MEDICAL GROUP | Facility: PHYSICIAN GROUP | Age: 76
End: 2021-12-07
Payer: MEDICARE

## 2021-12-07 ENCOUNTER — TELEPHONE (OUTPATIENT)
Dept: MEDICAL GROUP | Facility: PHYSICIAN GROUP | Age: 76
End: 2021-12-07

## 2021-12-07 NOTE — TELEPHONE ENCOUNTER
Called PCP this morning to request lab orders for patient appointment that is on today's schedule because non were present when reviewing nurse appointments for the day. PCP would like to like to review patient history before placing lab orders. Called patient per PCP request and cancelled today's lab draw.

## 2021-12-08 ENCOUNTER — TELEPHONE (OUTPATIENT)
Dept: MEDICAL GROUP | Facility: PHYSICIAN GROUP | Age: 76
End: 2021-12-08

## 2021-12-08 DIAGNOSIS — R79.9 ABNORMAL FINDING OF BLOOD CHEMISTRY, UNSPECIFIED: ICD-10-CM

## 2021-12-08 DIAGNOSIS — R79.89 LOW SERUM VITAMIN D: ICD-10-CM

## 2021-12-08 DIAGNOSIS — C91.10 CLL (CHRONIC LYMPHOCYTIC LEUKEMIA) (HCC): ICD-10-CM

## 2021-12-08 DIAGNOSIS — C91.11 CHRONIC LYMPHOID LEUKEMIA IN REMISSION (HCC): ICD-10-CM

## 2021-12-08 DIAGNOSIS — M10.9 ACUTE GOUT INVOLVING TOE OF RIGHT FOOT, UNSPECIFIED CAUSE: ICD-10-CM

## 2021-12-08 DIAGNOSIS — N95.1 HOT FLASHES, MENOPAUSAL: ICD-10-CM

## 2021-12-08 DIAGNOSIS — I10 ESSENTIAL HYPERTENSION: ICD-10-CM

## 2021-12-08 NOTE — TELEPHONE ENCOUNTER
----- Message from Mitchell Jordan R.N. sent at 12/7/2021  9:24 AM PST -----  Regarding: Reschedule Appt  Hi Dr. Paige,    Just a reminder to place blood draw labs for this patient and reschedule her since we had to cancel today.    Thanks, Mitchell

## 2021-12-08 NOTE — TELEPHONE ENCOUNTER
I am not sure why the patient was scheduled to come in for blood work.  She is not due for any blood work.  She could do her routine labs in April 2022 if she would like but nothing is indicated at this time.  I will order her routine blood work now to be done in April and she can see me to follow-up those results at that time.

## 2021-12-10 ENCOUNTER — HOSPITAL ENCOUNTER (OUTPATIENT)
Dept: RADIOLOGY | Facility: MEDICAL CENTER | Age: 76
End: 2021-12-10
Attending: FAMILY MEDICINE
Payer: MEDICARE

## 2021-12-10 DIAGNOSIS — Z78.0 ASYMPTOMATIC MENOPAUSAL STATE: ICD-10-CM

## 2021-12-10 DIAGNOSIS — Z12.31 ENCOUNTER FOR SCREENING MAMMOGRAM FOR MALIGNANT NEOPLASM OF BREAST: ICD-10-CM

## 2021-12-10 PROCEDURE — 77063 BREAST TOMOSYNTHESIS BI: CPT

## 2021-12-10 PROCEDURE — 77080 DXA BONE DENSITY AXIAL: CPT

## 2022-01-27 ENCOUNTER — HOSPITAL ENCOUNTER (OUTPATIENT)
Dept: LAB | Facility: MEDICAL CENTER | Age: 77
End: 2022-01-27
Attending: FAMILY MEDICINE
Payer: MEDICARE

## 2022-01-27 DIAGNOSIS — I10 ESSENTIAL HYPERTENSION: ICD-10-CM

## 2022-01-27 DIAGNOSIS — C91.10 CLL (CHRONIC LYMPHOCYTIC LEUKEMIA) (HCC): ICD-10-CM

## 2022-01-27 DIAGNOSIS — R79.9 ABNORMAL FINDING OF BLOOD CHEMISTRY, UNSPECIFIED: ICD-10-CM

## 2022-01-27 LAB
ALBUMIN SERPL BCP-MCNC: 4.7 G/DL (ref 3.2–4.9)
ALBUMIN/GLOB SERPL: 2 G/DL
ALP SERPL-CCNC: 61 U/L (ref 30–99)
ALT SERPL-CCNC: 16 U/L (ref 2–50)
ANION GAP SERPL CALC-SCNC: 12 MMOL/L (ref 7–16)
ANISOCYTOSIS BLD QL SMEAR: ABNORMAL
AST SERPL-CCNC: 20 U/L (ref 12–45)
BASOPHILS # BLD AUTO: 0 % (ref 0–1.8)
BASOPHILS # BLD: 0 K/UL (ref 0–0.12)
BILIRUB SERPL-MCNC: 0.4 MG/DL (ref 0.1–1.5)
BUN SERPL-MCNC: 17 MG/DL (ref 8–22)
CALCIUM SERPL-MCNC: 9.7 MG/DL (ref 8.5–10.5)
CHLORIDE SERPL-SCNC: 107 MMOL/L (ref 96–112)
CHOLEST SERPL-MCNC: 125 MG/DL (ref 100–199)
CO2 SERPL-SCNC: 23 MMOL/L (ref 20–33)
CREAT SERPL-MCNC: 0.84 MG/DL (ref 0.5–1.4)
EOSINOPHIL # BLD AUTO: 0.2 K/UL (ref 0–0.51)
EOSINOPHIL NFR BLD: 0.9 % (ref 0–6.9)
ERYTHROCYTE [DISTWIDTH] IN BLOOD BY AUTOMATED COUNT: 48 FL (ref 35.9–50)
EST. AVERAGE GLUCOSE BLD GHB EST-MCNC: 111 MG/DL
FASTING STATUS PATIENT QL REPORTED: NORMAL
GLOBULIN SER CALC-MCNC: 2.4 G/DL (ref 1.9–3.5)
GLUCOSE SERPL-MCNC: 89 MG/DL (ref 65–99)
HBA1C MFR BLD: 5.5 % (ref 4–5.6)
HCT VFR BLD AUTO: 41.6 % (ref 37–47)
HDLC SERPL-MCNC: 67 MG/DL
HGB BLD-MCNC: 13.3 G/DL (ref 12–16)
LDLC SERPL CALC-MCNC: 44 MG/DL
LYMPHOCYTES # BLD AUTO: 19.27 K/UL (ref 1–4.8)
LYMPHOCYTES NFR BLD: 86.8 % (ref 22–41)
MACROCYTES BLD QL SMEAR: ABNORMAL
MANUAL DIFF BLD: NORMAL
MCH RBC QN AUTO: 30.9 PG (ref 27–33)
MCHC RBC AUTO-ENTMCNC: 32 G/DL (ref 33.6–35)
MCV RBC AUTO: 96.7 FL (ref 81.4–97.8)
MONOCYTES # BLD AUTO: 0.2 K/UL (ref 0–0.85)
MONOCYTES NFR BLD AUTO: 0.9 % (ref 0–13.4)
MORPHOLOGY BLD-IMP: NORMAL
NEUTROPHILS # BLD AUTO: 2.53 K/UL (ref 2–7.15)
NEUTROPHILS NFR BLD: 11.4 % (ref 44–72)
NRBC # BLD AUTO: 0 K/UL
NRBC BLD-RTO: 0 /100 WBC
PLATELET # BLD AUTO: 179 K/UL (ref 164–446)
PLATELET BLD QL SMEAR: NORMAL
PMV BLD AUTO: 11.9 FL (ref 9–12.9)
POTASSIUM SERPL-SCNC: 4.7 MMOL/L (ref 3.6–5.5)
PROT SERPL-MCNC: 7.1 G/DL (ref 6–8.2)
RBC # BLD AUTO: 4.3 M/UL (ref 4.2–5.4)
RBC BLD AUTO: PRESENT
SODIUM SERPL-SCNC: 142 MMOL/L (ref 135–145)
TRIGL SERPL-MCNC: 71 MG/DL (ref 0–149)
TSH SERPL DL<=0.005 MIU/L-ACNC: 1.98 UIU/ML (ref 0.38–5.33)
WBC # BLD AUTO: 22.2 K/UL (ref 4.8–10.8)

## 2022-01-27 PROCEDURE — 84443 ASSAY THYROID STIM HORMONE: CPT

## 2022-01-27 PROCEDURE — 36415 COLL VENOUS BLD VENIPUNCTURE: CPT

## 2022-01-27 PROCEDURE — 80053 COMPREHEN METABOLIC PANEL: CPT

## 2022-01-27 PROCEDURE — 80061 LIPID PANEL: CPT

## 2022-01-27 PROCEDURE — 83036 HEMOGLOBIN GLYCOSYLATED A1C: CPT

## 2022-01-27 PROCEDURE — 85027 COMPLETE CBC AUTOMATED: CPT

## 2022-01-27 PROCEDURE — 85007 BL SMEAR W/DIFF WBC COUNT: CPT

## 2022-02-10 ENCOUNTER — OFFICE VISIT (OUTPATIENT)
Dept: DERMATOLOGY | Facility: IMAGING CENTER | Age: 77
End: 2022-02-10
Payer: MEDICARE

## 2022-02-10 DIAGNOSIS — B07.9 VIRAL WARTS, UNSPECIFIED TYPE: ICD-10-CM

## 2022-02-10 DIAGNOSIS — L82.1 SEBORRHEIC KERATOSIS: ICD-10-CM

## 2022-02-10 DIAGNOSIS — L29.9 ITCHING: ICD-10-CM

## 2022-02-10 DIAGNOSIS — L85.3 XEROSIS OF SKIN: ICD-10-CM

## 2022-02-10 PROCEDURE — 17110 DESTRUCTION B9 LES UP TO 14: CPT | Performed by: DERMATOLOGY

## 2022-02-10 PROCEDURE — 99203 OFFICE O/P NEW LOW 30 MIN: CPT | Mod: 25 | Performed by: DERMATOLOGY

## 2022-02-10 RX ORDER — TRIAMCINOLONE ACETONIDE 1 MG/G
CREAM TOPICAL
Qty: 454 G | Refills: 0 | Status: SHIPPED | OUTPATIENT
Start: 2022-02-10 | End: 2022-09-29

## 2022-02-10 NOTE — PROGRESS NOTES
CC:  Rash , dry flaky skin     Subjective:new patient here for itching, dryness and skin spots.     Upper back and back of both legs   Very itchy and picks at scabs   Onset: 7 years   Aggravating factors:  Unknown   Alleviating factors: none   New creams/topicals:  OTC lotions   New medications (up to last 6 months): no  New travel: no  Other exposures: no  Treatments: no     Wart on left wrist x 6 months   Itchy and irritated     History of skin cancer: No  History of biopsies:No  History of blistering/severe sunburns:No  Family history of skin cancer:Yes, Details: father type unknown   Family history of atypical moles:No    ROS: no fevers/chills. ++ itch.  No cough  Relevant PMH: NC  Social: former smoker    PE: Gen:WDWN female in NAD. Skin: diffuse xerosis noted. Scattered waxy papules back, arms, legs. Stucco papules on ankles, right post popliteal. Scattered tan macules appearing benign in sun distribution. Wrty papule on left wrist    TSH Jan 2022: WNL    A/P:   VV, left wrist:  -LN2 25 sec X 2 cycles  X1 lesion  -f/u if persists at 1 month  -home therapies reviewed    Lentigos/SKs, some itching and diffuse xerosis noted, suspect dryness related:   -TAC 0.1% cream BID PRN  -moisturizer use reviewed and handout supplied   -f/u PRN      I have reviewed medications relevant to my specialty.

## 2022-05-05 ENCOUNTER — OFFICE VISIT (OUTPATIENT)
Dept: MEDICAL GROUP | Facility: PHYSICIAN GROUP | Age: 77
End: 2022-05-05
Payer: MEDICARE

## 2022-05-05 VITALS
SYSTOLIC BLOOD PRESSURE: 112 MMHG | TEMPERATURE: 96.5 F | HEART RATE: 71 BPM | OXYGEN SATURATION: 96 % | DIASTOLIC BLOOD PRESSURE: 66 MMHG | HEIGHT: 62 IN | WEIGHT: 157.7 LBS | BODY MASS INDEX: 29.02 KG/M2 | RESPIRATION RATE: 18 BRPM

## 2022-05-05 DIAGNOSIS — K21.9 GASTROESOPHAGEAL REFLUX DISEASE WITHOUT ESOPHAGITIS: ICD-10-CM

## 2022-05-05 DIAGNOSIS — C91.11 CHRONIC LYMPHOID LEUKEMIA IN REMISSION (HCC): ICD-10-CM

## 2022-05-05 DIAGNOSIS — N95.1 HOT FLASHES, MENOPAUSAL: ICD-10-CM
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
## 2022-05-05 DIAGNOSIS — Z00.00 MEDICARE ANNUAL WELLNESS VISIT, SUBSEQUENT: Primary | ICD-10-CM

## 2022-05-05 DIAGNOSIS — M25.551 RIGHT HIP PAIN: ICD-10-CM

## 2022-05-05 PROBLEM — R60.0 LEG EDEMA, LEFT: Status: RESOLVED | Noted: 2021-04-01 | Resolved: 2022-05-05

## 2022-05-05 PROCEDURE — 99213 OFFICE O/P EST LOW 20 MIN: CPT | Performed by: FAMILY MEDICINE

## 2022-05-05 ASSESSMENT — PATIENT HEALTH QUESTIONNAIRE - PHQ9: CLINICAL INTERPRETATION OF PHQ2 SCORE: 0

## 2022-05-05 ASSESSMENT — FIBROSIS 4 INDEX: FIB4 SCORE: 2.12

## 2022-05-05 ASSESSMENT — ENCOUNTER SYMPTOMS: GENERAL WELL-BEING: GOOD

## 2022-05-05 ASSESSMENT — ACTIVITIES OF DAILY LIVING (ADL): BATHING_REQUIRES_ASSISTANCE: 0
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
## 2022-05-05 NOTE — PROGRESS NOTES
Annual Health Assessment Questions:    1.  Are you currently engaging in any exercise or physical activity? Yes    2.  How would you describe your mood or emotional well-being today? good    3.  Have you had any falls in the last year? No    4.  Have you noticed any problems with your balance or had difficulty walking? Yes    5.  In the last six months have you experienced any leakage of urine? Yes    6. DPA/Advanced Directive: Patient has Advanced Directive on file.

## 2022-06-30 DIAGNOSIS — I10 ESSENTIAL HYPERTENSION: ICD-10-CM

## 2022-06-30 RX ORDER — ATENOLOL 50 MG/1
TABLET ORAL
Qty: 180 TABLET | Refills: 0 | Status: SHIPPED | OUTPATIENT
Start: 2022-06-30 | End: 2022-10-19 | Stop reason: SDUPTHER

## 2022-07-20 ENCOUNTER — TELEPHONE (OUTPATIENT)
Dept: MEDICAL GROUP | Facility: PHYSICIAN GROUP | Age: 77
End: 2022-07-20
Payer: MEDICARE

## 2022-07-20 NOTE — PROGRESS NOTES
NEW PATIENT VISIT PRE-VISIT PLANNING    1.  [employer] Patient is checked in Patient Demographics?Yes    2.  Immunizations were updated in Epic using Reconcile Outside Information activity? Yes         3.  Is this appointment scheduled as a Hospital Follow-Up? No    4.  Patient is due for the following Health Maintenance Topics:   There are no preventive care reminders to display for this patient.      5.  Reviewed/Updated the following with patient:       •   Preferred Pharmacy? Yes       •   Preferred Lab? Yes       •   Preferred Communication? Yes       •   Allergies? Yes       •   Medications? YES. Was Abstract Encounter opened and chart updated? YES       •   Social History? No       •   Family History (document living status of immediate family members and if + hx of  cancer, diabetes, hypertension, hyperlipidemia, heart attack, stroke) No    6.  Updated Care Team?       •   DME Company (gait device, O2, CPAP, etc.) NO       •   Other Specialists (eye doctor, derm, GYN, cardiology, endo, etc): NO    7.  AHA (Puls[employer]) form printed for Provider? No, already completed    
Detail Level: Detailed
Add 29166 Cpt? (Important Note: In 2017 The Use Of 05369 Is Being Tracked By Cms To Determine Future Global Period Reimbursement For Global Periods): yes

## 2022-07-22 ENCOUNTER — OFFICE VISIT (OUTPATIENT)
Dept: MEDICAL GROUP | Facility: PHYSICIAN GROUP | Age: 77
End: 2022-07-22
Payer: MEDICARE

## 2022-07-22 VITALS
HEART RATE: 63 BPM | WEIGHT: 155 LBS | SYSTOLIC BLOOD PRESSURE: 114 MMHG | TEMPERATURE: 97.8 F | HEIGHT: 62 IN | DIASTOLIC BLOOD PRESSURE: 76 MMHG | RESPIRATION RATE: 16 BRPM | BODY MASS INDEX: 28.52 KG/M2 | OXYGEN SATURATION: 97 %

## 2022-07-22 DIAGNOSIS — M25.551 RIGHT HIP PAIN: ICD-10-CM

## 2022-07-22 DIAGNOSIS — N95.1 HOT FLASHES, MENOPAUSAL: ICD-10-CM

## 2022-07-22 DIAGNOSIS — H33.21 DETACHED RETINA, RIGHT: ICD-10-CM

## 2022-07-22 DIAGNOSIS — Z12.31 ENCOUNTER FOR SCREENING MAMMOGRAM FOR MALIGNANT NEOPLASM OF BREAST: ICD-10-CM

## 2022-07-22 DIAGNOSIS — C91.11 CHRONIC LYMPHOID LEUKEMIA IN REMISSION (HCC): ICD-10-CM

## 2022-07-22 DIAGNOSIS — Z00.00 HEALTHCARE MAINTENANCE: ICD-10-CM

## 2022-07-22 DIAGNOSIS — K21.9 GASTROESOPHAGEAL REFLUX DISEASE WITHOUT ESOPHAGITIS: ICD-10-CM

## 2022-07-22 DIAGNOSIS — I10 PRIMARY HYPERTENSION: ICD-10-CM

## 2022-07-22 PROCEDURE — 99214 OFFICE O/P EST MOD 30 MIN: CPT | Performed by: FAMILY MEDICINE

## 2022-07-22 RX ORDER — PANTOPRAZOLE SODIUM 40 MG/1
40 TABLET, DELAYED RELEASE ORAL 2 TIMES DAILY
Qty: 100 TABLET | Refills: 2 | Status: SHIPPED | OUTPATIENT
Start: 2022-07-22 | End: 2023-01-04 | Stop reason: SDUPTHER

## 2022-07-22 ASSESSMENT — FIBROSIS 4 INDEX: FIB4 SCORE: 2.12

## 2022-07-22 NOTE — ASSESSMENT & PLAN NOTE
Chronic, asymptomatic and being monitored by Dr. Davidson every 6 months. Chronically  elevated white blood cell count and lymphocytes.  Stable we will continue to monitor.

## 2022-07-22 NOTE — ASSESSMENT & PLAN NOTE
Improved after having regular PT, hip pain is still occ present but much improved. If she does not do PT exercises after a few days the pain flares up intermittently.

## 2022-07-22 NOTE — ASSESSMENT & PLAN NOTE
Chronic. Is wanting to trial being off of BP medication. Was diagnosed with HTN when she was still working a stressful job, is also lost over 30 pounds since then.  Discussed she can cut out 1 dose of the atenolol and start taking 50 mg once daily instead of twice daily and monitor her home blood pressures, log given today in clinic and if her blood pressure remains less than 130/80 she can cut out the second dose of atenolol after a week and continue to monitor her blood pressures however discussed if her blood pressure goes over 140/90 or she experiences any symptoms such as palpitations, headaches, dizziness then she needs to go back on atenolol 50 mg once daily.  We will continue to monitor and send her readings via [employer].

## 2022-07-22 NOTE — ASSESSMENT & PLAN NOTE
"Managed with estrace 0.25mg tab daily. Manages her hot flashes. Has been on this \"forever,\" has tried to wean off but \"it doesn't work.\" has had a breast reduction and BEN, continues to get annual mammograms.   "

## 2022-07-22 NOTE — PROGRESS NOTES
"Subjective:     CC:   Chief Complaint   Patient presents with   • Establish Care     New Pt    • Weight Loss     Wants to start        HISTORY OF THE PRESENT ILLNESS: Patient is a 76 y.o. female. This pleasant patient is here today to establish care and discuss weight loss. Her prior PCP was Dr. Paige  She would like to discuss weight loss, she is needing help jump starting this again. In November she lost 30-35 lbs by changing her eating habits and is comfortable but would like to lose 10-15 lbs. Eating habits she was eating a very low carb diet, she also eats gluten free as well due to wheat allergy. Eating lots of vegetables and somewhat fasting, no eating after 7 pm-10 am. Has been doing some physical activity, but not as much as she should. She did do PT for her hip which was helpful and walks her dog.   Hypertension  Chronic. Is wanting to trial being off of BP medication. Was diagnosed with HTN when she was still working a stressful job, is also lost over 30 pounds since then.  Discussed she can cut out 1 dose of the atenolol and start taking 50 mg once daily instead of twice daily and monitor her home blood pressures, log given today in clinic and if her blood pressure remains less than 130/80 she can cut out the second dose of atenolol after a week and continue to monitor her blood pressures however discussed if her blood pressure goes over 140/90 or she experiences any symptoms such as palpitations, headaches, dizziness then she needs to go back on atenolol 50 mg once daily.  We will continue to monitor and send her readings via [employer].    Hot flashes, menopausal  Managed with estrace 0.25mg tab daily. Manages her hot flashes. Has been on this \"forever,\" has tried to wean off but \"it doesn't work.\" has had a breast reduction and BEN, continues to get annual mammograms.     Right hip pain  Improved after having regular PT, hip pain is still occ present but much improved. If she does not do PT " "exercises after a few days the pain flares up intermittently.     Detached retina, right  Problem in 2018. Currently seeing Dr. Yan annually    GERD (gastroesophageal reflux disease)  Sees GI specialist. Uses protonix prn breakthrough reflux. 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 Davidson every 6 months. Chronically  elevated white blood cell count and lymphocytes.  Stable we will continue to monitor.      Current Outpatient Medications Ordered in Epic   Medication Sig Dispense Refill   • pantoprazole (PROTONIX) 40 MG Tablet Delayed Response Take 1 Tablet by mouth 2 times a day. 100 Tablet 2   • atenolol (TENORMIN) 50 MG Tab TAKE ONE TABLET BY MOUTH TWICE A  Tablet 0   • triamcinolone acetonide (KENALOG) 0.1 % Cream AAA body BID PRN itching. No face, axilla, groin use. 454 g 0   • estradiol (ESTRACE) 0.5 MG tablet TAKE 1/2 TABLET BY MOUTH EVERY DAY. 45 Tablet 5   • mometasone (NASONEX) 50 MCG/ACT nasal spray Administer 2 Sprays into affected nostril(S) as needed.     • famotidine (PEPCID) 20 MG Tab One by mouth once daily at bedtime 90 tablet 3     No current Epic-ordered facility-administered medications on file.       Health Maintenance: Completed      Objective:       Exam: /76 (BP Location: Left arm, Patient Position: Sitting, BP Cuff Size: Adult)   Pulse 63   Temp 36.6 °C (97.8 °F) (Temporal)   Resp 16   Ht 1.575 m (5' 2\")   Wt 70.3 kg (155 lb)   SpO2 97%  Body mass index is 28.35 kg/m².    Physical Exam  Vitals reviewed.   Constitutional:       General: She is not in acute distress.     Appearance: Normal appearance.   HENT:      Right Ear: Tympanic membrane, ear canal and external ear normal.      Left Ear: Tympanic membrane, ear canal and external ear normal.      Mouth/Throat:      Mouth: Mucous membranes are moist.      Pharynx: Oropharynx is clear.   Eyes:      Conjunctiva/sclera: Conjunctivae normal.    "   Pupils: Pupils are equal, round, and reactive to light.   Cardiovascular:      Rate and Rhythm: Normal rate and regular rhythm.      Pulses: Normal pulses.      Heart sounds: Normal heart sounds. No murmur heard.  Pulmonary:      Effort: Pulmonary effort is normal. No respiratory distress.      Breath sounds: Normal breath sounds. No stridor. No wheezing, rhonchi or rales.   Chest:      Chest wall: No tenderness.   Abdominal:      General: Abdomen is flat. Bowel sounds are normal.   Musculoskeletal:      Right lower leg: No edema.      Left lower leg: No edema.   Skin:     General: Skin is warm and dry.   Neurological:      Mental Status: She is alert and oriented to person, place, and time.   Psychiatric:         Mood and Affect: Mood normal.         Behavior: Behavior normal.          A chaperone was offered to the patient during today's exam. Patient declined chaperone.        Assessment & Plan:   76 y.o. female with the following -    1. Primary hypertension  Chronic, stable.  Discussed titrating her dose down as she is wanting to get off her antihypertensives.  We will go to 50 mg daily x1 week and then stop her medication as long as her blood pressure remains less than 130/80.  Send [employer] message with her readings over the next few weeks.  2. Hot flashes, menopausal  Chronic, Stable on current regimen 3. Right hip pain    4. Detached retina, right  History of and seeing eye doctor annually.  5. Gastroesophageal reflux disease without esophagitis  Chronic, stable  6. Healthcare maintenance  - CBC WITH DIFFERENTIAL; Future  - Comp Metabolic Panel; Future  - Lipid Profile; Future  - TSH WITH REFLEX TO FT4; Future  - VITAMIN D,25 HYDROXY; Future    7. Encounter for screening mammogram for malignant neoplasm of breast  - MA-SCREENING MAMMO BILAT W/TOMOSYNTHESIS W/CAD; Future    8. 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  Dispense: 100 Tablet; Refill: 2       I spent a total of 39 minutes with record review, exam, communication with the patient, communication with other providers, and documentation of this encounter.    Return in about 6 months (around 1/22/2023) for F/U labs, AWV.    Please note that this dictation was created using voice recognition software. I have made every reasonable attempt to correct obvious errors, but I expect that there are errors of grammar and possibly content that I did not discover before finalizing the note.

## 2022-09-01 ENCOUNTER — TELEPHONE (OUTPATIENT)
Dept: HEALTH INFORMATION MANAGEMENT | Facility: OTHER | Age: 77
End: 2022-09-01

## 2022-09-01 NOTE — TELEPHONE ENCOUNTER
1. Caller Name:Shanice Vaz Chino                          Call Back Number: [phone] (home) [phone] (work)        How would the patient prefer to be contacted with a response: Phone call do NOT leave a detailed message      Pt called asking if she can drop off DMV paperwork to be filled out or does pt need to make appointment.  Pt stated she just saw PCP in July/

## 2022-09-29 RX ORDER — TRIAMCINOLONE ACETONIDE 1 MG/G
CREAM TOPICAL
Qty: 454 G | Refills: 0 | Status: SHIPPED | OUTPATIENT
Start: 2022-09-29 | End: 2023-04-13

## 2022-10-10 ENCOUNTER — APPOINTMENT (OUTPATIENT)
Dept: RADIOLOGY | Facility: IMAGING CENTER | Age: 77
End: 2022-10-10
Attending: PHYSICIAN ASSISTANT
Payer: MEDICARE

## 2022-10-10 ENCOUNTER — OFFICE VISIT (OUTPATIENT)
Dept: URGENT CARE | Facility: CLINIC | Age: 77
End: 2022-10-10
Payer: MEDICARE

## 2022-10-10 VITALS
OXYGEN SATURATION: 98 % | HEART RATE: 63 BPM | HEIGHT: 61 IN | RESPIRATION RATE: 18 BRPM | WEIGHT: 150 LBS | BODY MASS INDEX: 28.32 KG/M2 | TEMPERATURE: 98 F | DIASTOLIC BLOOD PRESSURE: 88 MMHG | SYSTOLIC BLOOD PRESSURE: 132 MMHG

## 2022-10-10 DIAGNOSIS — M79.671 ACUTE FOOT PAIN, RIGHT: ICD-10-CM

## 2022-10-10 DIAGNOSIS — M25.571 ACUTE RIGHT ANKLE PAIN: ICD-10-CM

## 2022-10-10 PROCEDURE — 99213 OFFICE O/P EST LOW 20 MIN: CPT | Performed by: PHYSICIAN ASSISTANT

## 2022-10-10 PROCEDURE — 73630 X-RAY EXAM OF FOOT: CPT | Mod: TC,FY,RT | Performed by: RADIOLOGY

## 2022-10-10 PROCEDURE — 73610 X-RAY EXAM OF ANKLE: CPT | Mod: TC,FY,RT | Performed by: RADIOLOGY

## 2022-10-10 ASSESSMENT — FIBROSIS 4 INDEX: FIB4 SCORE: 2.12

## 2022-10-10 NOTE — PROGRESS NOTES
Subjective:   Shanice Magana is a 76 y.o. female who presents for Foot Swelling (X3 days, right foot swollen, bruised and painful from a bad fall )  Patient presents with chief complaint of right ankle and foot pain.  She reports she rolled her ankle on Saturday.  She states she heard a crunching and popping.  Since then the ankle has been swollen.  She has had difficulty weightbearing.  She reports using ice frequently.  No history of prior fracture.  No history of osteoporosis.  Denies numbness or tingling.      Medications:  atenolol Tabs  estradiol  famotidine Tabs  mometasone  pantoprazole Tbec  triamcinolone acetonide Crea    Allergies:             Grass pollen([id number] jamarcus), Other misc, and Wheat    Surgical History:         Past Surgical History:   Procedure Laterality Date    MT UPPER GI ENDOSCOPY,DIAGNOSIS N/A 4/28/2021    Procedure: GASTROSCOPY;  Surgeon: Mg Evans M.D.;  Location: SURGERY SAME DAY [facility];  Service: Gastroenterology    MT UPPER GI ENDOSCOPY,BIOPSY N/A 4/28/2021    Procedure: GASTROSCOPY, WITH BIOPSY;  Surgeon: Mg Evans M.D.;  Location: SURGERY SAME DAY [facility];  Service: Gastroenterology    RECTUS REPAIR Right 11/15/2018    Procedure: RECTUS REPAIR- SUPERIOR RECTUS RECESSION, ADJUSTABLE SUTURE INFERIOR RECTUS PLICATION/RESECTION;  Surgeon: Jane Larsen M.D.;  Location: SURGERY SAME DAY [facility];  Service: Ophthalmology    VITRECTOMY POSTERIOR Right 1/17/2018    Procedure: VITRECTOMY POSTERIOR W/AIR FLUID EXCHANGE, ENDO LASER, 23 GAUGE SF6 GAS, CRYOTHERAPY;  Surgeon: Sea Franklin M.D.;  Location: SURGERY SAME DAY [facility];  Service: Ophthalmology    MAMMOPLASTY REDUCTION Bilateral 11/3/2015    Procedure: MAMMOPLASTY REDUCTION;  Surgeon: Talia Barton M.D.;  Location: SURGERY [facility];  Service:     KNEE ARTHROSCOPY  9/17/2014    Performed by Dany Owens M.D. at [facility]    MENISCECTOMY  9/17/2014     "Performed by Dany Owens M.D. at SURGERY [facility]    SHOULDER ARTHROSCOPY W/ ROTATOR CUFF REPAIR  8/22/2013    Performed by Dany Owens M.D. at SURGERY [facility]    SHOULDER DECOMPRESSION ARTHROSCOPIC  8/22/2013    Performed by Dany Owens M.D. at SURGERY [facility]    CLAVICLE DISTAL EXCISION  8/22/2013    Performed by Dany Owens M.D. at SURGERY [facility]    OTHER  7/2013    laser procedure right eye    CATARACT EXTRACTION WITH IOL  5/29/13    right eye    ABDOMINAL HYSTERECTOMY TOTAL  1975    CHOLECYSTECTOMY  1973    open    TONSILLECTOMY  1950    BLADDER SUSPENSION  1973, 1978    x2       Past Social Hx:  Shanice Magana  reports that she quit smoking about 53 years ago. Her smoking use included cigarettes. She has never used smokeless tobacco. She reports current alcohol use of about 0.6 - 1.2 oz per week. She reports that she does not use drugs.     Past Family Hx:   Shanice Magana family history includes Arthritis in her father; Cancer in her father; Diabetes in her sister; Heart Disease in her father and mother; Hyperlipidemia in her father; Hypertension in her mother; Other in her daughter, father, mother, sister, and son.       Problem list, medications, and allergies reviewed by myself today in Epic.     Objective:     /88   Pulse 63   Temp 36.7 °C (98 °F) (Temporal)   Resp 18   Ht 1.549 m (5' 1\")   Wt 68 kg (150 lb)   SpO2 98%   BMI 28.34 kg/m²     Physical Exam  Musculoskeletal:        Feet:    Feet:      Comments: There is focal tenderness to the right distal lateral malleolus, fifth metatarsal base, and the ATFL, bones of the forefoot.  There is diffuse soft tissue swelling.  There is mild ecchymosis.  There is tenderness of the region, inversion, supination.  Drawer is negative.  Distal pulses are intact.  Sensation is intact.  Good capillary refill is noted.      RADIOLOGY RESULTS   DX-ANKLE 3+ VIEWS RIGHT    Result Date: " 10/10/2022  10/10/2022 12:32 PM HISTORY/REASON FOR EXAM:  Pain/Deformity Following Trauma. Lateral RIGHT foot and ankle pain, inversion injury 3 days ago. TECHNIQUE/EXAM DESCRIPTION AND NUMBER OF VIEWS:  3 views of the RIGHT ankle. COMPARISON: None. FINDINGS: Lateral soft tissue swelling at the ankle. Mortise is congruent. No fracture or dislocation.     1.  No fracture or dislocation of RIGHT ankle. 2.  Lateral soft tissue swelling.    DX-FOOT-COMPLETE 3+ RIGHT    Result Date: 10/10/2022  10/10/2022 12:32 PM HISTORY/REASON FOR EXAM:  Pain/Deformity Following Trauma Inversion injury 3 days ago, RIGHT foot and ankle pain. TECHNIQUE/EXAM DESCRIPTION AND NUMBER OF VIEWS: 3 views of the RIGHT foot. COMPARISON:  8/26/2021 FINDINGS: Lateral soft tissue swelling at the ankle. No fracture or dislocation Degenerative changes again seen involving 1st metatarsophalangeal joint. Mild degenerative change of the midfoot.     1.  No fracture or dislocation of RIGHT foot. 2.  Lateral soft tissue swelling at the ankle.         *X-rays were reviewed and interpreted independently by me. I agree with the radiologist's findings     Assessment/Plan:     Diagnosis and Associated Orders:     1. Acute right ankle pain  - DX-ANKLE 3+ VIEWS RIGHT; Future    2. Acute foot pain, right  - DX-FOOT-COMPLETE 3+ RIGHT; Future      Comments/MDM:    No evidence of fracture.  Low suspicion of syndesmotic injury.  No sign of neurovascular compromise.  Patient was fitted with a tall walking boot.   Instructed on the principle of functional rehabilitation and to transition from nonweightbearing to partial weightbearing to discontinue use of the crutches.  Recommend liberal elevation of the extremity, ice, anti-inflammatories.  Recommend follow-up with sports medicine  in 7 to 10 days, referral provided, for additional imaging if symptoms have not improved.    I personally reviewed prior external notes and test results pertinent to today's visit.  Red  flags discussed as well as indications to present to the Emergency Department.  Supportive care, natural history, differential diagnoses, and indications for immediate follow-up discussed.  Patient expresses understanding and agrees to plan.  Patient denies any other questions or concerns.    Follow-up with the primary care physician for recheck, reevaluation, and consideration of further management.      Please note that this dictation was created using voice recognition software. I have made a reasonable attempt to correct obvious errors, but I expect that there are errors of grammar and possibly content that I did not discover before finalizing the note.    This note was electronically signed by Robyn Hernandez PA-C

## 2022-10-19 ENCOUNTER — TELEPHONE (OUTPATIENT)
Dept: HEALTH INFORMATION MANAGEMENT | Facility: OTHER | Age: 77
End: 2022-10-19
Payer: MEDICARE

## 2022-10-19 DIAGNOSIS — I10 ESSENTIAL HYPERTENSION: ICD-10-CM

## 2022-10-19 RX ORDER — ATENOLOL 50 MG/1
TABLET ORAL
Qty: 200 TABLET | Refills: 3 | Status: SHIPPED | OUTPATIENT
Start: 2022-10-19 | End: 2023-12-11 | Stop reason: SDUPTHER

## 2023-01-04 ENCOUNTER — OFFICE VISIT (OUTPATIENT)
Dept: MEDICAL GROUP | Facility: PHYSICIAN GROUP | Age: 78
End: 2023-01-04
Payer: MEDICARE

## 2023-01-04 VITALS
HEART RATE: 63 BPM | TEMPERATURE: 96.5 F | BODY MASS INDEX: 28.25 KG/M2 | DIASTOLIC BLOOD PRESSURE: 62 MMHG | HEIGHT: 61 IN | SYSTOLIC BLOOD PRESSURE: 118 MMHG | RESPIRATION RATE: 12 BRPM | WEIGHT: 149.6 LBS | OXYGEN SATURATION: 98 %

## 2023-01-04 DIAGNOSIS — K21.9 GASTROESOPHAGEAL REFLUX DISEASE WITHOUT ESOPHAGITIS: ICD-10-CM

## 2023-01-04 DIAGNOSIS — Z98.890 HISTORY OF DETACHED RETINA REPAIR: ICD-10-CM

## 2023-01-04 DIAGNOSIS — Z13.220 ENCOUNTER FOR LIPID SCREENING FOR CARDIOVASCULAR DISEASE: ICD-10-CM

## 2023-01-04 DIAGNOSIS — I10 PRIMARY HYPERTENSION: ICD-10-CM

## 2023-01-04 DIAGNOSIS — F43.0 ACUTE REACTION TO SITUATIONAL STRESS: ICD-10-CM

## 2023-01-04 DIAGNOSIS — N95.1 HOT FLASHES, MENOPAUSAL: ICD-10-CM

## 2023-01-04 DIAGNOSIS — Z86.69 HISTORY OF DETACHED RETINA REPAIR: ICD-10-CM

## 2023-01-04 DIAGNOSIS — C91.11 CHRONIC LYMPHOID LEUKEMIA IN REMISSION (HCC): ICD-10-CM

## 2023-01-04 DIAGNOSIS — Z12.31 ENCOUNTER FOR SCREENING MAMMOGRAM FOR BREAST CANCER: ICD-10-CM

## 2023-01-04 DIAGNOSIS — N39.41 URGE INCONTINENCE: ICD-10-CM

## 2023-01-04 DIAGNOSIS — Z13.6 ENCOUNTER FOR LIPID SCREENING FOR CARDIOVASCULAR DISEASE: ICD-10-CM

## 2023-01-04 DIAGNOSIS — D50.9 IRON DEFICIENCY ANEMIA, UNSPECIFIED IRON DEFICIENCY ANEMIA TYPE: ICD-10-CM

## 2023-01-04 PROCEDURE — 99215 OFFICE O/P EST HI 40 MIN: CPT | Performed by: INTERNAL MEDICINE

## 2023-01-04 RX ORDER — PANTOPRAZOLE SODIUM 40 MG/1
40 TABLET, DELAYED RELEASE ORAL 2 TIMES DAILY
Qty: 200 TABLET | Refills: 3 | Status: SHIPPED | OUTPATIENT
Start: 2023-01-04 | End: 2024-02-12

## 2023-01-04 RX ORDER — FAMOTIDINE 20 MG/1
20 TABLET, FILM COATED ORAL PRN
Qty: 90 TABLET | Refills: 3 | COMMUNITY
Start: 2023-01-04

## 2023-01-04 ASSESSMENT — PATIENT HEALTH QUESTIONNAIRE - PHQ9
5. POOR APPETITE OR OVEREATING: 0 - NOT AT ALL
SUM OF ALL RESPONSES TO PHQ QUESTIONS 1-9: 8
CLINICAL INTERPRETATION OF PHQ2 SCORE: 6

## 2023-01-04 ASSESSMENT — FIBROSIS 4 INDEX: FIB4 SCORE: 2.15

## 2023-01-04 NOTE — ASSESSMENT & PLAN NOTE
New and decompensated problem, send in 30 day sample of mirabegron 25 mg daily to help with urge incontinence. Follow up next month and can discuss urogynecology referral at that time.

## 2023-01-04 NOTE — ASSESSMENT & PLAN NOTE
Chronic and ongoing problem, due for recheck, she will go get CBC. Has follow up planned for oncology in the next month.

## 2023-01-04 NOTE — ASSESSMENT & PLAN NOTE
Chronic and ongoing problem, overdue for follow up, check CBC and iron levels. EGD in 2021 with GI showed 8 cm hiatal hernia with erosions. Continue PPI and as needed pepcid in the meantime.

## 2023-01-04 NOTE — PROGRESS NOTES
Subjective:     CC:  Establish care    HISTORY OF THE PRESENT ILLNESS: Shanice Magana is a 77 y.o. female here today to establish primary medical care and discuss the below stated chronic medical conditions. Shanice is unaccompanied for today's visit.    Problem   Urge Incontinence    She reports for the past year she has worsening of urge incontinence. She has history of vaginal prolapse with failed trials of pessaries. Has not done pelvic floor PT or topical estrogen yet, she is on low dose systemic estrogen. Has been offered operative repair in the past but had declined at that time. Would be interested in trialing a medicine.    Current regimen: mirabegron 25 mg daily     Acute Reaction to Situational Stress    Depression Screening (1/2023)    Little interest or pleasure in doing things?  3 - nearly every day   Feeling down, depressed , or hopeless? 3 - nearly every day   Trouble falling or staying asleep, or sleeping too much?  0 - not at all   Feeling tired or having little energy?  0 - not at all   Poor appetite or overeating?  0 - not at all   Feeling bad about yourself - or that you are a failure or have let yourself or your family down? 0 - not at all   Trouble concentrating on things, such as reading the newspaper or watching television? 1 - several days   Moving or speaking so slowly that other people could have noticed.  Or the opposite - being so fidgety or restless that you have been moving around a lot more than usual?  0 - not at all   Thoughts that you would be better off dead, or of hurting yourself?  1 - several days   Patient Health Questionnaire Score: 8     She reports severe stress and anxiety starting in mid Dec related to her housing situation. She needs to find affordable housing and is trying to complete the appropriate paperwork. This has caused her immense stress and she is worried she will overburden her daughter. Needs to be out of her current apartment by Jan 20th. Denies SI or  "active plan. Declines behavioral health referral or medications at this time.     Anemia    She sees Dr. Gonzalez for GERD and hiatal hernia who monitors her lab values. Has had 2 episodes of a drop in her hgb requiring a transfusion. She says her GI thinks this might be related to her hiatal hernia. Is on protonix bid and has prn pepcid she uses on occasion.     History of Detached Retina Repair    Dr. Franklin, follows up annually, no recurrence.     Chronic Lymphoid Leukemia in Remission (Hcc)    Asymptomatic being monitored/Dr Davidson, Q 6 months lab tests.  Not on medications.  Stable, continue current plan of care with current medications.     She had a wbc 21 8/26/21 and her onc said that is w/in her range. Was 16 6/21. 2/22 22.2     Hypertension    Noted in her 60s, has used monotherapy with atenolol. She has lost weight and sustained through diet and hopes she can get off this medicine.    Current regimen: atenolol 50 mg BID     Hot Flashes, Menopausal    Managed with estrace 0.25mg tab daily. Manages her hot flashes. Has been on this \"forever,\" has tried to wean off but \"it doesn't work.\" has had a breast reduction and BEN. - we have reviewed the risks and benefits. Previously saw gynecology, not since 2015.             Current Medications:  Current Outpatient Medications Ordered in Epic   Medication Sig Dispense Refill    famotidine (PEPCID) 20 MG Tab Take 1 Tablet by mouth as needed (breakthrough heartburn). One by mouth once daily at bedtime 90 Tablet 3    Mirabegron ER 25 MG TABLET SR 24 HR Take 1 Tablet by mouth every day. 30 Tablet 0    pantoprazole (PROTONIX) 40 MG Tablet Delayed Response Take 1 Tablet by mouth 2 times a day. 200 Tablet 3    atenolol (TENORMIN) 50 MG Tab TAKE ONE TABLET BY MOUTH TWICE A  Tablet 3    triamcinolone acetonide (KENALOG) 0.1 % Cream APPLY TOPICALLY TO AFFECTED AREAS OF BODY TWO TIMES A DAY AS NEEDED FOR ITCHING, DO NOT APPLY TO FACE, AXILLA, OR GROIN 454 g 0    " "estradiol (ESTRACE) 0.5 MG tablet TAKE 1/2 TABLET BY MOUTH EVERY DAY. 45 Tablet 5    mometasone (NASONEX) 50 MCG/ACT nasal spray Administer 2 Sprays into affected nostril(S) as needed.       No current Epic-ordered facility-administered medications on file.       PMH, PSH, Social History, Medications, Allergies, FMH updated and reviewed as documented:    Objective:   Physical Exam:    Vitals: /62 (BP Location: Left arm, Patient Position: Sitting, BP Cuff Size: Adult)   Pulse 63   Temp 35.8 °C (96.5 °F) (Temporal)   Resp 12   Ht 1.549 m (5' 1\")   Wt 67.9 kg (149 lb 9.6 oz)   SpO2 98%    BMI: Body mass index is 28.27 kg/m².  Physical Exam  Constitutional:       Appearance: She is not ill-appearing or toxic-appearing.      Comments: Tearful, stressed   HENT:      Right Ear: There is no impacted cerumen.      Left Ear: There is no impacted cerumen.      Mouth/Throat:      Comments: Normal phonation  Eyes:      Comments: Injected conjunctiva b/l   Cardiovascular:      Rate and Rhythm: Normal rate and regular rhythm.      Pulses: Normal pulses.   Pulmonary:      Effort: Pulmonary effort is normal. No respiratory distress.      Breath sounds: No wheezing, rhonchi or rales.   Abdominal:      General: Bowel sounds are normal. There is no distension.      Palpations: Abdomen is soft.      Tenderness: There is no abdominal tenderness.   Musculoskeletal:      Right lower leg: No edema.      Left lower leg: No edema.   Skin:     General: Skin is warm and dry.      Findings: No rash.   Neurological:      Gait: Gait normal.   Psychiatric:         Behavior: Behavior normal.         Thought Content: Thought content normal.         Judgment: Judgment normal.      Comments: tearful        Assessment & Plan:   Shanice is a 77 y.o. 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 Has " follow up planned for oncology in the next month.         Hypertension     Chronic and ongoing issue, she is going through a very stressful time right now, will arrange for follow up in 6 weeks and we can talk about weaning down off the atenolol. She will update her labs in the meantime.         Relevant Orders    Comp Metabolic Panel    VITAMIN D,25 HYDROXY (DEFICIENCY)    VITAMIN B12    TSH WITH REFLEX TO FT4    GERD (gastroesophageal reflux disease)    Relevant Medications    famotidine (PEPCID) 20 MG Tab    pantoprazole (PROTONIX) 40 MG Tablet Delayed Response    Hot flashes, menopausal     Chronic and ongoing issue, has not really noticed a problem as she has missed a few doses in the past week. Discussed many postmenopausal symptoms can also be symptoms of CLL. Needs to stay UTD on mammograms. Continues estradiol 0.5 mg daily at this time, would favor reducing to every other day and having her follow up with gynecology. Will arrange recheck in 6 weeks to discuss.          History of detached retina repair     Previous problem, had associated scar tissue and follow up with Dr. Zambrano for treatment. Continue annual eye examinations.         Anemia     Chronic and ongoing problem, overdue for follow up, check CBC and iron levels. EGD in 2021 with GI showed 8 cm hiatal hernia with erosions. Continue PPI and as needed pepcid in the meantime.         Relevant Orders    IRON/TOTAL IRON BIND    FERRITIN    Urge incontinence     New and decompensated problem, send in 30 day sample of mirabegron 25 mg daily to help with urge incontinence. Follow up next month and can discuss urogynecology referral at that time.          Relevant Medications    Mirabegron ER 25 MG TABLET SR 24 HR    Acute reaction to situational stress     New and decompensated problem, provided active listening, offered social work referral to help with resources. She contracted for safety and denies SI/plan. Declines behavioral health referral at this  time. Recheck in 6 weeks, advised her to keep me updated over mychart sooner if needed and go to ER if she develops SI.          Other Visit Diagnoses       Encounter for lipid screening for cardiovascular disease        Relevant Orders    Lipid Profile    Encounter for screening mammogram for breast cancer        Relevant Orders    MA-SCREENING MAMMO BILAT W/TOMOSYNTHESIS W/CAD              RTC: Return in about 6 weeks (around 2/15/2023).    I spent a total of 45 minutes with record review, exam, communication with the patient, communication with other providers, and documentation of this encounter.    PLEASE NOTE: This dictation was created using voice recognition software. I have made every reasonable attempt to correct obvious errors, but I expect that there are errors of grammar and possibly content that I did not discover before finalizing the note.      Salina Morgan, DO  Geriatric and Internal Medicine  [facility]

## 2023-01-04 NOTE — ASSESSMENT & PLAN NOTE
Previous problem, had associated scar tissue and follow up with Dr. Zambrano for treatment. Continue annual eye examinations.

## 2023-01-04 NOTE — ASSESSMENT & PLAN NOTE
New and decompensated problem, provided active listening, offered social work referral to help with resources. She contracted for safety and denies SI/plan. Declines behavioral health referral at this time. Recheck in 6 weeks, advised her to keep me updated over mychart sooner if needed and go to ER if she develops SI.

## 2023-01-04 NOTE — ASSESSMENT & PLAN NOTE
Chronic and ongoing issue, she is going through a very stressful time right now, will arrange for follow up in 6 weeks and we can talk about weaning down off the atenolol. She will update her labs in the meantime.

## 2023-01-04 NOTE — ASSESSMENT & PLAN NOTE
Chronic and ongoing issue, has not really noticed a problem as she has missed a few doses in the past week. Discussed many postmenopausal symptoms can also be symptoms of CLL. Needs to stay UTD on mammograms. Continues estradiol 0.5 mg daily at this time, would favor reducing to every other day and having her follow up with gynecology. Will arrange recheck in 6 weeks to discuss.

## 2023-01-12 ENCOUNTER — APPOINTMENT (OUTPATIENT)
Dept: RADIOLOGY | Facility: MEDICAL CENTER | Age: 78
End: 2023-01-12
Attending: INTERNAL MEDICINE
Payer: MEDICARE

## 2023-01-23 DIAGNOSIS — N95.1 HOT FLASHES, MENOPAUSAL: ICD-10-CM

## 2023-01-23 RX ORDER — ESTRADIOL 0.5 MG/1
TABLET ORAL
Qty: 50 TABLET | Refills: 3 | Status: SHIPPED | OUTPATIENT
Start: 2023-01-23 | End: 2023-02-22 | Stop reason: SDUPTHER

## 2023-01-24 NOTE — TELEPHONE ENCOUNTER
Received request via: Pharmacy    Was the patient seen in the last year in this department? Yes    Does the patient have an active prescription (recently filled or refills available) for medication(s) requested? No    Does the patient have [facility] and need 100 day supply (blood pressure, diabetes and cholesterol meds only)? Yes, quantity updated to 100 days

## 2023-02-06 ENCOUNTER — DOCUMENTATION (OUTPATIENT)
Dept: HEALTH INFORMATION MANAGEMENT | Facility: OTHER | Age: 78
End: 2023-02-06
Payer: MEDICARE

## 2023-02-06 ENCOUNTER — TELEPHONE (OUTPATIENT)
Dept: HEALTH INFORMATION MANAGEMENT | Facility: OTHER | Age: 78
End: 2023-02-06
Payer: MEDICARE

## 2023-02-06 ENCOUNTER — PATIENT MESSAGE (OUTPATIENT)
Dept: HEALTH INFORMATION MANAGEMENT | Facility: OTHER | Age: 78
End: 2023-02-06

## 2023-02-13 ENCOUNTER — HOSPITAL ENCOUNTER (OUTPATIENT)
Dept: LAB | Facility: MEDICAL CENTER | Age: 78
End: 2023-02-13
Attending: INTERNAL MEDICINE
Payer: MEDICARE

## 2023-02-13 DIAGNOSIS — D50.9 IRON DEFICIENCY ANEMIA, UNSPECIFIED IRON DEFICIENCY ANEMIA TYPE: ICD-10-CM

## 2023-02-13 DIAGNOSIS — Z13.6 ENCOUNTER FOR LIPID SCREENING FOR CARDIOVASCULAR DISEASE: ICD-10-CM

## 2023-02-13 DIAGNOSIS — Z13.220 ENCOUNTER FOR LIPID SCREENING FOR CARDIOVASCULAR DISEASE: ICD-10-CM

## 2023-02-13 DIAGNOSIS — I10 PRIMARY HYPERTENSION: ICD-10-CM

## 2023-02-13 LAB
25(OH)D3 SERPL-MCNC: 58 NG/ML (ref 30–100)
ALBUMIN SERPL BCP-MCNC: 4 G/DL (ref 3.2–4.9)
ALBUMIN SERPL BCP-MCNC: 4.1 G/DL (ref 3.2–4.9)
ALBUMIN/GLOB SERPL: 1.7 G/DL
ALBUMIN/GLOB SERPL: 2 G/DL
ALP SERPL-CCNC: 57 U/L (ref 30–99)
ALP SERPL-CCNC: 58 U/L (ref 30–99)
ALT SERPL-CCNC: 8 U/L (ref 2–50)
ALT SERPL-CCNC: 9 U/L (ref 2–50)
ANION GAP SERPL CALC-SCNC: 11 MMOL/L (ref 7–16)
ANION GAP SERPL CALC-SCNC: 11 MMOL/L (ref 7–16)
ANISOCYTOSIS BLD QL SMEAR: ABNORMAL
AST SERPL-CCNC: 15 U/L (ref 12–45)
AST SERPL-CCNC: 15 U/L (ref 12–45)
BASOPHILS # BLD AUTO: 1 % (ref 0–1.8)
BASOPHILS # BLD: 0.21 K/UL (ref 0–0.12)
BILIRUB SERPL-MCNC: 0.3 MG/DL (ref 0.1–1.5)
BILIRUB SERPL-MCNC: 0.3 MG/DL (ref 0.1–1.5)
BUN SERPL-MCNC: 17 MG/DL (ref 8–22)
BUN SERPL-MCNC: 17 MG/DL (ref 8–22)
CALCIUM ALBUM COR SERPL-MCNC: 9 MG/DL (ref 8.5–10.5)
CALCIUM ALBUM COR SERPL-MCNC: 9 MG/DL (ref 8.5–10.5)
CALCIUM SERPL-MCNC: 9 MG/DL (ref 8.4–10.2)
CALCIUM SERPL-MCNC: 9.1 MG/DL (ref 8.4–10.2)
CHLORIDE SERPL-SCNC: 107 MMOL/L (ref 96–112)
CHLORIDE SERPL-SCNC: 108 MMOL/L (ref 96–112)
CHOLEST SERPL-MCNC: 138 MG/DL (ref 100–199)
CO2 SERPL-SCNC: 24 MMOL/L (ref 20–33)
CO2 SERPL-SCNC: 24 MMOL/L (ref 20–33)
CREAT SERPL-MCNC: 0.83 MG/DL (ref 0.5–1.4)
CREAT SERPL-MCNC: 0.89 MG/DL (ref 0.5–1.4)
EOSINOPHIL # BLD AUTO: 0 K/UL (ref 0–0.51)
EOSINOPHIL NFR BLD: 0 % (ref 0–6.9)
ERYTHROCYTE [DISTWIDTH] IN BLOOD BY AUTOMATED COUNT: 46.8 FL (ref 35.9–50)
FASTING STATUS PATIENT QL REPORTED: NORMAL
FASTING STATUS PATIENT QL REPORTED: NORMAL
FERRITIN SERPL-MCNC: 136 NG/ML (ref 10–291)
GFR SERPLBLD CREATININE-BSD FMLA CKD-EPI: 67 ML/MIN/1.73 M 2
GFR SERPLBLD CREATININE-BSD FMLA CKD-EPI: 72 ML/MIN/1.73 M 2
GLOBULIN SER CALC-MCNC: 2.1 G/DL (ref 1.9–3.5)
GLOBULIN SER CALC-MCNC: 2.3 G/DL (ref 1.9–3.5)
GLUCOSE SERPL-MCNC: 83 MG/DL (ref 65–99)
GLUCOSE SERPL-MCNC: 84 MG/DL (ref 65–99)
HCT VFR BLD AUTO: 38.4 % (ref 37–47)
HDLC SERPL-MCNC: 80 MG/DL
HGB BLD-MCNC: 12.5 G/DL (ref 12–16)
IRON SATN MFR SERPL: 28 % (ref 15–55)
IRON SERPL-MCNC: 79 UG/DL (ref 40–170)
LDH SERPL L TO P-CCNC: 136 U/L (ref 107–266)
LDLC SERPL CALC-MCNC: 47 MG/DL
LYMPHOCYTES # BLD AUTO: 19.93 K/UL (ref 1–4.8)
LYMPHOCYTES NFR BLD: 94 % (ref 22–41)
MANUAL DIFF BLD: NORMAL
MCH RBC QN AUTO: 31.3 PG (ref 27–33)
MCHC RBC AUTO-ENTMCNC: 32.6 G/DL (ref 33.6–35)
MCV RBC AUTO: 96 FL (ref 81.4–97.8)
MONOCYTES # BLD AUTO: 0.21 K/UL (ref 0–0.85)
MONOCYTES NFR BLD AUTO: 1 % (ref 0–13.4)
NEUTROPHILS # BLD AUTO: 0.85 K/UL (ref 2–7.15)
NEUTROPHILS NFR BLD: 4 % (ref 44–72)
NRBC # BLD AUTO: 0.02 K/UL
NRBC BLD-RTO: 0.1 /100 WBC
PLATELET # BLD AUTO: 172 K/UL (ref 164–446)
PLATELET BLD QL SMEAR: NORMAL
PMV BLD AUTO: 10.6 FL (ref 9–12.9)
POTASSIUM SERPL-SCNC: 4.1 MMOL/L (ref 3.6–5.5)
POTASSIUM SERPL-SCNC: 4.3 MMOL/L (ref 3.6–5.5)
PROT SERPL-MCNC: 6.2 G/DL (ref 6–8.2)
PROT SERPL-MCNC: 6.3 G/DL (ref 6–8.2)
RBC # BLD AUTO: 4 M/UL (ref 4.2–5.4)
RBC BLD AUTO: PRESENT
SMUDGE CELLS BLD QL SMEAR: NORMAL
SODIUM SERPL-SCNC: 142 MMOL/L (ref 135–145)
SODIUM SERPL-SCNC: 143 MMOL/L (ref 135–145)
TIBC SERPL-MCNC: 286 UG/DL (ref 250–450)
TRIGL SERPL-MCNC: 54 MG/DL (ref 0–149)
TSH SERPL DL<=0.005 MIU/L-ACNC: 1.67 UIU/ML (ref 0.38–5.33)
UIBC SERPL-MCNC: 207 UG/DL (ref 110–370)
VIT B12 SERPL-MCNC: 1447 PG/ML (ref 211–911)
WBC # BLD AUTO: 21.2 K/UL (ref 4.8–10.8)

## 2023-02-13 PROCEDURE — 82728 ASSAY OF FERRITIN: CPT

## 2023-02-13 PROCEDURE — 83550 IRON BINDING TEST: CPT

## 2023-02-13 PROCEDURE — 36415 COLL VENOUS BLD VENIPUNCTURE: CPT

## 2023-02-13 PROCEDURE — 85007 BL SMEAR W/DIFF WBC COUNT: CPT

## 2023-02-13 PROCEDURE — 82607 VITAMIN B-12: CPT

## 2023-02-13 PROCEDURE — 80053 COMPREHEN METABOLIC PANEL: CPT | Mod: 91

## 2023-02-13 PROCEDURE — 82306 VITAMIN D 25 HYDROXY: CPT

## 2023-02-13 PROCEDURE — 83540 ASSAY OF IRON: CPT

## 2023-02-13 PROCEDURE — 85025 COMPLETE CBC W/AUTO DIFF WBC: CPT

## 2023-02-13 PROCEDURE — 80053 COMPREHEN METABOLIC PANEL: CPT

## 2023-02-13 PROCEDURE — 80061 LIPID PANEL: CPT

## 2023-02-13 PROCEDURE — 83615 LACTATE (LD) (LDH) ENZYME: CPT

## 2023-02-13 PROCEDURE — 84443 ASSAY THYROID STIM HORMONE: CPT

## 2023-02-17 ENCOUNTER — HOSPITAL ENCOUNTER (OUTPATIENT)
Dept: RADIOLOGY | Facility: MEDICAL CENTER | Age: 78
End: 2023-02-17
Attending: INTERNAL MEDICINE
Payer: MEDICARE

## 2023-02-17 DIAGNOSIS — Z12.31 ENCOUNTER FOR SCREENING MAMMOGRAM FOR BREAST CANCER: ICD-10-CM

## 2023-02-17 PROCEDURE — 77063 BREAST TOMOSYNTHESIS BI: CPT

## 2023-02-22 ENCOUNTER — OFFICE VISIT (OUTPATIENT)
Dept: MEDICAL GROUP | Facility: PHYSICIAN GROUP | Age: 78
End: 2023-02-22
Payer: MEDICARE

## 2023-02-22 VITALS
RESPIRATION RATE: 17 BRPM | HEART RATE: 70 BPM | TEMPERATURE: 97.3 F | SYSTOLIC BLOOD PRESSURE: 110 MMHG | OXYGEN SATURATION: 98 % | HEIGHT: 62 IN | DIASTOLIC BLOOD PRESSURE: 70 MMHG | BODY MASS INDEX: 27.42 KG/M2 | WEIGHT: 149 LBS

## 2023-02-22 DIAGNOSIS — I10 PRIMARY HYPERTENSION: ICD-10-CM

## 2023-02-22 DIAGNOSIS — N39.41 URGE INCONTINENCE: ICD-10-CM

## 2023-02-22 DIAGNOSIS — Z91.89 OTHER SPECIFIED PERSONAL RISK FACTORS, NOT ELSEWHERE CLASSIFIED: ICD-10-CM

## 2023-02-22 DIAGNOSIS — L98.9 SKIN LESION: ICD-10-CM

## 2023-02-22 DIAGNOSIS — L85.3 DRY SKIN: ICD-10-CM

## 2023-02-22 DIAGNOSIS — N95.1 HOT FLASHES, MENOPAUSAL: ICD-10-CM

## 2023-02-22 PROBLEM — D64.9 ANEMIA: Status: RESOLVED | Noted: 2021-04-16 | Resolved: 2023-02-22

## 2023-02-22 PROCEDURE — 99214 OFFICE O/P EST MOD 30 MIN: CPT | Performed by: INTERNAL MEDICINE

## 2023-02-22 RX ORDER — ESTRADIOL 0.5 MG/1
TABLET ORAL
Qty: 50 TABLET | Refills: 3 | Status: SHIPPED | OUTPATIENT
Start: 2023-02-22

## 2023-02-22 ASSESSMENT — FIBROSIS 4 INDEX: FIB4 SCORE: 2.37

## 2023-02-22 NOTE — ASSESSMENT & PLAN NOTE
New problem, advised her to try topical aquaphor to see if that would allow the lesion to heal from the inside out, can follow up with dermatology if not resolving.

## 2023-02-22 NOTE — PROGRESS NOTES
"Subjective:   Chief Complaint/History of Present Illness:  Shanice Magana is a 77 y.o. female established patient who presents today to discuss medical problems as listed below. Shanice is unaccompanied for today's visit.    Problem   Skin Lesion    She reports a nonhealing skin lesion on her left upper brachium, she thinks it has been present for about 1 year, currently looks as healed as it every has. Roughly 0.6 cm x 0.8 cm with erythematous base. Has seen dermatology in the past but not for this concern.     Urge Incontinence    She reports for the past year she has worsening of urge incontinence. She has history of vaginal prolapse with failed trials of pessaries. Has not done pelvic floor PT or topical estrogen yet, she is on low dose systemic estrogen. Has been offered operative repair in the past but had declined at that time. Would be interested in trialing a medicine, sent in 30 days sample of mirabegron but she forgot to pick it up.     Dry Skin    Has seen derm about this in the past and was told she has a hereditary condition, feels her skin flakes off and itches. Showers 3x/wk. Tries to avoid hot water. No help w/ otc \"greasy\" products. Coconut oil after a bath helps. But still w/ patches on her back.      Hypertension    Noted in her 60s, has used monotherapy with atenolol. She has lost weight and sustained through diet and hopes she can get off this medicine.    Current regimen: atenolol 50 mg BID     Anemia (Resolved)    She sees Dr. Gonzalez for GERD and hiatal hernia who monitors her lab values. Has had 2 episodes of a drop in her hgb requiring a transfusion. She says her GI thinks this might be related to her hiatal hernia. Is on protonix bid and has prn pepcid she uses on occasion.          Current Medications:  Current Outpatient Medications Ordered in Epic   Medication Sig Dispense Refill    estradiol (ESTRACE) 0.5 MG tablet TAKE 1/2 TABLET BY MOUTH EVERY DAY. 50 Tablet 3    famotidine " "(PEPCID) 20 MG Tab Take 1 Tablet by mouth as needed (breakthrough heartburn). One by mouth once daily at bedtime 90 Tablet 3    Mirabegron ER 25 MG TABLET SR 24 HR Take 1 Tablet by mouth every day. 30 Tablet 0    pantoprazole (PROTONIX) 40 MG Tablet Delayed Response Take 1 Tablet by mouth 2 times a day. 200 Tablet 3    atenolol (TENORMIN) 50 MG Tab TAKE ONE TABLET BY MOUTH TWICE A  Tablet 3    triamcinolone acetonide (KENALOG) 0.1 % Cream APPLY TOPICALLY TO AFFECTED AREAS OF BODY TWO TIMES A DAY AS NEEDED FOR ITCHING, DO NOT APPLY TO FACE, AXILLA, OR GROIN 454 g 0    mometasone (NASONEX) 50 MCG/ACT nasal spray Administer 2 Sprays into affected nostril(S) as needed.       No current Epic-ordered facility-administered medications on file.          Objective:   Physical Exam:    Vitals: /70 (BP Location: Right arm, Patient Position: Sitting, BP Cuff Size: Adult)   Pulse 70   Temp 36.3 °C (97.3 °F) (Temporal)   Resp 17   Ht 1.562 m (5' 1.5\")   Wt 67.6 kg (149 lb)   SpO2 98%    BMI: Body mass index is 27.7 kg/m².  Physical Exam  Constitutional:       General: She is not in acute distress.     Appearance: Normal appearance. She is not ill-appearing.   Eyes:      Conjunctiva/sclera: Conjunctivae normal.   Cardiovascular:      Rate and Rhythm: Normal rate and regular rhythm.      Pulses: Normal pulses.   Pulmonary:      Effort: Pulmonary effort is normal. No respiratory distress.      Breath sounds: No wheezing or rhonchi.   Musculoskeletal:      Right lower leg: No edema.      Left lower leg: No edema.   Skin:     General: Skin is warm and dry.      Findings: Lesion present. No rash.             Comments: 06 x 0.8 cm, erythematous base   Neurological:      Gait: Gait normal.   Psychiatric:         Mood and Affect: Mood normal.         Behavior: Behavior normal.         Thought Content: Thought content normal.         Judgment: Judgment normal.             Assessment and Plan:   Shanice is a 77 y.o. female " with the following:  Problem List Items Addressed This Visit       Hypertension     Chronic ongoing problem, blood pressure well controlled, continue current regimen with atenolol 50 mg twice daily.         Hot flashes, menopausal    Relevant Medications    estradiol (ESTRACE) 0.5 MG tablet    Dry skin     Continue as needed topical steroids and follow up with dermatology as needed.         Urge incontinence     Chronic ongoing problem, has not picked up the medicine, could consider trial of mirabegron 25 mg daily or referral to urogynecology in th future if she would be interested.         Skin lesion     New problem, advised her to try topical aquaphor to see if that would allow the lesion to heal from the inside out, can follow up with dermatology if not resolving.          Other Visit Diagnoses       Other specified personal risk factors, not elsewhere classified        Relevant Orders    CT-CARDIAC SCORING              RTC: Return in about 9 months (around 11/22/2023).    I spent a total of 30 minutes with record review, exam, communication with the patient, communication with other providers, and documentation of this encounter.    PLEASE NOTE: This dictation was created using voice recognition software. I have made every reasonable attempt to correct obvious errors, but I expect that there are errors of grammar and possibly content that I did not discover before finalizing the note.      Salina Morgan, DO  Geriatric and Internal Medicine  [facility]

## 2023-02-22 NOTE — ASSESSMENT & PLAN NOTE
Chronic ongoing problem, has not picked up the medicine, could consider trial of mirabegron 25 mg daily or referral to urogynecology in th future if she would be interested.

## 2023-02-22 NOTE — ASSESSMENT & PLAN NOTE
Chronic ongoing problem, blood pressure well controlled, continue current regimen with atenolol 50 mg twice daily.

## 2023-03-23 ENCOUNTER — HOSPITAL ENCOUNTER (OUTPATIENT)
Dept: RADIOLOGY | Facility: MEDICAL CENTER | Age: 78
End: 2023-03-23
Attending: INTERNAL MEDICINE
Payer: COMMERCIAL

## 2023-03-23 DIAGNOSIS — Z91.89 OTHER SPECIFIED PERSONAL RISK FACTORS, NOT ELSEWHERE CLASSIFIED: ICD-10-CM

## 2023-03-23 PROCEDURE — 4410556 CT-CARDIAC SCORING (SELF PAY ONLY)

## 2023-04-13 RX ORDER — TRIAMCINOLONE ACETONIDE 1 MG/G
CREAM TOPICAL
Qty: 454 G | Refills: 0 | Status: SHIPPED | OUTPATIENT
Start: 2023-04-13

## 2023-07-25 ENCOUNTER — DOCUMENTATION (OUTPATIENT)
Dept: HEALTH INFORMATION MANAGEMENT | Facility: OTHER | Age: 78
End: 2023-07-25
Payer: MEDICARE

## 2023-08-07 ENCOUNTER — HOSPITAL ENCOUNTER (OUTPATIENT)
Dept: LAB | Facility: MEDICAL CENTER | Age: 78
End: 2023-08-07
Attending: INTERNAL MEDICINE
Payer: MEDICARE

## 2023-08-07 LAB
ALBUMIN SERPL BCP-MCNC: 4.5 G/DL (ref 3.2–4.9)
ALBUMIN/GLOB SERPL: 1.9 G/DL
ALP SERPL-CCNC: 51 U/L (ref 30–99)
ALT SERPL-CCNC: 8 U/L (ref 2–50)
ANION GAP SERPL CALC-SCNC: 10 MMOL/L (ref 7–16)
AST SERPL-CCNC: 15 U/L (ref 12–45)
BASOPHILS # BLD AUTO: 0 % (ref 0–1.8)
BASOPHILS # BLD: 0 K/UL (ref 0–0.12)
BILIRUB SERPL-MCNC: 0.4 MG/DL (ref 0.1–1.5)
BUN SERPL-MCNC: 22 MG/DL (ref 8–22)
CALCIUM ALBUM COR SERPL-MCNC: 9.2 MG/DL (ref 8.5–10.5)
CALCIUM SERPL-MCNC: 9.6 MG/DL (ref 8.4–10.2)
CHLORIDE SERPL-SCNC: 107 MMOL/L (ref 96–112)
CO2 SERPL-SCNC: 26 MMOL/L (ref 20–33)
CREAT SERPL-MCNC: 0.88 MG/DL (ref 0.5–1.4)
EOSINOPHIL # BLD AUTO: 0.65 K/UL (ref 0–0.51)
EOSINOPHIL NFR BLD: 3 % (ref 0–6.9)
ERYTHROCYTE [DISTWIDTH] IN BLOOD BY AUTOMATED COUNT: 46.3 FL (ref 35.9–50)
GFR SERPLBLD CREATININE-BSD FMLA CKD-EPI: 67 ML/MIN/1.73 M 2
GLOBULIN SER CALC-MCNC: 2.4 G/DL (ref 1.9–3.5)
GLUCOSE SERPL-MCNC: 96 MG/DL (ref 65–99)
HCT VFR BLD AUTO: 40.2 % (ref 37–47)
HGB BLD-MCNC: 12.9 G/DL (ref 12–16)
LDH SERPL L TO P-CCNC: 148 U/L (ref 107–266)
LYMPHOCYTES # BLD AUTO: 17.22 K/UL (ref 1–4.8)
LYMPHOCYTES NFR BLD: 79 % (ref 22–41)
MANUAL DIFF BLD: NORMAL
MCH RBC QN AUTO: 30.9 PG (ref 27–33)
MCHC RBC AUTO-ENTMCNC: 32.1 G/DL (ref 32.2–35.5)
MCV RBC AUTO: 96.2 FL (ref 81.4–97.8)
MONOCYTES # BLD AUTO: 2.4 K/UL (ref 0–0.85)
MONOCYTES NFR BLD AUTO: 11 % (ref 0–13.4)
NEUTROPHILS # BLD AUTO: 1.53 K/UL (ref 1.82–7.42)
NEUTROPHILS NFR BLD: 7 % (ref 44–72)
NRBC # BLD AUTO: 0 K/UL
NRBC BLD-RTO: 0 /100 WBC (ref 0–0.2)
PLATELET # BLD AUTO: 160 K/UL (ref 164–446)
PLATELET BLD QL SMEAR: NORMAL
PMV BLD AUTO: 10.5 FL (ref 9–12.9)
POTASSIUM SERPL-SCNC: 5 MMOL/L (ref 3.6–5.5)
PROT SERPL-MCNC: 6.9 G/DL (ref 6–8.2)
RBC # BLD AUTO: 4.18 M/UL (ref 4.2–5.4)
RBC BLD AUTO: NORMAL
SODIUM SERPL-SCNC: 143 MMOL/L (ref 135–145)
WBC # BLD AUTO: 21.8 K/UL (ref 4.8–10.8)

## 2023-08-07 PROCEDURE — 36415 COLL VENOUS BLD VENIPUNCTURE: CPT

## 2023-08-07 PROCEDURE — 80053 COMPREHEN METABOLIC PANEL: CPT

## 2023-08-07 PROCEDURE — 85007 BL SMEAR W/DIFF WBC COUNT: CPT

## 2023-08-07 PROCEDURE — 83615 LACTATE (LD) (LDH) ENZYME: CPT

## 2023-08-07 PROCEDURE — 85025 COMPLETE CBC W/AUTO DIFF WBC: CPT

## 2023-11-22 ENCOUNTER — OFFICE VISIT (OUTPATIENT)
Dept: MEDICAL GROUP | Facility: PHYSICIAN GROUP | Age: 78
End: 2023-11-22
Payer: MEDICARE

## 2023-11-22 VITALS
WEIGHT: 156 LBS | TEMPERATURE: 98 F | DIASTOLIC BLOOD PRESSURE: 70 MMHG | RESPIRATION RATE: 16 BRPM | HEART RATE: 60 BPM | BODY MASS INDEX: 28.71 KG/M2 | HEIGHT: 62 IN | SYSTOLIC BLOOD PRESSURE: 122 MMHG | OXYGEN SATURATION: 96 %

## 2023-11-22 DIAGNOSIS — C91.11 CHRONIC LYMPHOID LEUKEMIA IN REMISSION (HCC): ICD-10-CM

## 2023-11-22 DIAGNOSIS — F33.0 MILD EPISODE OF RECURRENT MAJOR DEPRESSIVE DISORDER (HCC): ICD-10-CM

## 2023-11-22 DIAGNOSIS — Z00.00 ENCOUNTER FOR SUBSEQUENT ANNUAL WELLNESS VISIT (AWV) IN MEDICARE PATIENT: Primary | ICD-10-CM

## 2023-11-22 DIAGNOSIS — Z12.31 ENCOUNTER FOR SCREENING MAMMOGRAM FOR BREAST CANCER: ICD-10-CM

## 2023-11-22 DIAGNOSIS — I10 PRIMARY HYPERTENSION: ICD-10-CM

## 2023-11-22 DIAGNOSIS — L98.9 SKIN LESION OF LEFT ARM: ICD-10-CM

## 2023-11-22 PROBLEM — F32.0 CURRENT MILD EPISODE OF MAJOR DEPRESSIVE DISORDER (HCC): Status: ACTIVE | Noted: 2023-11-22

## 2023-11-22 PROBLEM — F43.0 ACUTE REACTION TO SITUATIONAL STRESS: Status: RESOLVED | Noted: 2023-01-04 | Resolved: 2023-11-22

## 2023-11-22 PROCEDURE — 3078F DIAST BP <80 MM HG: CPT | Performed by: INTERNAL MEDICINE

## 2023-11-22 PROCEDURE — G0439 PPPS, SUBSEQ VISIT: HCPCS | Performed by: INTERNAL MEDICINE

## 2023-11-22 PROCEDURE — 3074F SYST BP LT 130 MM HG: CPT | Performed by: INTERNAL MEDICINE

## 2023-11-22 ASSESSMENT — FIBROSIS 4 INDEX: FIB4 SCORE: 2.59

## 2023-11-22 ASSESSMENT — ACTIVITIES OF DAILY LIVING (ADL): BATHING_REQUIRES_ASSISTANCE: 0

## 2023-11-22 ASSESSMENT — PATIENT HEALTH QUESTIONNAIRE - PHQ9: CLINICAL INTERPRETATION OF PHQ2 SCORE: 0

## 2023-11-22 ASSESSMENT — ENCOUNTER SYMPTOMS: GENERAL WELL-BEING: GOOD

## 2023-11-22 NOTE — ASSESSMENT & PLAN NOTE
Chronic ongoing problem, becoming more of a nuisance as patient will get irritated and slightly grow, started to improve, but will never completely heal. Will refer to dermatology for full skin check due to history of CLL and to check on left upper arm lesion that is nonhealing.
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
## 2023-11-22 NOTE — ASSESSMENT & PLAN NOTE
Chronic stable problem, blood pressure well-controlled on current medical therapy, continue atenolol 50 mg twice daily.

## 2023-11-22 NOTE — ASSESSMENT & PLAN NOTE
Chronic stable problem, continue with twice annual evaluation with her hematologist, white blood cell count stable at 21,000.

## 2023-11-22 NOTE — ASSESSMENT & PLAN NOTE
Chronic ongoing problem, slightly worsened in recent time prompting her to go back on sertraline 50 mg daily, we spent some time talking about her son who would have turned 60 this week, she has great support from her daughter and her sister.

## 2023-12-11 DIAGNOSIS — I10 ESSENTIAL HYPERTENSION: ICD-10-CM

## 2023-12-11 RX ORDER — ATENOLOL 50 MG/1
TABLET ORAL
Qty: 200 TABLET | Refills: 3 | Status: SHIPPED | OUTPATIENT
Start: 2023-12-11

## 2023-12-11 NOTE — TELEPHONE ENCOUNTER
Received request via: Patient    Was the patient seen in the last year in this department? Yes    Does the patient have an active prescription (recently filled or refills available) for medication(s) requested? No    Does the patient have long term Plus and need 100 day supply (blood pressure, diabetes and cholesterol meds only)? Yes, quantity updated to 100 days

## 2024-02-12 DIAGNOSIS — K21.9 GASTROESOPHAGEAL REFLUX DISEASE WITHOUT ESOPHAGITIS: ICD-10-CM

## 2024-02-12 RX ORDER — PANTOPRAZOLE SODIUM 40 MG/1
40 TABLET, DELAYED RELEASE ORAL 2 TIMES DAILY
Qty: 200 TABLET | Refills: 3 | Status: SHIPPED | OUTPATIENT
Start: 2024-02-12

## 2024-02-14 NOTE — PROGRESS NOTES
CHIEF COMPLAINT:  Chief Complaint   Patient presents with    Office Visit       HISTORY OF PRESENT ILLNESS:  The patient is a 44year old female. She is a new patient to me complaining of bothersome skin tags around the neck as well as skin tags under the breast and hair growth on her chin which she thinks is from polycystic ovarian disease      The patient presents for: Skin tags on the neck and axilla and hair growth on chin.      Location: Chin   Duration:  The problem has been present for years   Quality:     The problem has the following symptoms: Excessive Hair Growth   Severity:    The severity of the symptoms ismoderate  Modifying Factors:  Treatments tried include none; she denies a family history and thought it came from Depo Provera injections    The patient presents for the following lesion:  Location:  The lesion is located on the following part of the body: Bilateral axilla   Duration:  The lesion was first noticed by the patient around the following time: Years   Quality:     The lesion  itches  Severity:    The severity of the symptoms are  mild  Modifying Factors: Previous treatments include none     The patient presents for the following lesion:  Location:  The lesion is located on the following part of the body: Neck  Duration:  The lesion was first noticed by the patient around the following time: Years   Quality:     The lesion  itches  Severity:    The severity of the symptoms are  mild  Modifying Factors: Previous treatments include none      The patient presents for the following lesion:  Location:  The lesion is located on the following part of the body: Under the breasts   Duration:  The lesion was first noticed by the patient around the following time: Years   Quality:     The lesion  itches  Severity:    The severity of the symptoms are  mild  Modifying Factors: Previous treatments include none       REVIEW OF SYSTEMS:  Constitutional:   In general, the patient feels well: Outcome: Left Message    Please transfer to Patient Outreach Team at [phone] when patient returns call.    WebIZ Checked & Epic Updated:  no    [facility] Verified: yes    Attempt # AWV PROJECT 1ST ATTEMPT          Yes  Integument:    The patient denies any other signs or symptoms of skin disease:  Yes  Cardiovascular: The patient has a pacemaker:  No       The patient has a defibrillator:  No  Hematologic:  The patient bleeds easily because of being on aspirin or an anticoagulant:  No    The patient is pregnant:           [] Yes   [x] No    [] Not applicable. The patient is breast feeding:  [] Yes   [x] No    [] Not applicable. ALLERGIES:   Allergen Reactions    Metformin Other (See Comments)     Dark urine and decreased urine output       Current Outpatient Medications   Medication Sig Dispense Refill    topiramate (TOPAMAX) 25 MG tablet Take 3 tablets by mouth in the morning and 3 tablets in the evening. 180 tablet 5    eletriptan (RELPAX) 40 MG tablet TAKE 1 TABLET BY MOUTH AT ONSET OF MIGRAINE. MAY REPEAT AFTER 2 HOURS AS NEEDED 10 tablet 5    norethindrone (Aliya-BE) 0.35 MG tablet Take 1 tablet by mouth daily. 90 tablet 3    budesonide-formoterol (Symbicort) 160-4.5 MCG/ACT inhaler Inhale 2 puffs into the lungs in the morning and 2 puffs in the evening. 10.2 g 5    ondansetron (ZOFRAN ODT) 8 MG disintegrating tablet DISSOLVE 1 TABLET ON THE TONGUE EVERY 8 HOURS AS NEEDED FOR NAUSEA 20 tablet 0    clotrimazole (LOTRIMIN) 1 % cream APPLY TO AFFECTED AREAS TWICE DAILY UNTIL RASH IS RESOLVED, MAX IS 4 WEEKS 15 g 1    sertraline (ZOLOFT) 100 MG tablet TAKE 1 TABLET BY MOUTH TWICE DAILY 180 tablet 1    methocarbamol (ROBAXIN) 750 MG tablet take 2 tabs Po q8h PRN for muscle spasm, try first at bedtime 60 tablet 0    albuterol 108 (90 Base) MCG/ACT inhaler Inhale 2 puffs into the lungs every 4 hours as needed for Shortness of Breath or Wheezing. 1 each 3    Cholecalciferol (vitamin D) 50 mcg (2,000 units) capsule Take 2 capsules by mouth daily. No current facility-administered medications for this visit.      Facility-Administered Medications Ordered in Other Visits   Medication Dose Route Frequency Provider Last Rate Last Admin    sodium chloride (PF) 0.9 % injection 10 mL  10 mL Injection Once Rachel Berry MD        iopamidol (ISOVUE-300) 61 % injection 100 mL  100 mL Intravenous Once Rachel Berry MD           Past Medical History:   Diagnosis Date    Anxiety     Asthma     Depressive disorder     Drug abuse NEC, unspec     Gastritis     GERD (gastroesophageal reflux disease)     H/O chlamydia infection 06/25/2002    Infection due to severe acute respiratory syndrome coronavirus 2 (SARS-CoV-2) 11/23/2021 11/23/2021 SARS-CoV-2 PCR +    Migraines     Trichomonas vaginalis infection 06/25/2002 6/25/2002, 7/10/2006, 3/13/2007       DERMATOLOGY PMH:    Tinea versicolor 2017      FH: The patient has a family history of skin cancer:  No    SH: Manufacturing ; 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 The patient is in no acute distress, alert and oriented x3 who appears well and is well groomed. Significant findings include:     tan/brown flat papule(s) with no concerning features in the bra line on the left back 10 mm and then one that is 5 mm on the left upper back     Tags under the right and left breast-there are 2 larger ones under the left breast    Tan well circumscribed patch/macule under the left breast     Skin tags on the neck multiple on the right and left sides  She does have thick dark hair growing on the chin and neck.   Today I do not see any on the upper lip    LABORATORY:    ASSESSMENT AND PLAN:    Hirsutism   Excessive hair grown on the chin and neck  - She thinks she had PCOS  - DHEAS and Testosterone free and total labs ordered today  - Discussed treatment options with patient, will wait for lab results   -she could also discuss with her gyn  Laser hair removal was discussed as well as the need for at least for treatment 1 month apart  She is allergic to metformin  Now on BCPs and gets a monthly. 2. Skin tag removal  Skin Tag Removal  Impression: Multiple papillomas x6  Location: Right neck, they get irritated    Consent was obtained to excise the growth. It was prepped in a sterile fashion and anesthetized with lidocaine with epinephrine. The lesion was removed with an iris scissor and forceps. Drysol was applied and the base was cauterized and wound care was discussed. The patient will be notified of histology and should follow up pending pathology results. Impression: Multiple papillomas x6  Location: Left neck , they get irritated    Consent was obtained to excise the growth. It was prepped in a sterile fashion and anesthetized with lidocaine with epinephrine. The lesion was removed with an iris scissor and forceps. Drysol was applied and the base was cauterized and wound care was discussed. The patient will be notified of histology and should follow up pending pathology results. 3. Compound Nevus  -  Reassurance of the benign nature of these lesions.  -  Monitor at home for any concerning changes.  -  RTC (return to clinic) if any concerning changes. 4. Cafe au lait Macule:    -  Reassurance of the benign nature of this lesion    Return to clinic pending lab results     On 2/14/2024, IEbonie CMA, scribed the services personally performed by Fritz Mosher MD.The documentation recorded by the scribe accurately and completely reflects the service(s) I personally performed and the decisions made by me.

## 2024-02-23 ENCOUNTER — APPOINTMENT (RX ONLY)
Dept: URBAN - METROPOLITAN AREA CLINIC 22 | Facility: CLINIC | Age: 79
Setting detail: DERMATOLOGY
End: 2024-02-23

## 2024-02-23 DIAGNOSIS — L85.3 XEROSIS CUTIS: ICD-10-CM

## 2024-02-23 PROBLEM — D48.5 NEOPLASM OF UNCERTAIN BEHAVIOR OF SKIN: Status: ACTIVE | Noted: 2024-02-23

## 2024-02-23 PROCEDURE — 99202 OFFICE O/P NEW SF 15 MIN: CPT | Mod: 25

## 2024-02-23 PROCEDURE — 11102 TANGNTL BX SKIN SINGLE LES: CPT

## 2024-02-23 PROCEDURE — ? MEDICATION COUNSELING

## 2024-02-23 PROCEDURE — ? PRESCRIPTION

## 2024-02-23 PROCEDURE — ? COUNSELING

## 2024-02-23 PROCEDURE — ? BIOPSY BY SHAVE METHOD

## 2024-02-23 RX ORDER — AMMONIUM LACTATE 12 G/100G
CREAM TOPICAL
Qty: 1 | Refills: 5 | Status: ERX | COMMUNITY
Start: 2024-02-23

## 2024-02-23 RX ORDER — TRIAMCINOLONE ACETONIDE 0.25 MG/G
1 CREAM TOPICAL BID
Qty: 454 | Refills: 0 | Status: ERX

## 2024-02-23 RX ORDER — TRIAMCINOLONE ACETONIDE 0.25 MG/G
1 CREAM TOPICAL BID
Qty: 454 | Refills: 0 | Status: ERX | COMMUNITY
Start: 2024-02-23

## 2024-02-23 RX ORDER — AMMONIUM LACTATE 12 G/100G
CREAM TOPICAL
Qty: 1 | Refills: 5 | Status: ERX

## 2024-02-23 RX ADMIN — TRIAMCINOLONE ACETONIDE 1: 0.25 CREAM TOPICAL at 00:00

## 2024-02-23 RX ADMIN — AMMONIUM LACTATE: 12 CREAM TOPICAL at 00:00

## 2024-02-23 NOTE — PROCEDURE: BIOPSY BY SHAVE METHOD
Detail Level: Detailed
Depth Of Biopsy: dermis
Was A Bandage Applied: Yes
Size Of Lesion In Cm: 1
X Size Of Lesion In Cm: 0
Biopsy Type: H and E
Biopsy Method: Personna blade
Anesthesia Type: 0.5% lidocaine without epinephrine
Anesthesia Volume In Cc: 0.5
Hemostasis: Aluminum Chloride and Electrocautery
Wound Care: Petrolatum
Dressing: pressure dressing with telfa
Destruction After The Procedure: No
Type Of Destruction Used: Curettage
Curettage Text: The wound bed was treated with curettage after the biopsy was performed.
Cryotherapy Text: The wound bed was treated with cryotherapy after the biopsy was performed.
Electrodesiccation Text: The wound bed was treated with electrodesiccation after the biopsy was performed.
Electrodesiccation And Curettage Text: The wound bed was treated with electrodesiccation and curettage after the biopsy was performed.
Silver Nitrate Text: The wound bed was treated with silver nitrate after the biopsy was performed.
Lab: [id number]
Lab Facility: 
Consent: Written consent was obtained and risks were reviewed including but not limited to scarring, infection, bleeding, scabbing, incomplete removal, nerve damage and allergy to anesthesia.
Post-Care Instructions: I reviewed with the patient in detail post-care instructions. Patient is to keep the biopsy site dry overnight. Gentle cleansing daily.  Apply petroleum ointment daily until healed. Patient may apply hydrogen peroxide soaks to remove any crusting.
Notification Instructions: Patient will be notified of biopsy results. However, patient instructed to call the office if not contacted within 2 weeks.
Billing Type: Third-Party Bill
Information: Selecting Yes will display possible errors in your note based on the variables you have selected. This validation is only offered as a suggestion for you. PLEASE NOTE THAT THE VALIDATION TEXT WILL BE REMOVED WHEN YOU FINALIZE YOUR NOTE. IF YOU WANT TO FAX A PRELIMINARY NOTE YOU WILL NEED TO TOGGLE THIS TO 'NO' IF YOU DO NOT WANT IT IN YOUR FAXED NOTE.

## 2024-02-23 NOTE — PROCEDURE: MEDICATION COUNSELING
Griseofulvin Counseling:  I discussed with the patient the risks of griseofulvin including but not limited to photosensitivity, cytopenia, liver damage, nausea/vomiting and severe allergy.  The patient understands that this medication is best absorbed when taken with a fatty meal (e.g., ice cream or french fries).
Topical Retinoid counseling:  Patient advised to apply a pea-sized amount only at bedtime and wait 30 minutes after washing their face before applying.  If too drying, patient may add a non-comedogenic moisturizer. The patient verbalized understanding of the proper use and possible adverse effects of retinoids.  All of the patient's questions and concerns were addressed.
Cyclophosphamide Pregnancy And Lactation Text: This medication is Pregnancy Category D and it isn't considered safe during pregnancy. This medication is excreted in breast milk.
Birth Control Pills Counseling: Birth Control Pill Counseling: I discussed with the patient the potential side effects of OCPs including but not limited to increased risk of stroke, heart attack, thrombophlebitis, deep venous thrombosis, hepatic adenomas, breast changes, GI upset, headaches, and depression.  The patient verbalized understanding of the proper use and possible adverse effects of OCPs. All of the patient's questions and concerns were addressed.
Xeldemarz Pregnancy And Lactation Text: This medication is Pregnancy Category D and is not considered safe during pregnancy.  The risk during breast feeding is also uncertain.
Cimetidine Pregnancy And Lactation Text: This medication is Pregnancy Category B and is considered safe during pregnancy. It is also excreted in breast milk and breast feeding isn't recommended.
Stelara Pregnancy And Lactation Text: This medication is Pregnancy Category B and is considered safe during pregnancy. It is unknown if this medication is excreted in breast milk.
Litfulo Counseling: I discussed with the patient the risks of Litfulo therapy including but not limited to upper respiratory tract infections, shingles, cold sores, and nausea. Live vaccines should be avoided.  This medication has been linked to serious infections; higher rate of mortality; malignancy and lymphoproliferative disorders; major adverse cardiovascular events; thrombosis; gastrointestinal perforations; neutropenia; lymphopenia; anemia; liver enzyme elevations; and lipid elevations.
Libtayo Pregnancy And Lactation Text: This medication is contraindicated in pregnancy and when breast feeding.
Protopic Pregnancy And Lactation Text: This medication is Pregnancy Category C. It is unknown if this medication is excreted in breast milk when applied topically.
Erythromycin Pregnancy And Lactation Text: This medication is Pregnancy Category B and is considered safe during pregnancy. It is also excreted in breast milk.
Rifampin Counseling: I discussed with the patient the risks of rifampin including but not limited to liver damage, kidney damage, red-orange body fluids, nausea/vomiting and severe allergy.
Minoxidil Counseling: Minoxidil is a topical medication which can increase blood flow where it is applied. It is uncertain how this medication increases hair growth. Side effects are uncommon and include stinging and allergic reactions.
Glycopyrrolate Pregnancy And Lactation Text: This medication is Pregnancy Category B and is considered safe during pregnancy. It is unknown if it is excreted breast milk.
Sski Pregnancy And Lactation Text: This medication is Pregnancy Category D and isn't considered safe during pregnancy. It is excreted in breast milk.
Bactrim Counseling:  I discussed with the patient the risks of sulfa antibiotics including but not limited to GI upset, allergic reaction, drug rash, diarrhea, dizziness, photosensitivity, and yeast infections.  Rarely, more serious reactions can occur including but not limited to aplastic anemia, agranulocytosis, methemoglobinemia, blood dyscrasias, liver or kidney failure, lung infiltrates or desquamative/blistering drug rashes.
Rituxan Counseling:  I discussed with the patient the risks of Rituxan infusions. Side effects can include infusion reactions, severe drug rashes including mucocutaneous reactions, reactivation of latent hepatitis and other infections and rarely progressive multifocal leukoencephalopathy.  All of the patient's questions and concerns were addressed.
Arava Pregnancy And Lactation Text: This medication is Pregnancy Category X and is absolutely contraindicated during pregnancy. It is unknown if it is excreted in breast milk.
Dupixent Pregnancy And Lactation Text: This medication likely crosses the placenta but the risk for the fetus is uncertain. This medication is excreted in breast milk.
Vtama Pregnancy And Lactation Text: It is unknown if this medication can cause problems during pregnancy and breastfeeding.
Elidel Pregnancy And Lactation Text: This medication is Pregnancy Category C. It is unknown if this medication is excreted in breast milk.
Olanzapine Counseling- I discussed with the patient the common side effects of olanzapine including but are not limited to: lack of energy, dry mouth, increased appetite, sleepiness, tremor, constipation, dizziness, changes in behavior, or restlessness.  Explained that teenagers are more likely to experience headaches, abdominal pain, pain in the arms or legs, tiredness, and sleepiness.  Serious side effects include but are not limited: increased risk of death in elderly patients who are confused, have memory loss, or dementia-related psychosis; hyperglycemia; increased cholesterol and triglycerides; and weight gain.
Topical Steroids Counseling: I discussed with the patient that prolonged use of topical steroids can result in the increased appearance of superficial blood vessels (telangiectasias), lightening (hypopigmentation) and thinning of the skin (atrophy).  Patient understands to avoid using high potency steroids in skin folds, the groin or the face.  The patient verbalized understanding of the proper use and possible adverse effects of topical steroids.  All of the patient's questions and concerns were addressed.
Griseofulvin Pregnancy And Lactation Text: This medication is Pregnancy Category X and is known to cause serious birth defects. It is unknown if this medication is excreted in breast milk but breast feeding should be avoided.
Cyclosporine Counseling:  I discussed with the patient the risks of cyclosporine including but not limited to hypertension, gingival hyperplasia,myelosuppression, immunosuppression, liver damage, kidney damage, neurotoxicity, lymphoma, and serious infections. The patient understands that monitoring is required including baseline blood pressure, CBC, CMP, lipid panel and uric acid, and then 1-2 times monthly CMP and blood pressure.
Adbry Counseling: I discussed with the patient the risks of tralokinumab including but not limited to eye infection and irritation, cold sores, injection site reactions, worsening of asthma, allergic reactions and increased risk of parasitic infection.  Live vaccines should be avoided while taking tralokinumab. The patient understands that monitoring is required and they must alert us or the primary physician if symptoms of infection or other concerning signs are noted.
Finasteride Pregnancy And Lactation Text: This medication is absolutely contraindicated during pregnancy. It is unknown if it is excreted in breast milk.
Doxepin Counseling:  Patient advised that the medication is sedating and not to drive a car after taking this medication. Patient informed of potential adverse effects including but not limited to dry mouth, urinary retention, and blurry vision.  The patient verbalized understanding of the proper use and possible adverse effects of doxepin.  All of the patient's questions and concerns were addressed.
Litfulo Pregnancy And Lactation Text: Based on animal studies, Lifulo may cause embryo-fetal harm when administered to pregnant women.  The medication should not be used in pregnancy.  Breastfeeding is not recommended during treatment.
Libtayo Counseling- I discussed with the patient the risks of Libtayo including but not limited to nausea, vomiting, diarrhea, and bone or muscle pain.  The patient verbalized understanding of the proper use and possible adverse effects of Libtayo.  All of the patient's questions and concerns were addressed.
Taltz Counseling: I discussed with the patient the risks of ixekizumab including but not limited to immunosuppression, serious infections, worsening of inflammatory bowel disease and drug reactions.  The patient understands that monitoring is required including a PPD at baseline and must alert us or the primary physician if symptoms of infection or other concerning signs are noted.
Qbrexza Counseling:  I discussed with the patient the risks of Qbrexza including but not limited to headache, mydriasis, blurred vision, dry eyes, nasal dryness, dry mouth, dry throat, dry skin, urinary hesitation, and constipation.  Local skin reactions including erythema, burning, stinging, and itching can also occur.
Aklief counseling:  Patient advised to apply a pea-sized amount only at bedtime and wait 30 minutes after washing their face before applying.  If too drying, patient may add a non-comedogenic moisturizer.  The most commonly reported side effects including irritation, redness, scaling, dryness, stinging, burning, itching, and increased risk of sunburn.  The patient verbalized understanding of the proper use and possible adverse effects of retinoids.  All of the patient's questions and concerns were addressed.
Clofazimine Counseling:  I discussed with the patient the risks of clofazimine including but not limited to skin and eye pigmentation, liver damage, nausea/vomiting, gastrointestinal bleeding and allergy.
Rituxan Pregnancy And Lactation Text: This medication is Pregnancy Category C and it isn't know if it is safe during pregnancy. It is unknown if this medication is excreted in breast milk but similar antibodies are known to be excreted.
Acitretin Counseling:  I discussed with the patient the risks of acitretin including but not limited to hair loss, dry lips/skin/eyes, liver damage, hyperlipidemia, depression/suicidal ideation, photosensitivity.  Serious rare side effects can include but are not limited to pancreatitis, pseudotumor cerebri, bony changes, clot formation/stroke/heart attack.  Patient understands that alcohol is contraindicated since it can result in liver toxicity and significantly prolong the elimination of the drug by many years.
Rifampin Pregnancy And Lactation Text: This medication is Pregnancy Category C and it isn't know if it is safe during pregnancy. It is also excreted in breast milk and should not be used if you are breast feeding.
Minoxidil Pregnancy And Lactation Text: This medication has not been assigned a Pregnancy Risk Category but animal studies failed to show danger with the topical medication. It is unknown if the medication is excreted in breast milk.
Glycopyrrolate Counseling:  I discussed with the patient the risks of glycopyrrolate including but not limited to skin rash, drowsiness, dry mouth, difficulty urinating, and blurred vision.
Thalidomide Counseling: I discussed with the patient the risks of thalidomide including but not limited to birth defects, anxiety, weakness, chest pain, dizziness, cough and severe allergy.
Bactrim Pregnancy And Lactation Text: This medication is Pregnancy Category D and is known to cause fetal risk.  It is also excreted in breast milk.
Eucrisa Counseling: Patient may experience a mild burning sensation during topical application. Eucrisa is not approved in children less than 2 years of age.
Enbrel Counseling:  I discussed with the patient the risks of etanercept including but not limited to myelosuppression, immunosuppression, autoimmune hepatitis, demyelinating diseases, lymphoma, and infections.  The patient understands that monitoring is required including a PPD at baseline and must alert us or the primary physician if symptoms of infection or other concerning signs are noted.
Zoryve Counseling:  I discussed with the patient that Zoryve is not for use in the eyes, mouth or vagina. The most commonly reported side effects include diarrhea, headache, insomnia, application site pain, upper respiratory tract infections, and urinary tract infections.  All of the patient's questions and concerns were addressed.
Itraconazole Counseling:  I discussed with the patient the risks of itraconazole including but not limited to liver damage, nausea/vomiting, neuropathy, and severe allergy.  The patient understands that this medication is best absorbed when taken with acidic beverages such as non-diet cola or ginger ale.  The patient understands that monitoring is required including baseline LFTs and repeat LFTs at intervals.  The patient understands that they are to contact us or the primary physician if concerning signs are noted.
Adbry Pregnancy And Lactation Text: It is unknown if this medication will adversely affect pregnancy or breast feeding.
Cyclosporine Pregnancy And Lactation Text: This medication is Pregnancy Category C and it isn't know if it is safe during pregnancy. This medication is excreted in breast milk.
Nsaids Pregnancy And Lactation Text: These medications are considered safe up to 30 weeks gestation. It is excreted in breast milk.
Topical Steroids Applications Pregnancy And Lactation Text: Most topical steroids are considered safe to use during pregnancy and lactation.  Any topical steroid applied to the breast or nipple should be washed off before breastfeeding.
Doxepin Pregnancy And Lactation Text: This medication is Pregnancy Category C and it isn't known if it is safe during pregnancy. It is also excreted in breast milk and breast feeding isn't recommended.
Tazorac Counseling:  Patient advised that medication is irritating and drying.  Patient may need to apply sparingly and wash off after an hour before eventually leaving it on overnight.  The patient verbalized understanding of the proper use and possible adverse effects of tazorac.  All of the patient's questions and concerns were addressed.
Finasteride Female Counseling: Finasteride Counseling:  I discussed with the patient the risks of use of finasteride including but not limited to decreased libido and sexual dysfunction. Explained the teratogenic nature of the medication and stressed the importance of not getting pregnant during treatment. All of the patient's questions and concerns were addressed.
Olumiant Counseling: I discussed with the patient the risks of Olumiant therapy including but not limited to upper respiratory tract infections, shingles, cold sores, and nausea. Live vaccines should be avoided.  This medication has been linked to serious infections; higher rate of mortality; malignancy and lymphoproliferative disorders; major adverse cardiovascular events; thrombosis; gastrointestinal perforations; neutropenia; lymphopenia; anemia; liver enzyme elevations; and lipid elevations.
Qbrexza Pregnancy And Lactation Text: There is no available data on Qbrexza use in pregnant women.  There is no available data on Qbrexza use in lactation.
Taltz Pregnancy And Lactation Text: The risk during pregnancy and breastfeeding is uncertain with this medication.
Siliq Counseling:  I discussed with the patient the risks of Siliq including but not limited to new or worsening depression, suicidal thoughts and behavior, immunosuppression, malignancy, posterior leukoencephalopathy syndrome, and serious infections.  The patient understands that monitoring is required including a PPD at baseline and must alert us or the primary physician if symptoms of infection or other concerning signs are noted. There is also a special program designed to monitor depression which is required with Siliq.
Acitretin Pregnancy And Lactation Text: This medication is Pregnancy Category X and should not be given to women who are pregnant or may become pregnant in the future. This medication is excreted in breast milk.
Mirvaso Counseling: Mirvaso is a topical medication which can decrease superficial blood flow where applied. Side effects are uncommon and include stinging, redness and allergic reactions.
Aklief Pregnancy And Lactation Text: It is unknown if this medication is safe to use during pregnancy.  It is unknown if this medication is excreted in breast milk.  Breastfeeding women should use the topical cream on the smallest area of the skin for the shortest time needed while breastfeeding.  Do not apply to nipple and areola.
Cephalexin Counseling: I counseled the patient regarding use of cephalexin as an antibiotic for prophylactic and/or therapeutic purposes. Cephalexin (commonly prescribed under brand name Keflex) is a cephalosporin antibiotic which is active against numerous classes of bacteria, including most skin bacteria. Side effects may include nausea, diarrhea, gastrointestinal upset, rash, hives, yeast infections, and in rare cases, hepatitis, kidney disease, seizures, fever, confusion, neurologic symptoms, and others. Patients with severe allergies to penicillin medications are cautioned that there is about a 10% incidence of cross-reactivity with cephalosporins. When possible, patients with penicillin allergies should use alternatives to cephalosporins for antibiotic therapy.
Sarecycline Counseling: Patient advised regarding possible photosensitivity and discoloration of the teeth, skin, lips, tongue and gums.  Patient instructed to avoid sunlight, if possible.  When exposed to sunlight, patients should wear protective clothing, sunglasses, and sunscreen.  The patient was instructed to call the office immediately if the following severe adverse effects occur:  hearing changes, easy bruising/bleeding, severe headache, or vision changes.  The patient verbalized understanding of the proper use and possible adverse effects of sarecycline.  All of the patient's questions and concerns were addressed.
Otezla Counseling: The side effects of Otezla were discussed with the patient, including but not limited to worsening or new depression, weight loss, diarrhea, nausea, upper respiratory tract infection, and headache. Patient instructed to call the office should any adverse effect occur.  The patient verbalized understanding of the proper use and possible adverse effects of Otezla.  All the patient's questions and concerns were addressed.
Itraconazole Pregnancy And Lactation Text: This medication is Pregnancy Category C and it isn't know if it is safe during pregnancy. It is also excreted in breast milk.
Gabapentin Pregnancy And Lactation Text: This medication is Pregnancy Category C and isn't considered safe during pregnancy. It is excreted in breast milk.
Methotrexate Counseling:  Patient counseled regarding adverse effects of methotrexate including but not limited to nausea, vomiting, abnormalities in liver function tests. Patients may develop mouth sores, rash, diarrhea, and abnormalities in blood counts. The patient understands that monitoring is required including LFT's and blood counts.  There is a rare possibility of scarring of the liver and lung problems that can occur when taking methotrexate. Persistent nausea, loss of appetite, pale stools, dark urine, cough, and shortness of breath should be reported immediately. Patient advised to discontinue methotrexate treatment at least three months before attempting to become pregnant.  I discussed the need for folate supplements while taking methotrexate.  These supplements can decrease side effects during methotrexate treatment. The patient verbalized understanding of the proper use and possible adverse effects of methotrexate.  All of the patient's questions and concerns were addressed.
Calcipotriene Counseling:  I discussed with the patient the risks of calcipotriene including but not limited to erythema, scaling, itching, and irritation.
Bimzelx Counseling:  I discussed with the patient the risks of Bimzelx including but not limited to depression, immunosuppression, allergic reactions and infections.  The patient understands that monitoring is required including a PPD at baseline and must alert us or the primary physician if symptoms of infection or other concerning signs are noted.
Nsaids Counseling: NSAID Counseling: I discussed with the patient that NSAIDs should be taken with food. Prolonged use of NSAIDs can result in the development of stomach ulcers.  Patient advised to stop taking NSAIDs if abdominal pain occurs.  The patient verbalized understanding of the proper use and possible adverse effects of NSAIDs.  All of the patient's questions and concerns were addressed.
Topical Sulfur Applications Counseling: Topical Sulfur Counseling: Patient counseled that this medication may cause skin irritation or allergic reactions.  In the event of skin irritation, the patient was advised to reduce the amount of the drug applied or use it less frequently.   The patient verbalized understanding of the proper use and possible adverse effects of topical sulfur application.  All of the patient's questions and concerns were addressed.
Hydroxyzine Counseling: Patient advised that the medication is sedating and not to drive a car after taking this medication.  Patient informed of potential adverse effects including but not limited to dry mouth, urinary retention, and blurry vision.  The patient verbalized understanding of the proper use and possible adverse effects of hydroxyzine.  All of the patient's questions and concerns were addressed.
Tazorac Pregnancy And Lactation Text: This medication is not safe during pregnancy. It is unknown if this medication is excreted in breast milk.
Finasteride Male Counseling: Finasteride Counseling:  I discussed with the patient the risks of use of finasteride including but not limited to decreased libido, decreased ejaculate volume, gynecomastia, and depression. Women should not handle medication.  All of the patient's questions and concerns were addressed.
Olumiant Pregnancy And Lactation Text: Based on animal studies, Olumiant may cause embryo-fetal harm when administered to pregnant women.  The medication should not be used in pregnancy.  Breastfeeding is not recommended during treatment.
Calcipotriene Pregnancy And Lactation Text: The use of this medication during pregnancy or lactation is not recommended as there is insufficient data.
Rhofade Counseling: Rhofade is a topical medication which can decrease superficial blood flow where applied. Side effects are uncommon and include stinging, redness and allergic reactions.
Mirvaso Pregnancy And Lactation Text: This medication has not been assigned a Pregnancy Risk Category. It is unknown if the medication is excreted in breast milk.
Erivedge Counseling- I discussed with the patient the risks of Erivedge including but not limited to nausea, vomiting, diarrhea, constipation, weight loss, changes in the sense of taste, decreased appetite, muscle spasms, and hair loss.  The patient verbalized understanding of the proper use and possible adverse effects of Erivedge.  All of the patient's questions and concerns were addressed.
Azelaic Acid Counseling: Patient counseled that medicine may cause skin irritation and to avoid applying near the eyes.  In the event of skin irritation, the patient was advised to reduce the amount of the drug applied or use it less frequently.   The patient verbalized understanding of the proper use and possible adverse effects of azelaic acid.  All of the patient's questions and concerns were addressed.
Cephalexin Pregnancy And Lactation Text: This medication is Pregnancy Category B and considered safe during pregnancy.  It is also excreted in breast milk but can be used safely for shorter doses.
Sarecycline Pregnancy And Lactation Text: This medication is Pregnancy Category D and not consider safe during pregnancy. It is also excreted in breast milk.
Bexarotene Counseling:  I discussed with the patient the risks of bexarotene including but not limited to hair loss, dry lips/skin/eyes, liver abnormalities, hyperlipidemia, pancreatitis, depression/suicidal ideation, photosensitivity, drug rash/allergic reactions, hypothyroidism, anemia, leukopenia, infection, cataracts, and teratogenicity.  Patient understands that they will need regular blood tests to check lipid profile, liver function tests, white blood cell count, thyroid function tests and pregnancy test if applicable.
Hydroquinone Counseling:  Patient advised that medication may result in skin irritation, lightening (hypopigmentation), dryness, and burning.  In the event of skin irritation, the patient was advised to reduce the amount of the drug applied or use it less frequently.  Rarely, spots that are treated with hydroquinone can become darker (pseudoochronosis).  Should this occur, patient instructed to stop medication and call the office. The patient verbalized understanding of the proper use and possible adverse effects of hydroquinone.  All of the patient's questions and concerns were addressed.
Tranexamic Acid Counseling:  Patient advised of the small risk of bleeding problems with tranexamic acid. They were also instructed to call if they developed any nausea, vomiting or diarrhea. All of the patient's questions and concerns were addressed.
Gabapentin Counseling: I discussed with the patient the risks of gabapentin including but not limited to dizziness, somnolence, fatigue and ataxia.
Detail Level: Simple
Humira Counseling:  I discussed with the patient the risks of adalimumab including but not limited to myelosuppression, immunosuppression, autoimmune hepatitis, demyelinating diseases, lymphoma, and serious infections.  The patient understands that monitoring is required including a PPD at baseline and must alert us or the primary physician if symptoms of infection or other concerning signs are noted.
Zyclara Counseling:  I discussed with the patient the risks of imiquimod including but not limited to erythema, scaling, itching, weeping, crusting, and pain.  Patient understands that the inflammatory response to imiquimod is variable from person to person and was educated regarded proper titration schedule.  If flu-like symptoms develop, patient knows to discontinue the medication and contact us.
Cantharidin Counseling:  I discussed with the patient the risks of Cantharidin including but not limited to pain, redness, burning, itching, and blistering.
Topical Sulfur Applications Pregnancy And Lactation Text: This medication is Pregnancy Category C and has an unknown safety profile during pregnancy. It is unknown if this topical medication is excreted in breast milk.
Metronidazole Counseling:  I discussed with the patient the risks of metronidazole including but not limited to seizures, nausea/vomiting, a metallic taste in the mouth, nausea/vomiting and severe allergy.
Niacinamide Pregnancy And Lactation Text: These medications are considered safe during pregnancy.
Otezla Pregnancy And Lactation Text: This medication is Pregnancy Category C and it isn't known if it is safe during pregnancy. It is unknown if it is excreted in breast milk.
Ketoconazole Counseling:   Patient counseled regarding improving absorption with orange juice.  Adverse effects include but are not limited to breast enlargement, headache, diarrhea, nausea, upset stomach, liver function test abnormalities, taste disturbance, and stomach pain.  There is a rare possibility of liver failure that can occur when taking ketoconazole. The patient understands that monitoring of LFTs may be required, especially at baseline. The patient verbalized understanding of the proper use and possible adverse effects of ketoconazole.  All of the patient's questions and concerns were addressed.
Topical Clindamycin Counseling: Patient counseled that this medication may cause skin irritation or allergic reactions.  In the event of skin irritation, the patient was advised to reduce the amount of the drug applied or use it less frequently.   The patient verbalized understanding of the proper use and possible adverse effects of clindamycin.  All of the patient's questions and concerns were addressed.
Methotrexate Pregnancy And Lactation Text: This medication is Pregnancy Category X and is known to cause fetal harm. This medication is excreted in breast milk.
Bimzelx Pregnancy And Lactation Text: This medication crosses the placenta and the safety is uncertain during pregnancy. It is unknown if this medication is present in breast milk.
Dutasteride Pregnancy And Lactation Text: This medication is absolutely contraindicated in women, especially during pregnancy and breast feeding. Feminization of male fetuses is possible if taking while pregnant.
Cantharidin Pregnancy And Lactation Text: This medication has not been proven safe during pregnancy. It is unknown if this medication is excreted in breast milk.
Hydroxyzine Pregnancy And Lactation Text: This medication is not safe during pregnancy and should not be taken. It is also excreted in breast milk and breast feeding isn't recommended.
Opzelura Counseling:  I discussed with the patient the risks of Opzelura including but not limited to nasopharngitis, bronchitis, ear infection, eosinophila, hives, diarrhea, folliculitis, tonsillitis, and rhinorrhea.  Taken orally, this medication has been linked to serious infections; higher rate of mortality; malignancy and lymphoproliferative disorders; major adverse cardiovascular events; thrombosis; thrombocytopenia, anemia, and neutropenia; and lipid elevations.
Rinvoq Counseling: I discussed with the patient the risks of Rinvoq therapy including but not limited to upper respiratory tract infections, shingles, cold sores, bronchitis, nausea, cough, fever, acne, and headache. Live vaccines should be avoided.  This medication has been linked to serious infections; higher rate of mortality; malignancy and lymphoproliferative disorders; major adverse cardiovascular events; thrombosis; thrombocytopenia, anemia, and neutropenia; lipid elevations; liver enzyme elevations; and gastrointestinal perforations.
Azelaic Acid Pregnancy And Lactation Text: This medication is considered safe during pregnancy and breast feeding.
Bexarotene Pregnancy And Lactation Text: This medication is Pregnancy Category X and should not be given to women who are pregnant or may become pregnant. This medication should not be used if you are breast feeding.
Tetracycline Counseling: Patient counseled regarding possible photosensitivity and increased risk for sunburn.  Patient instructed to avoid sunlight, if possible.  When exposed to sunlight, patients should wear protective clothing, sunglasses, and sunscreen.  The patient was instructed to call the office immediately if the following severe adverse effects occur:  hearing changes, easy bruising/bleeding, severe headache, or vision changes.  The patient verbalized understanding of the proper use and possible adverse effects of tetracycline.  All of the patient's questions and concerns were addressed. Patient understands to avoid pregnancy while on therapy due to potential birth defects.
Include Pregnancy/Lactation Warning?: No
Tranexamic Acid Pregnancy And Lactation Text: It is unknown if this medication is safe during pregnancy or breast feeding.
Dapsone Pregnancy And Lactation Text: This medication is Pregnancy Category C and is not considered safe during pregnancy or breast feeding.
5-Fu Counseling: 5-Fluorouracil Counseling:  I discussed with the patient the risks of 5-fluorouracil including but not limited to erythema, scaling, itching, weeping, crusting, and pain.
Azathioprine Counseling:  I discussed with the patient the risks of azathioprine including but not limited to myelosuppression, immunosuppression, hepatotoxicity, lymphoma, and infections.  The patient understands that monitoring is required including baseline LFTs, Creatinine, possible TPMP genotyping and weekly CBCs for the first month and then every 2 weeks thereafter.  The patient verbalized understanding of the proper use and possible adverse effects of azathioprine.  All of the patient's questions and concerns were addressed.
Clindamycin Counseling: I counseled the patient regarding use of clindamycin as an antibiotic for prophylactic and/or therapeutic purposes. Clindamycin is active against numerous classes of bacteria, including skin bacteria. Side effects may include nausea, diarrhea, gastrointestinal upset, rash, hives, yeast infections, and in rare cases, colitis.
Simponi Counseling:  I discussed with the patient the risks of golimumab including but not limited to myelosuppression, immunosuppression, autoimmune hepatitis, demyelinating diseases, lymphoma, and serious infections.  The patient understands that monitoring is required including a PPD at baseline and must alert us or the primary physician if symptoms of infection or other concerning signs are noted.
Metronidazole Pregnancy And Lactation Text: This medication is Pregnancy Category B and considered safe during pregnancy.  It is also excreted in breast milk.
Xolair Pregnancy And Lactation Text: This medication is Pregnancy Category B and is considered safe during pregnancy. This medication is excreted in breast milk.
Niacinamide Counseling: I recommended taking niacin or niacinamide, also know as vitamin B3, twice daily. Recent evidence suggests that taking vitamin B3 (500 mg twice daily) can reduce the risk of actinic keratoses and non-melanoma skin cancers. Side effects of vitamin B3 include flushing and headache.
Oxybutynin Counseling:  I discussed with the patient the risks of oxybutynin including but not limited to skin rash, drowsiness, dry mouth, difficulty urinating, and blurred vision.
Ketoconazole Pregnancy And Lactation Text: This medication is Pregnancy Category C and it isn't know if it is safe during pregnancy. It is also excreted in breast milk and breast feeding isn't recommended.
Wartpeel Counseling:  I discussed with the patient the risks of Wartpeel including but not limited to erythema, scaling, itching, weeping, crusting, and pain.
Cimzia Counseling:  I discussed with the patient the risks of Cimzia including but not limited to immunosuppression, allergic reactions and infections.  The patient understands that monitoring is required including a PPD at baseline and must alert us or the primary physician if symptoms of infection or other concerning signs are noted.
Prednisone Counseling:  I discussed with the patient the risks of prolonged use of prednisone including but not limited to weight gain, insomnia, osteoporosis, mood changes, diabetes, susceptibility to infection, glaucoma and high blood pressure.  In cases where prednisone use is prolonged, patients should be monitored with blood pressure checks, serum glucose levels and an eye exam.  Additionally, the patient may need to be placed on GI prophylaxis, PCP prophylaxis, and calcium and vitamin D supplementation and/or a bisphosphonate.  The patient verbalized understanding of the proper use and the possible adverse effects of prednisone.  All of the patient's questions and concerns were addressed.
Dutasteride Female Counseling: Dutasteride Counseling:  I discussed with the patient the risks of use of dutasteride including but not limited to decreased libido and sexual dysfunction. Explained the teratogenic nature of the medication and stressed the importance of not getting pregnant during treatment. All of the patient's questions and concerns were addressed.
Azathioprine Pregnancy And Lactation Text: This medication is Pregnancy Category D and isn't considered safe during pregnancy. It is unknown if this medication is excreted in breast milk.
Topical Clindamycin Pregnancy And Lactation Text: This medication is Pregnancy Category B and is considered safe during pregnancy. It is unknown if it is excreted in breast milk.
Rinvoq Pregnancy And Lactation Text: Based on animal studies, Rinvoq may cause embryo-fetal harm when administered to pregnant women.  The medication should not be used in pregnancy.  Breastfeeding is not recommended during treatment and for 6 days after the last dose.
Solaraze Counseling:  I discussed with the patient the risks of Solaraze including but not limited to erythema, scaling, itching, weeping, crusting, and pain.
Benzoyl Peroxide Counseling: Patient counseled that medicine may cause skin irritation and bleach clothing.  In the event of skin irritation, the patient was advised to reduce the amount of the drug applied or use it less frequently.   The patient verbalized understanding of the proper use and possible adverse effects of benzoyl peroxide.  All of the patient's questions and concerns were addressed.
Isotretinoin Counseling: Patient should get monthly blood tests, not donate blood, not drive at night if vision affected, not share medication, and not undergo elective surgery for 6 months after tx completed. Side effects reviewed, pt to contact office should one occur.
Opzelura Pregnancy And Lactation Text: There is insufficient data to evaluate drug-associated risk for major birth defects, miscarriage, or other adverse maternal or fetal outcomes.  There is a pregnancy registry that monitors pregnancy outcomes in pregnant persons exposed to the medication during pregnancy.  It is unknown if this medication is excreted in breast milk.  Do not breastfeed during treatment and for about 4 weeks after the last dose.
Valtrex Counseling: I discussed with the patient the risks of valacyclovir including but not limited to kidney damage, nausea, vomiting and severe allergy.  The patient understands that if the infection seems to be worsening or is not improving, they are to call.
Albendazole Counseling:  I discussed with the patient the risks of albendazole including but not limited to cytopenia, kidney damage, nausea/vomiting and severe allergy.  The patient understands that this medication is being used in an off-label manner.
Dapsone Counseling: I discussed with the patient the risks of dapsone including but not limited to hemolytic anemia, agranulocytosis, rashes, methemoglobinemia, kidney failure, peripheral neuropathy, headaches, GI upset, and liver toxicity.  Patients who start dapsone require monitoring including baseline LFTs and weekly CBCs for the first month, then every month thereafter.  The patient verbalized understanding of the proper use and possible adverse effects of dapsone.  All of the patient's questions and concerns were addressed.
Clindamycin Pregnancy And Lactation Text: This medication can be used in pregnancy if certain situations. Clindamycin is also present in breast milk.
Imiquimod Counseling:  I discussed with the patient the risks of imiquimod including but not limited to erythema, scaling, itching, weeping, crusting, and pain.  Patient understands that the inflammatory response to imiquimod is variable from person to person and was educated regarded proper titration schedule.  If flu-like symptoms develop, patient knows to discontinue the medication and contact us.
Minocycline Counseling: Patient advised regarding possible photosensitivity and discoloration of the teeth, skin, lips, tongue and gums.  Patient instructed to avoid sunlight, if possible.  When exposed to sunlight, patients should wear protective clothing, sunglasses, and sunscreen.  The patient was instructed to call the office immediately if the following severe adverse effects occur:  hearing changes, easy bruising/bleeding, severe headache, or vision changes.  The patient verbalized understanding of the proper use and possible adverse effects of minocycline.  All of the patient's questions and concerns were addressed.
Xolair Counseling:  Patient informed of potential adverse effects including but not limited to fever, muscle aches, rash and allergic reactions.  The patient verbalized understanding of the proper use and possible adverse effects of Xolair.  All of the patient's questions and concerns were addressed.
Low Dose Naltrexone Pregnancy And Lactation Text: Naltrexone is pregnancy category C.  There have been no adequate and well-controlled studies in pregnant women.  It should be used in pregnancy only if the potential benefit justifies the potential risk to the fetus.   Limited data indicates that naltrexone is minimally excreted into breastmilk.
Ilumya Counseling: I discussed with the patient the risks of tildrakizumab including but not limited to immunosuppression, malignancy, posterior leukoencephalopathy syndrome, and serious infections.  The patient understands that monitoring is required including a PPD at baseline and must alert us or the primary physician if symptoms of infection or other concerning signs are noted.
Terbinafine Counseling: Patient counseling regarding adverse effects of terbinafine including but not limited to headache, diarrhea, rash, upset stomach, liver function test abnormalities, itching, taste/smell disturbance, nausea, abdominal pain, and flatulence.  There is a rare possibility of liver failure that can occur when taking terbinafine.  The patient understands that a baseline LFT and kidney function test may be required. The patient verbalized understanding of the proper use and possible adverse effects of terbinafine.  All of the patient's questions and concerns were addressed.
5-Fu Pregnancy And Lactation Text: This medication is Pregnancy Category X and contraindicated in pregnancy and in women who may become pregnant. It is unknown if this medication is excreted in breast milk.
Cimzia Pregnancy And Lactation Text: This medication crosses the placenta but can be considered safe in certain situations. Cimzia may be excreted in breast milk.
Cellcept Counseling:  I discussed with the patient the risks of mycophenolate mofetil including but not limited to infection/immunosuppression, GI upset, hypokalemia, hypercholesterolemia, bone marrow suppression, lymphoproliferative disorders, malignancy, GI ulceration/bleed/perforation, colitis, interstitial lung disease, kidney failure, progressive multifocal leukoencephalopathy, and birth defects.  The patient understands that monitoring is required including a baseline creatinine and regular CBC testing. In addition, patient must alert us immediately if symptoms of infection or other concerning signs are noted.
Topical Ketoconazole Counseling: Patient counseled that this medication may cause skin irritation or allergic reactions.  In the event of skin irritation, the patient was advised to reduce the amount of the drug applied or use it less frequently.   The patient verbalized understanding of the proper use and possible adverse effects of ketoconazole.  All of the patient's questions and concerns were addressed.
Dutasteride Male Counseling: Dustasteride Counseling:  I discussed with the patient the risks of use of dutasteride including but not limited to decreased libido, decreased ejaculate volume, and gynecomastia. Women who can become pregnant should not handle medication.  All of the patient's questions and concerns were addressed.
Opioid Counseling: I discussed with the patient the potential side effects of opioids including but not limited to addiction, altered mental status, and depression. I stressed avoiding alcohol, benzodiazepines, muscle relaxants and sleep aids unless specifically okayed by a physician. The patient verbalized understanding of the proper use and possible adverse effects of opioids. All of the patient's questions and concerns were addressed. They were instructed to flush the remaining pills down the toilet if they did not need them for pain.
Sotyktu Counseling:  I discussed the most common side effects of Sotyktu including: common cold, sore throat, sinus infections, cold sores, canker sores, folliculitis, and acne.? I also discussed more serious side effects of Sotyktu including but not limited to: serious allergic reactions; increased risk for infections such as TB; cancers such as lymphomas; rhabdomyolysis and elevated CPK; and elevated triglycerides and liver enzymes.?
Solaraze Pregnancy And Lactation Text: This medication is Pregnancy Category B and is considered safe. There is some data to suggest avoiding during the third trimester. It is unknown if this medication is excreted in breast milk.
Spironolactone Pregnancy And Lactation Text: This medication can cause feminization of the male fetus and should be avoided during pregnancy. The active metabolite is also found in breast milk.
Skyrizi Counseling: I discussed with the patient the risks of risankizumab-rzaa including but not limited to immunosuppression, and serious infections.  The patient understands that monitoring is required including a PPD at baseline and must alert us or the primary physician if symptoms of infection or other concerning signs are noted.
Picato Counseling:  I discussed with the patient the risks of Picato including but not limited to erythema, scaling, itching, weeping, crusting, and pain.
Isotretinoin Pregnancy And Lactation Text: This medication is Pregnancy Category X and is considered extremely dangerous during pregnancy. It is unknown if it is excreted in breast milk.
Benzoyl Peroxide Pregnancy And Lactation Text: This medication is Pregnancy Category C. It is unknown if benzoyl peroxide is excreted in breast milk.
Valtrex Pregnancy And Lactation Text: this medication is Pregnancy Category B and is considered safe during pregnancy. This medication is not directly found in breast milk but it's metabolite acyclovir is present.
Doxycycline Counseling:  Patient counseled regarding possible photosensitivity and increased risk for sunburn.  Patient instructed to avoid sunlight, if possible.  When exposed to sunlight, patients should wear protective clothing, sunglasses, and sunscreen.  The patient was instructed to call the office immediately if the following severe adverse effects occur:  hearing changes, easy bruising/bleeding, severe headache, or vision changes.  The patient verbalized understanding of the proper use and possible adverse effects of doxycycline.  All of the patient's questions and concerns were addressed.
Albendazole Pregnancy And Lactation Text: This medication is Pregnancy Category C and it isn't known if it is safe during pregnancy. It is also excreted in breast milk.
Propranolol Counseling:  I discussed with the patient the risks of propranolol including but not limited to low heart rate, low blood pressure, low blood sugar, restlessness and increased cold sensitivity. They should call the office if they experience any of these side effects.
Drysol Counseling:  I discussed with the patient the risks of drysol/aluminum chloride including but not limited to skin rash, itching, irritation, burning.
Cosentyx Counseling:  I discussed with the patient the risks of Cosentyx including but not limited to worsening of Crohn's disease, immunosuppression, allergic reactions and infections.  The patient understands that monitoring is required including a PPD at baseline and must alert us or the primary physician if symptoms of infection or other concerning signs are noted.
Low Dose Naltrexone Counseling- I discussed with the patient the potential risks and side effects of low dose naltrexone including but not limited to: more vivid dreams, headaches, nausea, vomiting, abdominal pain, fatigue, dizziness, and anxiety.
Winlevi Counseling:  I discussed with the patient the risks of topical clascoterone including but not limited to erythema, scaling, itching, and stinging. Patient voiced their understanding.
Fluconazole Counseling:  Patient counseled regarding adverse effects of fluconazole including but not limited to headache, diarrhea, nausea, upset stomach, liver function test abnormalities, taste disturbance, and stomach pain.  There is a rare possibility of liver failure that can occur when taking fluconazole.  The patient understands that monitoring of LFTs and kidney function test may be required, especially at baseline. The patient verbalized understanding of the proper use and possible adverse effects of fluconazole.  All of the patient's questions and concerns were addressed.
Oral Minoxidil Pregnancy And Lactation Text: This medication should only be used when clearly needed if you are pregnant, attempting to become pregnant or breast feeding.
Sotyktu Pregnancy And Lactation Text: There is insufficient data to evaluate whether or not Sotyktu is safe to use during pregnancy.? ?It is not known if Sotyktu passes into breast milk and whether or not it is safe to use when breastfeeding.??
Opioid Pregnancy And Lactation Text: These medications can lead to premature delivery and should be avoided during pregnancy. These medications are also present in breast milk in small amounts.
Spironolactone Counseling: Patient advised regarding risks of diarrhea, abdominal pain, hyperkalemia, birth defects (for female patients), liver toxicity and renal toxicity. The patient may need blood work to monitor liver and kidney function and potassium levels while on therapy. The patient verbalized understanding of the proper use and possible adverse effects of spironolactone.  All of the patient's questions and concerns were addressed.
Soolantra Counseling: I discussed with the patients the risks of topial Soolantra. This is a medicine which decreases the number of mites and inflammation in the skin. You experience burning, stinging, eye irritation or allergic reactions.  Please call our office if you develop any problems from using this medication.
Cibinqo Counseling: I discussed with the patient the risks of Cibinqo therapy including but not limited to common cold, nausea, headache, cold sores, increased blood CPK levels, dizziness, UTIs, fatigue, acne, and vomitting. Live vaccines should be avoided.  This medication has been linked to serious infections; higher rate of mortality; malignancy and lymphoproliferative disorders; major adverse cardiovascular events; thrombosis; thrombocytopenia and lymphopenia; lipid elevations; and retinal detachment.
Carac Counseling:  I discussed with the patient the risks of Carac including but not limited to erythema, scaling, itching, weeping, crusting, and pain.
Doxycycline Pregnancy And Lactation Text: This medication is Pregnancy Category D and not consider safe during pregnancy. It is also excreted in breast milk but is considered safe for shorter treatment courses.
Ivermectin Counseling:  Patient instructed to take medication on an empty stomach with a full glass of water.  Patient informed of potential adverse effects including but not limited to nausea, diarrhea, dizziness, itching, and swelling of the extremities or lymph nodes.  The patient verbalized understanding of the proper use and possible adverse effects of ivermectin.  All of the patient's questions and concerns were addressed.
Infliximab Counseling:  I discussed with the patient the risks of infliximab including but not limited to myelosuppression, immunosuppression, autoimmune hepatitis, demyelinating diseases, lymphoma, and serious infections.  The patient understands that monitoring is required including a PPD at baseline and must alert us or the primary physician if symptoms of infection or other concerning signs are noted.
Klisyri Counseling:  I discussed with the patient the risks of Klisyri including but not limited to erythema, scaling, itching, weeping, crusting, and pain.
High Dose Vitamin A Counseling: Side effects reviewed, pt to contact office should one occur.
Azithromycin Counseling:  I discussed with the patient the risks of azithromycin including but not limited to GI upset, allergic reaction, drug rash, diarrhea, and yeast infections.
Colchicine Counseling:  Patient counseled regarding adverse effects including but not limited to stomach upset (nausea, vomiting, stomach pain, or diarrhea).  Patient instructed to limit alcohol consumption while taking this medication.  Colchicine may reduce blood counts especially with prolonged use.  The patient understands that monitoring of kidney function and blood counts may be required, especially at baseline. The patient verbalized understanding of the proper use and possible adverse effects of colchicine.  All of the patient's questions and concerns were addressed.
Winlevi Pregnancy And Lactation Text: This medication is considered safe during pregnancy and breastfeeding.
Quinolones Counseling:  I discussed with the patient the risks of fluoroquinolones including but not limited to GI upset, allergic reaction, drug rash, diarrhea, dizziness, photosensitivity, yeast infections, liver function test abnormalities, tendonitis/tendon rupture.
Hydroxychloroquine Pregnancy And Lactation Text: This medication has been shown to cause fetal harm but it isn't assigned a Pregnancy Risk Category. There are small amounts excreted in breast milk.
Tremfya Counseling: I discussed with the patient the risks of guselkumab including but not limited to immunosuppression, serious infections, worsening of inflammatory bowel disease and drug reactions.  The patient understands that monitoring is required including a PPD at baseline and must alert us or the primary physician if symptoms of infection or other concerning signs are noted.
Propranolol Pregnancy And Lactation Text: This medication is Pregnancy Category C and it isn't known if it is safe during pregnancy. It is excreted in breast milk.
Topical Metronidazole Counseling: Metronidazole is a topical antibiotic medication. You may experience burning, stinging, redness, or allergic reactions.  Please call our office if you develop any problems from using this medication.
Oral Minoxidil Counseling- I discussed with the patient the risks of oral minoxidil including but not limited to shortness of breath, swelling of the feet or ankles, dizziness, lightheadedness, unwanted hair growth and allergic reaction.  The patient verbalized understanding of the proper use and possible adverse effects of oral minoxidil.  All of the patient's questions and concerns were addressed.
Xeljanz Counseling: I discussed with the patient the risks of Xeljanz therapy including increased risk of infection, liver issues, headache, diarrhea, or cold symptoms. Live vaccines should be avoided. They were instructed to call if they have any problems.
Cyclophosphamide Counseling:  I discussed with the patient the risks of cyclophosphamide including but not limited to hair loss, hormonal abnormalities, decreased fertility, abdominal pain, diarrhea, nausea and vomiting, bone marrow suppression and infection. The patient understands that monitoring is required while taking this medication.
Soolantra Pregnancy And Lactation Text: This medication is Pregnancy Category C. This medication is considered safe during breast feeding.
Birth Control Pills Pregnancy And Lactation Text: This medication should be avoided if pregnant and for the first 30 days post-partum.
Protopic Counseling: Patient may experience a mild burning sensation during topical application. Protopic is not approved in children less than 2 years of age. There have been case reports of hematologic and skin malignancies in patients using topical calcineurin inhibitors although causality is questionable.
Stelara Counseling:  I discussed with the patient the risks of ustekinumab including but not limited to immunosuppression, malignancy, posterior leukoencephalopathy syndrome, and serious infections.  The patient understands that monitoring is required including a PPD at baseline and must alert us or the primary physician if symptoms of infection or other concerning signs are noted.
Cimetidine Counseling:  I discussed with the patient the risks of Cimetidine including but not limited to gynecomastia, headache, diarrhea, nausea, drowsiness, arrhythmias, pancreatitis, skin rashes, psychosis, bone marrow suppression and kidney toxicity.
Cibinqo Pregnancy And Lactation Text: It is unknown if this medication will adversely affect pregnancy or breast feeding.  You should not take this medication if you are currently pregnant or planning a pregnancy or while breastfeeding.
Erythromycin Counseling:  I discussed with the patient the risks of erythromycin including but not limited to GI upset, allergic reaction, drug rash, diarrhea, increase in liver enzymes, and yeast infections.
Odomzo Counseling- I discussed with the patient the risks of Odomzo including but not limited to nausea, vomiting, diarrhea, constipation, weight loss, changes in the sense of taste, decreased appetite, muscle spasms, and hair loss.  The patient verbalized understanding of the proper use and possible adverse effects of Odomzo.  All of the patient's questions and concerns were addressed.
Klisyri Pregnancy And Lactation Text: It is unknown if this medication can harm a developing fetus or if it is excreted in breast milk.
High Dose Vitamin A Pregnancy And Lactation Text: High dose vitamin A therapy is contraindicated during pregnancy and breast feeding.
Elidel Counseling: Patient may experience a mild burning sensation during topical application. Elidel is not approved in children less than 2 years of age. There have been case reports of hematologic and skin malignancies in patients using topical calcineurin inhibitors although causality is questionable.
Arava Counseling:  Patient counseled regarding adverse effects of Arava including but not limited to nausea, vomiting, abnormalities in liver function tests. Patients may develop mouth sores, rash, diarrhea, and abnormalities in blood counts. The patient understands that monitoring is required including LFTs and blood counts.  There is a rare possibility of scarring of the liver and lung problems that can occur when taking methotrexate. Persistent nausea, loss of appetite, pale stools, dark urine, cough, and shortness of breath should be reported immediately. Patient advised to discontinue Arava treatment and consult with a physician prior to attempting conception. The patient will have to undergo a treatment to eliminate Arava from the body prior to conception.
Hydroxychloroquine Counseling:  I discussed with the patient that a baseline ophthalmologic exam is needed at the start of therapy and every year thereafter while on therapy. A CBC may also be warranted for monitoring.  The side effects of this medication were discussed with the patient, including but not limited to agranulocytosis, aplastic anemia, seizures, rashes, retinopathy, and liver toxicity. Patient instructed to call the office should any adverse effect occur.  The patient verbalized understanding of the proper use and possible adverse effects of Plaquenil.  All the patient's questions and concerns were addressed.
SSKI Counseling:  I discussed with the patient the risks of SSKI including but not limited to thyroid abnormalities, metallic taste, GI upset, fever, headache, acne, arthralgias, paraesthesias, lymphadenopathy, easy bleeding, arrhythmias, and allergic reaction.
Topical Metronidazole Pregnancy And Lactation Text: This medication is Pregnancy Category B and considered safe during pregnancy.  It is also considered safe to use while breastfeeding.
Azithromycin Pregnancy And Lactation Text: This medication is considered safe during pregnancy and is also secreted in breast milk.
VTAMA Counseling: I discussed with the patient that VTAMA is not for use in the eyes, mouth or mouth. They should call the office if they develop any signs of allergic reactions to VTAMA. The patient verbalized understanding of the proper use and possible adverse effects of VTAMA.  All of the patient's questions and concerns were addressed.
Dupixent Counseling: I discussed with the patient the risks of dupilumab including but not limited to eye infection and irritation, cold sores, injection site reactions, worsening of asthma, allergic reactions and increased risk of parasitic infection.  Live vaccines should be avoided while taking dupilumab. Dupilumab will also interact with certain medications such as warfarin and cyclosporine. The patient understands that monitoring is required and they must alert us or the primary physician if symptoms of infection or other concerning signs are noted.
Olanzapine Pregnancy And Lactation Text: This medication is pregnancy category C.   There are no adequate and well controlled trials with olanzapine in pregnant females.  Olanzapine should be used during pregnancy only if the potential benefit justifies the potential risk to the fetus.   In a study in lactating healthy women, olanzapine was excreted in breast milk.  It is recommended that women taking olanzapine should not breast feed.

## 2024-02-29 ENCOUNTER — HOSPITAL ENCOUNTER (OUTPATIENT)
Dept: LAB | Facility: MEDICAL CENTER | Age: 79
End: 2024-02-29
Attending: INTERNAL MEDICINE
Payer: MEDICARE

## 2024-02-29 DIAGNOSIS — I10 PRIMARY HYPERTENSION: ICD-10-CM

## 2024-02-29 LAB
25(OH)D3 SERPL-MCNC: 49 NG/ML (ref 30–100)
CHOLEST SERPL-MCNC: 132 MG/DL (ref 100–199)
FASTING STATUS PATIENT QL REPORTED: NORMAL
HDLC SERPL-MCNC: 75 MG/DL
LDLC SERPL CALC-MCNC: 50 MG/DL
TRIGL SERPL-MCNC: 35 MG/DL (ref 0–149)
TSH SERPL DL<=0.005 MIU/L-ACNC: 1.81 UIU/ML (ref 0.38–5.33)
VIT B12 SERPL-MCNC: 461 PG/ML (ref 211–911)

## 2024-02-29 PROCEDURE — 80061 LIPID PANEL: CPT

## 2024-02-29 PROCEDURE — 84443 ASSAY THYROID STIM HORMONE: CPT

## 2024-02-29 PROCEDURE — 82607 VITAMIN B-12: CPT

## 2024-02-29 PROCEDURE — 36415 COLL VENOUS BLD VENIPUNCTURE: CPT

## 2024-02-29 PROCEDURE — 82306 VITAMIN D 25 HYDROXY: CPT

## 2024-03-25 ENCOUNTER — APPOINTMENT (RX ONLY)
Dept: URBAN - METROPOLITAN AREA CLINIC 22 | Facility: CLINIC | Age: 79
Setting detail: DERMATOLOGY
End: 2024-03-25

## 2024-03-25 PROBLEM — C44.619 BASAL CELL CARCINOMA OF SKIN OF LEFT UPPER LIMB, INCLUDING SHOULDER: Status: ACTIVE | Noted: 2024-03-25

## 2024-03-25 PROCEDURE — 12032 INTMD RPR S/A/T/EXT 2.6-7.5: CPT

## 2024-03-25 PROCEDURE — ? EXCISION

## 2024-03-25 PROCEDURE — 11602 EXC TR-EXT MAL+MARG 1.1-2 CM: CPT

## 2024-03-25 NOTE — PROCEDURE: EXCISION
Epidermal Sutures: 4-0 Prolene
Complex Repair And Double Advancement Flap Text: The defect edges were debeveled with a #15 scalpel blade.  The primary defect was closed partially with a complex linear closure.  Given the location of the remaining defect, shape of the defect and the proximity to free margins a double advancement flap was deemed most appropriate for complete closure of the defect.  Using a sterile surgical marker, an appropriate advancement flap was drawn incorporating the defect and placing the expected incisions within the relaxed skin tension lines where possible.    The area thus outlined was incised deep to adipose tissue with a #15 scalpel blade.  The skin margins were undermined to an appropriate distance in all directions utilizing iris scissors.
Show Referring Physician Variable: Yes
Keystone Flap Text: The defect edges were debeveled with a #15 scalpel blade.  Given the location of the defect, shape of the defect a keystone flap was deemed most appropriate.  Using a sterile surgical marker, an appropriate keystone flap was drawn incorporating the defect, outlining the appropriate donor tissue and placing the expected incisions within the relaxed skin tension lines where possible. The area thus outlined was incised deep to adipose tissue with a #15 scalpel blade.  The skin margins were undermined to an appropriate distance in all directions around the primary defect and laterally outward around the flap utilizing iris scissors.
Complex Repair And Dermal Autograft Text: The defect edges were debeveled with a #15 scalpel blade.  The primary defect was closed partially with a complex linear closure.  Given the location of the defect, shape of the defect and the proximity to free margins an dermal autograft was deemed most appropriate to repair the remaining defect.  The graft was trimmed to fit the size of the remaining defect.  The graft was then placed in the primary defect, oriented appropriately, and sutured into place.
Number Of Deep Sutures (Optional): 3
Saucerization Excision Additional Text (Leave Blank If You Do Not Want): The margin was drawn around the clinically apparent lesion.  Incisions were then made along these lines, in a tangential fashion, to the appropriate tissue plane and the lesion was extirpated.
Primary Defect Width (In Cm): 0
Complex Repair And M Plasty Text: The defect edges were debeveled with a #15 scalpel blade.  The primary defect was closed partially with a complex linear closure.  Given the location of the remaining defect, shape of the defect and the proximity to free margins an M plasty was deemed most appropriate for complete closure of the defect.  Using a sterile surgical marker, an appropriate advancement flap was drawn incorporating the defect and placing the expected incisions within the relaxed skin tension lines where possible.    The area thus outlined was incised deep to adipose tissue with a #15 scalpel blade.  The skin margins were undermined to an appropriate distance in all directions utilizing iris scissors.
Cartilage Graft Text: The defect edges were debeveled with a #15 scalpel blade.  Given the location of the defect, shape of the defect, the fact the defect involved a full thickness cartilage defect a cartilage graft was deemed most appropriate.  An appropriate donor site was identified, cleansed, and anesthetized. The cartilage graft was then harvested and transferred to the recipient site, oriented appropriately and then sutured into place.  The secondary defect was then repaired using a primary closure.
Hatchet Flap Text: The defect edges were debeveled with a #15 scalpel blade.  Given the location of the defect, shape of the defect and the proximity to free margins a hatchet flap was deemed most appropriate.  Using a sterile surgical marker, an appropriate hatchet flap was drawn incorporating the defect and placing the expected incisions within the relaxed skin tension lines where possible.    The area thus outlined was incised deep to adipose tissue with a #15 scalpel blade.  The skin margins were undermined to an appropriate distance in all directions utilizing iris scissors.
Slit Excision Additional Text (Leave Blank If You Do Not Want): A linear line was drawn on the skin overlying the lesion. An incision was made slowly until the lesion was visualized.  Once visualized, the lesion was removed with blunt dissection.
Bill [id number] For Specimen Handling/Conveyance To Laboratory?: no
Complex Repair And Xenograft Text: The defect edges were debeveled with a #15 scalpel blade.  The primary defect was closed partially with a complex linear closure.  Given the location of the defect, shape of the defect and the proximity to free margins a xenograft was deemed most appropriate to repair the remaining defect.  The graft was trimmed to fit the size of the remaining defect.  The graft was then placed in the primary defect, oriented appropriately, and sutured into place.
A-T Advancement Flap Text: The defect edges were debeveled with a #15 scalpel blade.  Given the location of the defect, shape of the defect and the proximity to free margins an A-T advancement flap was deemed most appropriate.  Using a sterile surgical marker, an appropriate advancement flap was drawn incorporating the defect and placing the expected incisions within the relaxed skin tension lines where possible.    The area thus outlined was incised deep to adipose tissue with a #15 scalpel blade.  The skin margins were undermined to an appropriate distance in all directions utilizing iris scissors.
Complex Repair And Transposition Flap Text: The defect edges were debeveled with a #15 scalpel blade.  The primary defect was closed partially with a complex linear closure.  Given the location of the remaining defect, shape of the defect and the proximity to free margins a transposition flap was deemed most appropriate for complete closure of the defect.  Using a sterile surgical marker, an appropriate advancement flap was drawn incorporating the defect and placing the expected incisions within the relaxed skin tension lines where possible.    The area thus outlined was incised deep to adipose tissue with a #15 scalpel blade.  The skin margins were undermined to an appropriate distance in all directions utilizing iris scissors.
Split-Thickness Skin Graft Text: The defect edges were debeveled with a #15 scalpel blade.  Given the location of the defect, shape of the defect and the proximity to free margins a split thickness skin graft was deemed most appropriate.  Using a sterile surgical marker, the primary defect shape was transferred to the donor site. The split thickness graft was then harvested.  The skin graft was then placed in the primary defect and oriented appropriately.
Estimated Blood Loss (Cc): minimal
Suture Removal: 14 days
Hemostasis: Electrocautery
Lip Wedge Excision Repair Text: Given the location of the defect and the proximity to free margins a full thickness wedge repair was deemed most appropriate.  Using a sterile surgical marker, the appropriate repair was drawn incorporating the defect and placing the expected incisions perpendicular to the vermilion border.  The vermilion border was also meticulously outlined to ensure appropriate reapproximation during the repair.  The area thus outlined was incised through and through with a #15 scalpel blade.  The muscularis and dermis were reaproximated with deep sutures following hemostasis. Care was taken to realign the vermilion border before proceeding with the superficial closure.  Once the vermilion was realigned the superfical and mucosal closure was finished.
Repair Type: Intermediate
M-Plasty Intermediate Repair Preamble Text (Leave Blank If You Do Not Want): Undermining was performed with blunt dissection.
Burow's Advancement Flap Text: The defect edges were debeveled with a #15 scalpel blade.  Given the location of the defect and the proximity to free margins a Burow's advancement flap was deemed most appropriate.  Using a sterile surgical marker, the appropriate advancement flap was drawn incorporating the defect and placing the expected incisions within the relaxed skin tension lines where possible.    The area thus outlined was incised deep to adipose tissue with a #15 scalpel blade.  The skin margins were undermined to an appropriate distance in all directions utilizing iris scissors.
Dermal Autograft Text: The defect edges were debeveled with a #15 scalpel blade.  Given the location of the defect, shape of the defect and the proximity to free margins a dermal autograft was deemed most appropriate.  Using a sterile surgical marker, the primary defect shape was transferred to the donor site. The area thus outlined was incised deep to adipose tissue with a #15 scalpel blade.  The harvested graft was then trimmed of adipose and epidermal tissue until only dermis was left.  The skin graft was then placed in the primary defect and oriented appropriately.
Bilobed Transposition Flap Text: The defect edges were debeveled with a #15 scalpel blade.  Given the location of the defect and the proximity to free margins a bilobed transposition flap was deemed most appropriate.  Using a sterile surgical marker, an appropriate bilobe flap drawn around the defect.    The area thus outlined was incised deep to adipose tissue with a #15 scalpel blade.  The skin margins were undermined to an appropriate distance in all directions utilizing iris scissors.
Double Island Pedicle Flap Text: The defect edges were debeveled with a #15 scalpel blade.  Given the location of the defect, shape of the defect and the proximity to free margins a double island pedicle advancement flap was deemed most appropriate.  Using a sterile surgical marker, an appropriate advancement flap was drawn incorporating the defect, outlining the appropriate donor tissue and placing the expected incisions within the relaxed skin tension lines where possible.    The area thus outlined was incised deep to adipose tissue with a #15 scalpel blade.  The skin margins were undermined to an appropriate distance in all directions around the primary defect and laterally outward around the island pedicle utilizing iris scissors.  There was minimal undermining beneath the pedicle flap.
Date Of Previous Biopsy (Optional): 02/23/2024
Ear Star Wedge Flap Text: The defect edges were debeveled with a #15 blade scalpel.  Given the location of the defect and the proximity to free margins (helical rim) an ear star wedge flap was deemed most appropriate.  Using a sterile surgical marker, the appropriate flap was drawn incorporating the defect and placing the expected incisions between the helical rim and antihelix where possible.  The area thus outlined was incised through and through with a #15 scalpel blade.
Crescentic Advancement Flap Text: The defect edges were debeveled with a #15 scalpel blade.  Given the location of the defect and the proximity to free margins a crescentic advancement flap was deemed most appropriate.  Using a sterile surgical marker, the appropriate advancement flap was drawn incorporating the defect and placing the expected incisions within the relaxed skin tension lines where possible.    The area thus outlined was incised deep to adipose tissue with a #15 scalpel blade.  The skin margins were undermined to an appropriate distance in all directions utilizing iris scissors.
Complex Repair And O-T Advancement Flap Text: The defect edges were debeveled with a #15 scalpel blade.  The primary defect was closed partially with a complex linear closure.  Given the location of the remaining defect, shape of the defect and the proximity to free margins an O-T advancement flap was deemed most appropriate for complete closure of the defect.  Using a sterile surgical marker, an appropriate advancement flap was drawn incorporating the defect and placing the expected incisions within the relaxed skin tension lines where possible.    The area thus outlined was incised deep to adipose tissue with a #15 scalpel blade.  The skin margins were undermined to an appropriate distance in all directions utilizing iris scissors.
Previous Accession (Optional): [id number]
Partial Purse String (Simple) Text: Given the location of the defect and the characteristics of the surrounding skin a simple purse string closure was deemed most appropriate.  Undermining was performed circumferentially around the surgical defect.  A purse string suture was then placed and tightened. Wound tension of the circular defect prevented complete closure of the wound.
Advancement Flap (Single) Text: The defect edges were debeveled with a #15 scalpel blade.  Given the location of the defect and the proximity to free margins a single advancement flap was deemed most appropriate.  Using a sterile surgical marker, an appropriate advancement flap was drawn incorporating the defect and placing the expected incisions within the relaxed skin tension lines where possible.    The area thus outlined was incised deep to adipose tissue with a #15 scalpel blade.  The skin margins were undermined to an appropriate distance in all directions utilizing iris scissors.
Partial Purse String (Intermediate) Text: Given the location of the defect and the characteristics of the surrounding skin an intermediate purse string closure was deemed most appropriate.  Undermining was performed circumferentially around the surgical defect.  A purse string suture was then placed and tightened. Wound tension of the circular defect prevented complete closure of the wound.
Epidermal Closure Graft Donor Site (Optional): simple interrupted
Deep Sutures: 4-0 Maxon
Lab Facility: 
Advancement-Rotation Flap Text: The defect edges were debeveled with a #15 scalpel blade.  Given the location of the defect, shape of the defect and the proximity to free margins an advancement-rotation flap was deemed most appropriate.  Using a sterile surgical marker, an appropriate flap was drawn incorporating the defect and placing the expected incisions within the relaxed skin tension lines where possible. The area thus outlined was incised deep to adipose tissue with a #15 scalpel blade.  The skin margins were undermined to an appropriate distance in all directions utilizing iris scissors.
X Size Of Lesion In Cm (Optional): 0.6
Epidermal Closure: running
Complex Repair And Rhombic Flap Text: The defect edges were debeveled with a #15 scalpel blade.  The primary defect was closed partially with a complex linear closure.  Given the location of the remaining defect, shape of the defect and the proximity to free margins a rhombic flap was deemed most appropriate for complete closure of the defect.  Using a sterile surgical marker, an appropriate advancement flap was drawn incorporating the defect and placing the expected incisions within the relaxed skin tension lines where possible.    The area thus outlined was incised deep to adipose tissue with a #15 scalpel blade.  The skin margins were undermined to an appropriate distance in all directions utilizing iris scissors.
Post-Care Instructions: I reviewed with the patient in detail post-care instructions. Patient is not to engage in any heavy lifting, exercise, or swimming for the next 14 days. Should the patient develop any fevers, chills, expanding redness, bleeding, purulent drainage, severe pain patient will contact the office immediately.
Perilesional Excision Additional Text (Leave Blank If You Do Not Want): The margin was drawn around the clinically apparent lesion. Incisions were then made along these lines to the appropriate tissue plane and the lesion was extirpated.
Island Pedicle Flap With Canthal Suspension Text: The defect edges were debeveled with a #15 scalpel blade.  Given the location of the defect, shape of the defect and the proximity to free margins an island pedicle advancement flap was deemed most appropriate.  Using a sterile surgical marker, an appropriate advancement flap was drawn incorporating the defect, outlining the appropriate donor tissue and placing the expected incisions within the relaxed skin tension lines where possible. The area thus outlined was incised deep to adipose tissue with a #15 scalpel blade.  The skin margins were undermined to an appropriate distance in all directions around the primary defect and laterally outward around the island pedicle utilizing iris scissors.  There was minimal undermining beneath the pedicle flap. A suspension suture was placed in the canthal tendon to prevent tension and prevent ectropion.
Ftsg Text: The defect edges were debeveled with a #15 scalpel blade.  Given the location of the defect, shape of the defect and the proximity to free margins a full thickness skin graft was deemed most appropriate.  Using a sterile surgical marker, the primary defect shape was transferred to the donor site. The area thus outlined was incised deep to adipose tissue with a #15 scalpel blade.  The harvested graft was then trimmed of adipose tissue until only dermis and epidermis was left.  The skin margins of the secondary defect were undermined to an appropriate distance in all directions utilizing iris scissors.  The secondary defect was closed with interrupted buried subcutaneous sutures.  The skin edges were then re-apposed with running  sutures.  The skin graft was then placed in the primary defect and oriented appropriately.
Curvilinear Excision Additional Text (Leave Blank If You Do Not Want): The margin was drawn around the clinically apparent lesion.  A curvilinear shape was then drawn on the skin incorporating the lesion and margins.  Incisions were then made along these lines to the appropriate tissue plane and the lesion was extirpated.
Tissue Cultured Epidermal Autograft Text: The defect edges were debeveled with a #15 scalpel blade.  Given the location of the defect, shape of the defect and the proximity to free margins a tissue cultured epidermal autograft was deemed most appropriate.  The graft was then trimmed to fit the size of the defect.  The graft was then placed in the primary defect and oriented appropriately.
Anesthesia Type: 1% lidocaine with epinephrine and a 1:10 solution of 8.4% sodium bicarbonate
Purse String (Simple) Text: Given the location of the defect and the characteristics of the surrounding skin a purse string simple closure was deemed most appropriate.  Undermining was performed circumferentially around the surgical defect.  A purse string suture was then placed and tightened.
Complex Repair And Tissue Cultured Epidermal Autograft Text: The defect edges were debeveled with a #15 scalpel blade.  The primary defect was closed partially with a complex linear closure.  Given the location of the defect, shape of the defect and the proximity to free margins an tissue cultured epidermal autograft was deemed most appropriate to repair the remaining defect.  The graft was trimmed to fit the size of the remaining defect.  The graft was then placed in the primary defect, oriented appropriately, and sutured into place.
Complex Repair And Dorsal Nasal Flap Text: The defect edges were debeveled with a #15 scalpel blade.  The primary defect was closed partially with a complex linear closure.  Given the location of the remaining defect, shape of the defect and the proximity to free margins a dorsal nasal flap was deemed most appropriate for complete closure of the defect.  Using a sterile surgical marker, an appropriate flap was drawn incorporating the defect and placing the expected incisions within the relaxed skin tension lines where possible.    The area thus outlined was incised deep to adipose tissue with a #15 scalpel blade.  The skin margins were undermined to an appropriate distance in all directions utilizing iris scissors.
Star Wedge Flap Text: The defect edges were debeveled with a #15 scalpel blade.  Given the location of the defect, shape of the defect and the proximity to free margins a star wedge flap was deemed most appropriate.  Using a sterile surgical marker, an appropriate rotation flap was drawn incorporating the defect and placing the expected incisions within the relaxed skin tension lines where possible. The area thus outlined was incised deep to adipose tissue with a #15 scalpel blade.  The skin margins were undermined to an appropriate distance in all directions utilizing iris scissors.
Excision Depth: adipose tissue
Spiral Flap Text: The defect edges were debeveled with a #15 scalpel blade.  Given the location of the defect, shape of the defect and the proximity to free margins a spiral flap was deemed most appropriate.  Using a sterile surgical marker, an appropriate rotation flap was drawn incorporating the defect and placing the expected incisions within the relaxed skin tension lines where possible. The area thus outlined was incised deep to adipose tissue with a #15 scalpel blade.  The skin margins were undermined to an appropriate distance in all directions utilizing iris scissors.
Interpolation Flap Text: A decision was made to reconstruct the defect utilizing an interpolation axial flap and a staged reconstruction.  A telfa template was made of the defect.  This telfa template was then used to outline the interpolation flap.  The donor area for the pedicle flap was then injected with anesthesia.  The flap was excised through the skin and subcutaneous tissue down to the layer of the underlying musculature.  The interpolation flap was carefully excised within this deep plane to maintain its blood supply.  The edges of the donor site were undermined.   The donor site was closed in a primary fashion.  The pedicle was then rotated into position and sutured.  Once the tube was sutured into place, adequate blood supply was confirmed with blanching and refill.  The pedicle was then wrapped with xeroform gauze and dressed appropriately with a telfa and gauze bandage to ensure continued blood supply and protect the attached pedicle.
Home Suture Removal Text: Patient was provided a home suture removal kit and will remove their sutures at home.  If they have any questions or difficulties they will call the office.
Billing Type: Third-Party Bill
Island Pedicle Flap-Requiring Vessel Identification Text: The defect edges were debeveled with a #15 scalpel blade.  Given the location of the defect, shape of the defect and the proximity to free margins an island pedicle advancement flap was deemed most appropriate.  Using a sterile surgical marker, an appropriate advancement flap was drawn, based on the axial vessel mentioned above, incorporating the defect, outlining the appropriate donor tissue and placing the expected incisions within the relaxed skin tension lines where possible.    The area thus outlined was incised deep to adipose tissue with a #15 scalpel blade.  The skin margins were undermined to an appropriate distance in all directions around the primary defect and laterally outward around the island pedicle utilizing iris scissors.  There was minimal undermining beneath the pedicle flap.
Melolabial Interpolation Flap Text: A decision was made to reconstruct the defect utilizing an interpolation axial flap and a staged reconstruction.  A telfa template was made of the defect.  This telfa template was then used to outline the melolabial interpolation flap.  The donor area for the pedicle flap was then injected with anesthesia.  The flap was excised through the skin and subcutaneous tissue down to the layer of the underlying musculature.  The pedicle flap was carefully excised within this deep plane to maintain its blood supply.  The edges of the donor site were undermined.   The donor site was closed in a primary fashion.  The pedicle was then rotated into position and sutured.  Once the tube was sutured into place, adequate blood supply was confirmed with blanching and refill.  The pedicle was then wrapped with xeroform gauze and dressed appropriately with a telfa and gauze bandage to ensure continued blood supply and protect the attached pedicle.
Complex Repair And A-T Advancement Flap Text: The defect edges were debeveled with a #15 scalpel blade.  The primary defect was closed partially with a complex linear closure.  Given the location of the remaining defect, shape of the defect and the proximity to free margins an A-T advancement flap was deemed most appropriate for complete closure of the defect.  Using a sterile surgical marker, an appropriate advancement flap was drawn incorporating the defect and placing the expected incisions within the relaxed skin tension lines where possible.    The area thus outlined was incised deep to adipose tissue with a #15 scalpel blade.  The skin margins were undermined to an appropriate distance in all directions utilizing iris scissors.
Intermediate / Complex Repair - Final Wound Length In Cm: 3.8
Lab: [id number]
V-Y Flap Text: The defect edges were debeveled with a #15 scalpel blade.  Given the location of the defect, shape of the defect and the proximity to free margins a V-Y flap was deemed most appropriate.  Using a sterile surgical marker, an appropriate advancement flap was drawn incorporating the defect and placing the expected incisions within the relaxed skin tension lines where possible.    The area thus outlined was incised deep to adipose tissue with a #15 scalpel blade.  The skin margins were undermined to an appropriate distance in all directions utilizing iris scissors.
Fusiform Excision Additional Text (Leave Blank If You Do Not Want): The margin was drawn around the clinically apparent lesion.  A fusiform shape was then drawn on the skin incorporating the lesion and margins.  Incisions were then made along these lines to the appropriate tissue plane and the lesion was extirpated.
Complex Repair And Bilobe Flap Text: The defect edges were debeveled with a #15 scalpel blade.  The primary defect was closed partially with a complex linear closure.  Given the location of the remaining defect, shape of the defect and the proximity to free margins a bilobe flap was deemed most appropriate for complete closure of the defect.  Using a sterile surgical marker, an appropriate advancement flap was drawn incorporating the defect and placing the expected incisions within the relaxed skin tension lines where possible.    The area thus outlined was incised deep to adipose tissue with a #15 scalpel blade.  The skin margins were undermined to an appropriate distance in all directions utilizing iris scissors.
V-Y Plasty Text: The defect edges were debeveled with a #15 scalpel blade.  Given the location of the defect, shape of the defect and the proximity to free margins an V-Y advancement flap was deemed most appropriate.  Using a sterile surgical marker, an appropriate advancement flap was drawn incorporating the defect and placing the expected incisions within the relaxed skin tension lines where possible.    The area thus outlined was incised deep to adipose tissue with a #15 scalpel blade.  The skin margins were undermined to an appropriate distance in all directions utilizing iris scissors.
Rotation Flap Text: The defect edges were debeveled with a #15 scalpel blade.  Given the location of the defect, shape of the defect and the proximity to free margins a rotation flap was deemed most appropriate.  Using a sterile surgical marker, an appropriate rotation flap was drawn incorporating the defect and placing the expected incisions within the relaxed skin tension lines where possible.    The area thus outlined was incised deep to adipose tissue with a #15 scalpel blade.  The skin margins were undermined to an appropriate distance in all directions utilizing iris scissors.
Double M-Plasty Complex Repair Preamble Text (Leave Blank If You Do Not Want): Extensive wide undermining was performed.
No Repair - Repaired With Adjacent Surgical Defect Text (Leave Blank If You Do Not Want): After the excision the defect was repaired concurrently with another surgical defect which was in close approximation.
Z Plasty Text: The lesion was extirpated to the level of the fat with a #15 scalpel blade.  Given the location of the defect, shape of the defect and the proximity to free margins a Z-plasty was deemed most appropriate for repair.  Using a sterile surgical marker, the appropriate transposition arms of the Z-plasty were drawn incorporating the defect and placing the expected incisions within the relaxed skin tension lines where possible.    The area thus outlined was incised deep to adipose tissue with a #15 scalpel blade.  The skin margins were undermined to an appropriate distance in all directions utilizing iris scissors.  The opposing transposition arms were then transposed into place in opposite direction and anchored with interrupted buried subcutaneous sutures.
Anesthesia Volume In Cc: 7
Transposition Flap Text: The defect edges were debeveled with a #15 scalpel blade.  Given the location of the defect and the proximity to free margins a transposition flap was deemed most appropriate.  Using a sterile surgical marker, an appropriate transposition flap was drawn incorporating the defect.    The area thus outlined was incised deep to adipose tissue with a #15 scalpel blade.  The skin margins were undermined to an appropriate distance in all directions utilizing iris scissors.
Cheek-To-Nose Interpolation Flap Text: A decision was made to reconstruct the defect utilizing an interpolation axial flap and a staged reconstruction.  A telfa template was made of the defect.  This telfa template was then used to outline the Cheek-To-Nose Interpolation flap.  The donor area for the pedicle flap was then injected with anesthesia.  The flap was excised through the skin and subcutaneous tissue down to the layer of the underlying musculature.  The interpolation flap was carefully excised within this deep plane to maintain its blood supply.  The edges of the donor site were undermined.   The donor site was closed in a primary fashion.  The pedicle was then rotated into position and sutured.  Once the tube was sutured into place, adequate blood supply was confirmed with blanching and refill.  The pedicle was then wrapped with xeroform gauze and dressed appropriately with a telfa and gauze bandage to ensure continued blood supply and protect the attached pedicle.
Dorsal Nasal Flap Text: The defect edges were debeveled with a #15 scalpel blade.  Given the location of the defect and the proximity to free margins a dorsal nasal flap was deemed most appropriate.  Using a sterile surgical marker, an appropriate dorsal nasal flap was drawn around the defect.    The area thus outlined was incised deep to adipose tissue with a #15 scalpel blade.  The skin margins were undermined to an appropriate distance in all directions utilizing iris scissors.
Graft Donor Site Bandage (Optional-Leave Blank If You Don't Want In Note): Steri-strips and a pressure bandage were applied to the donor site.
Scalpel Size: 15 blade
Mercedes Flap Text: The defect edges were debeveled with a #15 scalpel blade.  Given the location of the defect, shape of the defect and the proximity to free margins a Mercedes flap was deemed most appropriate.  Using a sterile surgical marker, an appropriate advancement flap was drawn incorporating the defect and placing the expected incisions within the relaxed skin tension lines where possible. The area thus outlined was incised deep to adipose tissue with a #15 scalpel blade.  The skin margins were undermined to an appropriate distance in all directions utilizing iris scissors.
Complex Repair And Single Advancement Flap Text: The defect edges were debeveled with a #15 scalpel blade.  The primary defect was closed partially with a complex linear closure.  Given the location of the remaining defect, shape of the defect and the proximity to free margins a single advancement flap was deemed most appropriate for complete closure of the defect.  Using a sterile surgical marker, an appropriate advancement flap was drawn incorporating the defect and placing the expected incisions within the relaxed skin tension lines where possible.    The area thus outlined was incised deep to adipose tissue with a #15 scalpel blade.  The skin margins were undermined to an appropriate distance in all directions utilizing iris scissors.
Repair Performed By Another Provider Text (Leave Blank If You Do Not Want): After the tissue was excised the defect was repaired by another provider.
Complex Repair And Z Plasty Text: The defect edges were debeveled with a #15 scalpel blade.  The primary defect was closed partially with a complex linear closure.  Given the location of the remaining defect, shape of the defect and the proximity to free margins a Z plasty was deemed most appropriate for complete closure of the defect.  Using a sterile surgical marker, an appropriate advancement flap was drawn incorporating the defect and placing the expected incisions within the relaxed skin tension lines where possible.    The area thus outlined was incised deep to adipose tissue with a #15 scalpel blade.  The skin margins were undermined to an appropriate distance in all directions utilizing iris scissors.
W Plasty Text: The lesion was extirpated to the level of the fat with a #15 scalpel blade.  Given the location of the defect, shape of the defect and the proximity to free margins a W-plasty was deemed most appropriate for repair.  Using a sterile surgical marker, the appropriate transposition arms of the W-plasty were drawn incorporating the defect and placing the expected incisions within the relaxed skin tension lines where possible.    The area thus outlined was incised deep to adipose tissue with a #15 scalpel blade.  The skin margins were undermined to an appropriate distance in all directions utilizing iris scissors.  The opposing transposition arms were then transposed into place in opposite direction and anchored with interrupted buried subcutaneous sutures.
Advancement Flap (Double) Text: The defect edges were debeveled with a #15 scalpel blade.  Given the location of the defect and the proximity to free margins a double advancement flap was deemed most appropriate.  Using a sterile surgical marker, the appropriate advancement flaps were drawn incorporating the defect and placing the expected incisions within the relaxed skin tension lines where possible.    The area thus outlined was incised deep to adipose tissue with a #15 scalpel blade.  The skin margins were undermined to an appropriate distance in all directions utilizing iris scissors.
O-T Plasty Text: The defect edges were debeveled with a #15 scalpel blade.  Given the location of the defect, shape of the defect and the proximity to free margins an O-T plasty was deemed most appropriate.  Using a sterile surgical marker, an appropriate O-T plasty was drawn incorporating the defect and placing the expected incisions within the relaxed skin tension lines where possible.    The area thus outlined was incised deep to adipose tissue with a #15 scalpel blade.  The skin margins were undermined to an appropriate distance in all directions utilizing iris scissors.
Excision Method: Elliptical
Elliptical Excision Additional Text (Leave Blank If You Do Not Want): The margin was drawn around the clinically apparent lesion.  An elliptical shape was then drawn on the skin incorporating the lesion and margins.  Incisions were then made along these lines to the appropriate tissue plane and the lesion was extirpated.
Cheek Interpolation Flap Text: A decision was made to reconstruct the defect utilizing an interpolation axial flap and a staged reconstruction.  A telfa template was made of the defect.  This telfa template was then used to outline the Cheek Interpolation flap.  The donor area for the pedicle flap was then injected with anesthesia.  The flap was excised through the skin and subcutaneous tissue down to the layer of the underlying musculature.  The interpolation flap was carefully excised within this deep plane to maintain its blood supply.  The edges of the donor site were undermined.   The donor site was closed in a primary fashion.  The pedicle was then rotated into position and sutured.  Once the tube was sutured into place, adequate blood supply was confirmed with blanching and refill.  The pedicle was then wrapped with xeroform gauze and dressed appropriately with a telfa and gauze bandage to ensure continued blood supply and protect the attached pedicle.
Mucosal Advancement Flap Text: Given the location of the defect, shape of the defect and the proximity to free margins a mucosal advancement flap was deemed most appropriate. Incisions were made with a 15 blade scalpel in the appropriate fashion along the cutaneous vermilion border and the mucosal lip. The remaining actinically damaged mucosal tissue was excised.  The mucosal advancement flap was then elevated to the gingival sulcus with care taken to preserve the neurovascular structures and advanced into the primary defect. Care was taken to ensure that precise realignment of the vermilion border was achieved.
Alar Island Pedicle Flap Text: The defect edges were debeveled with a #15 scalpel blade.  Given the location of the defect, shape of the defect and the proximity to the alar rim an island pedicle advancement flap was deemed most appropriate.  Using a sterile surgical marker, an appropriate advancement flap was drawn incorporating the defect, outlining the appropriate donor tissue and placing the expected incisions within the nasal ala running parallel to the alar rim. The area thus outlined was incised with a #15 scalpel blade.  The skin margins were undermined minimally to an appropriate distance in all directions around the primary defect and laterally outward around the island pedicle utilizing iris scissors.  There was minimal undermining beneath the pedicle flap.
Purse String (Intermediate) Text: Given the location of the defect and the characteristics of the surrounding skin a purse string intermediate closure was deemed most appropriate.  Undermining was performed circumfirentially around the surgical defect.  A purse string suture was then placed and tightened.
Island Pedicle Flap Text: The defect edges were debeveled with a #15 scalpel blade.  Given the location of the defect, shape of the defect and the proximity to free margins an island pedicle advancement flap was deemed most appropriate.  Using a sterile surgical marker, an appropriate advancement flap was drawn incorporating the defect, outlining the appropriate donor tissue and placing the expected incisions within the relaxed skin tension lines where possible.    The area thus outlined was incised deep to adipose tissue with a #15 scalpel blade.  The skin margins were undermined to an appropriate distance in all directions around the primary defect and laterally outward around the island pedicle utilizing iris scissors.  There was minimal undermining beneath the pedicle flap.
Skin Substitute Text: The defect edges were debeveled with a #15 scalpel blade.  Given the location of the defect, shape of the defect and the proximity to free margins a skin substitute graft was deemed most appropriate.  The graft material was trimmed to fit the size of the defect. The graft was then placed in the primary defect and oriented appropriately.
Trilobed Flap Text: The defect edges were debeveled with a #15 scalpel blade.  Given the location of the defect and the proximity to free margins a trilobed flap was deemed most appropriate.  Using a sterile surgical marker, an appropriate trilobed flap drawn around the defect.    The area thus outlined was incised deep to adipose tissue with a #15 scalpel blade.  The skin margins were undermined to an appropriate distance in all directions utilizing iris scissors.
O-T Advancement Flap Text: The defect edges were debeveled with a #15 scalpel blade.  Given the location of the defect, shape of the defect and the proximity to free margins an O-T advancement flap was deemed most appropriate.  Using a sterile surgical marker, an appropriate advancement flap was drawn incorporating the defect and placing the expected incisions within the relaxed skin tension lines where possible.    The area thus outlined was incised deep to adipose tissue with a #15 scalpel blade.  The skin margins were undermined to an appropriate distance in all directions utilizing iris scissors.
Complex Repair And Skin Substitute Graft Text: The defect edges were debeveled with a #15 scalpel blade.  The primary defect was closed partially with a complex linear closure.  Given the location of the remaining defect, shape of the defect and the proximity to free margins a skin substitute graft was deemed most appropriate to repair the remaining defect.  The graft was trimmed to fit the size of the remaining defect.  The graft was then placed in the primary defect, oriented appropriately, and sutured into place.
Complex Repair And O-L Flap Text: The defect edges were debeveled with a #15 scalpel blade.  The primary defect was closed partially with a complex linear closure.  Given the location of the remaining defect, shape of the defect and the proximity to free margins an O-L flap was deemed most appropriate for complete closure of the defect.  Using a sterile surgical marker, an appropriate flap was drawn incorporating the defect and placing the expected incisions within the relaxed skin tension lines where possible.    The area thus outlined was incised deep to adipose tissue with a #15 scalpel blade.  The skin margins were undermined to an appropriate distance in all directions utilizing iris scissors.
Dressing: pressure dressing with telfa
Complex Repair And Epidermal Autograft Text: The defect edges were debeveled with a #15 scalpel blade.  The primary defect was closed partially with a complex linear closure.  Given the location of the defect, shape of the defect and the proximity to free margins an epidermal autograft was deemed most appropriate to repair the remaining defect.  The graft was trimmed to fit the size of the remaining defect.  The graft was then placed in the primary defect, oriented appropriately, and sutured into place.
Complex Repair And Double M Plasty Text: The defect edges were debeveled with a #15 scalpel blade.  The primary defect was closed partially with a complex linear closure.  Given the location of the remaining defect, shape of the defect and the proximity to free margins a double M plasty was deemed most appropriate for complete closure of the defect.  Using a sterile surgical marker, an appropriate advancement flap was drawn incorporating the defect and placing the expected incisions within the relaxed skin tension lines where possible.    The area thus outlined was incised deep to adipose tissue with a #15 scalpel blade.  The skin margins were undermined to an appropriate distance in all directions utilizing iris scissors.
H Plasty Text: Given the location of the defect, shape of the defect and the proximity to free margins a H-plasty was deemed most appropriate for repair.  Using a sterile surgical marker, the appropriate advancement arms of the H-plasty were drawn incorporating the defect and placing the expected incisions within the relaxed skin tension lines where possible. The area thus outlined was incised deep to adipose tissue with a #15 scalpel blade. The skin margins were undermined to an appropriate distance in all directions utilizing iris scissors.  The opposing advancement arms were then advanced into place in opposite direction and anchored with interrupted buried subcutaneous sutures.
Paramedian Forehead Flap Text: A decision was made to reconstruct the defect utilizing an interpolation axial flap and a staged reconstruction.  A telfa template was made of the defect.  This telfa template was then used to outline the paramedian forehead pedicle flap.  The donor area for the pedicle flap was then injected with anesthesia.  The flap was excised through the skin and subcutaneous tissue down to the layer of the underlying musculature.  The pedicle flap was carefully excised within this deep plane to maintain its blood supply.  The edges of the donor site were undermined.   The donor site was closed in a primary fashion.  The pedicle was then rotated into position and sutured.  Once the tube was sutured into place, adequate blood supply was confirmed with blanching and refill.  The pedicle was then wrapped with xeroform gauze and dressed appropriately with a telfa and gauze bandage to ensure continued blood supply and protect the attached pedicle.
Complex Repair And Rotation Flap Text: The defect edges were debeveled with a #15 scalpel blade.  The primary defect was closed partially with a complex linear closure.  Given the location of the remaining defect, shape of the defect and the proximity to free margins a rotation flap was deemed most appropriate for complete closure of the defect.  Using a sterile surgical marker, an appropriate advancement flap was drawn incorporating the defect and placing the expected incisions within the relaxed skin tension lines where possible.    The area thus outlined was incised deep to adipose tissue with a #15 scalpel blade.  The skin margins were undermined to an appropriate distance in all directions utilizing iris scissors.
Size Of Lesion In Cm: 1.1
Melolabial Transposition Flap Text: The defect edges were debeveled with a #15 scalpel blade.  Given the location of the defect and the proximity to free margins a melolabial flap was deemed most appropriate.  Using a sterile surgical marker, an appropriate melolabial transposition flap was drawn incorporating the defect.    The area thus outlined was incised deep to adipose tissue with a #15 scalpel blade.  The skin margins were undermined to an appropriate distance in all directions utilizing iris scissors.
Modified Advancement Flap Text: The defect edges were debeveled with a #15 scalpel blade.  Given the location of the defect, shape of the defect and the proximity to free margins a modified advancement flap was deemed most appropriate.  Using a sterile surgical marker, an appropriate advancement flap was drawn incorporating the defect and placing the expected incisions within the relaxed skin tension lines where possible.    The area thus outlined was incised deep to adipose tissue with a #15 scalpel blade.  The skin margins were undermined to an appropriate distance in all directions utilizing iris scissors.
Rhombic Flap Text: The defect edges were debeveled with a #15 scalpel blade.  Given the location of the defect and the proximity to free margins a rhombic flap was deemed most appropriate.  Using a sterile surgical marker, an appropriate rhombic flap was drawn incorporating the defect.    The area thus outlined was incised deep to adipose tissue with a #15 scalpel blade.  The skin margins were undermined to an appropriate distance in all directions utilizing iris scissors.
Mastoid Interpolation Flap Text: A decision was made to reconstruct the defect utilizing an interpolation axial flap and a staged reconstruction.  A telfa template was made of the defect.  This telfa template was then used to outline the mastoid interpolation flap.  The donor area for the pedicle flap was then injected with anesthesia.  The flap was excised through the skin and subcutaneous tissue down to the layer of the underlying musculature.  The pedicle flap was carefully excised within this deep plane to maintain its blood supply.  The edges of the donor site were undermined.   The donor site was closed in a primary fashion.  The pedicle was then rotated into position and sutured.  Once the tube was sutured into place, adequate blood supply was confirmed with blanching and refill.  The pedicle was then wrapped with xeroform gauze and dressed appropriately with a telfa and gauze bandage to ensure continued blood supply and protect the attached pedicle.
Bilobed Flap Text: The defect edges were debeveled with a #15 scalpel blade.  Given the location of the defect and the proximity to free margins a bilobe flap was deemed most appropriate.  Using a sterile surgical marker, an appropriate bilobe flap drawn around the defect.    The area thus outlined was incised deep to adipose tissue with a #15 scalpel blade.  The skin margins were undermined to an appropriate distance in all directions utilizing iris scissors.
Detail Level: Detailed
Bi-Rhombic Flap Text: The defect edges were debeveled with a #15 scalpel blade.  Given the location of the defect and the proximity to free margins a bi-rhombic flap was deemed most appropriate.  Using a sterile surgical marker, an appropriate rhombic flap was drawn incorporating the defect. The area thus outlined was incised deep to adipose tissue with a #15 scalpel blade.  The skin margins were undermined to an appropriate distance in all directions utilizing iris scissors.
Excisional Biopsy Additional Text (Leave Blank If You Do Not Want): The margin was drawn around the clinically apparent lesion. An elliptical shape was then drawn on the skin incorporating the lesion and margins.  Incisions were then made along these lines to the appropriate tissue plane and the lesion was extirpated.
O-L Flap Text: The defect edges were debeveled with a #15 scalpel blade.  Given the location of the defect, shape of the defect and the proximity to free margins an O-L flap was deemed most appropriate.  Using a sterile surgical marker, an appropriate advancement flap was drawn incorporating the defect and placing the expected incisions within the relaxed skin tension lines where possible.    The area thus outlined was incised deep to adipose tissue with a #15 scalpel blade.  The skin margins were undermined to an appropriate distance in all directions utilizing iris scissors.
Muscle Hinge Flap Text: The defect edges were debeveled with a #15 scalpel blade.  Given the size, depth and location of the defect and the proximity to free margins a muscle hinge flap was deemed most appropriate.  Using a sterile surgical marker, an appropriate hinge flap was drawn incorporating the defect. The area thus outlined was incised with a #15 scalpel blade.  The skin margins were undermined to an appropriate distance in all directions utilizing iris scissors.
Xenograft Text: The defect edges were debeveled with a #15 scalpel blade.  Given the location of the defect, shape of the defect and the proximity to free margins a xenograft was deemed most appropriate.  The graft was then trimmed to fit the size of the defect.  The graft was then placed in the primary defect and oriented appropriately.
Wound Care: Petrolatum
Complex Repair And Melolabial Flap Text: The defect edges were debeveled with a #15 scalpel blade.  The primary defect was closed partially with a complex linear closure.  Given the location of the remaining defect, shape of the defect and the proximity to free margins a melolabial flap was deemed most appropriate for complete closure of the defect.  Using a sterile surgical marker, an appropriate advancement flap was drawn incorporating the defect and placing the expected incisions within the relaxed skin tension lines where possible.    The area thus outlined was incised deep to adipose tissue with a #15 scalpel blade.  The skin margins were undermined to an appropriate distance in all directions utilizing iris scissors.
Positioning (Leave Blank If You Do Not Want): The patient was placed in a comfortable position exposing the surgical site.
Posterior Auricular Interpolation Flap Text: A decision was made to reconstruct the defect utilizing an interpolation axial flap and a staged reconstruction.  A telfa template was made of the defect.  This telfa template was then used to outline the posterior auricular interpolation flap.  The donor area for the pedicle flap was then injected with anesthesia.  The flap was excised through the skin and subcutaneous tissue down to the layer of the underlying musculature.  The pedicle flap was carefully excised within this deep plane to maintain its blood supply.  The edges of the donor site were undermined.   The donor site was closed in a primary fashion.  The pedicle was then rotated into position and sutured.  Once the tube was sutured into place, adequate blood supply was confirmed with blanching and refill.  The pedicle was then wrapped with xeroform gauze and dressed appropriately with a telfa and gauze bandage to ensure continued blood supply and protect the attached pedicle.
Composite Graft Text: The defect edges were debeveled with a #15 scalpel blade.  Given the location of the defect, shape of the defect, the proximity to free margins and the fact the defect was full thickness a composite graft was deemed most appropriate.  The defect was outline and then transferred to the donor site.  A full thickness graft was then excised from the donor site. The graft was then placed in the primary defect, oriented appropriately and then sutured into place.  The secondary defect was then repaired using a primary closure.
Complex Repair And Split-Thickness Skin Graft Text: The defect edges were debeveled with a #15 scalpel blade.  The primary defect was closed partially with a complex linear closure.  Given the location of the defect, shape of the defect and the proximity to free margins a split thickness skin graft was deemed most appropriate to repair the remaining defect.  The graft was trimmed to fit the size of the remaining defect.  The graft was then placed in the primary defect, oriented appropriately, and sutured into place.
Epidermal Autograft Text: The defect edges were debeveled with a #15 scalpel blade.  Given the location of the defect, shape of the defect and the proximity to free margins an epidermal autograft was deemed most appropriate.  Using a sterile surgical marker, the primary defect shape was transferred to the donor site. The epidermal graft was then harvested.  The skin graft was then placed in the primary defect and oriented appropriately.
Consent was obtained from the patient. The risks and benefits to therapy were discussed in detail. Specifically, the risks of infection, scarring, bleeding, prolonged wound healing, incomplete removal, allergy to anesthesia, nerve injury and recurrence were addressed. Prior to the procedure, the treatment site was clearly identified and confirmed by the patient. All components of Universal Protocol/PAUSE Rule completed.
Path Notes (To The Dermatopathologist): Please check margins.
Complex Repair And V-Y Plasty Text: The defect edges were debeveled with a #15 scalpel blade.  The primary defect was closed partially with a complex linear closure.  Given the location of the remaining defect, shape of the defect and the proximity to free margins a V-Y plasty was deemed most appropriate for complete closure of the defect.  Using a sterile surgical marker, an appropriate advancement flap was drawn incorporating the defect and placing the expected incisions within the relaxed skin tension lines where possible.    The area thus outlined was incised deep to adipose tissue with a #15 scalpel blade.  The skin margins were undermined to an appropriate distance in all directions utilizing iris scissors.
Complex Repair And Ftsg Text: The defect edges were debeveled with a #15 scalpel blade.  The primary defect was closed partially with a complex linear closure.  Given the location of the defect, shape of the defect and the proximity to free margins a full thickness skin graft was deemed most appropriate to repair the remaining defect.  The graft was trimmed to fit the size of the remaining defect.  The graft was then placed in the primary defect, oriented appropriately, and sutured into place.
Helical Rim Advancement Flap Text: The defect edges were debeveled with a #15 blade scalpel.  Given the location of the defect and the proximity to free margins (helical rim) a double helical rim advancement flap was deemed most appropriate.  Using a sterile surgical marker, the appropriate advancement flaps were drawn incorporating the defect and placing the expected incisions between the helical rim and antihelix where possible.  The area thus outlined was incised through and through with a #15 scalpel blade.  With a skin hook and iris scissors, the flaps were gently and sharply undermined and freed up.
Size Of Margin In Cm: 0.3
Pre-Excision Curettage Text (Leave Blank If You Do Not Want): Prior to drawing the surgical margin the visible lesion was removed with electrodesiccation and curettage to clearly define the lesion size.
Bilateral Helical Rim Advancement Flap Text: The defect edges were debeveled with a #15 blade scalpel.  Given the location of the defect and the proximity to free margins (helical rim) a bilateral helical rim advancement flap was deemed most appropriate.  Using a sterile surgical marker, the appropriate advancement flaps were drawn incorporating the defect and placing the expected incisions between the helical rim and antihelix where possible.  The area thus outlined was incised through and through with a #15 scalpel blade.  With a skin hook and iris scissors, the flaps were gently and sharply undermined and freed up.
Complex Repair And W Plasty Text: The defect edges were debeveled with a #15 scalpel blade.  The primary defect was closed partially with a complex linear closure.  Given the location of the remaining defect, shape of the defect and the proximity to free margins a W plasty was deemed most appropriate for complete closure of the defect.  Using a sterile surgical marker, an appropriate advancement flap was drawn incorporating the defect and placing the expected incisions within the relaxed skin tension lines where possible.    The area thus outlined was incised deep to adipose tissue with a #15 scalpel blade.  The skin margins were undermined to an appropriate distance in all directions utilizing iris scissors.
Banner Transposition Flap Text: The defect edges were debeveled with a #15 scalpel blade.  Given the location of the defect and the proximity to free margins a Banner transposition flap was deemed most appropriate.  Using a sterile surgical marker, an appropriate flap drawn around the defect. The area thus outlined was incised deep to adipose tissue with a #15 scalpel blade.  The skin margins were undermined to an appropriate distance in all directions utilizing iris scissors.
Complex Repair And Modified Advancement Flap Text: The defect edges were debeveled with a #15 scalpel blade.  The primary defect was closed partially with a complex linear closure.  Given the location of the remaining defect, shape of the defect and the proximity to free margins a modified advancement flap was deemed most appropriate for complete closure of the defect.  Using a sterile surgical marker, an appropriate advancement flap was drawn incorporating the defect and placing the expected incisions within the relaxed skin tension lines where possible.    The area thus outlined was incised deep to adipose tissue with a #15 scalpel blade.  The skin margins were undermined to an appropriate distance in all directions utilizing iris scissors.
O-Z Plasty Text: The defect edges were debeveled with a #15 scalpel blade.  Given the location of the defect, shape of the defect and the proximity to free margins an O-Z plasty (double transposition flap) was deemed most appropriate.  Using a sterile surgical marker, the appropriate transposition flaps were drawn incorporating the defect and placing the expected incisions within the relaxed skin tension lines where possible.    The area thus outlined was incised deep to adipose tissue with a #15 scalpel blade.  The skin margins were undermined to an appropriate distance in all directions utilizing iris scissors.  Hemostasis was achieved with electrocautery.  The flaps were then transposed into place, one clockwise and the other counterclockwise, and anchored with interrupted buried subcutaneous sutures.
Estlander Flap (Upper To Lower Lip) Text: The defect of the lower lip was assessed and measured.  Given the location and size of the defect, an Estlander flap was deemed most appropriate. Using a sterile surgical marker, an appropriate Estlander flap was measured and drawn on the upper lip. Local anesthesia was then infiltrated. A scalpel was then used to incise the lateral aspect of the flap, through skin, muscle and mucosa, leaving the flap pedicled medially.  The flap was then rotated and positioned to fill the lower lip defect.  The flap was then sutured into place with a three layer technique, closing the orbicularis oris muscle layer with subcutaneous buried sutures, followed by a mucosal layer and an epidermal layer.
Retention Suture Text: Retention sutures were placed to support the closure and prevent dehiscence.
Information: Selecting Yes will display possible errors in your note based on the variables you have selected. This validation is only offered as a suggestion for you. PLEASE NOTE THAT THE VALIDATION TEXT WILL BE REMOVED WHEN YOU FINALIZE YOUR NOTE. IF YOU WANT TO FAX A PRELIMINARY NOTE YOU WILL NEED TO TOGGLE THIS TO 'NO' IF YOU DO NOT WANT IT IN YOUR FAXED NOTE.
Nasalis Myocutaneous Flap Text: Using a #15 blade, an incision was made around the donor flap to the level of the nasalis muscle. Wide lateral undermining was then performed in both the subcutaneous plane above the nasalis muscle, and in a submuscular plane just above periosteum. This allowed the formation of a free nasalis muscle axial pedicle which was still attached to the actual cutaneous flap, increasing its mobility and vascular viability. Hemostasis was obtained with pinpoint electrocoagulation. The flap was mobilized into position and the pivotal anchor points positioned and stabilized with buried interrupted sutures. Subcutaneous and dermal tissues were closed in a multilayered fashion with sutures. Tissue redundancies were excised, and the epidermal edges were apposed without significant tension and sutured with sutures.
Chonodrocutaneous Helical Advancement Flap Text: The defect edges were debeveled with a #15 scalpel blade.  Given the location of the defect and the proximity to free margins a chondrocutaneous helical advancement flap was deemed most appropriate.  Using a sterile surgical marker, the appropriate advancement flap was drawn incorporating the defect and placing the expected incisions within the relaxed skin tension lines where possible.    The area thus outlined was incised deep to adipose tissue with a #15 scalpel blade.  The skin margins were undermined to an appropriate distance in all directions utilizing iris scissors.
Abbe Flap (Lower To Upper Lip) Text: The defect of the upper lip was assessed and measured.  Given the location and size of the defect, an Abbe flap was deemed most appropriate. Using a sterile surgical marker, an appropriate Abbe flap was measured and drawn on the lower lip. Local anesthesia was then infiltrated. A scalpel was then used to incise the upper lip through and through the skin, vermilion, muscle and mucosa, leaving the flap pedicled on the opposite side.  The flap was then rotated and transferred to the lower lip defect.  The flap was then sutured into place with a three layer technique, closing the orbicularis oris muscle layer with subcutaneous buried sutures, followed by a mucosal layer and an epidermal layer.
Length To Time In Minutes Device Was In Place: 10
Orbicularis Oris Muscle Flap Text: The defect edges were debeveled with a #15 scalpel blade.  Given that the defect affected the competency of the oral sphincter an obicularis oris muscle flap was deemed most appropriate to restore this competency and normal muscle function.  Using a sterile surgical marker, an appropriate flap was drawn incorporating the defect. The area thus outlined was incised with a #15 scalpel blade.
O-Z Flap Text: The defect edges were debeveled with a #15 scalpel blade.  Given the location of the defect, shape of the defect and the proximity to free margins an O-Z flap was deemed most appropriate.  Using a sterile surgical marker, an appropriate transposition flap was drawn incorporating the defect and placing the expected incisions within the relaxed skin tension lines where possible. The area thus outlined was incised deep to adipose tissue with a #15 scalpel blade.  The skin margins were undermined to an appropriate distance in all directions utilizing iris scissors.
Number Of Hemigard Strips Per Side: 1
Suturegard Body: The suture ends were repeatedly re-tightened and re-clamped to achieve the desired tissue expansion.
Helical Rim Text: The closure involved the helical rim.
Pinch Graft Text: The defect edges were debeveled with a #15 scalpel blade. Given the location of the defect, shape of the defect and the proximity to free margins a pinch graft was deemed most appropriate. Using a sterile surgical marker, the primary defect shape was transferred to the donor site. The area thus outlined was incised deep to adipose tissue with a #15 scalpel blade.  The harvested graft was then trimmed of adipose tissue until only dermis and epidermis was left. The skin margins of the secondary defect were undermined to an appropriate distance in all directions utilizing iris scissors.  The secondary defect was closed with interrupted buried subcutaneous sutures.  The skin edges were then re-apposed with running  sutures.  The skin graft was then placed in the primary defect and oriented appropriately.
Double O-Z Flap Text: The defect edges were debeveled with a #15 scalpel blade.  Given the location of the defect, shape of the defect and the proximity to free margins a Double O-Z flap was deemed most appropriate.  Using a sterile surgical marker, an appropriate transposition flap was drawn incorporating the defect and placing the expected incisions within the relaxed skin tension lines where possible. The area thus outlined was incised deep to adipose tissue with a #15 scalpel blade.  The skin margins were undermined to an appropriate distance in all directions utilizing iris scissors.
Undermining Type: Entire Wound
Rectangular Flap Text: The defect edges were debeveled with a #15 scalpel blade. Given the location of the defect and the proximity to free margins a rectangular flap was deemed most appropriate. Using a sterile surgical marker, an appropriate rectangular flap was drawn incorporating the defect. The area thus outlined was incised deep to adipose tissue with a #15 scalpel blade. The skin margins were undermined to an appropriate distance in all directions utilizing iris scissors. Following this, the designed flap was carried over into the primary defect and sutured into place.
Peng Advancement Flap Text: The defect edges were debeveled with a #15 scalpel blade.  Given the location of the defect, shape of the defect and the proximity to free margins a Peng advancement flap was deemed most appropriate.  Using a sterile surgical marker, an appropriate advancement flap was drawn incorporating the defect and placing the expected incisions within the relaxed skin tension lines where possible. The area thus outlined was incised deep to adipose tissue with a #15 scalpel blade.  The skin margins were undermined to an appropriate distance in all directions utilizing iris scissors.
Burow's Graft Text: The defect edges were debeveled with a #15 scalpel blade.  Given the location of the defect, shape of the defect, the proximity to free margins and the presence of a standing cone deformity a Burow's skin graft was deemed most appropriate. The standing cone was removed and this tissue was then trimmed to the shape of the primary defect. The adipose tissue was also removed until only dermis and epidermis were left.  The skin margins of the secondary defect were undermined to an appropriate distance in all directions utilizing iris scissors.  The secondary defect was closed with interrupted buried subcutaneous sutures.  The skin edges were then re-apposed with running  sutures.  The skin graft was then placed in the primary defect and oriented appropriately.
Vermilion Border Text: The closure involved the vermilion border.
Rhomboid Transposition Flap Text: The defect edges were debeveled with a #15 scalpel blade.  Given the location of the defect and the proximity to free margins a rhomboid transposition flap was deemed most appropriate.  Using a sterile surgical marker, an appropriate rhomboid flap was drawn incorporating the defect.    The area thus outlined was incised deep to adipose tissue with a #15 scalpel blade.  The skin margins were undermined to an appropriate distance in all directions utilizing iris scissors.
Hemigard Intro: Due to skin fragility and wound tension, it was decided to use HEMIGARD adhesive retention suture devices to permit a linear closure. The skin was cleaned and dried for a 6cm distance away from the wound. Excessive hair, if present, was removed to allow for adhesion.
Hemigard Postcare Instructions: The HEMIGARD strips are to remain completely dry for at least 5-7 days.
Nostril Rim Text: The closure involved the nostril rim.
Suturegard Retention Suture: 2-0 Nylon
Debridement Text: The wound edges were debrided prior to proceeding with the closure to facilitate wound healing.
Complex Repair And Burow's Graft Text: The defect edges were debeveled with a #15 scalpel blade.  The primary defect was closed partially with a complex linear closure.  Given the location of the defect, shape of the defect, the proximity to free margins and the presence of a standing cone deformity a Burow's graft was deemed most appropriate to repair the remaining defect.  The graft was trimmed to fit the size of the remaining defect.  The graft was then placed in the primary defect, oriented appropriately, and sutured into place.
Double O-Z Plasty Text: The defect edges were debeveled with a #15 scalpel blade.  Given the location of the defect, shape of the defect and the proximity to free margins a Double O-Z plasty (double transposition flap) was deemed most appropriate.  Using a sterile surgical marker, the appropriate transposition flaps were drawn incorporating the defect and placing the expected incisions within the relaxed skin tension lines where possible. The area thus outlined was incised deep to adipose tissue with a #15 scalpel blade.  The skin margins were undermined to an appropriate distance in all directions utilizing iris scissors.  Hemostasis was achieved with electrocautery.  The flaps were then transposed into place, one clockwise and the other counterclockwise, and anchored with interrupted buried subcutaneous sutures.
Eye Clamp Note Details: An eye clamp was used during the procedure.
Bilateral Rotation Flap Text: The defect edges were debeveled with a #15 scalpel blade. Given the location of the defect, shape of the defect and the proximity to free margins a bilateral rotation flap was deemed most appropriate. Using a sterile surgical marker, an appropriate rotation flap was drawn incorporating the defect and placing the expected incisions within the relaxed skin tension lines where possible. The area thus outlined was incised deep to adipose tissue with a #15 scalpel blade. The skin margins were undermined to an appropriate distance in all directions utilizing iris scissors. Following this, the designed flap was carried over into the primary defect and sutured into place.
Saucerization Depth: dermis and superficial adipose tissue
Where Do You Want The Question To Include Opioid Counseling Located?: Case Summary Tab
Double Z Plasty Text: The lesion was extirpated to the level of the fat with a #15 scalpel blade. Given the location of the defect, shape of the defect and the proximity to free margins a double Z-plasty was deemed most appropriate for repair. Using a sterile surgical marker, the appropriate transposition arms of the double Z-plasty were drawn incorporating the defect and placing the expected incisions within the relaxed skin tension lines where possible. The area thus outlined was incised deep to adipose tissue with a #15 scalpel blade. The skin margins were undermined to an appropriate distance in all directions utilizing iris scissors. The opposing transposition arms were then transposed and carried over into place in opposite direction and anchored with interrupted buried subcutaneous sutures.
Staged Advancement Flap Text: The defect edges were debeveled with a #15 scalpel blade.  Given the location of the defect, shape of the defect and the proximity to free margins a staged advancement flap was deemed most appropriate.  Using a sterile surgical marker, an appropriate advancement flap was drawn incorporating the defect and placing the expected incisions within the relaxed skin tension lines where possible. The area thus outlined was incised deep to adipose tissue with a #15 scalpel blade.  The skin margins were undermined to an appropriate distance in all directions utilizing iris scissors.
Zygomaticofacial Flap Text: Given the location of the defect, shape of the defect and the proximity to free margins a zygomaticofacial flap was deemed most appropriate for repair.  Using a sterile surgical marker, the appropriate flap was drawn incorporating the defect and placing the expected incisions within the relaxed skin tension lines where possible. The area thus outlined was incised deep to adipose tissue with a #15 scalpel blade with preservation of a vascular pedicle.  The skin margins were undermined to an appropriate distance in all directions utilizing iris scissors.  The flap was then placed into the defect and anchored with interrupted buried subcutaneous sutures.
Mustarde Flap Text: The defect edges were debeveled with a #15 scalpel blade.  Given the size, depth and location of the defect and the proximity to free margins a Mustarde flap was deemed most appropriate.  Using a sterile surgical marker, an appropriate flap was drawn incorporating the defect. The area thus outlined was incised with a #15 scalpel blade.  The skin margins were undermined to an appropriate distance in all directions utilizing iris scissors.
Suturegard Intro: Intraoperative tissue expansion was performed, utilizing the SUTUREGARD device, in order to reduce wound tension.
Nasalis-Muscle-Based Myocutaneous Island Pedicle Flap Text: Using a #15 blade, an incision was made around the donor flap to the level of the nasalis muscle. Wide lateral undermining was then performed in both the subcutaneous plane above the nasalis muscle, and in a submuscular plane just above periosteum. This allowed the formation of a free nasalis muscle axial pedicle (based on the angular artery) which was still attached to the actual cutaneous flap, increasing its mobility and vascular viability. Hemostasis was obtained with pinpoint electrocoagulation. The flap was mobilized into position and the pivotal anchor points positioned and stabilized with buried interrupted sutures. Subcutaneous and dermal tissues were closed in a multilayered fashion with sutures. Tissue redundancies were excised, and the epidermal edges were apposed without significant tension and sutured with sutures.
Adjacent Tissue Transfer Text: The defect edges were debeveled with a #15 scalpel blade.  Given the location of the defect and the proximity to free margins an adjacent tissue transfer was deemed most appropriate.  Using a sterile surgical marker, an appropriate flap was drawn incorporating the defect and placing the expected incisions within the relaxed skin tension lines where possible.    The area thus outlined was incised deep to adipose tissue with a #15 scalpel blade.  The skin margins were undermined to an appropriate distance in all directions utilizing iris scissors.
Abbe Flap (Upper To Lower Lip) Text: The defect of the lower lip was assessed and measured.  Given the location and size of the defect, an Abbe flap was deemed most appropriate. Using a sterile surgical marker, an appropriate Abbe flap was measured and drawn on the upper lip. Local anesthesia was then infiltrated.  A scalpel was then used to incise the upper lip through and through the skin, vermilion, muscle and mucosa, leaving the flap pedicled on the opposite side.  The flap was then rotated and transferred to the lower lip defect.  The flap was then sutured into place with a three layer technique, closing the orbicularis oris muscle layer with subcutaneous buried sutures, followed by a mucosal layer and an epidermal layer.
Retention Suture Bite Size: 3 mm
Nasal Turnover Hinge Flap Text: The defect edges were debeveled with a #15 scalpel blade.  Given the size, depth, location of the defect and the defect being full thickness a nasal turnover hinge flap was deemed most appropriate.  Using a sterile surgical marker, an appropriate hinge flap was drawn incorporating the defect. The area thus outlined was incised with a #15 scalpel blade. The flap was designed to recreate the nasal mucosal lining and the alar rim. The skin margins were undermined to an appropriate distance in all directions utilizing iris scissors.

## 2024-03-27 NOTE — ASSESSMENT & PLAN NOTE
Chronic, reports she gets up 1-3 times a night to take her 1.5 year old puppy out. Controlled w/ behavior techniques, no meds.    [place]

## 2024-04-09 ENCOUNTER — APPOINTMENT (RX ONLY)
Dept: URBAN - METROPOLITAN AREA CLINIC 22 | Facility: CLINIC | Age: 79
Setting detail: DERMATOLOGY
End: 2024-04-09

## 2024-04-09 DIAGNOSIS — Z48.02 ENCOUNTER FOR REMOVAL OF SUTURES: ICD-10-CM

## 2024-04-09 PROCEDURE — ? SUTURE REMOVAL (GLOBAL PERIOD)

## 2024-04-09 ASSESSMENT — LOCATION DETAILED DESCRIPTION DERM: LOCATION DETAILED: LEFT PROXIMAL POSTERIOR UPPER ARM

## 2024-04-09 ASSESSMENT — LOCATION SIMPLE DESCRIPTION DERM: LOCATION SIMPLE: LEFT POSTERIOR UPPER ARM

## 2024-04-09 ASSESSMENT — LOCATION ZONE DERM: LOCATION ZONE: ARM

## 2024-04-09 NOTE — PROCEDURE: SUTURE REMOVAL (GLOBAL PERIOD)
Detail Level: Detailed
Add 67805 Cpt? (Important Note: In 2017 The Use Of 98785 Is Being Tracked By Cms To Determine Future Global Period Reimbursement For Global Periods): no

## 2024-04-28 DIAGNOSIS — N95.1 HOT FLASHES, MENOPAUSAL: ICD-10-CM

## 2024-04-29 RX ORDER — ESTRADIOL 0.5 MG/1
TABLET ORAL
Qty: 50 TABLET | Refills: 3 | Status: SHIPPED | OUTPATIENT
Start: 2024-04-29

## 2024-06-07 ENCOUNTER — HOSPITAL ENCOUNTER (OUTPATIENT)
Dept: LAB | Facility: MEDICAL CENTER | Age: 79
End: 2024-06-07
Attending: INTERNAL MEDICINE
Payer: MEDICARE

## 2024-06-07 LAB
ALBUMIN SERPL BCP-MCNC: 4.5 G/DL (ref 3.2–4.9)
ALBUMIN/GLOB SERPL: 2 G/DL
ALP SERPL-CCNC: 52 U/L (ref 30–99)
ALT SERPL-CCNC: 7 U/L (ref 2–50)
ANION GAP SERPL CALC-SCNC: 13 MMOL/L (ref 7–16)
AST SERPL-CCNC: 13 U/L (ref 12–45)
BASOPHILS # BLD AUTO: 0 % (ref 0–1.8)
BASOPHILS # BLD: 0 K/UL (ref 0–0.12)
BILIRUB SERPL-MCNC: 0.3 MG/DL (ref 0.1–1.5)
BUN SERPL-MCNC: 26 MG/DL (ref 8–22)
CALCIUM ALBUM COR SERPL-MCNC: 9.1 MG/DL (ref 8.5–10.5)
CALCIUM SERPL-MCNC: 9.5 MG/DL (ref 8.4–10.2)
CHLORIDE SERPL-SCNC: 103 MMOL/L (ref 96–112)
CO2 SERPL-SCNC: 23 MMOL/L (ref 20–33)
CREAT SERPL-MCNC: 0.96 MG/DL (ref 0.5–1.4)
EOSINOPHIL # BLD AUTO: 0.25 K/UL (ref 0–0.51)
EOSINOPHIL NFR BLD: 1 % (ref 0–6.9)
ERYTHROCYTE [DISTWIDTH] IN BLOOD BY AUTOMATED COUNT: 48 FL (ref 35.9–50)
GFR SERPLBLD CREATININE-BSD FMLA CKD-EPI: 60 ML/MIN/1.73 M 2
GLOBULIN SER CALC-MCNC: 2.3 G/DL (ref 1.9–3.5)
GLUCOSE SERPL-MCNC: 96 MG/DL (ref 65–99)
HCT VFR BLD AUTO: 39.1 % (ref 37–47)
HGB BLD-MCNC: 12.4 G/DL (ref 12–16)
LDH SERPL L TO P-CCNC: 130 U/L (ref 107–266)
LYMPHOCYTES # BLD AUTO: 21.08 K/UL (ref 1–4.8)
LYMPHOCYTES NFR BLD: 85 % (ref 22–41)
MANUAL DIFF BLD: NORMAL
MCH RBC QN AUTO: 30.6 PG (ref 27–33)
MCHC RBC AUTO-ENTMCNC: 31.7 G/DL (ref 32.2–35.5)
MCV RBC AUTO: 96.5 FL (ref 81.4–97.8)
MONOCYTES # BLD AUTO: 0.99 K/UL (ref 0–0.85)
MONOCYTES NFR BLD AUTO: 4 % (ref 0–13.4)
NEUTROPHILS # BLD AUTO: 2.48 K/UL (ref 1.82–7.42)
NEUTROPHILS NFR BLD: 10 % (ref 44–72)
NRBC # BLD AUTO: 0 K/UL
NRBC BLD-RTO: 0 /100 WBC (ref 0–0.2)
PLATELET # BLD AUTO: 208 K/UL (ref 164–446)
PLATELET BLD QL SMEAR: NORMAL
PMV BLD AUTO: 10.1 FL (ref 9–12.9)
POTASSIUM SERPL-SCNC: 4.2 MMOL/L (ref 3.6–5.5)
PROT SERPL-MCNC: 6.8 G/DL (ref 6–8.2)
RBC # BLD AUTO: 4.05 M/UL (ref 4.2–5.4)
RBC BLD AUTO: NORMAL
SMUDGE CELLS BLD QL SMEAR: NORMAL
SODIUM SERPL-SCNC: 139 MMOL/L (ref 135–145)
WBC # BLD AUTO: 24.8 K/UL (ref 4.8–10.8)

## 2024-06-07 PROCEDURE — 85027 COMPLETE CBC AUTOMATED: CPT

## 2024-06-07 PROCEDURE — 80053 COMPREHEN METABOLIC PANEL: CPT

## 2024-06-07 PROCEDURE — 83615 LACTATE (LD) (LDH) ENZYME: CPT

## 2024-06-07 PROCEDURE — 85007 BL SMEAR W/DIFF WBC COUNT: CPT

## 2024-06-07 PROCEDURE — 36415 COLL VENOUS BLD VENIPUNCTURE: CPT

## 2024-06-10 ENCOUNTER — APPOINTMENT (OUTPATIENT)
Dept: MEDICAL GROUP | Facility: PHYSICIAN GROUP | Age: 79
End: 2024-06-10
Payer: MEDICARE

## 2024-06-10 VITALS
BODY MASS INDEX: 29.15 KG/M2 | OXYGEN SATURATION: 95 % | DIASTOLIC BLOOD PRESSURE: 60 MMHG | WEIGHT: 158.4 LBS | RESPIRATION RATE: 16 BRPM | TEMPERATURE: 97.3 F | HEART RATE: 66 BPM | SYSTOLIC BLOOD PRESSURE: 110 MMHG | HEIGHT: 62 IN

## 2024-06-10 DIAGNOSIS — F33.0 MILD EPISODE OF RECURRENT MAJOR DEPRESSIVE DISORDER (HCC): ICD-10-CM

## 2024-06-10 DIAGNOSIS — C91.11 CHRONIC LYMPHOID LEUKEMIA IN REMISSION (HCC): ICD-10-CM

## 2024-06-10 DIAGNOSIS — B00.89 RECURRENT HERPES SIMPLEX VIRUS (HSV) INFECTION OF BUTTOCK: ICD-10-CM

## 2024-06-10 DIAGNOSIS — C44.90 NONMELANOMA SKIN CANCER: ICD-10-CM

## 2024-06-10 DIAGNOSIS — I10 PRIMARY HYPERTENSION: ICD-10-CM

## 2024-06-10 DIAGNOSIS — Z12.31 ENCOUNTER FOR SCREENING MAMMOGRAM FOR BREAST CANCER: ICD-10-CM

## 2024-06-10 PROCEDURE — 3078F DIAST BP <80 MM HG: CPT | Performed by: INTERNAL MEDICINE

## 2024-06-10 PROCEDURE — 99214 OFFICE O/P EST MOD 30 MIN: CPT | Performed by: INTERNAL MEDICINE

## 2024-06-10 PROCEDURE — 3074F SYST BP LT 130 MM HG: CPT | Performed by: INTERNAL MEDICINE

## 2024-06-10 RX ORDER — ATENOLOL 50 MG/1
50 TABLET ORAL DAILY
Qty: 100 TABLET | Refills: 3
Start: 2024-06-10

## 2024-06-10 RX ORDER — VALACYCLOVIR HYDROCHLORIDE 1 G/1
1000 TABLET, FILM COATED ORAL 2 TIMES DAILY
Qty: 28 TABLET | Refills: 0 | Status: SHIPPED | OUTPATIENT
Start: 2024-06-10 | End: 2024-06-24

## 2024-06-10 ASSESSMENT — PATIENT HEALTH QUESTIONNAIRE - PHQ9: CLINICAL INTERPRETATION OF PHQ2 SCORE: 0

## 2024-06-10 ASSESSMENT — FIBROSIS 4 INDEX: FIB4 SCORE: 1.84

## 2024-06-10 NOTE — PROGRESS NOTES
Subjective:   Chief Complaint/History of Present Illness:  Shanice Magana is a 78 y.o. female established patient who presents today to discuss medical problems as listed below. Shanice is unaccompanied for today's visit.    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  Last episode was in her early 70s about 4 to 5 years ago and recently had an outbreak.  Previously good response to coconut cream but due to ongoing symptoms would be interested in antiviral treatment.  Used Valtrex in the past without issue and agreeable to updated prescription.     Current Mild Episode of Major Depressive Disorder (Hcc)    She reports periodically using sertraline in the past for her mood.  She notes this time of the year is hard as it would be her son's 60th birthday week of , he  by suicide when he was 17 years old on .  She has good support with her daughter who is in her 50s and a sister who is in [place]. Resumed sertraline in 2023 but did not feel like it was helping so stopped it in late .    Current regimen: observing off medical therapy  Previous regimen: Sertraline 50 mg daily     Nonmelanoma skin cancer- left upper arm    She reports a nonhealing skin lesion on her left upper brachium, she thinks it has been present for about 3 years, currently looks as healed as it every has. Roughly 0.6 cm x 0.8 cm with erythematous base. Has seen dermatology in the past but not for this concern.    She saw dermatology and this lesion was excised and consistent with nonmelanoma skin cancer.  She reports that the incision was very irritated at the areas of the sutures and continues to be nonhealing which causes her concern.  She would like to transfer care to a new dermatology team.     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  Not on medications.  Stable, continue current plan of care with current medications.     She had a wbc 24.8 6/2024, +smudge cells     Hypertension    Noted in her 60s, has used monotherapy with atenolol. She has lost weight and sustained through diet and hopes she can get off this medicine.  Will start by reducing from twice daily to daily and she will monitor readings at home and notify me if blood pressure goes greater than 140/80 with regularity.    Current regimen: atenolol 50 mg daily  Previous regimen: atenolol 50 mg BID          Current Medications:  Current Outpatient Medications Ordered in Epic   Medication Sig Dispense Refill    valacyclovir (VALTREX) 1 GM Tab Take 1 Tablet by mouth 2 times a day for 14 days. 28 Tablet 0    atenolol (TENORMIN) 50 MG Tab Take 1 Tablet by mouth every day. 100 Tablet 3    estradiol (ESTRACE) 0.5 MG tablet TAKE 1/2 TABLET BY MOUTH DAILY 50 Tablet 3    pantoprazole (PROTONIX) 40 MG Tablet Delayed Response TAKE ONE TABLET BY MOUTH TWICE A  Tablet 3    triamcinolone acetonide (KENALOG) 0.1 % Cream APPLY TOPICALLY TO THE AFFECTED AREA(S) OF BODY TWO TIMES A DAY AS NEEDED FOR ITCHING, DO NOT APPLY TO FACE, AXILLA OR GROIN. 454 g 0    famotidine (PEPCID) 20 MG Tab Take 1 Tablet by mouth as needed (breakthrough heartburn). One by mouth once daily at bedtime 90 Tablet 3     No current Epic-ordered facility-administered medications on file.          Objective:   Physical Exam:    Vitals: /60 (BP Location: Left arm, Patient Position: Sitting, BP Cuff Size: Large adult)   Pulse 66   Temp 36.3 °C (97.3 °F) (Temporal)   Resp 16   Ht 1.562 m (5' 1.5\")   Wt 71.8 kg (158 lb 6.4 oz)   SpO2 95%    BMI: Body mass index is 29.44 kg/m².  Physical Exam  Constitutional:       General: She is not in acute distress.     Appearance: Normal appearance. She is not ill-appearing.   HENT:      Right Ear: External ear normal. There is no " impacted cerumen.      Left Ear: External ear normal. There is no impacted cerumen.   Eyes:      General: No scleral icterus.     Conjunctiva/sclera: Conjunctivae normal.   Cardiovascular:      Rate and Rhythm: Normal rate and regular rhythm.      Pulses: Normal pulses.   Pulmonary:      Effort: Pulmonary effort is normal. No respiratory distress.      Breath sounds: No wheezing or rhonchi.   Abdominal:      General: Bowel sounds are normal. There is no distension.      Palpations: Abdomen is soft.      Tenderness: There is no abdominal tenderness.   Musculoskeletal:      Right lower leg: No edema.      Left lower leg: No edema.   Lymphadenopathy:      Cervical: No cervical adenopathy.   Skin:     General: Skin is warm and dry.      Findings: No rash.      Comments: Previous suture line on left upper brachium with scar tissue at 3 suture sites   Psychiatric:         Mood and Affect: Mood normal.         Behavior: Behavior normal.         Thought Content: Thought content normal.         Judgment: Judgment normal.            Assessment and Plan:   Shanice is a 78 y.o. female with the following:  Problem List Items Addressed This Visit       Chronic lymphoid leukemia in remission (HCC)     Chronic ongoing issue, follow-up with oncology as recommended, no constitutional symptoms reported at this time.  She did have recent outbreak of herpes which is unusual for her as the last skin outbreak was 4 to 5 years ago.  Discussed this could be manifestation of a change with her immune system.  She will follow-up with Dr. Davidson as recommended.         Current mild episode of major depressive disorder (HCC)     Chronic ongoing problem, reports ongoing challenges with mood which she feels like is related to normal stressors in life as well as more intense life events such as a good friend who recently had a stroke.  She has been support from her daughter and a group of friends who she has lunch with regularly.  Did not feel like  the sertraline is helping when she added it back in 2023 so she went off of it and overall feels like she is coping fine without it.         Hypertension     Chronic ongoing problem, appropriate for dose reduction, will bring down twice daily dosing to daily dosing on the atenolol 50 mg.  She will monitor readings at home periodically and let me know if she is greater than 140/80 with any regularity.         Relevant Medications    atenolol (TENORMIN) 50 MG Tab    Nonmelanoma skin cancer- left upper arm     Dermatology and had lesion excised and consistent with nonmelanoma skin cancer.  She is not happy with the healing of the incision as it is raised and irritated where the sutures were present.  She did not see the dermatologist for suture removal but notes the RN was not concerned.  She would like to transfer care to a new dermatology group moving forward, will place a referral with Dr. Gibbs at [facility].         Relevant Orders    Referral to Dermatology    Recurrent herpes simplex virus (HSV) infection of buttock     New and decompensated issue, will send in valacyclovir 1 g twice daily for 2 weeks and she will update me if this does not take care of the lesions or if she has any issues with medication.  Discussed that as a recurrence then we may need to put her on a preventative or prophylactic dosing.  Also discussed that CLL as well as other stressors can cause outbreaks of herpes.         Relevant Medications    valacyclovir (VALTREX) 1 GM Tab     Other Visit Diagnoses       Encounter for screening mammogram for breast cancer        Relevant Orders    MA-SCREENING MAMMO BILAT W/TOMOSYNTHESIS W/CAD               RTC: Return in about 6 months (around 12/10/2024).    I spent a total of 32 minutes with record review, exam, communication with the patient, communication with other providers, and documentation of this encounter.    PLEASE NOTE: This dictation was created using voice recognition software.  I have made every reasonable attempt to correct obvious errors, but I expect that there are errors of grammar and possibly content that I did not discover before finalizing the note.      Salina Morgan, DO  Geriatric and Internal Medicine  [facility]

## 2024-06-10 NOTE — LETTER
[employer]  Salina Morgan D.O.  [address]  [place] [address]  Fax: [phone]   Authorization for Release/Disclosure of   Protected Health Information   Name: FÁTIMA COOPER : 1945 SSN: [id number]   Address: [address] [place] [address] [address]  [place] [address] Phone:    [phone] (home) [phone] (work)   I authorize the entity listed below to release/disclose the PHI below to:   [employer]/Salina Morgan D.O. and Salina Morgan D.O.   Provider or Entity Name:  [facility]- Dr. Paulino   Address   City, State, [facility]   Phone:      Fax:     Reason for request: continuity of care   Information to be released:    [  ] LAST COLONOSCOPY,  including any PATH REPORT and follow-up  [  ] LAST FIT/COLOGUARD RESULT [  ] LAST DEXA  [  ] LAST MAMMOGRAM  [  ] LAST PAP  [  ] LAST LABS [  ] RETINA EXAM REPORT  [  ] IMMUNIZATION RECORDS  [ x ] Release all info      [ x ] Check here and initial the line next to each item to release ALL health information INCLUDING  _____ Care and treatment for drug and / or alcohol abuse  _____ HIV testing, infection status, or AIDS  _____ Genetic Testing    DATES OF SERVICE OR TIME PERIOD TO BE DISCLOSED: ________  I understand and acknowledge that:  * This Authorization may be revoked at any time by you in writing, except if your health information has already been used or disclosed.  * Your health information that will be used or disclosed as a result of you signing this authorization could be re-disclosed by the recipient. If this occurs, your re-disclosed health information may no longer be protected by State or Federal laws.  * You may refuse to sign this Authorization. Your refusal will not affect your ability to obtain treatment.  * This Authorization becomes effective upon signing and will  on (date) __________.      If no date is indicated, this Authorization will  one (1) year from the signature date.     Name: Shanice Vaz Chino  Signature: Date:   6/10/2024     PLEASE FAX REQUESTED RECORDS BACK TO: [phone]

## 2024-06-11 NOTE — ASSESSMENT & PLAN NOTE
Dermatology and had lesion excised and consistent with nonmelanoma skin cancer.  She is not happy with the healing of the incision as it is raised and irritated where the sutures were present.  She did not see the dermatologist for suture removal but notes the RN was not concerned.  She would like to transfer care to a new dermatology group moving forward, will place a referral with Dr. Gibbs at [facility].

## 2024-06-11 NOTE — ASSESSMENT & PLAN NOTE
Chronic ongoing issue, follow-up with oncology as recommended, no constitutional symptoms reported at this time.  She did have recent outbreak of herpes which is unusual for her as the last skin outbreak was 4 to 5 years ago.  Discussed this could be manifestation of a change with her immune system.  She will follow-up with Dr. Davidson as recommended.

## 2024-06-11 NOTE — ASSESSMENT & PLAN NOTE
New and decompensated issue, will send in valacyclovir 1 g twice daily for 2 weeks and she will update me if this does not take care of the lesions or if she has any issues with medication.  Discussed that as a recurrence then we may need to put her on a preventative or prophylactic dosing.  Also discussed that CLL as well as other stressors can cause outbreaks of herpes.

## 2024-06-11 NOTE — ASSESSMENT & PLAN NOTE
Chronic ongoing problem, appropriate for dose reduction, will bring down twice daily dosing to daily dosing on the atenolol 50 mg.  She will monitor readings at home periodically and let me know if she is greater than 140/80 with any regularity.

## 2024-06-11 NOTE — ASSESSMENT & PLAN NOTE
Chronic ongoing problem, reports ongoing challenges with mood which she feels like is related to normal stressors in life as well as more intense life events such as a good friend who recently had a stroke.  She has been support from her daughter and a group of friends who she has lunch with regularly.  Did not feel like the sertraline is helping when she added it back in 2023 so she went off of it and overall feels like she is coping fine without it.

## 2024-06-20 ENCOUNTER — APPOINTMENT (OUTPATIENT)
Dept: RADIOLOGY | Facility: MEDICAL CENTER | Age: 79
End: 2024-06-20
Attending: INTERNAL MEDICINE
Payer: MEDICARE

## 2024-06-25 NOTE — ASSESSMENT & PLAN NOTE
Chronic, stable. She is not currently receiving any treatment. Following with heme/onc every 6 months to have her labs monitored.

## 2024-06-25 NOTE — ASSESSMENT & PLAN NOTE
Chronic, Stable. BP in office today is 142/70. She currently takes atenolol 50 mg daily. She has recently cut her dose to once a day instead of twice and states this is why it is likely high. She is checking her BP at home. Follow up with PCP for continued monitoring and management.

## 2024-06-25 NOTE — ASSESSMENT & PLAN NOTE
Chronic, stable. Currently takes pepcid 20 mg PRN and pantoprazole 40 mg BID. States this has well controlled her reflux. Follow up with PCP for continued monitoring.

## 2024-06-25 NOTE — ASSESSMENT & PLAN NOTE
Chronic, stable. PHQ-9 in office today is 0. States she does not feel she was ever depressed. She was just going through a lot at once. She is not currently taking any medications. She is doing well without them. Follow up with PCP as needed.

## 2024-06-26 ENCOUNTER — OFFICE VISIT (OUTPATIENT)
Dept: FAMILY PLANNING/WOMEN'S HEALTH CLINIC | Facility: PHYSICIAN GROUP | Age: 79
End: 2024-06-26
Payer: MEDICARE

## 2024-06-26 VITALS
WEIGHT: 160 LBS | HEIGHT: 62 IN | BODY MASS INDEX: 29.44 KG/M2 | SYSTOLIC BLOOD PRESSURE: 142 MMHG | DIASTOLIC BLOOD PRESSURE: 70 MMHG

## 2024-06-26 DIAGNOSIS — F33.0 MILD EPISODE OF RECURRENT MAJOR DEPRESSIVE DISORDER (HCC): ICD-10-CM

## 2024-06-26 DIAGNOSIS — I10 PRIMARY HYPERTENSION: ICD-10-CM

## 2024-06-26 DIAGNOSIS — K21.9 GASTROESOPHAGEAL REFLUX DISEASE WITHOUT ESOPHAGITIS: ICD-10-CM

## 2024-06-26 DIAGNOSIS — C91.11 CHRONIC LYMPHOID LEUKEMIA IN REMISSION (HCC): ICD-10-CM

## 2024-06-26 SDOH — ECONOMIC STABILITY: HOUSING INSECURITY: IN THE LAST 12 MONTHS, HOW MANY PLACES HAVE YOU LIVED?: 1

## 2024-06-26 SDOH — ECONOMIC STABILITY: HOUSING INSECURITY
IN THE LAST 12 MONTHS, WAS THERE A TIME WHEN YOU DID NOT HAVE A STEADY PLACE TO SLEEP OR SLEPT IN A SHELTER (INCLUDING NOW)?: NO

## 2024-06-26 SDOH — ECONOMIC STABILITY: TRANSPORTATION INSECURITY
IN THE PAST 12 MONTHS, HAS LACK OF TRANSPORTATION KEPT YOU FROM MEETINGS, WORK, OR FROM GETTING THINGS NEEDED FOR DAILY LIVING?: NO

## 2024-06-26 SDOH — ECONOMIC STABILITY: INCOME INSECURITY: IN THE LAST 12 MONTHS, WAS THERE A TIME WHEN YOU WERE NOT ABLE TO PAY THE MORTGAGE OR RENT ON TIME?: NO

## 2024-06-26 SDOH — ECONOMIC STABILITY: INCOME INSECURITY: HOW HARD IS IT FOR YOU TO PAY FOR THE VERY BASICS LIKE FOOD, HOUSING, MEDICAL CARE, AND HEATING?: NOT HARD AT ALL

## 2024-06-26 SDOH — ECONOMIC STABILITY: TRANSPORTATION INSECURITY
IN THE PAST 12 MONTHS, HAS THE LACK OF TRANSPORTATION KEPT YOU FROM MEDICAL APPOINTMENTS OR FROM GETTING MEDICATIONS?: NO

## 2024-06-26 SDOH — ECONOMIC STABILITY: FOOD INSECURITY: WITHIN THE PAST 12 MONTHS, YOU WORRIED THAT YOUR FOOD WOULD RUN OUT BEFORE YOU GOT MONEY TO BUY MORE.: NEVER TRUE

## 2024-06-26 SDOH — ECONOMIC STABILITY: FOOD INSECURITY: WITHIN THE PAST 12 MONTHS, THE FOOD YOU BOUGHT JUST DIDN'T LAST AND YOU DIDN'T HAVE MONEY TO GET MORE.: NEVER TRUE

## 2024-06-26 ASSESSMENT — PAIN SCALES - GENERAL: PAINLEVEL: NO PAIN

## 2024-06-26 ASSESSMENT — ENCOUNTER SYMPTOMS: GENERAL WELL-BEING: GOOD

## 2024-06-26 ASSESSMENT — ACTIVITIES OF DAILY LIVING (ADL): BATHING_REQUIRES_ASSISTANCE: 0

## 2024-06-26 ASSESSMENT — FIBROSIS 4 INDEX: FIB4 SCORE: 1.84

## 2024-06-26 ASSESSMENT — PATIENT HEALTH QUESTIONNAIRE - PHQ9: CLINICAL INTERPRETATION OF PHQ2 SCORE: 0
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
## 2024-06-28 ENCOUNTER — APPOINTMENT (OUTPATIENT)
Dept: RADIOLOGY | Facility: MEDICAL CENTER | Age: 79
End: 2024-06-28
Attending: INTERNAL MEDICINE
Payer: MEDICARE

## 2024-06-28 DIAGNOSIS — Z12.31 ENCOUNTER FOR SCREENING MAMMOGRAM FOR BREAST CANCER: ICD-10-CM

## 2024-06-28 PROCEDURE — 77063 BREAST TOMOSYNTHESIS BI: CPT

## 2024-07-15 DIAGNOSIS — I10 PRIMARY HYPERTENSION: ICD-10-CM

## 2024-07-15 RX ORDER — ATENOLOL 50 MG/1
50 TABLET ORAL DAILY
Qty: 100 TABLET | Refills: 3 | Status: SHIPPED | OUTPATIENT
Start: 2024-07-15

## 2024-09-04 ENCOUNTER — HOSPITAL ENCOUNTER (OUTPATIENT)
Dept: LAB | Facility: MEDICAL CENTER | Age: 79
End: 2024-09-04
Attending: INTERNAL MEDICINE
Payer: MEDICARE

## 2024-09-04 LAB
ALBUMIN SERPL BCP-MCNC: 4.2 G/DL (ref 3.2–4.9)
ALBUMIN/GLOB SERPL: 2 G/DL
ALP SERPL-CCNC: 53 U/L (ref 30–99)
ALT SERPL-CCNC: 6 U/L (ref 2–50)
ANION GAP SERPL CALC-SCNC: 10 MMOL/L (ref 7–16)
AST SERPL-CCNC: 12 U/L (ref 12–45)
BASOPHILS # BLD AUTO: 0 % (ref 0–1.8)
BASOPHILS # BLD: 0 K/UL (ref 0–0.12)
BILIRUB SERPL-MCNC: 0.3 MG/DL (ref 0.1–1.5)
BUN SERPL-MCNC: 20 MG/DL (ref 8–22)
CALCIUM ALBUM COR SERPL-MCNC: 8.8 MG/DL (ref 8.5–10.5)
CALCIUM SERPL-MCNC: 9 MG/DL (ref 8.4–10.2)
CHLORIDE SERPL-SCNC: 107 MMOL/L (ref 96–112)
CO2 SERPL-SCNC: 23 MMOL/L (ref 20–33)
CREAT SERPL-MCNC: 1.01 MG/DL (ref 0.5–1.4)
EOSINOPHIL # BLD AUTO: 0.45 K/UL (ref 0–0.51)
EOSINOPHIL NFR BLD: 2 % (ref 0–6.9)
ERYTHROCYTE [DISTWIDTH] IN BLOOD BY AUTOMATED COUNT: 50.7 FL (ref 35.9–50)
GFR SERPLBLD CREATININE-BSD FMLA CKD-EPI: 57 ML/MIN/1.73 M 2
GLOBULIN SER CALC-MCNC: 2.1 G/DL (ref 1.9–3.5)
GLUCOSE SERPL-MCNC: 86 MG/DL (ref 65–99)
HCT VFR BLD AUTO: 35.7 % (ref 37–47)
HGB BLD-MCNC: 11.3 G/DL (ref 12–16)
LDH SERPL L TO P-CCNC: 141 U/L (ref 107–266)
LYMPHOCYTES # BLD AUTO: 15.9 K/UL (ref 1–4.8)
LYMPHOCYTES NFR BLD: 71 % (ref 22–41)
MANUAL DIFF BLD: NORMAL
MCH RBC QN AUTO: 30.7 PG (ref 27–33)
MCHC RBC AUTO-ENTMCNC: 31.7 G/DL (ref 32.2–35.5)
MCV RBC AUTO: 97 FL (ref 81.4–97.8)
MONOCYTES # BLD AUTO: 0.67 K/UL (ref 0–0.85)
MONOCYTES NFR BLD AUTO: 3 % (ref 0–13.4)
NEUTROPHILS # BLD AUTO: 5.38 K/UL (ref 1.82–7.42)
NEUTROPHILS NFR BLD: 24 % (ref 44–72)
NRBC # BLD AUTO: 0 K/UL
NRBC BLD-RTO: 0 /100 WBC (ref 0–0.2)
PLATELET # BLD AUTO: 212 K/UL (ref 164–446)
PLATELET BLD QL SMEAR: NORMAL
PMV BLD AUTO: 9.3 FL (ref 9–12.9)
POTASSIUM SERPL-SCNC: 4.2 MMOL/L (ref 3.6–5.5)
PROT SERPL-MCNC: 6.3 G/DL (ref 6–8.2)
RBC # BLD AUTO: 3.68 M/UL (ref 4.2–5.4)
RBC BLD AUTO: NORMAL
SODIUM SERPL-SCNC: 140 MMOL/L (ref 135–145)
WBC # BLD AUTO: 22.4 K/UL (ref 4.8–10.8)

## 2024-09-04 PROCEDURE — 80053 COMPREHEN METABOLIC PANEL: CPT

## 2024-09-04 PROCEDURE — 83615 LACTATE (LD) (LDH) ENZYME: CPT

## 2024-09-04 PROCEDURE — 85007 BL SMEAR W/DIFF WBC COUNT: CPT

## 2024-09-04 PROCEDURE — 36415 COLL VENOUS BLD VENIPUNCTURE: CPT

## 2024-09-04 PROCEDURE — 85027 COMPLETE CBC AUTOMATED: CPT

## 2024-09-18 ENCOUNTER — OFFICE VISIT (OUTPATIENT)
Dept: MEDICAL GROUP | Facility: PHYSICIAN GROUP | Age: 79
End: 2024-09-18
Payer: MEDICARE

## 2024-09-18 VITALS
OXYGEN SATURATION: 95 % | HEART RATE: 62 BPM | RESPIRATION RATE: 16 BRPM | WEIGHT: 160 LBS | DIASTOLIC BLOOD PRESSURE: 66 MMHG | BODY MASS INDEX: 29.44 KG/M2 | SYSTOLIC BLOOD PRESSURE: 124 MMHG | TEMPERATURE: 97.5 F | HEIGHT: 62 IN

## 2024-09-18 DIAGNOSIS — M54.41 CHRONIC BILATERAL LOW BACK PAIN WITH RIGHT-SIDED SCIATICA: ICD-10-CM

## 2024-09-18 DIAGNOSIS — G89.29 CHRONIC BILATERAL LOW BACK PAIN WITH RIGHT-SIDED SCIATICA: ICD-10-CM

## 2024-09-18 DIAGNOSIS — G89.29 CHRONIC PAIN OF LEFT KNEE: ICD-10-CM

## 2024-09-18 DIAGNOSIS — F33.0 MILD EPISODE OF RECURRENT MAJOR DEPRESSIVE DISORDER (HCC): ICD-10-CM

## 2024-09-18 DIAGNOSIS — F33.1 MODERATE EPISODE OF RECURRENT MAJOR DEPRESSIVE DISORDER (HCC): ICD-10-CM

## 2024-09-18 DIAGNOSIS — M25.562 CHRONIC PAIN OF LEFT KNEE: ICD-10-CM

## 2024-09-18 PROCEDURE — 3078F DIAST BP <80 MM HG: CPT | Performed by: INTERNAL MEDICINE

## 2024-09-18 PROCEDURE — 99214 OFFICE O/P EST MOD 30 MIN: CPT | Performed by: INTERNAL MEDICINE

## 2024-09-18 PROCEDURE — 3074F SYST BP LT 130 MM HG: CPT | Performed by: INTERNAL MEDICINE

## 2024-09-18 RX ORDER — GABAPENTIN 100 MG/1
CAPSULE ORAL
Qty: 162 CAPSULE | Refills: 0 | Status: SHIPPED | OUTPATIENT
Start: 2024-09-18 | End: 2024-10-18

## 2024-09-18 RX ORDER — DULOXETIN HYDROCHLORIDE 30 MG/1
30 CAPSULE, DELAYED RELEASE ORAL DAILY
Qty: 100 CAPSULE | Refills: 3 | Status: SHIPPED | OUTPATIENT
Start: 2024-09-18

## 2024-09-18 ASSESSMENT — PATIENT HEALTH QUESTIONNAIRE - PHQ9
5. POOR APPETITE OR OVEREATING: 3 - NEARLY EVERY DAY
CLINICAL INTERPRETATION OF PHQ2 SCORE: 4
SUM OF ALL RESPONSES TO PHQ QUESTIONS 1-9: 15

## 2024-09-18 ASSESSMENT — ANXIETY QUESTIONNAIRES
6. BECOMING EASILY ANNOYED OR IRRITABLE: SEVERAL DAYS
7. FEELING AFRAID AS IF SOMETHING AWFUL MIGHT HAPPEN: SEVERAL DAYS
3. WORRYING TOO MUCH ABOUT DIFFERENT THINGS: SEVERAL DAYS
2. NOT BEING ABLE TO STOP OR CONTROL WORRYING: SEVERAL DAYS
5. BEING SO RESTLESS THAT IT IS HARD TO SIT STILL: SEVERAL DAYS
4. TROUBLE RELAXING: SEVERAL DAYS
GAD7 TOTAL SCORE: 7
1. FEELING NERVOUS, ANXIOUS, OR ON EDGE: SEVERAL DAYS

## 2024-09-18 ASSESSMENT — FIBROSIS 4 INDEX: FIB4 SCORE: 1.8

## 2024-09-18 NOTE — PROGRESS NOTES
Subjective:   Chief Complaint/History of Present Illness:  Shanice Magana is a 78 y.o. female established patient who presents today to discuss medical problems as listed below. Shanice is unaccompanied for today's visit.    History of Present Illness  The patient presents for evaluation of multiple medical concerns.    She has been experiencing persistent pain in her right external hip and left medial knee, which has recently worsened and is affecting her quality of life. Her left knee, which has been problematic for a long time, is particularly painful. She experiences a burning sensation when touching her knee and has been told that she has bone-on-bone arthritis. She has a good range of motion but experiences pain when lifting her leg into the car. She sometimes wakes up at night due to pain when rolling onto her side due to bursitis. She takes Tylenol for pain relief and occasionally drinks wine. She avoids NSAIDs due to their potential impact on her stomach. She last attended physical therapy 18 months ago, which she found exacerbated her pain. She has not tried Wellbutrin, Cymbalta, or or gabapentin.    She was previously diagnosed with sciatica, characterized by pain radiating from the hip down the leg.    She received a steroid injection in her left knee years ago, which initially provided relief but the pain gradually returned. She also underwent shoulder surgery performed by Dr. Owens.    She reports stress eating and consuming large amounts of licorice, leading to weight gain. She has noticed an increase in her food intake and has stopped taking Zoloft, which she believes may be contributing to her current symptoms. Mood doing worse.    She has a history of a bleeding ulcer and has required blood transfusions due to low blood count.        Current Medications:  Current Outpatient Medications Ordered in Epic   Medication Sig Dispense Refill    DULoxetine (CYMBALTA) 30 MG Cap DR Particles Take 1  "Capsule by mouth every day. 100 Capsule 3    gabapentin (NEURONTIN) 100 MG Cap Take 1 Capsule by mouth 2 times a day for 3 days, THEN 2 Capsules 2 times a day for 3 days, THEN 3 Capsules 2 times a day for 24 days. 162 Capsule 0    atenolol (TENORMIN) 50 MG Tab Take 1 Tablet by mouth every day. 100 Tablet 3    estradiol (ESTRACE) 0.5 MG tablet TAKE 1/2 TABLET BY MOUTH DAILY 50 Tablet 3    pantoprazole (PROTONIX) 40 MG Tablet Delayed Response TAKE ONE TABLET BY MOUTH TWICE A  Tablet 3    triamcinolone acetonide (KENALOG) 0.1 % Cream APPLY TOPICALLY TO THE AFFECTED AREA(S) OF BODY TWO TIMES A DAY AS NEEDED FOR ITCHING, DO NOT APPLY TO FACE, AXILLA OR GROIN. 454 g 0    famotidine (PEPCID) 20 MG Tab Take 1 Tablet by mouth as needed (breakthrough heartburn). One by mouth once daily at bedtime 90 Tablet 3     No current Epic-ordered facility-administered medications on file.          Objective:   Physical Exam:    Vitals: /66 (BP Location: Right arm, Patient Position: Sitting, BP Cuff Size: Large adult)   Pulse 62   Temp 36.4 °C (97.5 °F) (Temporal)   Resp 16   Ht 1.562 m (5' 1.5\")   Wt 72.6 kg (160 lb)   SpO2 95%    BMI: Body mass index is 29.74 kg/m².  Physical Exam  Constitutional:       General: She is not in acute distress.     Appearance: Normal appearance. She is not ill-appearing.   HENT:      Right Ear: External ear normal.      Left Ear: External ear normal.   Eyes:      General: No scleral icterus.     Conjunctiva/sclera: Conjunctivae normal.   Cardiovascular:      Rate and Rhythm: Normal rate and regular rhythm.      Pulses: Normal pulses.   Pulmonary:      Effort: Pulmonary effort is normal. No respiratory distress.      Breath sounds: No wheezing or rhonchi.   Abdominal:      General: Bowel sounds are normal.      Palpations: Abdomen is soft.   Musculoskeletal:         General: Tenderness and deformity present.      Comments: Pain at right trochanteric bursa and left medial joint line of " knee. Mild crepitus of left > right knee with flexion/extension.    Skin:     General: Skin is warm and dry.      Findings: No rash.   Neurological:      Gait: Gait abnormal.   Psychiatric:         Behavior: Behavior normal.         Thought Content: Thought content normal.         Judgment: Judgment normal.      Comments: Mood doing worse in the past 6-8 weeks        Depression Screening    Little interest or pleasure in doing things?  2 - more than half the days   Feeling down, depressed , or hopeless? 2 - more than half the days   Trouble falling or staying asleep, or sleeping too much?  1 - several days   Feeling tired or having little energy?  2 - more than half the days   Poor appetite or overeating?  3 - nearly every day   Feeling bad about yourself - or that you are a failure or have let yourself or your family down? 3 - nearly every day   Trouble concentrating on things, such as reading the newspaper or watching television? 2 - more than half the days   Moving or speaking so slowly that other people could have noticed.  Or the opposite - being so fidgety or restless that you have been moving around a lot more than usual?  0 - not at all   Thoughts that you would be better off dead, or of hurting yourself?  0 - not at all   Patient Health Questionnaire Score: 15         Results  Imaging  Old MRI of the neck and shoulders from 2013. Old knee MRI by Dr. Jamil in 2014 showed medial meniscus, posterior root tear, patellofemoral cartilage thinning, little cyst, little effusion. Lumbar x-ray from 11 years ago showed starting to develop a degenerative scoliotic curvature in response to degeneration of the disc between these bony vertebrae.    Assessment & Plan  1. Right trochanteric bursitis  The pain in the hip is likely due to bursitis and nerve impingement. She reports significant tenderness on palpation and difficulty with certain movements. An injection into the bursa was discussed as a potential treatment to  alleviate the pain. An updated x-ray of the lumbar spine and left knee will be ordered, only mild hip arthritis on xray from 2022. A referral to physiatry for further evaluation and management, including potential injections, will be made.    2. Left Knee Pain, medial aspect.  The knee pain, particularly in the left knee, is likely due to advanced arthritis, possibly bone-on-bone, as well as bursitis. She reports significant pain and burning sensation, especially after prolonged activity. An updated x-ray of the knee will be ordered. A referral to physiatry for further evaluation and management, including potential injections, will be made.    3. Major depressive disorder, moderate episode, currently exacerbated.  She has been off sertraline and is experiencing increased stress eating and mood disturbances. Cymbalta (duloxetine) 30 mg once daily will be initiated to address both mood and chronic pain. The potential side effects were discussed, and she was advised to monitor her response to the medication over the next few weeks.may then consider trial bupropion 75 mg twice daily if needed in several weeks.    4. 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 The dosage will be gradually increased every few days until adequate pain control is achieved. She was advised not to drive or engage in activities requiring alertness until she knows how the medication affects her. Update xray imaging, may need MRI if epidural or nerve ablation is recommended.        Assessment and Plan:   Shanice is a 78 y.o. female with the following:  Problem List Items Addressed This Visit       Moderate episode of recurrent major depressive disorder (HCC)    Relevant Medications    DULoxetine (CYMBALTA) 30 MG Cap DR Particles     Other Visit Diagnoses       Chronic bilateral low back pain with right-sided sciatica        Relevant Medications     DULoxetine (CYMBALTA) 30 MG Cap DR Particles    gabapentin (NEURONTIN) 100 MG Cap    Other Relevant Orders    DX-LUMBAR SPINE-2 OR 3 VIEWS    Referral to Pain Clinic    Chronic pain of left knee        Relevant Medications    DULoxetine (CYMBALTA) 30 MG Cap DR Particles    gabapentin (NEURONTIN) 100 MG Cap    Other Relevant Orders    DX-KNEE COMPLETE 4+ LEFT    Referral to Pain Clinic               RTC: Return in about 7 weeks (around 11/5/2024).    I spent a total of 38 minutes with record review, exam, communication with the patient, communication with other providers, and documentation of this encounter.    Verbal consent was acquired by the patient to use [employer] ambient listening note generation during this visit Yes       PLEASE NOTE: This dictation was created using voice recognition software. I have made every reasonable attempt to correct obvious errors, but I expect that there are errors of grammar and possibly content that I did not discover before finalizing the note.      Salina Morgan, DO  Geriatric and Internal Medicine  [facility]

## 2024-09-23 ENCOUNTER — HOSPITAL ENCOUNTER (OUTPATIENT)
Dept: RADIOLOGY | Facility: MEDICAL CENTER | Age: 79
End: 2024-09-23
Attending: INTERNAL MEDICINE
Payer: MEDICARE

## 2024-09-23 DIAGNOSIS — M54.41 CHRONIC BILATERAL LOW BACK PAIN WITH RIGHT-SIDED SCIATICA: ICD-10-CM

## 2024-09-23 DIAGNOSIS — G89.29 CHRONIC BILATERAL LOW BACK PAIN WITH RIGHT-SIDED SCIATICA: ICD-10-CM

## 2024-09-23 DIAGNOSIS — G89.29 CHRONIC PAIN OF LEFT KNEE: ICD-10-CM

## 2024-09-23 DIAGNOSIS — M25.562 CHRONIC PAIN OF LEFT KNEE: ICD-10-CM

## 2024-09-23 PROCEDURE — 73564 X-RAY EXAM KNEE 4 OR MORE: CPT | Mod: LT

## 2024-09-23 PROCEDURE — 72100 X-RAY EXAM L-S SPINE 2/3 VWS: CPT

## 2024-09-26 ENCOUNTER — OFFICE VISIT (OUTPATIENT)
Dept: PHYSICAL MEDICINE AND REHAB | Facility: MEDICAL CENTER | Age: 79
End: 2024-09-26
Payer: MEDICARE

## 2024-09-26 VITALS
HEIGHT: 61 IN | SYSTOLIC BLOOD PRESSURE: 144 MMHG | BODY MASS INDEX: 30.26 KG/M2 | WEIGHT: 160.27 LBS | DIASTOLIC BLOOD PRESSURE: 90 MMHG | OXYGEN SATURATION: 93 % | HEART RATE: 60 BPM | TEMPERATURE: 98.8 F

## 2024-09-26 DIAGNOSIS — G89.29 CHRONIC BILATERAL LOW BACK PAIN WITHOUT SCIATICA: ICD-10-CM

## 2024-09-26 DIAGNOSIS — G89.29 CHRONIC PAIN OF LEFT KNEE: ICD-10-CM

## 2024-09-26 DIAGNOSIS — M70.61 GREATER TROCHANTERIC BURSITIS OF RIGHT HIP: ICD-10-CM

## 2024-09-26 DIAGNOSIS — M17.12 ARTHRITIS OF LEFT KNEE: Primary | ICD-10-CM

## 2024-09-26 DIAGNOSIS — M25.562 CHRONIC PAIN OF LEFT KNEE: ICD-10-CM

## 2024-09-26 DIAGNOSIS — M54.50 CHRONIC BILATERAL LOW BACK PAIN WITHOUT SCIATICA: ICD-10-CM

## 2024-09-26 RX ORDER — DEXAMETHASONE SODIUM PHOSPHATE 4 MG/ML
4 INJECTION, SOLUTION INTRA-ARTICULAR; INTRALESIONAL; INTRAMUSCULAR; INTRAVENOUS; SOFT TISSUE ONCE
Status: COMPLETED | OUTPATIENT
Start: 2024-09-26 | End: 2024-09-26

## 2024-09-26 RX ADMIN — DEXAMETHASONE SODIUM PHOSPHATE 4 MG: 4 INJECTION, SOLUTION INTRA-ARTICULAR; INTRALESIONAL; INTRAMUSCULAR; INTRAVENOUS; SOFT TISSUE at 11:16

## 2024-09-26 ASSESSMENT — FIBROSIS 4 INDEX: FIB4 SCORE: 1.8

## 2024-09-26 ASSESSMENT — PATIENT HEALTH QUESTIONNAIRE - PHQ9
CLINICAL INTERPRETATION OF PHQ2 SCORE: 2
SUM OF ALL RESPONSES TO PHQ QUESTIONS 1-9: 8
5. POOR APPETITE OR OVEREATING: 1 - SEVERAL DAYS

## 2024-09-26 ASSESSMENT — PAIN SCALES - GENERAL: PAINLEVEL: 3=SLIGHT PAIN
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
## 2024-10-15 ENCOUNTER — PATIENT MESSAGE (OUTPATIENT)
Dept: MEDICAL GROUP | Facility: PHYSICIAN GROUP | Age: 79
End: 2024-10-15
Payer: MEDICARE

## 2024-10-15 DIAGNOSIS — M25.562 CHRONIC PAIN OF LEFT KNEE: ICD-10-CM

## 2024-10-15 DIAGNOSIS — G89.29 CHRONIC PAIN OF LEFT KNEE: ICD-10-CM

## 2024-10-15 DIAGNOSIS — M54.41 CHRONIC BILATERAL LOW BACK PAIN WITH RIGHT-SIDED SCIATICA: ICD-10-CM

## 2024-10-15 DIAGNOSIS — G89.29 CHRONIC BILATERAL LOW BACK PAIN WITH RIGHT-SIDED SCIATICA: ICD-10-CM

## 2024-10-21 RX ORDER — GABAPENTIN 300 MG/1
300 CAPSULE ORAL 2 TIMES DAILY
Qty: 200 CAPSULE | Refills: 3 | Status: SHIPPED | OUTPATIENT
Start: 2024-10-21

## 2024-10-30 ENCOUNTER — OFFICE VISIT (OUTPATIENT)
Dept: PHYSICAL MEDICINE AND REHAB | Facility: MEDICAL CENTER | Age: 79
End: 2024-10-30
Payer: MEDICARE

## 2024-10-30 ENCOUNTER — TELEPHONE (OUTPATIENT)
Dept: PHYSICAL MEDICINE AND REHAB | Facility: MEDICAL CENTER | Age: 79
End: 2024-10-30

## 2024-10-30 VITALS
HEIGHT: 61 IN | WEIGHT: 159.83 LBS | TEMPERATURE: 96.7 F | SYSTOLIC BLOOD PRESSURE: 138 MMHG | OXYGEN SATURATION: 99 % | BODY MASS INDEX: 30.18 KG/M2 | HEART RATE: 55 BPM | DIASTOLIC BLOOD PRESSURE: 100 MMHG

## 2024-10-30 DIAGNOSIS — M70.61 TROCHANTERIC BURSITIS OF RIGHT HIP: Primary | ICD-10-CM

## 2024-10-30 DIAGNOSIS — M17.12 PRIMARY OSTEOARTHRITIS OF LEFT KNEE: ICD-10-CM

## 2024-10-30 RX ORDER — DEXAMETHASONE SODIUM PHOSPHATE 4 MG/ML
4 INJECTION, SOLUTION INTRA-ARTICULAR; INTRALESIONAL; INTRAMUSCULAR; INTRAVENOUS; SOFT TISSUE ONCE
Status: COMPLETED | OUTPATIENT
Start: 2024-10-30 | End: 2024-10-30

## 2024-10-30 RX ADMIN — DEXAMETHASONE SODIUM PHOSPHATE 4 MG: 4 INJECTION, SOLUTION INTRA-ARTICULAR; INTRALESIONAL; INTRAMUSCULAR; INTRAVENOUS; SOFT TISSUE at 15:22

## 2024-10-30 RX ADMIN — Medication 7 ML: at 15:22

## 2024-10-30 ASSESSMENT — FIBROSIS 4 INDEX: FIB4 SCORE: 1.83

## 2024-10-30 ASSESSMENT — PATIENT HEALTH QUESTIONNAIRE - PHQ9
CLINICAL INTERPRETATION OF PHQ2 SCORE: 2
5. POOR APPETITE OR OVEREATING: 1 - SEVERAL DAYS
SUM OF ALL RESPONSES TO PHQ QUESTIONS 1-9: 6

## 2024-10-30 ASSESSMENT — PAIN SCALES - GENERAL: PAINLEVEL: 8=MODERATE-SEVERE PAIN

## 2024-11-05 DIAGNOSIS — J01.00 ACUTE NON-RECURRENT MAXILLARY SINUSITIS: ICD-10-CM

## 2024-11-06 ENCOUNTER — OFFICE VISIT (OUTPATIENT)
Dept: MEDICAL GROUP | Facility: PHYSICIAN GROUP | Age: 79
End: 2024-11-06
Payer: MEDICARE

## 2024-11-06 VITALS
DIASTOLIC BLOOD PRESSURE: 70 MMHG | BODY MASS INDEX: 29.72 KG/M2 | WEIGHT: 161.5 LBS | TEMPERATURE: 96.9 F | OXYGEN SATURATION: 92 % | RESPIRATION RATE: 12 BRPM | HEART RATE: 73 BPM | HEIGHT: 62 IN | SYSTOLIC BLOOD PRESSURE: 136 MMHG

## 2024-11-06 DIAGNOSIS — M25.551 RIGHT HIP PAIN: ICD-10-CM

## 2024-11-06 DIAGNOSIS — K21.9 GASTROESOPHAGEAL REFLUX DISEASE WITHOUT ESOPHAGITIS: ICD-10-CM

## 2024-11-06 DIAGNOSIS — N95.1 HOT FLASHES, MENOPAUSAL: ICD-10-CM

## 2024-11-06 DIAGNOSIS — I10 PRIMARY HYPERTENSION: ICD-10-CM

## 2024-11-06 DIAGNOSIS — C91.11 CHRONIC LYMPHOID LEUKEMIA IN REMISSION (HCC): ICD-10-CM

## 2024-11-06 DIAGNOSIS — J01.10 ACUTE NON-RECURRENT FRONTAL SINUSITIS: ICD-10-CM

## 2024-11-06 PROCEDURE — 3075F SYST BP GE 130 - 139MM HG: CPT | Performed by: INTERNAL MEDICINE

## 2024-11-06 PROCEDURE — 3078F DIAST BP <80 MM HG: CPT | Performed by: INTERNAL MEDICINE

## 2024-11-06 PROCEDURE — 99214 OFFICE O/P EST MOD 30 MIN: CPT | Performed by: INTERNAL MEDICINE

## 2024-11-06 ASSESSMENT — FIBROSIS 4 INDEX: FIB4 SCORE: 1.83

## 2024-11-06 NOTE — PROGRESS NOTES
Subjective:   Chief Complaint/History of Present Illness:  Shanice Magana is a 79 y.o. female established patient who presents today to discuss medical problems as listed below. Shanice is unaccompanied for today's visit.       History of Present Illness  The patient is a 79-year-old female with a past medical history of hypertension, GERD, sciatica, and CLL. She is here for a follow-up visit due to sinus issues that began 3 days ago.    She initially experienced excessive sneezing, which progressed to a sensation of fullness in her head, particularly in the front of her face and ears. She attempted to alleviate her symptoms with Sudafed and Tylenol, which provided minimal relief. Her symptoms are more severe on the right side of her face, but she feels pressure on both sides.   She reports no fever, nausea, vomiting, or drainage. She also reports no recent travel or exposure to sick individuals. She has experienced similar symptoms in the past, but they are not frequent.    She is currently under stress due to moving where she was also exposed to a lot of dust from boxes. She received a cortisone right hip bursa injection about 2 weeks ago and had a COVID-19 and flu shot a little over 2 weeks ago. She trialed duloxetine but this led to drowsiness so she stopped taking it and has continued with acetaminophen and gabapentin.           Current Medications:  Current Outpatient Medications Ordered in Epic   Medication Sig Dispense Refill    amoxicillin-clavulanate (AUGMENTIN) 875-125 MG Tab Take 1 Tablet by mouth 2 times a day for 7 days. 14 Tablet 0    gabapentin (NEURONTIN) 300 MG Cap Take 1 Capsule by mouth 2 times a day. 200 Capsule 3    atenolol (TENORMIN) 50 MG Tab Take 1 Tablet by mouth every day. 100 Tablet 3    estradiol (ESTRACE) 0.5 MG tablet TAKE 1/2 TABLET BY MOUTH DAILY 50 Tablet 3    pantoprazole (PROTONIX) 40 MG Tablet Delayed Response TAKE ONE TABLET BY MOUTH TWICE A  Tablet 3     "triamcinolone acetonide (KENALOG) 0.1 % Cream APPLY TOPICALLY TO THE AFFECTED AREA(S) OF BODY TWO TIMES A DAY AS NEEDED FOR ITCHING, DO NOT APPLY TO FACE, AXILLA OR GROIN. 454 g 0    famotidine (PEPCID) 20 MG Tab Take 1 Tablet by mouth as needed (breakthrough heartburn). One by mouth once daily at bedtime 90 Tablet 3     No current Epic-ordered facility-administered medications on file.          Objective:   Physical Exam:    Vitals: /70 (BP Location: Left arm, Patient Position: Sitting, BP Cuff Size: Adult)   Pulse 73   Temp 36.1 °C (96.9 °F) (Temporal)   Resp 12   Ht 1.562 m (5' 1.5\")   Wt 73.3 kg (161 lb 8 oz)   SpO2 92%    BMI: Body mass index is 30.02 kg/m².  Physical Exam  Constitutional:       Appearance: Normal appearance. She is not toxic-appearing.   HENT:      Head: Normocephalic and atraumatic.      Comments: Frontal and maxillary sinuses tender to palpation     Right Ear: Tympanic membrane and ear canal normal.      Left Ear: Tympanic membrane and ear canal normal.      Nose: Congestion present.      Mouth/Throat:      Mouth: Mucous membranes are moist.      Pharynx: No oropharyngeal exudate or posterior oropharyngeal erythema.      Comments: Mildy erythematous nasal turbinates   Eyes:      Extraocular Movements: Extraocular movements intact.      Conjunctiva/sclera: Conjunctivae normal.      Pupils: Pupils are equal, round, and reactive to light.   Cardiovascular:      Rate and Rhythm: Normal rate and regular rhythm.      Pulses: Normal pulses.      Heart sounds: Normal heart sounds.   Pulmonary:      Effort: Pulmonary effort is normal.      Breath sounds: Normal breath sounds. No stridor. No wheezing or rhonchi.   Abdominal:      General: Bowel sounds are normal.      Palpations: Abdomen is soft.      Tenderness: There is no abdominal tenderness.   Musculoskeletal:         General: No swelling. Normal range of motion.      Cervical back: Normal range of motion and neck supple. No " tenderness.   Lymphadenopathy:      Cervical: No cervical adenopathy.   Neurological:      General: No focal deficit present.      Mental Status: She is alert and oriented to person, place, and time.      Sensory: No sensory deficit.      Motor: No weakness.   Psychiatric:         Mood and Affect: Mood normal.         Behavior: Behavior normal.         Thought Content: Thought content normal.         Judgment: Judgment normal.             Assessment & Plan       Shanice is a 79 y.o. female with the followin. Acute sinusitis   -Continue Augmentin 875-125 x 7 days, this was prescribed yesterday in anticipation of this visit due to progression of symptoms. Patient was counseled to maintain adequate hydration and nutrition. She will update us if not improving.    2. Hypertension  - Continue 50 mg of atenolol daily and reports no dizziness or lightheadedness. She is maintaining a healthy diet and avoiding salt.    3. GERD  -Continue Protonix daily and famotidine as needed; Avoiding food triggers e.g. chocolate and sitting up at least 45 minutes after eating, related to her hiatal hernia.    4. Sciatica; Osteoarthritis right hip; right trochanteric bursitis  -Continue Tylenol and gabapentin as directed. Patient reports that she held Duloxetine 30 mg due to side effects of intense fatigue but is amenable to restarting after the current sinusitis symptoms subside, perhaps at lower 20 mg dose. She will update us after she completes moving. She is happy with improvement of bursitis following greater trochanter injection recently.     5. Dermatitis/dry & pruritic skin  - Continue Kenalog cream as needed; No change or worsening in symptoms    6. Hot flashes/vasomotor symptoms   -Continue estradiol oral tablet daily, she understands risks but had significant worsening of symptoms off medication and at current dose it continues to work well. Patient denies smoking or signs of clotting e.g. leg pain, dyspnea    7.  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    I spent a total of 30 minutes with record review, exam, communication with the patient, communication with other providers, and documentation of this encounter.    Verbal consent was acquired by the patient to use [employer] ambient listening note generation during this visit Yes     Patient was seen in conjunction with our geriatric fellow, Dr. Selena Young. Pertinent details of the history and examination were verified and we discussed the assessment and plan in detail. Orders were reviewed together. I agree with the documentation in the note with additions or changes made as indicated above.       PLEASE NOTE: This dictation was created using voice recognition software. I have made every reasonable attempt to correct obvious errors, but I expect that there are errors of grammar and possibly content that I did not discover before finalizing the note.      Salina Morgan, DO  Geriatric and Internal Medicine  [facility]

## 2024-12-05 ENCOUNTER — APPOINTMENT (OUTPATIENT)
Dept: PHYSICAL MEDICINE AND REHAB | Facility: MEDICAL CENTER | Age: 79
End: 2024-12-05
Payer: MEDICARE

## 2024-12-05 VITALS
TEMPERATURE: 97.7 F | DIASTOLIC BLOOD PRESSURE: 75 MMHG | HEIGHT: 61 IN | BODY MASS INDEX: 29.43 KG/M2 | WEIGHT: 155.87 LBS | OXYGEN SATURATION: 97 % | SYSTOLIC BLOOD PRESSURE: 144 MMHG | HEART RATE: 62 BPM

## 2024-12-05 DIAGNOSIS — G89.29 CHRONIC BILATERAL LOW BACK PAIN WITH RIGHT-SIDED SCIATICA: Primary | ICD-10-CM

## 2024-12-05 DIAGNOSIS — M79.605 LEFT LEG PAIN: ICD-10-CM

## 2024-12-05 DIAGNOSIS — M25.562 CHRONIC PAIN OF LEFT KNEE: ICD-10-CM

## 2024-12-05 DIAGNOSIS — G89.29 CHRONIC PAIN OF LEFT KNEE: ICD-10-CM

## 2024-12-05 DIAGNOSIS — M54.41 CHRONIC BILATERAL LOW BACK PAIN WITH RIGHT-SIDED SCIATICA: Primary | ICD-10-CM

## 2024-12-05 PROCEDURE — 99214 OFFICE O/P EST MOD 30 MIN: CPT | Performed by: STUDENT IN AN ORGANIZED HEALTH CARE EDUCATION/TRAINING PROGRAM

## 2024-12-05 PROCEDURE — 3078F DIAST BP <80 MM HG: CPT | Performed by: STUDENT IN AN ORGANIZED HEALTH CARE EDUCATION/TRAINING PROGRAM

## 2024-12-05 PROCEDURE — 1125F AMNT PAIN NOTED PAIN PRSNT: CPT | Performed by: STUDENT IN AN ORGANIZED HEALTH CARE EDUCATION/TRAINING PROGRAM

## 2024-12-05 PROCEDURE — 3077F SYST BP >= 140 MM HG: CPT | Performed by: STUDENT IN AN ORGANIZED HEALTH CARE EDUCATION/TRAINING PROGRAM

## 2024-12-05 RX ORDER — GABAPENTIN 300 MG/1
CAPSULE ORAL
Qty: 180 CAPSULE | Refills: 1 | Status: SHIPPED | OUTPATIENT
Start: 2024-12-05

## 2024-12-05 ASSESSMENT — FIBROSIS 4 INDEX: FIB4 SCORE: 1.83

## 2024-12-05 ASSESSMENT — PATIENT HEALTH QUESTIONNAIRE - PHQ9: CLINICAL INTERPRETATION OF PHQ2 SCORE: 0

## 2024-12-05 ASSESSMENT — PAIN SCALES - GENERAL: PAINLEVEL_OUTOF10: 4=SLIGHT-MODERATE PAIN

## 2024-12-05 NOTE — PROGRESS NOTES
FOLLOW-UP PATIENT VISIT    Interventional Spine and Pain  Physiatry (Physical Medicine and Rehabilitation)     Date of Service: 24   Chief Complaint:   Chief Complaint   Patient presents with    Follow-Up     Trochanteric bursitis of right hip      Patient Name: Shanice Magana   : 1945   MRN: [id number]       PRIOR HISTORY    Please see new patient note by Dr. Chiu for more details.     Interval History  Patient identification: Shanice Magana,  1945, with history of CLL, HTN, gastric ulcer, who presents today for follow-up of right hip, low back, and left knee pain. Patient was last seen on 10/30/24 where she received a right greater trochanteric bursa steroid injection. She reports that this injection does seem to have helped significantly with her right lateral thigh pain, but she has been noticing increased pain in her right posterior thigh as well as more pain in the medial left knee and the lateral left lower leg. This pain is described as burning and is worst at night.  Pain typically ranges in intensity from 4-7 out of 10. She denies any numbness but does report longstanding weakness in the right greater than left leg.  She denies any falls since she was last seen and denies any new bowel or bladder dysfunction or recent fever or chills.      Procedures:  10/30/24: Right greater trochanteric bursa steroid injection, 60-70% improvement in lateral thigh pain  24: Left knee intraarticular steroid injection, 80-90% improvement ~4 weeks        PMHx:   Past Medical History:   Diagnosis Date    Acute reaction to situational stress 2023    Depression Screening (2023)     Little interest or pleasure in doing things?  3 - nearly every day   Feeling down, depressed , or hopeless? 3 - nearly every day   Trouble falling or staying asleep, or sleeping too much?  0 - not at all   Feeling tired or having little energy?  0 - not at all   Poor appetite or overeating?  0 - not  "at all   Feeling bad about yourself - or that you are a failure o    Anemia 04/16/2021    She sees Dr. Gonzalez for GERD and hiatal hernia who monitors her lab values. Has had 2 episodes of a drop in her hgb requiring a transfusion. She says her GI thinks this might be related to her hiatal hernia. Is on protonix bid and has prn pepcid she uses on occasion.    Arthritis     slight. 4/27/21: Knees \"Very little\"    BMI 34.0-34.9,adult 09/11/2014    Breath shortness     Cancer (HCC) 2011    CLL- \"Wait and watch\"    CATARACT     surgical correction margo     Celiac disease     CLL (chronic lymphocytic leukemia) ([facility]) 03/03/2011    Asymptomatic being monitored/Dr Davidson, Q 6 months. Not on medications.     Family history of adverse effect to anesthesia     Daughter/Hard time waking     GERD (gastroesophageal reflux disease) 03/03/2011    Sees GI specialist     Heart burn 04/27/2021    GERD    Hiatal hernia 03/03/2011    Hot flashes, menopausal 03/03/2011    Hypertension 03/03/2011       PSHx:   Past Surgical History:   Procedure Laterality Date    [place] UPPER GI ENDOSCOPY,DIAGNOSIS N/A 4/28/2021    Procedure: GASTROSCOPY;  Surgeon: Mg Evans M.D.;  Location: SURGERY SAME DAY [facility];  Service: Gastroenterology    [place] UPPER GI ENDOSCOPY,BIOPSY N/A 4/28/2021    Procedure: GASTROSCOPY, WITH BIOPSY;  Surgeon: Mg Evans M.D.;  Location: SURGERY SAME DAY [facility];  Service: Gastroenterology    RECTUS REPAIR Right 11/15/2018    Procedure: RECTUS REPAIR- SUPERIOR RECTUS RECESSION, ADJUSTABLE SUTURE INFERIOR RECTUS PLICATION/RESECTION;  Surgeon: Jane Larsen M.D.;  Location: SURGERY SAME DAY [facility]S;  Service: Ophthalmology    VITRECTOMY POSTERIOR Right 1/17/2018    Procedure: VITRECTOMY POSTERIOR W/AIR FLUID EXCHANGE, ENDO LASER, 23 GAUGE SF6 GAS, CRYOTHERAPY;  Surgeon: Sae Franklin M.D.;  Location: SURGERY SAME DAY [facility];  Service: Ophthalmology    MAMMOPLASTY REDUCTION Bilateral 11/3/2015    " Procedure: MAMMOPLASTY REDUCTION;  Surgeon: Talia Barton M.D.;  Location: SURGERY [facility];  Service:     KNEE ARTHROSCOPY  9/17/2014    Performed by Dany Owens M.D. at [facility]    MENISCECTOMY  9/17/2014    Performed by Dany Owens M.D. at [facility]    SHOULDER ARTHROSCOPY W/ ROTATOR CUFF REPAIR  8/22/2013    Performed by Dany Owens M.D. at [facility]    SHOULDER DECOMPRESSION ARTHROSCOPIC  8/22/2013    Performed by Dany Owens M.D. at [facility]    CLAVICLE DISTAL EXCISION  8/22/2013    Performed by Dany Owens M.D. at [facility]    OTHER  7/2013    laser procedure right eye    CATARACT EXTRACTION WITH IOL  5/29/13    right eye    ABDOMINAL HYSTERECTOMY TOTAL  1975    CHOLECYSTECTOMY  1973    open    TONSILLECTOMY  1950    BLADDER SUSPENSION  1973, 1978    x2       Family history   Family History   Problem Relation Age of Onset    Heart Disease Mother     Hypertension Mother     Other Mother         pancreatitis    Arthritis Father     Cancer Father         Lymphoma, colon, skin    Hyperlipidemia Father     Other Father         Clogged artery in leg    Heart Disease Father         A Fib    Diabetes Sister         Pre    Other Sister         Obesity    Other Daughter         Gluten allergies    Other Son         Suicide       Medications:   Outpatient Medications Marked as Taking for the 12/5/24 encounter (Office Visit) with Tomasa Chiu M.D.   Medication Sig Dispense Refill    gabapentin (NEURONTIN) 300 MG Cap Take 1 Capsule by mouth every morning AND 2 Capsules every evening. 180 Capsule 1    atenolol (TENORMIN) 50 MG Tab Take 1 Tablet by mouth every day. 100 Tablet 3    estradiol (ESTRACE) 0.5 MG tablet TAKE 1/2 TABLET BY MOUTH DAILY 50 Tablet 3    pantoprazole (PROTONIX) 40 MG Tablet Delayed Response TAKE ONE TABLET BY MOUTH TWICE A  Tablet 3    triamcinolone acetonide (KENALOG) 0.1 % Cream  APPLY TOPICALLY TO THE AFFECTED AREA(S) OF BODY TWO TIMES A DAY AS NEEDED FOR ITCHING, DO NOT APPLY TO FACE, AXILLA OR GROIN. 454 g 0    famotidine (PEPCID) 20 MG Tab Take 1 Tablet by mouth as needed (breakthrough heartburn). One by mouth once daily at bedtime 90 Tablet 3        Current Outpatient Medications on File Prior to Visit   Medication Sig Dispense Refill    atenolol (TENORMIN) 50 MG Tab Take 1 Tablet by mouth every day. 100 Tablet 3    estradiol (ESTRACE) 0.5 MG tablet TAKE 1/2 TABLET BY MOUTH DAILY 50 Tablet 3    pantoprazole (PROTONIX) 40 MG Tablet Delayed Response TAKE ONE TABLET BY MOUTH TWICE A  Tablet 3    triamcinolone acetonide (KENALOG) 0.1 % Cream APPLY TOPICALLY TO THE AFFECTED AREA(S) OF BODY TWO TIMES A DAY AS NEEDED FOR ITCHING, DO NOT APPLY TO FACE, AXILLA OR GROIN. 454 g 0    famotidine (PEPCID) 20 MG Tab Take 1 Tablet by mouth as needed (breakthrough heartburn). One by mouth once daily at bedtime 90 Tablet 3     No current facility-administered medications on file prior to visit.       Allergies:   Allergies   Allergen Reactions    Grass Pollen(K-O-R-T-Swt [place]) Runny Nose     Sneezing, dizziniess    Other Misc      All household chemicals, soap powders, perfumes. Tongue and lips go numb, itching.    Wheat      Gluten. Stomach ache.        Social Hx:   Social History     Socioeconomic History    Marital status: Single     Spouse name: Not on file    Number of children: Not on file    Years of education: Not on file    Highest education level: Not on file   Occupational History    Not on file   Tobacco Use    Smoking status: Former     Current packs/day: 0.00     Types: Cigarettes     Quit date: 1967     Years since quittin.9    Smokeless tobacco: Never   Vaping Use    Vaping status: Never Used   Substance and Sexual Activity    Alcohol use: Yes     Alcohol/week: 0.6 - 1.2 oz     Types: 1 - 2 Glasses of wine per week     Comment: Several times a week.  21: 2-4/month     "Drug use: No    Sexual activity: Not Currently   Other Topics Concern    Not on file   Social History Narrative    Retired from medical billing. She has one daughter, 2 grandkids (one is starting med school), son in law is an ICU hospitalist at [employer]. She enjoys knitting and traveling, belongs to a travel group.  She is  after 30y of marriage, lives alone, has a good support group.      Social Drivers of Health     Financial Resource Strain: Low Risk  (6/26/2024)    Overall Financial Resource Strain (CARDIA)     Difficulty of Paying Living Expenses: Not hard at all   Food Insecurity: No Food Insecurity (6/26/2024)    Hunger Vital Sign     Worried About Running Out of Food in the Last Year: Never true     Ran Out of Food in the Last Year: Never true   Transportation Needs: No Transportation Needs (6/26/2024)    PRAPARE - Transportation     Lack of Transportation (Medical): No     Lack of Transportation (Non-Medical): No   Physical Activity: Not on file   Stress: Not on file   Social Connections: Not on file   Intimate Partner Violence: Not on file   Housing Stability: Low Risk  (6/26/2024)    Housing Stability Vital Sign     Unable to Pay for Housing in the Last Year: No     Number of Places Lived in the Last Year: 1     Unstable Housing in the Last Year: No         EXAMINATION     Physical Exam:   Vitals: BP (!) 144/75 (BP Location: Left arm, Patient Position: Sitting, BP Cuff Size: Adult)   Pulse 62   Temp 36.5 °C (97.7 °F) (Temporal)   Ht 1.549 m (5' 1\")   Wt 70.7 kg (155 lb 13.8 oz)   SpO2 97%     Constitutional:   Body Habitus: Body mass index is 29.45 kg/m².  Cooperation: Fully cooperates with exam  Appearance: Well-groomed, well-nourished  Respiratory:  Breathing comfortable on room air, no audible wheezing  Cardiovascular: Skin appears well-perfused  Psychiatric: Appropriate affect  Gait: normal gait, no use of ambulatory device, nonantalgic.    Neurologic:  Strength:    Bilateral LE 5/5 in hip " flexors, knee extensors and flexors, ankle dorsiflexors and plantarflexors  Sensation: grossly intact bilaterally dermatomes L3-S1      MEDICAL DECISION MAKING    DATA    Labs:   Lab Results   Component Value Date/Time    SODIUM 140 09/04/2024 10:17 AM    POTASSIUM 4.2 09/04/2024 10:17 AM    CHLORIDE 107 09/04/2024 10:17 AM    CO2 23 09/04/2024 10:17 AM    GLUCOSE 86 09/04/2024 10:17 AM    BUN 20 09/04/2024 10:17 AM    CREATININE 1.01 09/04/2024 10:17 AM    CREATININE 0.83 03/25/2011 11:05 AM    BUNCREATRAT 20 03/25/2011 11:05 AM    GLOMRATE >59 03/25/2011 11:05 AM        Lab Results   Component Value Date/Time    PROTHROMBTM 13.7 04/16/2021 03:38 PM    INR 1.02 04/16/2021 03:38 PM        Lab Results   Component Value Date/Time    WBC 22.4 (H) 09/04/2024 10:17 AM    WBC 10.8 (H) 03/25/2011 11:05 AM    RBC 3.68 (L) 09/04/2024 10:17 AM    RBC 4.30 03/25/2011 11:05 AM    HEMOGLOBIN 11.3 (L) 09/04/2024 10:17 AM    HEMATOCRIT 35.7 (L) 09/04/2024 10:17 AM    MCV 97.0 09/04/2024 10:17 AM    MCV 95 03/25/2011 11:05 AM    MCH 30.7 09/04/2024 10:17 AM    MCH 31.2 03/25/2011 11:05 AM    MCHC 31.7 (L) 09/04/2024 10:17 AM    MPV 9.3 09/04/2024 10:17 AM    NEUTSPOLYS 24.00 (L) 09/04/2024 10:17 AM    LYMPHOCYTES 71.00 (H) 09/04/2024 10:17 AM    MONOCYTES 3.00 09/04/2024 10:17 AM    EOSINOPHILS 2.00 09/04/2024 10:17 AM    BASOPHILS 0.00 09/04/2024 10:17 AM    HYPOCHROMIA 1+ 06/07/2021 10:34 AM    ANISOCYTOSIS 1+ 02/13/2023 12:31 PM        Lab Results   Component Value Date/Time    HBA1C 5.5 01/27/2022 12:54 PM          Imaging:   Prior personal imaging review:  Right hip x-ray 5/17/22: Mild joint space narrowing, evidence of CAM deformity.      I reviewed the following radiology reports              Results for orders placed during the hospital encounter of 10/15/16    MR-LUMBAR SPINE-W/O    Impression  1.  Multilevel multifactorial degenerative changes of the lumbar and thoracic spine  2.  Moderate central canal narrowing at  L4-L5  3.  Mild BILATERAL neural foraminal narrowing at L4-L5  4.  LEFT L4-L5 anterior medial synovial cyst contributes to central canal narrowing and displaces some of the nerve roots of the cauda equina, most conspicuously the traversing LEFT L5 nerve root  5.  BILATERAL sacroiliac arthropathy                                 Results for orders placed during the hospital encounter of 09/23/24    DX-KNEE COMPLETE 4+ LEFT    Impression  Tricompartment degenerative change which involves the medial femorotibial articulation to the greatest degree.   Results for orders placed during the hospital encounter of 09/23/24    DX-LUMBAR SPINE-2 OR 3 VIEWS    Impression  1.  Multilevel degenerative disc disease and facet degeneration.    2.  Grade 1 anterior subluxation at L4-5.    3.  Convex left scoliotic curvature.         DIAGNOSIS   Visit Diagnoses     ICD-10-CM   1. Chronic bilateral low back pain with right-sided sciatica  M54.41    G89.29   2. Chronic pain of left knee  M25.562    G89.29   3. Left leg pain  M79.605           ASSESSMENT and PLAN:     Shanice Magana is a 79 y.o. female who returns to clinic for follow-up of right lateral hip, posterior thigh, and left knee and lower leg pain.    Shanice was seen today for follow-up.    Diagnoses and all orders for this visit:    Chronic bilateral low back pain with right-sided sciatica  -     gabapentin (NEURONTIN) 300 MG Cap; Take 1 Capsule by mouth every morning AND 2 Capsules every evening.  -     MR-LUMBAR SPINE-W/O; Future    Chronic pain of left knee  -     gabapentin (NEURONTIN) 300 MG Cap; Take 1 Capsule by mouth every morning AND 2 Capsules every evening.  -     Referral to EMG - Physiatry (PMR)    Left leg pain  -     Referral to EMG - Physiatry (PMR)        Patient presents for follow-up of chronic moderate to severe right lateral hip and posterior thigh pain as well as burning pain in the left knee and lower leg.  She does report improvement of her right  lateral thigh pain after right greater trochanteric steroid injection performed on 10/30/2024, but she reports that her lateral hip and posterior thigh pain has been more bothersome and has been refractory to greater than 6 weeks of physician guided home exercise program.  Given chronicity of pain as well as patient's age, at this point I believe it is appropriate to obtain MRI of the lumbar spine in order to evaluate for evidence of focal pathology.  In addition, patient unfortunately has had recurrence of her left knee pain, with left knee steroid injection pain relief lasting for around 4 to 6 weeks.  Given the burning pain in her left lateral lower leg as well, I would also like to obtain EMG of the left lower extremity in order to evaluate for evidence of radiculopathy or peroneal nerve compression.  Depending on the results of this, I would consider patient for genicular nerve block given failure of knee steroid injection and ongoing pain.  Finally, we discussed that for the burning pain that is worse at night, patient can increase her nighttime dose of gabapentin to 600 mg, while keeping the morning dose at 300 mg.    Follow up: In approximately 2 weeks after MRI of the lumbar spine and EMG of the left lower extremity    Thank you for allowing me to participate in the care of this patient. If you have any questions please not hesitate to contact me.         Please note that this dictation was created using voice recognition software. I have made every reasonable attempt to correct obvious errors but there may be errors of grammar and content that I may have overlooked prior to finalization of this note.    Tomasa Chiu MD  Interventional Spine and Sports Physiatry  Physical Medicine and Rehabilitation  [facility]

## 2024-12-06 ENCOUNTER — APPOINTMENT (OUTPATIENT)
Dept: RADIOLOGY | Facility: MEDICAL CENTER | Age: 79
End: 2024-12-06
Attending: STUDENT IN AN ORGANIZED HEALTH CARE EDUCATION/TRAINING PROGRAM
Payer: MEDICARE

## 2024-12-06 DIAGNOSIS — M54.41 CHRONIC BILATERAL LOW BACK PAIN WITH RIGHT-SIDED SCIATICA: ICD-10-CM

## 2024-12-06 DIAGNOSIS — G89.29 CHRONIC BILATERAL LOW BACK PAIN WITH RIGHT-SIDED SCIATICA: ICD-10-CM

## 2024-12-06 PROCEDURE — 72148 MRI LUMBAR SPINE W/O DYE: CPT

## 2024-12-13 ENCOUNTER — OFFICE VISIT (OUTPATIENT)
Dept: PHYSICAL MEDICINE AND REHAB | Facility: MEDICAL CENTER | Age: 79
End: 2024-12-13
Payer: MEDICARE

## 2024-12-13 VITALS
HEIGHT: 61 IN | HEART RATE: 65 BPM | TEMPERATURE: 97.7 F | DIASTOLIC BLOOD PRESSURE: 64 MMHG | OXYGEN SATURATION: 96 % | BODY MASS INDEX: 29.27 KG/M2 | WEIGHT: 155 LBS | SYSTOLIC BLOOD PRESSURE: 128 MMHG

## 2024-12-13 DIAGNOSIS — M79.605 LEFT LEG PAIN: ICD-10-CM

## 2024-12-13 PROCEDURE — 3078F DIAST BP <80 MM HG: CPT | Performed by: GENERAL PRACTICE

## 2024-12-13 PROCEDURE — 3074F SYST BP LT 130 MM HG: CPT | Performed by: GENERAL PRACTICE

## 2024-12-13 PROCEDURE — 95886 MUSC TEST DONE W/N TEST COMP: CPT | Mod: LT | Performed by: GENERAL PRACTICE

## 2024-12-13 PROCEDURE — 95909 NRV CNDJ TST 5-6 STUDIES: CPT | Performed by: GENERAL PRACTICE

## 2024-12-13 PROCEDURE — 1125F AMNT PAIN NOTED PAIN PRSNT: CPT | Performed by: GENERAL PRACTICE

## 2024-12-13 ASSESSMENT — PAIN SCALES - GENERAL: PAINLEVEL_OUTOF10: 7=MODERATE-SEVERE PAIN

## 2024-12-13 ASSESSMENT — FIBROSIS 4 INDEX: FIB4 SCORE: 1.83

## 2024-12-13 NOTE — PROCEDURES
"Electromyography Report          Name: Shanice Magana    MRN: [id number]   YOB: 1945 (79 y.o.)   Sex: female   Vitals:  Vitals:    24 0940   BP: 128/64   Weight: 70.3 kg (155 lb)   Height: 1.549 m (5' 1\")     Body mass index is 29.29 kg/m².    Examining Physician: Rip Cano D.O.     Visit Diagnoses     ICD-10-CM   1. Left leg pain  M79.605       EMG Examination Date: 2024     Impression:      This is an abnormal study.   The study is diagnostic for a LEFT peroneal mononeuropathy at the fibular head.  The exam today points AGAINST LEFT tibial mononeuropathy, sural mononeuropathy, lumbosacral plexopathy, or lumbarsacral radiculopathy.  The exam today points AGAINST RIGHT peroneal mononeuropathy     Recommendations: Follow up appointment.  May consider repeat EMG/NCS testing in 3-6 months.     History:    History of Present Illness  The patient presents for evaluation of left lateral knee pain.         Physical exam:    Physical Exam  - Hip flexion: 4+ out of 5 bilaterally  - Pain in the left knee  - Remaining motor manual testin out of 5 strength  - Straight leg raise test: negative bilaterally  - Peroneal Tinel test: positive on the left leg, negative on the right      Nerve Conduction Studies and Electromyography  Examination Findings:      Nerve Conduction Studies Examination Findings:       LEFT sural sensory nerve action potential (SNAP) amplitude and peak latency are normal.   LEFT peroneal superficial sensory nerve action potential (SNAP) amplitude and peak latency are no response.     LEFT  peroneal compound muscle action potential (CMAP) amplitude is decreased. distal latency and conduction velocity are normal.   LEFT  peroneal compound muscle action potential (CMAP) amplitude, distal latency, conduction velocity are normal.   LEFT  tibial compound muscle action potential (CMAP) amplitude, distal latency, conduction velocity  are normal.   NCS+  Motor Nerve Results      " Latency Amplitude F-Lat Segment Distance CV Comment   Site (ms) Norm (mV) Norm (ms)  (cm) (m/s) Norm    Left Fibular (EDB) Motor   Ankle 4.7  < 6.5 0.99  > 2.0         Bel fib head 11.0 - 0.80 -  Bel fib head-Ankle 28 44  > 44    Pop fossa 13.0 - 0.68 -  Pop fossa-Bel fib head 10 50  > 44    Right Fibular (EDB) Motor   Ankle 5.1  < 6.5 1.44  > 2.0         Bel fib head 11.5 - 1.51 -  Bel fib head-Ankle 28 44  > 44    Pop fossa 13.5 - 1.58 -  Pop fossa-Bel fib head 10 50  > 44    Left Tibial (AH) Motor   Ankle 5.5  < 5.8 7.4  > 4.0         Knee 12.2 - 5.4 -  Knee-Ankle 36 54  > 41      Sensory Sites      Neg Peak Lat Amplitude (O-P) Segment Distance Velocity Comment   Site (ms) Norm (µV) Norm  (cm) (ms)    Left Superficial Fibular Sensory   14 cm NR  < 4.4 NR  > 6 14 cm-Ankle 14     Left Sural Sensory   Calf-Lat mall 2.8  < 4.4 18  > 6 Calf-Lat mall 14          EMG+     Side Muscle Nerve Root Ins.Act Fibs Psw Amp Dur Poly Recrt Int.Pat Comment   Left Tib ant Deep fib,  Fibular L4-L5 Nml Nml Nml Nml Nml 0 Nml Nml    Left EHL Deep fib,  Fibular L5-S1 Nml Nml Nml Nml Nml 0 Nml Nml    Left Fib longus Fibular,  Superficial... L5-S1 Nml Nml Nml Incr Nml 0 Nml Nml    Left Vastus med Femoral L2-L4 Nml Nml Nml Nml Nml 0 Nml Nml    Left Gluteus med Sup gluteal L5-S1 Nml Nml Nml Nml Nml 0 Nml Nml    Left Gastroc Tibial S1-S2 Nml Nml Nml Nml Nml 0 Nml Nml            Waveforms:    Motor           Sensory           Nerve conduction studies (NCS) and electromyography (EMG) are utilized to evaluate direct or indirect damage to the peripheral nervous system. NCS are performed to measure the nerve(s) response(s) to electrostimulation across a given nerve segment. EMG evaluates the passive and active electrical activity of the muscle(s) in question.  Muscles are innervated by specific peripheral nerves and roots. Often times, several nerves the muscle to be examined in order to determine the presence or absence of the disease process.  Furthermore, nerves and muscles may need to be tested in a [id number] comparison, as well as in additional extremities, as this may be crucial in characterizing the extent of the disease process, which may be diffuse or isolated and of varying degree of severity. The extent of the neurodiagnostic exam is justified as it may help arrive to a proper diagnosis, which ultimately may contribute to better management of the patient. Therefore, the nerves to muscles examined during the study were medically necessary.    The patient tolerated testing well, without any complications. The study was done with a  concentric needle examination.   Reference values are from the [facility] normative data guidelines and Kelsy related to age guidelines.     This information is more accurate than what was recorded on the EMG computer.     cpt 32347. Nerve conduction studies,  5-6 studies  CPT 11362. needle electromyography, complete, each extremity.       Rip Cano D.O.

## 2024-12-26 NOTE — PROGRESS NOTES
FOLLOW-UP PATIENT VISIT    Interventional Spine and Pain  Physiatry (Physical Medicine and Rehabilitation)     Date of Service: 24   Chief Complaint:   Chief Complaint   Patient presents with    Follow-Up     Chronic bilateral low back pain with right-sided sciatica      Patient Name: Shanice Magana   : 1945   MRN: [id number]       PRIOR HISTORY    Please see new patient note by Dr. Chiu for more details.     Interval History  Patient identification: Shanice Magana,  1945, with history of CLL, HTN, gastric ulcer, who presents today for follow-up of right lateral hip and posterior thigh pain and left knee and leg pain. Patient was last seen in clinic 24 where I ordered an EMG of the left lower extremity and ordered a lumbar spine MRI, as well as increased her gabapentin to 300 mg in the morning and 600 mg at night. EMG of the left lower extremity was completed 24 and demonstrated a left peroneal mononeuropathy at the fibular head. Since patient was last seen she continues to note pain primarily in the medial right knee as well as weakness in the left lower leg. In addition she notes stable pain in the right buttock, around 6 out of 10 in intensity. She does report improvement in her right leg pain, but not the right buttock. She also reports instability in the left knee as well as intermittent swelling over the left fibular head associated with increased standing or walking. She denies any falls, fever, chills, or antibiotic use.       Procedures:  24: Left knee intraarticular steroid injection, 80-90% improvement ~4 weeks  10/30/24: Right greater trochanteric bursa steroid injection, 60-70% improvement in lateral thigh pain  24: Right buttock trigger point injection        PMHx:   Past Medical History:   Diagnosis Date    Acute reaction to situational stress 2023    Depression Screening (2023)     Little interest or pleasure in doing things?  3 -  "nearly every day   Feeling down, depressed , or hopeless? 3 - nearly every day   Trouble falling or staying asleep, or sleeping too much?  0 - not at all   Feeling tired or having little energy?  0 - not at all   Poor appetite or overeating?  0 - not at all   Feeling bad about yourself - or that you are a failure o    Anemia 04/16/2021    She sees Dr. Gonzalez for GERD and hiatal hernia who monitors her lab values. Has had 2 episodes of a drop in her hgb requiring a transfusion. She says her GI thinks this might be related to her hiatal hernia. Is on protonix bid and has prn pepcid she uses on occasion.    Arthritis     slight. 4/27/21: Knees \"Very little\"    BMI 34.0-34.9,adult 09/11/2014    Breath shortness     Cancer (HCC) 2011    CLL- \"Wait and watch\"    CATARACT     surgical correction margo     Celiac disease     CLL (chronic lymphocytic leukemia) ([facility]) 03/03/2011    Asymptomatic being monitored/Dr Davidson, Q 6 months. Not on medications.     Family history of adverse effect to anesthesia     Daughter/Hard time waking     GERD (gastroesophageal reflux disease) 03/03/2011    Sees GI specialist     Heart burn 04/27/2021    GERD    Hiatal hernia 03/03/2011    Hot flashes, menopausal 03/03/2011    Hypertension 03/03/2011       PSHx:   Past Surgical History:   Procedure Laterality Date    UT UPPER GI ENDOSCOPY,DIAGNOSIS N/A 4/28/2021    Procedure: GASTROSCOPY;  Surgeon: Mg Evans M.D.;  Location: SURGERY SAME DAY [facility];  Service: Gastroenterology    UT UPPER GI ENDOSCOPY,BIOPSY N/A 4/28/2021    Procedure: GASTROSCOPY, WITH BIOPSY;  Surgeon: Mg Evans M.D.;  Location: SURGERY SAME DAY [facility];  Service: Gastroenterology    RECTUS REPAIR Right 11/15/2018    Procedure: RECTUS REPAIR- SUPERIOR RECTUS RECESSION, ADJUSTABLE SUTURE INFERIOR RECTUS PLICATION/RESECTION;  Surgeon: Jane Larsen M.D.;  Location: SURGERY SAME DAY [facility];  Service: Ophthalmology    VITRECTOMY POSTERIOR Right " 1/17/2018    Procedure: VITRECTOMY POSTERIOR W/AIR FLUID EXCHANGE, ENDO LASER, 23 GAUGE SF6 GAS, CRYOTHERAPY;  Surgeon: Sea Franklin M.D.;  Location: SURGERY SAME DAY [facility];  Service: Ophthalmology    MAMMOPLASTY REDUCTION Bilateral 11/3/2015    Procedure: MAMMOPLASTY REDUCTION;  Surgeon: Talia Barton M.D.;  Location: SURGERY [facility];  Service:     KNEE ARTHROSCOPY  9/17/2014    Performed by Dany Owens M.D. at [facility]    MENISCECTOMY  9/17/2014    Performed by Dany Owens M.D. at [facility]    SHOULDER ARTHROSCOPY W/ ROTATOR CUFF REPAIR  8/22/2013    Performed by Dany Owens M.D. at [facility]    SHOULDER DECOMPRESSION ARTHROSCOPIC  8/22/2013    Performed by Dany Owens M.D. at [facility]    CLAVICLE DISTAL EXCISION  8/22/2013    Performed by Dany Owens M.D. at [facility]    OTHER  7/2013    laser procedure right eye    CATARACT EXTRACTION WITH IOL  5/29/13    right eye    ABDOMINAL HYSTERECTOMY TOTAL  1975    CHOLECYSTECTOMY  1973    open    TONSILLECTOMY  1950    BLADDER SUSPENSION  1973, 1978    x2       Family history   Family History   Problem Relation Age of Onset    Heart Disease Mother     Hypertension Mother     Other Mother         pancreatitis    Arthritis Father     Cancer Father         Lymphoma, colon, skin    Hyperlipidemia Father     Other Father         Clogged artery in leg    Heart Disease Father         A Fib    Diabetes Sister         Pre    Other Sister         Obesity    Other Daughter         Gluten allergies    Other Son         Suicide       Medications:   Outpatient Medications Marked as Taking for the 12/27/24 encounter (Office Visit) with Tomasa Chiu M.D.   Medication Sig Dispense Refill    gabapentin (NEURONTIN) 300 MG Cap Take 1 Capsule by mouth every morning AND 2 Capsules every evening. 180 Capsule 1    atenolol (TENORMIN) 50 MG Tab Take 1 Tablet by mouth  every day. 100 Tablet 3    estradiol (ESTRACE) 0.5 MG tablet TAKE 1/2 TABLET BY MOUTH DAILY 50 Tablet 3    pantoprazole (PROTONIX) 40 MG Tablet Delayed Response TAKE ONE TABLET BY MOUTH TWICE A  Tablet 3    triamcinolone acetonide (KENALOG) 0.1 % Cream APPLY TOPICALLY TO THE AFFECTED AREA(S) OF BODY TWO TIMES A DAY AS NEEDED FOR ITCHING, DO NOT APPLY TO FACE, AXILLA OR GROIN. 454 g 0    famotidine (PEPCID) 20 MG Tab Take 1 Tablet by mouth as needed (breakthrough heartburn). One by mouth once daily at bedtime 90 Tablet 3     Current Facility-Administered Medications for the 12/27/24 encounter (Office Visit) with Tomasa Chiu M.D.   Medication Dose Route Frequency Provider Last Rate Last Admin    lidocaine (Xylocaine) 1 % injection 1 mL  1 mL Injection Once             Current Outpatient Medications on File Prior to Visit   Medication Sig Dispense Refill    gabapentin (NEURONTIN) 300 MG Cap Take 1 Capsule by mouth every morning AND 2 Capsules every evening. 180 Capsule 1    atenolol (TENORMIN) 50 MG Tab Take 1 Tablet by mouth every day. 100 Tablet 3    estradiol (ESTRACE) 0.5 MG tablet TAKE 1/2 TABLET BY MOUTH DAILY 50 Tablet 3    pantoprazole (PROTONIX) 40 MG Tablet Delayed Response TAKE ONE TABLET BY MOUTH TWICE A  Tablet 3    triamcinolone acetonide (KENALOG) 0.1 % Cream APPLY TOPICALLY TO THE AFFECTED AREA(S) OF BODY TWO TIMES A DAY AS NEEDED FOR ITCHING, DO NOT APPLY TO FACE, AXILLA OR GROIN. 454 g 0    famotidine (PEPCID) 20 MG Tab Take 1 Tablet by mouth as needed (breakthrough heartburn). One by mouth once daily at bedtime 90 Tablet 3     No current facility-administered medications on file prior to visit.       Allergies:   Allergies   Allergen Reactions    Grass Pollen(K-O-R-T-Swt [place]) Runny Nose     Sneezing, dizziniess    Other Misc      All household chemicals, soap powders, perfumes. Tongue and lips go numb, itching.    Wheat      Gluten. Stomach ache.        Social Hx:   Social History      Socioeconomic History    Marital status: Single     Spouse name: Not on file    Number of children: Not on file    Years of education: Not on file    Highest education level: Not on file   Occupational History    Not on file   Tobacco Use    Smoking status: Former     Current packs/day: 0.00     Types: Cigarettes     Quit date: 1967     Years since quittin.0    Smokeless tobacco: Never   Vaping Use    Vaping status: Never Used   Substance and Sexual Activity    Alcohol use: Yes     Alcohol/week: 0.6 - 1.2 oz     Types: 1 - 2 Glasses of wine per week     Comment: Several times a week.  21: 2-4/month    Drug use: No    Sexual activity: Not Currently   Other Topics Concern    Not on file   Social History Narrative    Retired from medical billing. She has one daughter, 2 grandkids (one is starting med school), son in law is an ICU hospitalist at [employer]. She enjoys knitting and traveling, belongs to a travel group.  She is  after 30y of marriage, lives alone, has a good support group.      Social Drivers of Health     Financial Resource Strain: Low Risk  (2024)    Overall Financial Resource Strain (CARDIA)     Difficulty of Paying Living Expenses: Not hard at all   Food Insecurity: No Food Insecurity (2024)    Hunger Vital Sign     Worried About Running Out of Food in the Last Year: Never true     Ran Out of Food in the Last Year: Never true   Transportation Needs: No Transportation Needs (2024)    PRAPARE - Transportation     Lack of Transportation (Medical): No     Lack of Transportation (Non-Medical): No   Physical Activity: Not on file   Stress: Not on file   Social Connections: Not on file   Intimate Partner Violence: Not on file   Housing Stability: Low Risk  (2024)    Housing Stability Vital Sign     Unable to Pay for Housing in the Last Year: No     Number of Places Lived in the Last Year: 1     Unstable Housing in the Last Year: No         EXAMINATION     Physical  "Exam:   Vitals: BP (!) 152/71 (BP Location: Right arm, Patient Position: Sitting, BP Cuff Size: Adult)   Pulse 64   Temp 36.2 °C (97.2 °F) (Temporal)   Ht 1.549 m (5' 1\")   Wt 72.6 kg (160 lb 0.9 oz)   SpO2 94%     Constitutional:   Body Habitus: Body mass index is 30.24 kg/m².  Cooperation: Fully cooperates with exam  Appearance: Well-groomed, well-nourished  Respiratory:  Breathing comfortable on room air, no audible wheezing  Cardiovascular: Skin appears well-perfused  Psychiatric: Appropriate affect  Gait: normal gait, no use of ambulatory device, nonantalgic.  MSK: TTP right upper buttock    Special Tests:  Facet loading maneuvers negative for reproduction of buttock pain      MEDICAL DECISION MAKING    DATA    Labs:   Lab Results   Component Value Date/Time    SODIUM 140 09/04/2024 10:17 AM    POTASSIUM 4.2 09/04/2024 10:17 AM    CHLORIDE 107 09/04/2024 10:17 AM    CO2 23 09/04/2024 10:17 AM    GLUCOSE 86 09/04/2024 10:17 AM    BUN 20 09/04/2024 10:17 AM    CREATININE 1.01 09/04/2024 10:17 AM    CREATININE 0.83 03/25/2011 11:05 AM    BUNCREATRAT 20 03/25/2011 11:05 AM    GLOMRATE >59 03/25/2011 11:05 AM        Lab Results   Component Value Date/Time    PROTHROMBTM 13.7 04/16/2021 03:38 PM    INR 1.02 04/16/2021 03:38 PM        Lab Results   Component Value Date/Time    WBC 22.4 (H) 09/04/2024 10:17 AM    WBC 10.8 (H) 03/25/2011 11:05 AM    RBC 3.68 (L) 09/04/2024 10:17 AM    RBC 4.30 03/25/2011 11:05 AM    HEMOGLOBIN 11.3 (L) 09/04/2024 10:17 AM    HEMATOCRIT 35.7 (L) 09/04/2024 10:17 AM    MCV 97.0 09/04/2024 10:17 AM    MCV 95 03/25/2011 11:05 AM    MCH 30.7 09/04/2024 10:17 AM    MCH 31.2 03/25/2011 11:05 AM    MCHC 31.7 (L) 09/04/2024 10:17 AM    MPV 9.3 09/04/2024 10:17 AM    NEUTSPOLYS 24.00 (L) 09/04/2024 10:17 AM    LYMPHOCYTES 71.00 (H) 09/04/2024 10:17 AM    MONOCYTES 3.00 09/04/2024 10:17 AM    EOSINOPHILS 2.00 09/04/2024 10:17 AM    BASOPHILS 0.00 09/04/2024 10:17 AM    HYPOCHROMIA 1+ " 06/07/2021 10:34 AM    ANISOCYTOSIS 1+ 02/13/2023 12:31 PM        Lab Results   Component Value Date/Time    HBA1C 5.5 01/27/2022 12:54 PM          Imaging:   I personally reviewed the following imaging, below is my independent interpretation:  MRI Lumbar Spine 12/8/24: Grade 1 L4-5 anterolisthesis. Mild vertebral height loss at T11 and T12 with degenerative disc changes at T11, T12, L1. Mild L3-4 disc bulge with minimal canal stenosis. L4-5 disc bulge with mild to moderate canal stenosis. Multilevel facet arthropathy.      I reviewed the following radiology reports                 Results for orders placed during the hospital encounter of 09/23/24    DX-KNEE COMPLETE 4+ LEFT    Impression  Tricompartment degenerative change which involves the medial femorotibial articulation to the greatest degree.   Results for orders placed during the hospital encounter of 09/23/24    DX-LUMBAR SPINE-2 OR 3 VIEWS    Impression  1.  Multilevel degenerative disc disease and facet degeneration.    2.  Grade 1 anterior subluxation at L4-5.    3.  Convex left scoliotic curvature.         DIAGNOSIS   Visit Diagnoses     ICD-10-CM   1. Myalgia  M79.10   2. 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 Pain in right buttock  M79.18   4. Lumbar facet arthropathy  M47.816   5. Lumbar disc disorder  M51.9   6. Spondylolisthesis at L4-L5 level  M43.16             ASSESSMENT and PLAN:     Shanice Magana is a 79 y.o. female who returns to clinic for follow-up of right buttock, and left knee and lower leg pain.    Shanice was seen today for follow-up.    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  Given focal tenderness to palpation on exam today, I do think that there is a significant myofascial component to this pain and therefore have offered her a trigger point injection today, see procedure note below.  We discussed the risks including but not limited to bleeding, infection, damage surrounding structures, patient voiced understanding and wishes to proceed.  We also discussed the results of her lumbar MRI which does demonstrate lower lumbar facet arthropathy which could be a source of her pain, though with facet loading maneuvers on exam today not reproducing her buttock pain I think this is a less likely source of the current pain.  We did also discussed that there is spondylolisthesis at L4-5 leading to moderate canal stenosis at this level which could be a source of her previous right leg pain, should this pain return we could consider a right L5 epidural steroid injection.  Finally, we discussed the results of her left lower extremity EMG which did reveal evidence of a left peroneal neuropathy at the fibular head, given her left knee pain and instability as well as reports of intermittent swelling at the fibular head, I would like her to be evaluated by orthopedics for their recommendations such as imaging and potential treatments if there is compression at the fibular head, and I have placed a referral to orthopedic surgery today.    Follow up: In 1 month    Thank you for allowing me to participate in the care of this patient. If you have any questions please not hesitate to contact me.         Please note that this dictation was created using voice recognition software. I have made every reasonable attempt to correct obvious errors but there may be errors of  grammar and content that I may have overlooked prior to finalization of this note.    Tomasa Chiu MD  Interventional Spine and Sports Physiatry  Physical Medicine and Rehabilitation  [facility]

## 2024-12-27 ENCOUNTER — OFFICE VISIT (OUTPATIENT)
Dept: PHYSICAL MEDICINE AND REHAB | Facility: MEDICAL CENTER | Age: 79
End: 2024-12-27
Payer: MEDICARE

## 2024-12-27 VITALS
OXYGEN SATURATION: 94 % | WEIGHT: 160.05 LBS | TEMPERATURE: 97.2 F | BODY MASS INDEX: 30.22 KG/M2 | HEIGHT: 61 IN | SYSTOLIC BLOOD PRESSURE: 152 MMHG | DIASTOLIC BLOOD PRESSURE: 71 MMHG | HEART RATE: 64 BPM

## 2024-12-27 DIAGNOSIS — M43.16 SPONDYLOLISTHESIS AT L4-L5 LEVEL: ICD-10-CM

## 2024-12-27 DIAGNOSIS — G57.32 PERONEAL NEUROPATHY AT KNEE, LEFT: ICD-10-CM

## 2024-12-27 DIAGNOSIS — M79.18 PAIN IN RIGHT BUTTOCK: ICD-10-CM

## 2024-12-27 DIAGNOSIS — M47.816 LUMBAR FACET ARTHROPATHY: ICD-10-CM

## 2024-12-27 DIAGNOSIS — M51.9 LUMBAR DISC DISORDER: ICD-10-CM

## 2024-12-27 DIAGNOSIS — M79.10 MYALGIA: Primary | ICD-10-CM

## 2024-12-27 PROCEDURE — 1125F AMNT PAIN NOTED PAIN PRSNT: CPT | Performed by: STUDENT IN AN ORGANIZED HEALTH CARE EDUCATION/TRAINING PROGRAM

## 2024-12-27 PROCEDURE — 20552 NJX 1/MLT TRIGGER POINT 1/2: CPT | Performed by: STUDENT IN AN ORGANIZED HEALTH CARE EDUCATION/TRAINING PROGRAM

## 2024-12-27 PROCEDURE — 3077F SYST BP >= 140 MM HG: CPT | Performed by: STUDENT IN AN ORGANIZED HEALTH CARE EDUCATION/TRAINING PROGRAM

## 2024-12-27 PROCEDURE — 99214 OFFICE O/P EST MOD 30 MIN: CPT | Mod: 25 | Performed by: STUDENT IN AN ORGANIZED HEALTH CARE EDUCATION/TRAINING PROGRAM

## 2024-12-27 PROCEDURE — 3078F DIAST BP <80 MM HG: CPT | Performed by: STUDENT IN AN ORGANIZED HEALTH CARE EDUCATION/TRAINING PROGRAM

## 2024-12-27 RX ORDER — DEXAMETHASONE SODIUM PHOSPHATE 4 MG/ML
4 INJECTION, SOLUTION INTRA-ARTICULAR; INTRALESIONAL; INTRAMUSCULAR; INTRAVENOUS; SOFT TISSUE ONCE
Status: COMPLETED | OUTPATIENT
Start: 2024-12-27 | End: 2024-12-27

## 2024-12-27 RX ADMIN — Medication 1 ML: at 11:40

## 2024-12-27 RX ADMIN — DEXAMETHASONE SODIUM PHOSPHATE 4 MG: 4 INJECTION, SOLUTION INTRA-ARTICULAR; INTRALESIONAL; INTRAMUSCULAR; INTRAVENOUS; SOFT TISSUE at 11:30

## 2024-12-27 ASSESSMENT — FIBROSIS 4 INDEX: FIB4 SCORE: 1.83

## 2024-12-27 ASSESSMENT — PATIENT HEALTH QUESTIONNAIRE - PHQ9
5. POOR APPETITE OR OVEREATING: 1 - SEVERAL DAYS
SUM OF ALL RESPONSES TO PHQ QUESTIONS 1-9: 7
CLINICAL INTERPRETATION OF PHQ2 SCORE: 2

## 2024-12-27 ASSESSMENT — PAIN SCALES - GENERAL: PAINLEVEL_OUTOF10: 5=MODERATE PAIN

## 2024-12-27 NOTE — PROCEDURES
Date of Service: 12/27/2024     Physician/s: Tomasa Chiu MD    Pre-operative Diagnosis: Myalgia (M79.1)    Post-operative Diagnosis: Myalgia (M79.1)    Procedure: Right lower lumbar paraspinous muscle 2 trigger point injections    Description of procedure:    The risks, benefits, and alternatives of the procedure were reviewed and discussed with the patient.  Written informed consent was freely obtained. A pre-procedural time-out was conducted by the physician verifying patient’s identity, procedure to be performed, procedure site and side, and allergy verification. Appropriate equipment was determined to be in place for the procedure.     In the office suite exam room the patient was placed in a prone position and the skin areas for injection over the right lower lumbar paraspinal was marked. A solution was prepared with 1 mL of 1% lidocaine and 1 mL of 4 mg/mL dexamethasone. The areas of pain was then prepped and draped in the usual sterile fashion. Using palpation guidance A 25g 3.5 inch needle needle was placed into each of the markings. After negative aspiration, approximately a 1 mL of the above solution was injected. The needle was removed intact after each trigger point injection, and the patient's back was covered with a 4x4 gauze, and a dressing was applied. There were no complications noted.     Preprocedural pain: 6/10  Postprocedural pain: 3/10    Tomasa Chiu MD  Physical Medicine and Rehabilitation  Interventional Spine and Sports Physiatry  [facility]

## 2025-01-23 NOTE — H&P (VIEW-ONLY)
Verbal consent was acquired by the patient to use [employer] ambient listening note generation during this visit Yes     FOLLOW-UP PATIENT VISIT    Interventional Spine and Pain  Physiatry (Physical Medicine and Rehabilitation)     Date of Service: 25   Chief Complaint:   Chief Complaint   Patient presents with    Follow-Up     1m fv- myalgia      Patient Name: Shanice Magana   : 1945   MRN: [id number]       PRIOR HISTORY    Please see new patient note by Dr. Chiu for more details.     Interval History  Patient identification: Shanice Magana,  1945, with history of CLL, HTN, gastric ulcer, who presents today for follow-up of right low back pain and left knee pain.    History of Present Illness  The patient is a 79-year-old female who presents today for follow-up of low back and left knee pain. She was last seen in the clinic on 2024, where she underwent trigger point injections to the right lower lumbar paraspinals. She reports that the trigger point injections provided minimal relief, with persistent discomfort in the middle of her lower back during ambulation and standing around 7 out of 10 in intensity. She experiences morning stiffness, which improves with mobility. She has been engaging in physical activity, including bowling on . She also notes pain radiating to the left side of her hip, although this has improved since greater trochanteric bursa injection. Her sleep quality has enhanced, allowing her to roll over without difficulty. She describes a constant stinging sensation in her left knee, which she attempts to alleviate through rubbing. She has an upcoming appointment with a joint surgeon next week.  She expresses concern about significant swelling in her leg and ankle, despite having low cholesterol levels. She has not yet discussed this issue with her primary care physician but plans to do so in the coming weeks.      Procedures:  24: Left  "knee intraarticular steroid injection, 80-90% improvement ~4 weeks  10/30/24: Right greater trochanteric bursa steroid injection, 60-70% improvement in lateral thigh pain  12/27/24: Right buttock trigger point injection, several days of mild improvement        PMHx:   Past Medical History:   Diagnosis Date    Acute reaction to situational stress 01/04/2023    Depression Screening (1/2023)     Little interest or pleasure in doing things?  3 - nearly every day   Feeling down, depressed , or hopeless? 3 - nearly every day   Trouble falling or staying asleep, or sleeping too much?  0 - not at all   Feeling tired or having little energy?  0 - not at all   Poor appetite or overeating?  0 - not at all   Feeling bad about yourself - or that you are a failure o    Anemia 04/16/2021    She sees Dr. Gonzalez for GERD and hiatal hernia who monitors her lab values. Has had 2 episodes of a drop in her hgb requiring a transfusion. She says her GI thinks this might be related to her hiatal hernia. Is on protonix bid and has prn pepcid she uses on occasion.    Arthritis     slight. 4/27/21: Knees \"Very little\"    BMI 34.0-34.9,adult 09/11/2014    Breath shortness     Cancer (HCC) 2011    CLL- \"Wait and watch\"    CATARACT     surgical correction margo     Celiac disease     CLL (chronic lymphocytic leukemia) ([facility]) 03/03/2011    Asymptomatic being monitored/Dr Davidson, Q 6 months. Not on medications.     Family history of adverse effect to anesthesia     Daughter/Hard time waking     GERD (gastroesophageal reflux disease) 03/03/2011    Sees GI specialist     Heart burn 04/27/2021    GERD    Hiatal hernia 03/03/2011    Hot flashes, menopausal 03/03/2011    Hypertension 03/03/2011       PSHx:   Past Surgical History:   Procedure Laterality Date    [place] UPPER GI ENDOSCOPY,DIAGNOSIS N/A 4/28/2021    Procedure: GASTROSCOPY;  Surgeon: Mg Evans M.D.;  Location: SURGERY SAME DAY [facility];  Service: Gastroenterology    [place] UPPER GI " ENDOSCOPY,BIOPSY N/A 4/28/2021    Procedure: GASTROSCOPY, WITH BIOPSY;  Surgeon: Mg Evans M.D.;  Location: SURGERY SAME DAY [facility];  Service: Gastroenterology    RECTUS REPAIR Right 11/15/2018    Procedure: RECTUS REPAIR- SUPERIOR RECTUS RECESSION, ADJUSTABLE SUTURE INFERIOR RECTUS PLICATION/RESECTION;  Surgeon: Jane Larsen M.D.;  Location: SURGERY SAME DAY [facility];  Service: Ophthalmology    VITRECTOMY POSTERIOR Right 1/17/2018    Procedure: VITRECTOMY POSTERIOR W/AIR FLUID EXCHANGE, ENDO LASER, 23 GAUGE SF6 GAS, CRYOTHERAPY;  Surgeon: Sea Franklin M.D.;  Location: SURGERY SAME DAY [facility];  Service: Ophthalmology    MAMMOPLASTY REDUCTION Bilateral 11/3/2015    Procedure: MAMMOPLASTY REDUCTION;  Surgeon: Talia Barton M.D.;  Location: SURGERY [facility];  Service:     KNEE ARTHROSCOPY  9/17/2014    Performed by Dany Owens M.D. at [facility]    MENISCECTOMY  9/17/2014    Performed by Dany Owens M.D. at [facility]    SHOULDER ARTHROSCOPY W/ ROTATOR CUFF REPAIR  8/22/2013    Performed by Dany Owens M.D. at [facility]    SHOULDER DECOMPRESSION ARTHROSCOPIC  8/22/2013    Performed by Dany Owens M.D. at [facility]    CLAVICLE DISTAL EXCISION  8/22/2013    Performed by Dany Owens M.D. at [facility]    OTHER  7/2013    laser procedure right eye    CATARACT EXTRACTION WITH IOL  5/29/13    right eye    ABDOMINAL HYSTERECTOMY TOTAL  1975    CHOLECYSTECTOMY  1973    open    TONSILLECTOMY  1950    BLADDER SUSPENSION  1973, 1978    x2       Family history   Family History   Problem Relation Age of Onset    Heart Disease Mother     Hypertension Mother     Other Mother         pancreatitis    Arthritis Father     Cancer Father         Lymphoma, colon, skin    Hyperlipidemia Father     Other Father         Clogged artery in leg    Heart Disease Father         A Fib    Diabetes Sister          Pre    Other Sister         Obesity    Other Daughter         Gluten allergies    Other Son         Suicide       Medications:   Outpatient Medications Marked as Taking for the 1/24/25 encounter (Office Visit) with Tomasa Chiu M.D.   Medication Sig Dispense Refill    gabapentin (NEURONTIN) 300 MG Cap Take 2 Capsules by mouth every evening. 60 Capsule 1    atenolol (TENORMIN) 50 MG Tab Take 1 Tablet by mouth every day. 100 Tablet 3    estradiol (ESTRACE) 0.5 MG tablet TAKE 1/2 TABLET BY MOUTH DAILY 50 Tablet 3    pantoprazole (PROTONIX) 40 MG Tablet Delayed Response TAKE ONE TABLET BY MOUTH TWICE A  Tablet 3    triamcinolone acetonide (KENALOG) 0.1 % Cream APPLY TOPICALLY TO THE AFFECTED AREA(S) OF BODY TWO TIMES A DAY AS NEEDED FOR ITCHING, DO NOT APPLY TO FACE, AXILLA OR GROIN. 454 g 0    famotidine (PEPCID) 20 MG Tab Take 1 Tablet by mouth as needed (breakthrough heartburn). One by mouth once daily at bedtime 90 Tablet 3        Current Outpatient Medications on File Prior to Visit   Medication Sig Dispense Refill    atenolol (TENORMIN) 50 MG Tab Take 1 Tablet by mouth every day. 100 Tablet 3    estradiol (ESTRACE) 0.5 MG tablet TAKE 1/2 TABLET BY MOUTH DAILY 50 Tablet 3    pantoprazole (PROTONIX) 40 MG Tablet Delayed Response TAKE ONE TABLET BY MOUTH TWICE A  Tablet 3    triamcinolone acetonide (KENALOG) 0.1 % Cream APPLY TOPICALLY TO THE AFFECTED AREA(S) OF BODY TWO TIMES A DAY AS NEEDED FOR ITCHING, DO NOT APPLY TO FACE, AXILLA OR GROIN. 454 g 0    famotidine (PEPCID) 20 MG Tab Take 1 Tablet by mouth as needed (breakthrough heartburn). One by mouth once daily at bedtime 90 Tablet 3     No current facility-administered medications on file prior to visit.       Allergies:   Allergies   Allergen Reactions    Grass Pollen(K-O-R-T-Swt [place]) Runny Nose     Sneezing, dizziniess    Other Misc      All household chemicals, soap powders, perfumes. Tongue and lips go numb, itching.    Wheat      Gluten.  Stomach ache.        Social Hx:   Social History     Socioeconomic History    Marital status: Single     Spouse name: Not on file    Number of children: Not on file    Years of education: Not on file    Highest education level: Not on file   Occupational History    Not on file   Tobacco Use    Smoking status: Former     Current packs/day: 0.00     Types: Cigarettes     Quit date: 1967     Years since quittin.1    Smokeless tobacco: Never   Vaping Use    Vaping status: Never Used   Substance and Sexual Activity    Alcohol use: Yes     Alcohol/week: 0.6 - 1.2 oz     Types: 1 - 2 Glasses of wine per week     Comment: Several times a week.  21: 2-4/month    Drug use: No    Sexual activity: Not Currently   Other Topics Concern    Not on file   Social History Narrative    Retired from medical billing. She has one daughter, 2 grandkids (one is starting med school), son in law is an ICU hospitalist at [employer]. She enjoys knitting and traveling, belongs to a travel group.  She is  after 30y of marriage, lives alone, has a good support group.      Social Drivers of Health     Financial Resource Strain: Low Risk  (2024)    Overall Financial Resource Strain (CARDIA)     Difficulty of Paying Living Expenses: Not hard at all   Food Insecurity: No Food Insecurity (2024)    Hunger Vital Sign     Worried About Running Out of Food in the Last Year: Never true     Ran Out of Food in the Last Year: Never true   Transportation Needs: No Transportation Needs (2024)    PRAPARE - Transportation     Lack of Transportation (Medical): No     Lack of Transportation (Non-Medical): No   Physical Activity: Not on file   Stress: Not on file   Social Connections: Not on file   Intimate Partner Violence: Not on file   Housing Stability: Low Risk  (2024)    Housing Stability Vital Sign     Unable to Pay for Housing in the Last Year: No     Number of Places Lived in the Last Year: 1     Unstable Housing in the  "Last Year: No         EXAMINATION     Physical Exam:   Vitals: /65 (BP Location: Right arm, Patient Position: Sitting, BP Cuff Size: Adult)   Pulse 61   Temp 36.2 °C (97.1 °F) (Temporal)   Ht 1.549 m (5' 1\")   Wt 73.8 kg (162 lb 11.2 oz)   SpO2 95%     Constitutional:   Body Habitus: Body mass index is 30.74 kg/m².  Cooperation: Fully cooperates with exam  Appearance: Well-groomed, well-nourished  Respiratory:  Breathing comfortable on room air, no audible wheezing  Cardiovascular: Skin appears well-perfused  Psychiatric: Appropriate affect  Gait: normal gait, no use of ambulatory device,  mildly antalgic.     MSK:?TTP across right greater than left lumbar paraspinals bilaterally    Special?tests:    Positive facet loading maneuvers bilaterally       MEDICAL DECISION MAKING    DATA    Labs:   Lab Results   Component Value Date/Time    SODIUM 140 09/04/2024 10:17 AM    POTASSIUM 4.2 09/04/2024 10:17 AM    CHLORIDE 107 09/04/2024 10:17 AM    CO2 23 09/04/2024 10:17 AM    GLUCOSE 86 09/04/2024 10:17 AM    BUN 20 09/04/2024 10:17 AM    CREATININE 1.01 09/04/2024 10:17 AM    CREATININE 0.83 03/25/2011 11:05 AM    BUNCREATRAT 20 03/25/2011 11:05 AM    GLOMRATE >59 03/25/2011 11:05 AM        Lab Results   Component Value Date/Time    PROTHROMBTM 13.7 04/16/2021 03:38 PM    INR 1.02 04/16/2021 03:38 PM        Lab Results   Component Value Date/Time    WBC 22.4 (H) 09/04/2024 10:17 AM    WBC 10.8 (H) 03/25/2011 11:05 AM    RBC 3.68 (L) 09/04/2024 10:17 AM    RBC 4.30 03/25/2011 11:05 AM    HEMOGLOBIN 11.3 (L) 09/04/2024 10:17 AM    HEMATOCRIT 35.7 (L) 09/04/2024 10:17 AM    MCV 97.0 09/04/2024 10:17 AM    MCV 95 03/25/2011 11:05 AM    MCH 30.7 09/04/2024 10:17 AM    MCH 31.2 03/25/2011 11:05 AM    MCHC 31.7 (L) 09/04/2024 10:17 AM    MPV 9.3 09/04/2024 10:17 AM    NEUTSPOLYS 24.00 (L) 09/04/2024 10:17 AM    LYMPHOCYTES 71.00 (H) 09/04/2024 10:17 AM    MONOCYTES 3.00 09/04/2024 10:17 AM    EOSINOPHILS 2.00 " 09/04/2024 10:17 AM    BASOPHILS 0.00 09/04/2024 10:17 AM    HYPOCHROMIA 1+ 06/07/2021 10:34 AM    ANISOCYTOSIS 1+ 02/13/2023 12:31 PM        Lab Results   Component Value Date/Time    HBA1C 5.5 01/27/2022 12:54 PM          Imaging:   Prior personal imaging review:  MRI Lumbar Spine 12/8/24: Grade 1 L4-5 anterolisthesis. Mild vertebral height loss at T11 and T12 with degenerative disc changes at T11, T12, L1. Mild L3-4 disc bulge with minimal canal stenosis. L4-5 disc bulge with mild to moderate canal stenosis. Multilevel facet arthropathy.        I reviewed the following radiology reports                 Results for orders placed during the hospital encounter of 09/23/24     DX-KNEE COMPLETE 4+ LEFT     Impression  Tricompartment degenerative change which involves the medial femorotibial articulation to the greatest degree.   Results for orders placed during the hospital encounter of 09/23/24     DX-LUMBAR SPINE-2 OR 3 VIEWS     Impression  1.  Multilevel degenerative disc disease and facet degeneration.     2.  Grade 1 anterior subluxation at L4-5.     3.  Convex left scoliotic curvature.        DIAGNOSIS   Visit Diagnoses     ICD-10-CM   1. Lumbar facet arthropathy  M47.816   2. Chronic bilateral low back pain without sciatica  M54.50    G89.29   3. Chronic pain of left knee  M25.562    G89.29         ASSESSMENT and PLAN:     Shanice Magana is a 79 y.o. female who returns to clinic for follow-up of low back and left knee pain.    Shanice was seen today for follow-up.    Diagnoses and all orders for this visit:    Lumbar facet arthropathy  -     Referral to Physical Medicine Rehab    Chronic bilateral low back pain without sciatica  -     gabapentin (NEURONTIN) 300 MG Cap; Take 2 Capsules by mouth every evening.    Chronic pain of left knee  -     gabapentin (NEURONTIN) 300 MG Cap; Take 2 Capsules by mouth every evening.        Assessment & Plan  Bilateral low back pain  Lumbar facet arthropathy  Patient  presents for follow-up of chronic moderate to severe midline and bilateral low back pain with radiation into the lower extremities, worse with prolonged standing and walking and with positive facet loading maneuvers on exam today. Given evidence of facet arthropathy on lumbar MRI I believe patient's symptoms are most consistent with lumbar facet arthropathy. Given lack of improvement with prescription medication and physical therapy with ongoing home exercises, we discussed the option of lumbar medial branch blocks and radiofrequency ablation which patient is interested in. A comprehensive discussion was held regarding the potential benefits and risks associated with medial branch blocks and radiofrequency ablation of the bilateral L4-5 and L5-S1 facet joints. She expressed interest in proceeding with these treatment options. Consequently, an order for the first test procedure has been placed, scheduled for [facility]/11/2025 at [facility]. She has been advised to arrange for transportation on the day of the procedure. A follow-up appointment will be scheduled approximately 3 to 5 days post-injection to assess her response to the treatment. In the meantime she will continue gabapentin which she has only been taking at nighttime, 600 mg nightly.    Left knee pain  Left knee osteoarthritis  The patient reports persistent pain in the left knee, which has been somewhat alleviated by previous injections. She will follow up with the surgeon next week to discuss further management options, including the possibility of knee replacement surgery. Depending on the surgeon's recommendations and the outcome of the back treatments, additional interventions for the knee may be considered such as repeat knee steroid injection, viscosupplementation, or genicular nerve block.        Follow up: For medial branch blocks targeting the bilateral L4-5 and L5-S1 facet joints, then 3-5 days after injections in clinic    Thank you for  allowing me to participate in the care of this patient. If you have any questions please not hesitate to contact me.         Please note that this dictation was created using voice recognition software. I have made every reasonable attempt to correct obvious errors but there may be errors of grammar and content that I may have overlooked prior to finalization of this note.    Tomasa Chiu MD  Interventional Spine and Sports Physiatry  Physical Medicine and Rehabilitation  [facility]

## 2025-01-23 NOTE — PROGRESS NOTES
Verbal consent was acquired by the patient to use [employer] ambient listening note generation during this visit Yes     FOLLOW-UP PATIENT VISIT    Interventional Spine and Pain  Physiatry (Physical Medicine and Rehabilitation)     Date of Service: 25   Chief Complaint:   Chief Complaint   Patient presents with    Follow-Up     1m fv- myalgia      Patient Name: Shanice Magana   : 1945   MRN: [id number]       PRIOR HISTORY    Please see new patient note by Dr. Chiu for more details.     Interval History  Patient identification: Shanice Magana,  1945, with history of CLL, HTN, gastric ulcer, who presents today for follow-up of right low back pain and left knee pain.    History of Present Illness  The patient is a 79-year-old female who presents today for follow-up of low back and left knee pain. She was last seen in the clinic on 2024, where she underwent trigger point injections to the right lower lumbar paraspinals. She reports that the trigger point injections provided minimal relief, with persistent discomfort in the middle of her lower back during ambulation and standing around 7 out of 10 in intensity. She experiences morning stiffness, which improves with mobility. She has been engaging in physical activity, including bowling on . She also notes pain radiating to the left side of her hip, although this has improved since greater trochanteric bursa injection. Her sleep quality has enhanced, allowing her to roll over without difficulty. She describes a constant stinging sensation in her left knee, which she attempts to alleviate through rubbing. She has an upcoming appointment with a joint surgeon next week.  She expresses concern about significant swelling in her leg and ankle, despite having low cholesterol levels. She has not yet discussed this issue with her primary care physician but plans to do so in the coming weeks.      Procedures:  24: Left  "knee intraarticular steroid injection, 80-90% improvement ~4 weeks  10/30/24: Right greater trochanteric bursa steroid injection, 60-70% improvement in lateral thigh pain  12/27/24: Right buttock trigger point injection, several days of mild improvement        PMHx:   Past Medical History:   Diagnosis Date    Acute reaction to situational stress 01/04/2023    Depression Screening (1/2023)     Little interest or pleasure in doing things?  3 - nearly every day   Feeling down, depressed , or hopeless? 3 - nearly every day   Trouble falling or staying asleep, or sleeping too much?  0 - not at all   Feeling tired or having little energy?  0 - not at all   Poor appetite or overeating?  0 - not at all   Feeling bad about yourself - or that you are a failure o    Anemia 04/16/2021    She sees Dr. Gonzalez for GERD and hiatal hernia who monitors her lab values. Has had 2 episodes of a drop in her hgb requiring a transfusion. She says her GI thinks this might be related to her hiatal hernia. Is on protonix bid and has prn pepcid she uses on occasion.    Arthritis     slight. 4/27/21: Knees \"Very little\"    BMI 34.0-34.9,adult 09/11/2014    Breath shortness     Cancer (HCC) 2011    CLL- \"Wait and watch\"    CATARACT     surgical correction margo     Celiac disease     CLL (chronic lymphocytic leukemia) ([facility]) 03/03/2011    Asymptomatic being monitored/Dr Davidson, Q 6 months. Not on medications.     Family history of adverse effect to anesthesia     Daughter/Hard time waking     GERD (gastroesophageal reflux disease) 03/03/2011    Sees GI specialist     Heart burn 04/27/2021    GERD    Hiatal hernia 03/03/2011    Hot flashes, menopausal 03/03/2011    Hypertension 03/03/2011       PSHx:   Past Surgical History:   Procedure Laterality Date    IL UPPER GI ENDOSCOPY,DIAGNOSIS N/A 4/28/2021    Procedure: GASTROSCOPY;  Surgeon: Mg Evans M.D.;  Location: SURGERY SAME DAY [facility];  Service: Gastroenterology    IL UPPER GI " ENDOSCOPY,BIOPSY N/A 4/28/2021    Procedure: GASTROSCOPY, WITH BIOPSY;  Surgeon: Mg Evans M.D.;  Location: SURGERY SAME DAY [facility];  Service: Gastroenterology    RECTUS REPAIR Right 11/15/2018    Procedure: RECTUS REPAIR- SUPERIOR RECTUS RECESSION, ADJUSTABLE SUTURE INFERIOR RECTUS PLICATION/RESECTION;  Surgeon: Jane Larsen M.D.;  Location: SURGERY SAME DAY [facility];  Service: Ophthalmology    VITRECTOMY POSTERIOR Right 1/17/2018    Procedure: VITRECTOMY POSTERIOR W/AIR FLUID EXCHANGE, ENDO LASER, 23 GAUGE SF6 GAS, CRYOTHERAPY;  Surgeon: Sea Franklin M.D.;  Location: SURGERY SAME DAY [facility];  Service: Ophthalmology    MAMMOPLASTY REDUCTION Bilateral 11/3/2015    Procedure: MAMMOPLASTY REDUCTION;  Surgeon: Talia Barton M.D.;  Location: SURGERY [facility];  Service:     KNEE ARTHROSCOPY  9/17/2014    Performed by Dany Owens M.D. at [facility]    MENISCECTOMY  9/17/2014    Performed by Dany Owens M.D. at [facility]    SHOULDER ARTHROSCOPY W/ ROTATOR CUFF REPAIR  8/22/2013    Performed by Dany wOens M.D. at [facility]    SHOULDER DECOMPRESSION ARTHROSCOPIC  8/22/2013    Performed by Dany Owens M.D. at [facility]    CLAVICLE DISTAL EXCISION  8/22/2013    Performed by Dany Owens M.D. at [facility]    OTHER  7/2013    laser procedure right eye    CATARACT EXTRACTION WITH IOL  5/29/13    right eye    ABDOMINAL HYSTERECTOMY TOTAL  1975    CHOLECYSTECTOMY  1973    open    TONSILLECTOMY  1950    BLADDER SUSPENSION  1973, 1978    x2       Family history   Family History   Problem Relation Age of Onset    Heart Disease Mother     Hypertension Mother     Other Mother         pancreatitis    Arthritis Father     Cancer Father         Lymphoma, colon, skin    Hyperlipidemia Father     Other Father         Clogged artery in leg    Heart Disease Father         A Fib    Diabetes Sister          Pre    Other Sister         Obesity    Other Daughter         Gluten allergies    Other Son         Suicide       Medications:   Outpatient Medications Marked as Taking for the 1/24/25 encounter (Office Visit) with Tomasa Chiu M.D.   Medication Sig Dispense Refill    gabapentin (NEURONTIN) 300 MG Cap Take 2 Capsules by mouth every evening. 60 Capsule 1    atenolol (TENORMIN) 50 MG Tab Take 1 Tablet by mouth every day. 100 Tablet 3    estradiol (ESTRACE) 0.5 MG tablet TAKE 1/2 TABLET BY MOUTH DAILY 50 Tablet 3    pantoprazole (PROTONIX) 40 MG Tablet Delayed Response TAKE ONE TABLET BY MOUTH TWICE A  Tablet 3    triamcinolone acetonide (KENALOG) 0.1 % Cream APPLY TOPICALLY TO THE AFFECTED AREA(S) OF BODY TWO TIMES A DAY AS NEEDED FOR ITCHING, DO NOT APPLY TO FACE, AXILLA OR GROIN. 454 g 0    famotidine (PEPCID) 20 MG Tab Take 1 Tablet by mouth as needed (breakthrough heartburn). One by mouth once daily at bedtime 90 Tablet 3        Current Outpatient Medications on File Prior to Visit   Medication Sig Dispense Refill    atenolol (TENORMIN) 50 MG Tab Take 1 Tablet by mouth every day. 100 Tablet 3    estradiol (ESTRACE) 0.5 MG tablet TAKE 1/2 TABLET BY MOUTH DAILY 50 Tablet 3    pantoprazole (PROTONIX) 40 MG Tablet Delayed Response TAKE ONE TABLET BY MOUTH TWICE A  Tablet 3    triamcinolone acetonide (KENALOG) 0.1 % Cream APPLY TOPICALLY TO THE AFFECTED AREA(S) OF BODY TWO TIMES A DAY AS NEEDED FOR ITCHING, DO NOT APPLY TO FACE, AXILLA OR GROIN. 454 g 0    famotidine (PEPCID) 20 MG Tab Take 1 Tablet by mouth as needed (breakthrough heartburn). One by mouth once daily at bedtime 90 Tablet 3     No current facility-administered medications on file prior to visit.       Allergies:   Allergies   Allergen Reactions    Grass Pollen(K-O-R-T-Swt [place]) Runny Nose     Sneezing, dizziniess    Other Misc      All household chemicals, soap powders, perfumes. Tongue and lips go numb, itching.    Wheat      Gluten.  Stomach ache.        Social Hx:   Social History     Socioeconomic History    Marital status: Single     Spouse name: Not on file    Number of children: Not on file    Years of education: Not on file    Highest education level: Not on file   Occupational History    Not on file   Tobacco Use    Smoking status: Former     Current packs/day: 0.00     Types: Cigarettes     Quit date: 1967     Years since quittin.1    Smokeless tobacco: Never   Vaping Use    Vaping status: Never Used   Substance and Sexual Activity    Alcohol use: Yes     Alcohol/week: 0.6 - 1.2 oz     Types: 1 - 2 Glasses of wine per week     Comment: Several times a week.  21: 2-4/month    Drug use: No    Sexual activity: Not Currently   Other Topics Concern    Not on file   Social History Narrative    Retired from medical billing. She has one daughter, 2 grandkids (one is starting med school), son in law is an ICU hospitalist at [employer] [facility] part of the [employer]. She enjoys knitting and traveling, belongs to a travel group.  She is  after 30y of marriage, lives alone, has a good support group.      Social Drivers of Health     Financial Resource Strain: Low Risk  (2024)    Overall Financial Resource Strain (CARDIA)     Difficulty of Paying Living Expenses: Not hard at all   Food Insecurity: No Food Insecurity (2024)    Hunger Vital Sign     Worried About Running Out of Food in the Last Year: Never true     Ran Out of Food in the Last Year: Never true   Transportation Needs: No Transportation Needs (2024)    PRAPARE - Transportation     Lack of Transportation (Medical): No     Lack of Transportation (Non-Medical): No   Physical Activity: Not on file   Stress: Not on file   Social Connections: Not on file   Intimate Partner Violence: Not on file   Housing Stability: Low Risk  (2024)    Housing Stability Vital Sign     Unable to Pay for Housing in the Last Year: No     Number of Places Lived in the Last Year: 1     Unstable Housing in the  "Last Year: No         EXAMINATION     Physical Exam:   Vitals: /65 (BP Location: Right arm, Patient Position: Sitting, BP Cuff Size: Adult)   Pulse 61   Temp 36.2 °C (97.1 °F) (Temporal)   Ht 1.549 m (5' 1\")   Wt 73.8 kg (162 lb 11.2 oz)   SpO2 95%     Constitutional:   Body Habitus: Body mass index is 30.74 kg/m².  Cooperation: Fully cooperates with exam  Appearance: Well-groomed, well-nourished  Respiratory:  Breathing comfortable on room air, no audible wheezing  Cardiovascular: Skin appears well-perfused  Psychiatric: Appropriate affect  Gait: normal gait, no use of ambulatory device,  mildly antalgic.     MSK:?TTP across right greater than left lumbar paraspinals bilaterally    Special?tests:    Positive facet loading maneuvers bilaterally       MEDICAL DECISION MAKING    DATA    Labs:   Lab Results   Component Value Date/Time    SODIUM 140 09/04/2024 10:17 AM    POTASSIUM 4.2 09/04/2024 10:17 AM    CHLORIDE 107 09/04/2024 10:17 AM    CO2 23 09/04/2024 10:17 AM    GLUCOSE 86 09/04/2024 10:17 AM    BUN 20 09/04/2024 10:17 AM    CREATININE 1.01 09/04/2024 10:17 AM    CREATININE 0.83 03/25/2011 11:05 AM    BUNCREATRAT 20 03/25/2011 11:05 AM    GLOMRATE >59 03/25/2011 11:05 AM        Lab Results   Component Value Date/Time    PROTHROMBTM 13.7 04/16/2021 03:38 PM    INR 1.02 04/16/2021 03:38 PM        Lab Results   Component Value Date/Time    WBC 22.4 (H) 09/04/2024 10:17 AM    WBC 10.8 (H) 03/25/2011 11:05 AM    RBC 3.68 (L) 09/04/2024 10:17 AM    RBC 4.30 03/25/2011 11:05 AM    HEMOGLOBIN 11.3 (L) 09/04/2024 10:17 AM    HEMATOCRIT 35.7 (L) 09/04/2024 10:17 AM    MCV 97.0 09/04/2024 10:17 AM    MCV 95 03/25/2011 11:05 AM    MCH 30.7 09/04/2024 10:17 AM    MCH 31.2 03/25/2011 11:05 AM    MCHC 31.7 (L) 09/04/2024 10:17 AM    MPV 9.3 09/04/2024 10:17 AM    NEUTSPOLYS 24.00 (L) 09/04/2024 10:17 AM    LYMPHOCYTES 71.00 (H) 09/04/2024 10:17 AM    MONOCYTES 3.00 09/04/2024 10:17 AM    EOSINOPHILS 2.00 " 09/04/2024 10:17 AM    BASOPHILS 0.00 09/04/2024 10:17 AM    HYPOCHROMIA 1+ 06/07/2021 10:34 AM    ANISOCYTOSIS 1+ 02/13/2023 12:31 PM        Lab Results   Component Value Date/Time    HBA1C 5.5 01/27/2022 12:54 PM          Imaging:   Prior personal imaging review:  MRI Lumbar Spine 12/8/24: Grade 1 L4-5 anterolisthesis. Mild vertebral height loss at T11 and T12 with degenerative disc changes at T11, T12, L1. Mild L3-4 disc bulge with minimal canal stenosis. L4-5 disc bulge with mild to moderate canal stenosis. Multilevel facet arthropathy.        I reviewed the following radiology reports                 Results for orders placed during the hospital encounter of 09/23/24     DX-KNEE COMPLETE 4+ LEFT     Impression  Tricompartment degenerative change which involves the medial femorotibial articulation to the greatest degree.   Results for orders placed during the hospital encounter of 09/23/24     DX-LUMBAR SPINE-2 OR 3 VIEWS     Impression  1.  Multilevel degenerative disc disease and facet degeneration.     2.  Grade 1 anterior subluxation at L4-5.     3.  Convex left scoliotic curvature.        DIAGNOSIS   Visit Diagnoses     ICD-10-CM   1. Lumbar facet arthropathy  M47.816   2. Chronic bilateral low back pain without sciatica  M54.50    G89.29   3. Chronic pain of left knee  M25.562    G89.29         ASSESSMENT and PLAN:     Shanice Magana is a 79 y.o. female who returns to clinic for follow-up of low back and left knee pain.    Shanice was seen today for follow-up.    Diagnoses and all orders for this visit:    Lumbar facet arthropathy  -     Referral to Physical Medicine Rehab    Chronic bilateral low back pain without sciatica  -     gabapentin (NEURONTIN) 300 MG Cap; Take 2 Capsules by mouth every evening.    Chronic pain of left knee  -     gabapentin (NEURONTIN) 300 MG Cap; Take 2 Capsules by mouth every evening.        Assessment & Plan  Bilateral low back pain  Lumbar facet arthropathy  Patient  presents for follow-up of chronic moderate to severe midline and bilateral low back pain with radiation into the lower extremities, worse with prolonged standing and walking and with positive facet loading maneuvers on exam today. Given evidence of facet arthropathy on lumbar MRI I believe patient's symptoms are most consistent with lumbar facet arthropathy. Given lack of improvement with prescription medication and physical therapy with ongoing home exercises, we discussed the option of lumbar medial branch blocks and radiofrequency ablation which patient is interested in. A comprehensive discussion was held regarding the potential benefits and risks associated with medial branch blocks and radiofrequency ablation of the bilateral L4-5 and L5-S1 facet joints. She expressed interest in proceeding with these treatment options. Consequently, an order for the first test procedure has been placed, scheduled for [facility]11/[facility] at [facility]. She has been advised to arrange for transportation on the day of the procedure. A follow-up appointment will be scheduled approximately 3 to 5 days post-injection to assess her response to the treatment. In the meantime she will continue gabapentin which she has only been taking at nighttime, 600 mg nightly.    Left knee pain  Left knee osteoarthritis  The patient reports persistent pain in the left knee, which has been somewhat alleviated by previous injections. She will follow up with the surgeon next week to discuss further management options, including the possibility of knee replacement surgery. Depending on the surgeon's recommendations and the outcome of the back treatments, additional interventions for the knee may be considered such as repeat knee steroid injection, viscosupplementation, or genicular nerve block.        Follow up: For medial branch blocks targeting the bilateral L4-5 and L5-S1 facet joints, then 3-5 days after injections in clinic    Thank you for  allowing me to participate in the care of this patient. If you have any questions please not hesitate to contact me.         Please note that this dictation was created using voice recognition software. I have made every reasonable attempt to correct obvious errors but there may be errors of grammar and content that I may have overlooked prior to finalization of this note.    Tomasa Chiu MD  Interventional Spine and Sports Physiatry  Physical Medicine and Rehabilitation  [facility]

## 2025-01-24 ENCOUNTER — OFFICE VISIT (OUTPATIENT)
Dept: PHYSICAL MEDICINE AND REHAB | Facility: MEDICAL CENTER | Age: 80
End: 2025-01-24
Payer: MEDICARE

## 2025-01-24 VITALS
HEART RATE: 61 BPM | WEIGHT: 162.7 LBS | OXYGEN SATURATION: 95 % | DIASTOLIC BLOOD PRESSURE: 65 MMHG | TEMPERATURE: 97.1 F | SYSTOLIC BLOOD PRESSURE: 128 MMHG | HEIGHT: 61 IN | BODY MASS INDEX: 30.72 KG/M2

## 2025-01-24 DIAGNOSIS — G89.29 CHRONIC PAIN OF LEFT KNEE: ICD-10-CM

## 2025-01-24 DIAGNOSIS — M54.50 CHRONIC BILATERAL LOW BACK PAIN WITHOUT SCIATICA: ICD-10-CM

## 2025-01-24 DIAGNOSIS — M25.562 CHRONIC PAIN OF LEFT KNEE: ICD-10-CM

## 2025-01-24 DIAGNOSIS — G89.29 CHRONIC BILATERAL LOW BACK PAIN WITHOUT SCIATICA: ICD-10-CM

## 2025-01-24 DIAGNOSIS — M47.816 LUMBAR FACET ARTHROPATHY: Primary | ICD-10-CM

## 2025-01-24 PROCEDURE — 1125F AMNT PAIN NOTED PAIN PRSNT: CPT | Performed by: STUDENT IN AN ORGANIZED HEALTH CARE EDUCATION/TRAINING PROGRAM

## 2025-01-24 PROCEDURE — 3078F DIAST BP <80 MM HG: CPT | Performed by: STUDENT IN AN ORGANIZED HEALTH CARE EDUCATION/TRAINING PROGRAM

## 2025-01-24 PROCEDURE — 99214 OFFICE O/P EST MOD 30 MIN: CPT | Performed by: STUDENT IN AN ORGANIZED HEALTH CARE EDUCATION/TRAINING PROGRAM

## 2025-01-24 PROCEDURE — 3074F SYST BP LT 130 MM HG: CPT | Performed by: STUDENT IN AN ORGANIZED HEALTH CARE EDUCATION/TRAINING PROGRAM

## 2025-01-24 RX ORDER — GABAPENTIN 300 MG/1
600 CAPSULE ORAL NIGHTLY
Qty: 60 CAPSULE | Refills: 1
Start: 2025-01-24

## 2025-01-24 ASSESSMENT — FIBROSIS 4 INDEX: FIB4 SCORE: 1.83

## 2025-01-24 ASSESSMENT — PATIENT HEALTH QUESTIONNAIRE - PHQ9
CLINICAL INTERPRETATION OF PHQ2 SCORE: 2
SUM OF ALL RESPONSES TO PHQ QUESTIONS 1-9: 4
5. POOR APPETITE OR OVEREATING: 0 - NOT AT ALL

## 2025-01-24 ASSESSMENT — PAIN SCALES - GENERAL: PAINLEVEL_OUTOF10: 7=MODERATE-SEVERE PAIN

## 2025-01-27 ENCOUNTER — OFFICE VISIT (OUTPATIENT)
Dept: SURGICAL ONCOLOGY | Facility: MEDICAL CENTER | Age: 80
End: 2025-01-27
Payer: MEDICARE

## 2025-01-27 VITALS
BODY MASS INDEX: 30.78 KG/M2 | OXYGEN SATURATION: 96 % | TEMPERATURE: 97.5 F | WEIGHT: 163 LBS | DIASTOLIC BLOOD PRESSURE: 68 MMHG | HEART RATE: 64 BPM | HEIGHT: 61 IN | SYSTOLIC BLOOD PRESSURE: 124 MMHG

## 2025-01-27 DIAGNOSIS — G57.32 NEUROPATHY OF PERONEAL NERVE AT LEFT KNEE: ICD-10-CM

## 2025-01-27 DIAGNOSIS — M17.12 PRIMARY OSTEOARTHRITIS OF LEFT KNEE: ICD-10-CM

## 2025-01-27 PROCEDURE — 3074F SYST BP LT 130 MM HG: CPT | Performed by: ORTHOPAEDIC SURGERY

## 2025-01-27 PROCEDURE — 3078F DIAST BP <80 MM HG: CPT | Performed by: ORTHOPAEDIC SURGERY

## 2025-01-27 PROCEDURE — 99203 OFFICE O/P NEW LOW 30 MIN: CPT | Performed by: ORTHOPAEDIC SURGERY

## 2025-01-27 ASSESSMENT — FIBROSIS 4 INDEX: FIB4 SCORE: 1.83

## 2025-01-28 ASSESSMENT — ENCOUNTER SYMPTOMS
FEVER: 0
SHORTNESS OF BREATH: 0
MYALGIAS: 0
CHILLS: 0
SENSORY CHANGE: 1
WEAKNESS: 0

## 2025-01-28 NOTE — PROGRESS NOTES
[facility] Orthopedic Surgery    Subjective:   1/27/2025  2:35 PM  Primary care physician:Salina Morgan D.O.    Chief Complaint:   Left knee pain    History of presenting illness:  Shanice Magana  is a pleasant 79 y.o. female who was referred for evaluation of left knee pain and common peroneal nerve entrapment at the left knee.  Patient reports she has had months if not years of left knee pain.  She has pain in multiple areas including the medial and lateral aspect of the knee.  She does get radiating pain down the lateral aspect of the leg as well with some decrease sensation into the dorsum of the foot.  She denies any weakness in the leg.  She takes Tylenol for pain and has previously had corticosteroid injections which provide some temporary relief.  She also previously had an arthroscopy on the knee many years ago.  She is not using any assistive devices.  She cannot take any NSAIDs.  She has also tried some topical creams which provide minimal relief.  She had a EMG with the physiatry team showing common peroneal neuropathy with compression at the level of the knee.    History of diabetes: no  History of tobacco use: former  History of renal disease: no  Use of anticoagulants: no  Prior surgeries on this limb/joint: no    Past Medical History:   Diagnosis Date    Acute reaction to situational stress 01/04/2023    Depression Screening (1/2023)     Little interest or pleasure in doing things?  3 - nearly every day   Feeling down, depressed , or hopeless? 3 - nearly every day   Trouble falling or staying asleep, or sleeping too much?  0 - not at all   Feeling tired or having little energy?  0 - not at all   Poor appetite or overeating?  0 - not at all   Feeling bad about yourself - or that you are a failure o    Anemia 04/16/2021    She sees Dr. Gonzalez for GERD and hiatal hernia who monitors her lab values. Has had 2 episodes of a drop in her hgb requiring a transfusion. She says her GI thinks this might  "be related to her hiatal hernia. Is on protonix bid and has prn pepcid she uses on occasion.    Arthritis     slight. 4/27/21: Knees \"Very little\"    BMI 34.0-34.9,adult 09/11/2014    Breath shortness     Cancer (HCC) 2011    CLL- \"Wait and watch\"    CATARACT     surgical correction margo     Celiac disease     CLL (chronic lymphocytic leukemia) ([facility]) 03/03/2011    Asymptomatic being monitored/Dr Davidson, Q 6 months. Not on medications.     Family history of adverse effect to anesthesia     Daughter/Hard time waking     GERD (gastroesophageal reflux disease) 03/03/2011    Sees GI specialist     Heart burn 04/27/2021    GERD    Hiatal hernia 03/03/2011    Hot flashes, menopausal 03/03/2011    Hypertension 03/03/2011     Past Surgical History:   Procedure Laterality Date    IN UPPER GI ENDOSCOPY,DIAGNOSIS N/A 4/28/2021    Procedure: GASTROSCOPY;  Surgeon: Mg Evans M.D.;  Location: SURGERY SAME DAY [facility];  Service: Gastroenterology    IN UPPER GI ENDOSCOPY,BIOPSY N/A 4/28/2021    Procedure: GASTROSCOPY, WITH BIOPSY;  Surgeon: Mg Evans M.D.;  Location: SURGERY SAME DAY [facility];  Service: Gastroenterology    RECTUS REPAIR Right 11/15/2018    Procedure: RECTUS REPAIR- SUPERIOR RECTUS RECESSION, ADJUSTABLE SUTURE INFERIOR RECTUS PLICATION/RESECTION;  Surgeon: Jane Larsen M.D.;  Location: SURGERY SAME DAY [facility];  Service: Ophthalmology    VITRECTOMY POSTERIOR Right 1/17/2018    Procedure: VITRECTOMY POSTERIOR W/AIR FLUID EXCHANGE, ENDO LASER, 23 GAUGE SF6 GAS, CRYOTHERAPY;  Surgeon: Sea Franklin M.D.;  Location: SURGERY SAME DAY [facility];  Service: Ophthalmology    MAMMOPLASTY REDUCTION Bilateral 11/3/2015    Procedure: MAMMOPLASTY REDUCTION;  Surgeon: Talia Barton M.D.;  Location: SURGERY [facility];  Service:     KNEE ARTHROSCOPY  9/17/2014    Performed by Dany Owens M.D. at [facility]    MENISCECTOMY  9/17/2014    Performed by Dany Owens M.D. " at SURGERY [facility]    SHOULDER ARTHROSCOPY W/ ROTATOR CUFF REPAIR  8/22/2013    Performed by Dany Owens M.D. at SURGERY [facility]    SHOULDER DECOMPRESSION ARTHROSCOPIC  8/22/2013    Performed by Dany Owens M.D. at SURGERY [facility]    CLAVICLE DISTAL EXCISION  8/22/2013    Performed by Dany Owens M.D. at SURGERY [facility]    OTHER  7/2013    laser procedure right eye    CATARACT EXTRACTION WITH IOL  5/29/13    right eye    ABDOMINAL HYSTERECTOMY TOTAL  1975    CHOLECYSTECTOMY  1973    open    TONSILLECTOMY  1950    BLADDER SUSPENSION  1973, 1978    x2     Allergies   Allergen Reactions    Grass Pollen(K-O-R-T-Swt [place]) Runny Nose     Sneezing, dizziniess    Other Misc      All household chemicals, soap powders, perfumes. Tongue and lips go numb, itching.    Wheat      Gluten. Stomach ache.      Outpatient Encounter Medications as of 1/27/2025   Medication Sig Dispense Refill    gabapentin (NEURONTIN) 300 MG Cap Take 2 Capsules by mouth every evening. 60 Capsule 1    atenolol (TENORMIN) 50 MG Tab Take 1 Tablet by mouth every day. 100 Tablet 3    estradiol (ESTRACE) 0.5 MG tablet TAKE 1/2 TABLET BY MOUTH DAILY 50 Tablet 3    pantoprazole (PROTONIX) 40 MG Tablet Delayed Response TAKE ONE TABLET BY MOUTH TWICE A  Tablet 3    triamcinolone acetonide (KENALOG) 0.1 % Cream APPLY TOPICALLY TO THE AFFECTED AREA(S) OF BODY TWO TIMES A DAY AS NEEDED FOR ITCHING, DO NOT APPLY TO FACE, AXILLA OR GROIN. 454 g 0    famotidine (PEPCID) 20 MG Tab Take 1 Tablet by mouth as needed (breakthrough heartburn). One by mouth once daily at bedtime 90 Tablet 3     No facility-administered encounter medications on file as of 1/27/2025.     Social History     Socioeconomic History    Marital status: Single     Spouse name: Not on file    Number of children: Not on file    Years of education: Not on file    Highest education level: Not on file   Occupational History    Not on file   Tobacco  Use    Smoking status: Former     Current packs/day: 0.00     Types: Cigarettes     Quit date: 1967     Years since quittin.1    Smokeless tobacco: Never   Vaping Use    Vaping status: Never Used   Substance and Sexual Activity    Alcohol use: Yes     Alcohol/week: 0.6 - 1.2 oz     Types: 1 - 2 Glasses of wine per week     Comment: Several times a week.  21: 2-4/month    Drug use: No    Sexual activity: Not Currently   Other Topics Concern    Not on file   Social History Narrative    Retired from medical billing. She has one daughter, 2 grandkids (one is starting med school), son in law is an ICU hospitalist at [employer]. She enjoys knitting and traveling, belongs to a travel group.  She is  after 30y of marriage, lives alone, has a good support group.      Social Drivers of Health     Financial Resource Strain: Low Risk  (2024)    Overall Financial Resource Strain (CARDIA)     Difficulty of Paying Living Expenses: Not hard at all   Food Insecurity: No Food Insecurity (2024)    Hunger Vital Sign     Worried About Running Out of Food in the Last Year: Never true     Ran Out of Food in the Last Year: Never true   Transportation Needs: No Transportation Needs (2024)    PRAPARE - Transportation     Lack of Transportation (Medical): No     Lack of Transportation (Non-Medical): No   Physical Activity: Not on file   Stress: Not on file   Social Connections: Not on file   Intimate Partner Violence: Not on file   Housing Stability: Low Risk  (2024)    Housing Stability Vital Sign     Unable to Pay for Housing in the Last Year: No     Number of Places Lived in the Last Year: 1     Unstable Housing in the Last Year: No      Social History     Tobacco Use   Smoking Status Former    Current packs/day: 0.00    Types: Cigarettes    Quit date: 1967    Years since quittin.1   Smokeless Tobacco Never     Social History     Substance and Sexual Activity   Alcohol Use Yes    Alcohol/week:  "0.6 - 1.2 oz    Types: 1 - 2 Glasses of wine per week    Comment: Several times a week.  4/27/21: 2-4/month     Social History     Substance and Sexual Activity   Drug Use No        Family History   Problem Relation Age of Onset    Heart Disease Mother     Hypertension Mother     Other Mother         pancreatitis    Arthritis Father     Cancer Father         Lymphoma, colon, skin    Hyperlipidemia Father     Other Father         Clogged artery in leg    Heart Disease Father         A Fib    Diabetes Sister         Pre    Other Sister         Obesity    Other Daughter         Gluten allergies    Other Son         Suicide       Review of Systems   Constitutional:  Negative for chills and fever.   Respiratory:  Negative for shortness of breath.    Cardiovascular:  Negative for chest pain.   Musculoskeletal:  Positive for joint pain. Negative for myalgias.   Neurological:  Positive for sensory change. Negative for weakness.        Objective:   /68 (BP Location: Left arm, Patient Position: Sitting, BP Cuff Size: Adult)   Pulse 64   Temp 36.4 °C (97.5 °F) (Temporal)   Ht 1.549 m (5' 1\")   Wt 73.9 kg (163 lb)   SpO2 96%   BMI 30.80 kg/m²     Physical Exam  Constitutional:       General: She is not in acute distress.     Appearance: Normal appearance. She is not ill-appearing.   HENT:      Head: Normocephalic and atraumatic.      Right Ear: External ear normal.      Left Ear: External ear normal.      Mouth/Throat:      Mouth: Mucous membranes are moist.   Eyes:      Extraocular Movements: Extraocular movements intact.      Conjunctiva/sclera: Conjunctivae normal.   Cardiovascular:      Rate and Rhythm: Normal rate.      Pulses: Normal pulses.   Pulmonary:      Effort: Pulmonary effort is normal.      Breath sounds: No wheezing.   Musculoskeletal:      Cervical back: Normal range of motion and neck supple.   Skin:     General: Skin is warm and dry.      Capillary Refill: Capillary refill takes less than 2 " seconds.   Neurological:      Mental Status: She is alert and oriented to person, place, and time.   Psychiatric:         Mood and Affect: Mood normal.         Behavior: Behavior normal.       Knee Exam -   left knee: Overlying skin demonstrates no erythema, ecchymoses or wounds. Knee ROM is full.  SILT spn, dpn, plantar and sural nerves. Motor intact to knee extension, ankle dorsiflexion, ankle plantar flexion, and eversion. DP/PT pulse 2+. There is tenderness at the medial and lateral joint line. There is tenderness at the proximal tib-fib joint with a palpable firm lump. negative Lachman. negative Eyal. stable varus/valgus stress.         Imaging:  Multiple views of the left knee demonstrates moderate degenerative changes including near complete joint space narrowing of the medial compartment.  There are mild/moderate changes in the lateral and patellofemoral compartments as well.  There are periarticular osteophytes and subchondral sclerosis. There are no acute fractures or destructive osseous lesions.         Diagnosis:     1. Primary osteoarthritis of left knee        2. 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  We reviewed the natural history and etiology of knee osteoarthritis. We discussed conservative and surgical treatment options in detail. Conservative treatment options include weight loss, low impact exercise (pool, bike, etc), corticosteroid injections, bracing, and ablative procedures of the sensory nerves to the joint. Surgical treatment includes total joint arthroplasty. They are not a candidate for partial knee arthroplasty given global involvement of arthritis on radiographs.    We also discussed the common peroneal neuropathy and pain she is experiencing on the lateral aspect of the knee.  I think she most likely has a ganglion cyst arising from the  proximal tibia/fibular joint.  I recommended an MRI to confirm this and we discussed that if this is indeed what it is surgery could be considered to decompress the nerve and excised the cyst.  I would prefer to do this either before or at the same time as a knee replacement if she wishes to have a total knee arthroplasty.  She is hoping to avoid knee arthroplasty at this point and would like to continue with conservative care for her knee.  She will continue using Tylenol, topical NSAIDs and a knee sleeve.  She could also consider a genicular nerve ablation.  In the meantime we will get an MRI to evaluate for the cyst.    All questions were answered and they were in agreement with the plan.     Referrals: none  Restrictions: none  New medications/refills: none  Imaging studies: MRI w/wo contrast left knee  Follow-up: after mri      Golden Fleming,   Orthopedic Oncology and General Orthopedics

## 2025-02-11 ENCOUNTER — HOSPITAL ENCOUNTER (OUTPATIENT)
Facility: REHABILITATION | Age: 80
End: 2025-02-11
Attending: STUDENT IN AN ORGANIZED HEALTH CARE EDUCATION/TRAINING PROGRAM | Admitting: STUDENT IN AN ORGANIZED HEALTH CARE EDUCATION/TRAINING PROGRAM
Payer: MEDICARE

## 2025-02-11 ENCOUNTER — APPOINTMENT (OUTPATIENT)
Dept: RADIOLOGY | Facility: REHABILITATION | Age: 80
End: 2025-02-11
Attending: STUDENT IN AN ORGANIZED HEALTH CARE EDUCATION/TRAINING PROGRAM
Payer: MEDICARE

## 2025-02-11 VITALS
DIASTOLIC BLOOD PRESSURE: 79 MMHG | HEART RATE: 69 BPM | BODY MASS INDEX: 29.55 KG/M2 | TEMPERATURE: 97.7 F | WEIGHT: 156.53 LBS | OXYGEN SATURATION: 97 % | SYSTOLIC BLOOD PRESSURE: 148 MMHG | HEIGHT: 61 IN | RESPIRATION RATE: 16 BRPM

## 2025-02-11 PROCEDURE — 64493 INJ PARAVERT F JNT L/S 1 LEV: CPT

## 2025-02-11 PROCEDURE — 700101 HCHG RX REV CODE 250

## 2025-02-11 PROCEDURE — 700117 HCHG RX CONTRAST REV CODE 255

## 2025-02-11 PROCEDURE — 700111 HCHG RX REV CODE 636 W/ 250 OVERRIDE (IP): Mod: JZ

## 2025-02-11 PROCEDURE — 64494 INJ PARAVERT F JNT L/S 2 LEV: CPT

## 2025-02-11 RX ORDER — LIDOCAINE HYDROCHLORIDE 10 MG/ML
INJECTION, SOLUTION EPIDURAL; INFILTRATION; INTRACAUDAL; PERINEURAL
Status: COMPLETED
Start: 2025-02-11 | End: 2025-02-11

## 2025-02-11 RX ORDER — LIDOCAINE HYDROCHLORIDE 20 MG/ML
INJECTION, SOLUTION EPIDURAL; INFILTRATION; INTRACAUDAL; PERINEURAL
Status: COMPLETED
Start: 2025-02-11 | End: 2025-02-11

## 2025-02-11 RX ORDER — BUPIVACAINE HYDROCHLORIDE 5 MG/ML
INJECTION, SOLUTION EPIDURAL; INTRACAUDAL
Status: COMPLETED
Start: 2025-02-11 | End: 2025-02-11

## 2025-02-11 RX ADMIN — IOHEXOL 5 ML: 240 INJECTION, SOLUTION INTRATHECAL; INTRAVASCULAR; INTRAVENOUS; ORAL at 13:13

## 2025-02-11 RX ADMIN — LIDOCAINE HYDROCHLORIDE 10 ML: 10 INJECTION, SOLUTION EPIDURAL; INFILTRATION; INTRACAUDAL; PERINEURAL at 13:13

## 2025-02-11 RX ADMIN — LIDOCAINE HYDROCHLORIDE 10 ML: 20 INJECTION, SOLUTION EPIDURAL; INFILTRATION; INTRACAUDAL; PERINEURAL at 13:13

## 2025-02-11 ASSESSMENT — FIBROSIS 4 INDEX: FIB4 SCORE: 1.83

## 2025-02-11 ASSESSMENT — PAIN DESCRIPTION - PAIN TYPE: TYPE: CHRONIC PAIN

## 2025-02-11 NOTE — OP REPORT
Date of Service: 2/11/2025     Patient: Shanice Magana 79 y.o. female     MRN: [id number]     Physician/s: Tomasa Chiu MD    Pre-operative Diagnosis: Lumbosacral spondylosis, facet arthropathy. The patient was NOT seen for lumbar radiculopathy today.     Post-operative Diagnosis: Lumbosacral spondylosis, facet arthropathy. The patient was NOT seen for lumbar radiculopathy today.     Procedure: Medial Branch Blocks targeting the right and left L4-5 and L5-S1  facet joints     Description of procedure:    The risks, benefits, and alternatives of the procedure were reviewed and discussed with the patient.  Written informed consent was freely obtained. A pre-procedural time-out was conducted by the physician verifying patient’s identity, procedure to be performed, procedure site and side, and allergy verification. Appropriate equipment was determined to be in place for the procedure.     In the fluoroscopy suite the patient was placed in a prone position and the skin was prepped and draped in the usual sterile fashion. The fluoroscope was placed over the lower back at the appropriate angles, and the targets for injection were marked. A 27g needle was placed into each of the markings at the levels below, and approx 1mL of 1% Lidocaine was injected subcutaneously into the epidermal and dermal layers. The needle was removed intact.     Using an oblique view, A 22g 5 inch needle was then placed at the intersection of the transverse process and superior articular process at the L4-5 facet joint where the L3 medial branch runs on the right side. The needle tips were then verified by AP, oblique, and lateral views.     Using an oblique view, A 22g 5 inch needle was then placed at the intersection of the transverse process and superior articular process at the L5-S1 facet joint where the L4 medial branch runs  on the right side. The needle tips were then verified by AP, oblique, and lateral views.     Using an oblique  view, A 22g 5 inch needle was then placed at the intersection of the transverse process and superior articular process at the S1 facet where the L5 dorsal ramus runs  on the right side. The needle tips were then verified by AP, oblique, and lateral views.     Using an oblique view, A 22g 5 inch needle was then placed at the intersection of the transverse process and superior articular process at the S1 facet where the L5 dorsal ramus runs  on the left side. The needle tips were then verified by AP, oblique, and lateral views.     Using an oblique view, A 22g 5 inch needle was then placed at the intersection of the transverse process and superior articular process at the L5-S1 facet joint where the L4 medial branch runs  on the left side. The needle tips were then verified by AP, oblique, and lateral views.     Using an oblique view, A 22g 5 inch needle was then placed at the intersection of the transverse process and superior articular process at the L4-5 facet joint where the L3 medial branch runs  on the left side. The needle tips were then verified by AP, oblique, and lateral views.      In the AP view, less than 1mL of contrast dye was used to highlight the medial branch while the fluoroscope was running live at the levels above. Following negative aspiration, approximately 0.5mL of 2% lidocaine was then injected at the above levels, and the needles were removed intact after restyleted. The patient's back was covered with a 4x4 gauze, the area was cleansed with sterile normal saline, and a dressing was applied.     There were no complications noted, the patient was hemodynamically stable, and tolerated the procedure well.       Preprocedure pain 8/10 NRS  Postprocedure pain 1 /10 NRS      Follow-up as scheduled      Tomasa Chiu MD  Physical Medicine and Rehabilitation  Interventional Spine and Sports Physiatry  [facility]            CPT  Intraarticular joint or medial branch block (MBB) - lumbar or  sacral (1st level):  93930-16  Intraarticular joint or medial branch block (MBB) - lumbar or sacral (2nd level):  26064-00

## 2025-02-11 NOTE — INTERVAL H&P NOTE
Consented Procedure: Diagnostic medial branch blocks targeting the BILATERAL L4-5 and L5-S1 facet joints with fluoroscopic guidance  I have examined the patient, provided the risks, benefits, and alternatives to the procedure(s) indicated on the signed consent form, and the patient wishes to proceed.    H&P reviewed. The patient was examined and there are no changes to the H&P      Tomasa Chiu M.D.  02/11/25 12:56 PM

## 2025-02-11 NOTE — PROGRESS NOTES
1218 Pt received to pre procedure area. ID band and allergies verified. Vital signs taken and stable. Verified that patient has not taken NSAIDS, anticoagulants or blood thinners in past 5 days. Pt's history reviewed. Reviewed post op instructions with patient, questions answered, verbalized understanding. Pt seen by Dr. Chiu  pre procedure discussed, questions answered.     1332  Pt received to recovery area, report received from procedure RN Kayli . Vitals taken and stable. Patient tolerated po fluids and snack without difficulty. Dressing clean, dry and intact. Ice pack applied over dressing.     1345 Pt seen by Dr. Chiu post procedure, orders received for discharge. Patient ambulatory without difficulty. Pt discharged to designated .

## 2025-02-12 ENCOUNTER — APPOINTMENT (OUTPATIENT)
Dept: MEDICAL GROUP | Facility: PHYSICIAN GROUP | Age: 80
End: 2025-02-12
Payer: MEDICARE

## 2025-02-12 VITALS
DIASTOLIC BLOOD PRESSURE: 64 MMHG | WEIGHT: 161.1 LBS | HEIGHT: 61 IN | TEMPERATURE: 97.5 F | RESPIRATION RATE: 12 BRPM | OXYGEN SATURATION: 97 % | SYSTOLIC BLOOD PRESSURE: 110 MMHG | HEART RATE: 60 BPM | BODY MASS INDEX: 30.41 KG/M2

## 2025-02-12 DIAGNOSIS — I10 PRIMARY HYPERTENSION: ICD-10-CM

## 2025-02-12 DIAGNOSIS — C91.11 CHRONIC LYMPHOID LEUKEMIA IN REMISSION (HCC): ICD-10-CM

## 2025-02-12 DIAGNOSIS — M54.50 CHRONIC BILATERAL LOW BACK PAIN, UNSPECIFIED WHETHER SCIATICA PRESENT: ICD-10-CM

## 2025-02-12 DIAGNOSIS — G89.29 CHRONIC BILATERAL LOW BACK PAIN, UNSPECIFIED WHETHER SCIATICA PRESENT: ICD-10-CM

## 2025-02-12 DIAGNOSIS — B00.89 RECURRENT HERPES SIMPLEX VIRUS (HSV) INFECTION OF BUTTOCK: ICD-10-CM

## 2025-02-12 DIAGNOSIS — G89.29 CHRONIC PAIN OF LEFT KNEE: ICD-10-CM

## 2025-02-12 DIAGNOSIS — F33.42 RECURRENT MAJOR DEPRESSIVE DISORDER, IN FULL REMISSION (HCC): ICD-10-CM

## 2025-02-12 DIAGNOSIS — M25.562 CHRONIC PAIN OF LEFT KNEE: ICD-10-CM

## 2025-02-12 PROCEDURE — G2211 COMPLEX E/M VISIT ADD ON: HCPCS | Performed by: INTERNAL MEDICINE

## 2025-02-12 PROCEDURE — 3078F DIAST BP <80 MM HG: CPT | Performed by: INTERNAL MEDICINE

## 2025-02-12 PROCEDURE — 99214 OFFICE O/P EST MOD 30 MIN: CPT | Performed by: INTERNAL MEDICINE

## 2025-02-12 PROCEDURE — 3074F SYST BP LT 130 MM HG: CPT | Performed by: INTERNAL MEDICINE

## 2025-02-12 RX ORDER — VALACYCLOVIR HYDROCHLORIDE 500 MG/1
1000 TABLET, FILM COATED ORAL 2 TIMES DAILY
Qty: 100 TABLET | Refills: 3 | Status: SHIPPED | OUTPATIENT
Start: 2025-02-12

## 2025-02-12 ASSESSMENT — FIBROSIS 4 INDEX: FIB4 SCORE: 1.83

## 2025-02-12 NOTE — PROGRESS NOTES
Subjective:   Chief Complaint/History of Present Illness:  Shanice Magana is a 79 y.o. female established patient who presents today to discuss medical problems as listed below. Shanice is unaccompanied for today's visit.    History of Present Illness  The patient presents for evaluation of chronic lymphocytic leukemia, herpes infection, and medication management.    She is currently in the lidocaine trial phase, having received her first treatment yesterday, which she reports as being effective. She anticipates a second treatment followed by cauterization. She has not received any steroid injections from Dr. Fleming. She is scheduled for an MRI in April 2024 and plans to contact the office to inquire about the possibility of being placed on a waiting list. She is also due for blood work with Dr. Davidson in approximately 3 weeks.    She experienced a herpes outbreak last week, localized to the buttock region. This is her third outbreak in the past 4 to 5 years. She has been managing the symptoms with coconut oil. She expresses concern about the potential impact of her chronic lymphocytic leukemia (CLL) on her immune system and the possibility of requiring daily prophylaxis. She has observed that minor injuries tend to heal more slowly. She was previously treated with Valtrex in June 2023, which proved effective.    She was prescribed gabapentin a few weeks ago, which she takes 1 tablet in the morning and 2 tablets at night. However, she discontinued the morning dose due to side effects of blurry vision. Despite taking 2 tablets at night, she reports difficulty falling asleep until 1:00 AM. She has attempted to take the medication between 8:00 and 9:00 PM but experienced a hangover effect.    Supplemental Information  She had a miscarriage at 5.5 months while she had an IUD in place.    MEDICATIONS  Current: Atenolol, estradiol, Pepcid, gabapentin, Valtrex       Current Medications:  Current Outpatient  "Medications Ordered in Epic   Medication Sig Dispense Refill    valACYclovir (VALTREX) 500 MG Tab Take 2 Tablets by mouth 2 times a day. 100 Tablet 3    gabapentin (NEURONTIN) 300 MG Cap Take 2 Capsules by mouth every evening. 60 Capsule 1    atenolol (TENORMIN) 50 MG Tab Take 1 Tablet by mouth every day. 100 Tablet 3    estradiol (ESTRACE) 0.5 MG tablet TAKE 1/2 TABLET BY MOUTH DAILY 50 Tablet 3    pantoprazole (PROTONIX) 40 MG Tablet Delayed Response TAKE ONE TABLET BY MOUTH TWICE A  Tablet 3    triamcinolone acetonide (KENALOG) 0.1 % Cream APPLY TOPICALLY TO THE AFFECTED AREA(S) OF BODY TWO TIMES A DAY AS NEEDED FOR ITCHING, DO NOT APPLY TO FACE, AXILLA OR GROIN. 454 g 0    famotidine (PEPCID) 20 MG Tab Take 1 Tablet by mouth as needed (breakthrough heartburn). One by mouth once daily at bedtime 90 Tablet 3     No current Epic-ordered facility-administered medications on file.          Objective:   Physical Exam:    Vitals: /64   Pulse 60   Temp 36.4 °C (97.5 °F) (Temporal)   Resp 12   Ht 1.549 m (5' 1\")   Wt 73.1 kg (161 lb 1.6 oz)   SpO2 97%    BMI: Body mass index is 30.44 kg/m².  Physical Exam  Constitutional:       General: She is not in acute distress.     Appearance: Normal appearance. She is not ill-appearing.   HENT:      Right Ear: External ear normal.      Left Ear: External ear normal.   Eyes:      General: No scleral icterus.     Conjunctiva/sclera: Conjunctivae normal.   Cardiovascular:      Rate and Rhythm: Normal rate and regular rhythm.      Pulses: Normal pulses.   Pulmonary:      Effort: Pulmonary effort is normal. No respiratory distress.      Breath sounds: No wheezing or rhonchi.   Abdominal:      General: Bowel sounds are normal.      Palpations: Abdomen is soft.   Psychiatric:         Mood and Affect: Mood normal.         Behavior: Behavior normal.         Thought Content: Thought content normal.         Judgment: Judgment normal.           Assessment & Plan  1. Chronic " lymphocytic leukemia (CLL).  Her CLL remains relatively indolent, but her immune cell count is not within the normal range. The elevated viral load may be contributing to her herpes outbreaks. She has received steroid injections in September 2024, October 2024, and December 2024, which could have potentially suppressed her immune system. Continue regular lab work per oncology.    2. Herpes infection, genital.  She experienced a herpes outbreak last week, localized to the buttock region. This is her third outbreak in the past 6 months after no outbreaks for 4 to 5 years. She has been managing the symptoms with coconut oil. She expresses concern about the potential impact of her chronic lymphocytic leukemia (CLL) on her immune system and the possibility of requiring daily prophylaxis. She has observed that minor injuries tend to heal more slowly. She will be initiated on a treatment regimen of Valtrex 1000 mg twice daily for a duration of 10-14 days, unless symptoms completely resolve earlier. Subsequently, she will transition to a maintenance dose of 500 mg twice daily. A handout detailing the treatment plan will be provided. If the current treatment approach proves ineffective, she will inform us so that an acute treatment regimen can be considered.    3. Chronic low back pain due to lumbar facet arthropathy  4. Chronic left knee pain   She was prescribed gabapentin a few weeks ago, which she takes 1 tablet in the morning and 2 tablets at night. However, she discontinued the morning dose due to side effects of blurry vision. Despite taking 2 tablets at night, she reports difficulty falling asleep until 1:00 AM. She has attempted to take the medication between 8:00 and 9:00 PM but experienced a hangover effect. S/p left knee steroid injection, right hip bursa injection, and trigger point following by medial nerve branch blocks of L4-5 with physiatry. She will have MRI left knee per Dr. Fleming in April.    5.  Recurrent major depressive disorder, in full remission  Previous issue, used sertraline in the past, not currently on medical therapy.    6. Hypertension   Chronic and stable issue, continue current regimen with atenolol 50 mg daily. Update renal function to ensure stability.      Follow-up  The patient will follow up in 6 months.    PROCEDURE  The patient received a lidocaine treatment yesterday, which was reported as effective.      Assessment and Plan:   Shanice is a 79 y.o. female with the following:  Problem List Items Addressed This Visit       Chronic bilateral low back pain    Chronic lymphoid leukemia in remission (HCC)    Chronic pain of left knee    Hypertension    Relevant Orders    Lipid Profile    VITAMIN B12    VITAMIN D,25 HYDROXY (DEFICIENCY)    TSH WITH REFLEX TO FT4    Recurrent herpes simplex virus (HSV) infection of buttock    Relevant Medications    valACYclovir (VALTREX) 500 MG Tab    Recurrent major depressive disorder, in full remission (HCC)          RTC: Return in about 6 months (around 8/12/2025).    I spent a total of 34 minutes with record review, exam, communication with the patient, communication with other providers, and documentation of this encounter.    Verbal consent was acquired by the patient to use [employer] ambient listening note generation during this visit Yes       PLEASE NOTE: This dictation was created using voice recognition software. I have made every reasonable attempt to correct obvious errors, but I expect that there are errors of grammar and possibly content that I did not discover before finalizing the note.    Billing : secondary to the complexity of this patient's illnesses and their interactions.  All problems listed were discussed during the office visit, medications were evaluated and complexities were discussed as well as plan for the future.       Salina Morgan,   Geriatric and Internal Medicine  [facility]

## 2025-02-12 NOTE — PATIENT INSTRUCTIONS
Genital:    Patients who are immunocompetent:    Treatment, initial episode: Oral: 1 g twice daily for 7 to 10 days; extend duration if lesion has not healed completely after 10 days (Ref).    Treatment, recurrent episode: Oral: 500 mg twice daily for 3 days or 1 g once daily for 5 days. Note: Treatment is most effective when initiated during the prodrome or within 1 day of lesion onset (Ref).    Suppressive therapy (eg, for severe and/or frequent recurrences): Oral: 500 mg or 1 g once daily. Note: Reassess need periodically (eg, annually). The 500 mg daily dose may be less effective in patients who experience very frequent (>=10) recurrences per year (Ref).

## 2025-02-12 NOTE — H&P (VIEW-ONLY)
Verbal consent was acquired by the patient to use [employer] ambient listening note generation during this visit Yes     FOLLOW-UP PATIENT VISIT    Interventional Spine and Pain  Physiatry (Physical Medicine and Rehabilitation)     Date of Service: 25   Chief Complaint:   Chief Complaint   Patient presents with    Follow-Up     Lumbar facet arthropathy      Patient Name: Shanice Magana   : 1945   MRN: [id number]       PRIOR HISTORY    Please see new patient note by Dr. Chiu for more details.     Interval History  Patient identification: Shanice Magana,  1945, with history of CLL, HTN, gastric ulcer, who presents today for follow-up of low back pain.    History of Present Illness  The patient is a 79-year-old female who presents today for follow-up of low back pain.    She underwent diagnostic medial branch block number 1 targeting the bilateral L4-L5 and L5-S1 facet joints on 2025. She reports that the injections administered on both sides of her lower back on Tuesday provided significant relief until she retired to bed. Since then she has noted gradual return of her usual right greater than left low back pain, which has escalated to a level of 7 today on the right and 4 on the left. Over the past few days, she has been managing well, but today, she reports experiencing the most severe pain. She also mentions that prolonged standing or walking exacerbates the pain on both sides. She denies any changes to her pain location, character, or new areas of pain, numbness, or weakness.      Procedures:  24: Left knee intraarticular steroid injection, 80-90% improvement ~4 weeks  10/30/24: Right greater trochanteric bursa steroid injection, 60-70% improvement in lateral thigh pain  24: Right buttock trigger point injection, several days of mild improvement  25: MBB#1 targeting bilateral L4-5 and L5-S1 facet joints, 90% pain improvement for ~8 hours      PMHx:  "  Past Medical History:   Diagnosis Date    Acute reaction to situational stress 01/04/2023    Depression Screening (1/2023)     Little interest or pleasure in doing things?  3 - nearly every day   Feeling down, depressed , or hopeless? 3 - nearly every day   Trouble falling or staying asleep, or sleeping too much?  0 - not at all   Feeling tired or having little energy?  0 - not at all   Poor appetite or overeating?  0 - not at all   Feeling bad about yourself - or that you are a failure o    Anemia 04/16/2021    She sees Dr. Gonzalez for GERD and hiatal hernia who monitors her lab values. Has had 2 episodes of a drop in her hgb requiring a transfusion. She says her GI thinks this might be related to her hiatal hernia. Is on protonix bid and has prn pepcid she uses on occasion.    Arthritis     slight. 4/27/21: Knees \"Very little\"    BMI 34.0-34.9,adult 09/11/2014    Breath shortness     Cancer (HCC) 2011    CLL- \"Wait and watch\"    CATARACT     surgical correction margo     Celiac disease     CLL (chronic lymphocytic leukemia) (HCC) 03/03/2011    Asymptomatic being monitored/Dr Davidson, Q 6 months. Not on medications.     Family history of adverse effect to anesthesia     Daughter/Hard time waking     GERD (gastroesophageal reflux disease) 03/03/2011    Sees GI specialist     Heart burn 04/27/2021    GERD    Hiatal hernia 03/03/2011    Hot flashes, menopausal 03/03/2011    Hypertension 03/03/2011       PSHx:   Past Surgical History:   Procedure Laterality Date    LUMBAR MEDIAL BRANCH BLOCKS Bilateral 2/11/2025    Procedure: Diagnostic medial branch blocks targeting the BILATERAL L4-5 and L5-S1 facet joints with fluoroscopic guidance;  Surgeon: Tomasa Chiu M.D.;  Location: [facility];  Service: Pain Management    SD UPPER GI ENDOSCOPY,DIAGNOSIS N/A 4/28/2021    Procedure: GASTROSCOPY;  Surgeon: Mg Evans M.D.;  Location: SURGERY SAME DAY [facility];  Service: Gastroenterology    SD UPPER " GI ENDOSCOPY,BIOPSY N/A 4/28/2021    Procedure: GASTROSCOPY, WITH BIOPSY;  Surgeon: Mg Evans M.D.;  Location: SURGERY SAME DAY [facility];  Service: Gastroenterology    RECTUS REPAIR Right 11/15/2018    Procedure: RECTUS REPAIR- SUPERIOR RECTUS RECESSION, ADJUSTABLE SUTURE INFERIOR RECTUS PLICATION/RESECTION;  Surgeon: Jane Larsen M.D.;  Location: SURGERY SAME DAY [facility];  Service: Ophthalmology    VITRECTOMY POSTERIOR Right 1/17/2018    Procedure: VITRECTOMY POSTERIOR W/AIR FLUID EXCHANGE, ENDO LASER, 23 GAUGE SF6 GAS, CRYOTHERAPY;  Surgeon: Sea Franklin M.D.;  Location: SURGERY SAME DAY [facility];  Service: Ophthalmology    MAMMOPLASTY REDUCTION Bilateral 11/3/2015    Procedure: MAMMOPLASTY REDUCTION;  Surgeon: Talia Barton M.D.;  Location: SURGERY [facility];  Service:     KNEE ARTHROSCOPY  9/17/2014    Performed by Dany Owens M.D. at [facility]    MENISCECTOMY  9/17/2014    Performed by Dany Owens M.D. at [facility]    SHOULDER ARTHROSCOPY W/ ROTATOR CUFF REPAIR  8/22/2013    Performed by Dany Owens M.D. at [facility]    SHOULDER DECOMPRESSION ARTHROSCOPIC  8/22/2013    Performed by Dany Owens M.D. at [facility]    CLAVICLE DISTAL EXCISION  8/22/2013    Performed by Dany Owens M.D. at [facility]    OTHER  7/2013    laser procedure right eye    CATARACT EXTRACTION WITH IOL  5/29/13    right eye    ABDOMINAL HYSTERECTOMY TOTAL  1975    CHOLECYSTECTOMY  1973    open    TONSILLECTOMY  1950    BLADDER SUSPENSION  1973, 1978    x2       Family history   Family History   Problem Relation Age of Onset    Heart Disease Mother     Hypertension Mother     Other Mother         pancreatitis    Arthritis Father     Cancer Father         Lymphoma, colon, skin    Hyperlipidemia Father     Other Father         Clogged artery in leg    Heart Disease Father         A Fib    Diabetes Sister          Pre    Other Sister         Obesity    Other Daughter         Gluten allergies    Other Son         Suicide       Medications:   Outpatient Medications Marked as Taking for the 2/14/25 encounter (Office Visit) with Tomasa Chiu M.D.   Medication Sig Dispense Refill    valACYclovir (VALTREX) 500 MG Tab Take 2 Tablets by mouth 2 times a day. 100 Tablet 3    gabapentin (NEURONTIN) 300 MG Cap Take 2 Capsules by mouth every evening. 60 Capsule 1    atenolol (TENORMIN) 50 MG Tab Take 1 Tablet by mouth every day. 100 Tablet 3    estradiol (ESTRACE) 0.5 MG tablet TAKE 1/2 TABLET BY MOUTH DAILY 50 Tablet 3    pantoprazole (PROTONIX) 40 MG Tablet Delayed Response TAKE ONE TABLET BY MOUTH TWICE A  Tablet 3    triamcinolone acetonide (KENALOG) 0.1 % Cream APPLY TOPICALLY TO THE AFFECTED AREA(S) OF BODY TWO TIMES A DAY AS NEEDED FOR ITCHING, DO NOT APPLY TO FACE, AXILLA OR GROIN. 454 g 0    famotidine (PEPCID) 20 MG Tab Take 1 Tablet by mouth as needed (breakthrough heartburn). One by mouth once daily at bedtime 90 Tablet 3        Current Outpatient Medications on File Prior to Visit   Medication Sig Dispense Refill    valACYclovir (VALTREX) 500 MG Tab Take 2 Tablets by mouth 2 times a day. 100 Tablet 3    gabapentin (NEURONTIN) 300 MG Cap Take 2 Capsules by mouth every evening. 60 Capsule 1    atenolol (TENORMIN) 50 MG Tab Take 1 Tablet by mouth every day. 100 Tablet 3    estradiol (ESTRACE) 0.5 MG tablet TAKE 1/2 TABLET BY MOUTH DAILY 50 Tablet 3    pantoprazole (PROTONIX) 40 MG Tablet Delayed Response TAKE ONE TABLET BY MOUTH TWICE A  Tablet 3    triamcinolone acetonide (KENALOG) 0.1 % Cream APPLY TOPICALLY TO THE AFFECTED AREA(S) OF BODY TWO TIMES A DAY AS NEEDED FOR ITCHING, DO NOT APPLY TO FACE, AXILLA OR GROIN. 454 g 0    famotidine (PEPCID) 20 MG Tab Take 1 Tablet by mouth as needed (breakthrough heartburn). One by mouth once daily at bedtime 90 Tablet 3     No current facility-administered medications  on file prior to visit.       Allergies:   Allergies   Allergen Reactions    Grass Pollen(K-O-R-T-Swt [place]) Runny Nose     Sneezing, dizziniess    Other Misc      All household chemicals, soap powders, perfumes. Tongue and lips go numb, itching.    Wheat      Gluten. Stomach ache.        Social Hx:   Social History     Socioeconomic History    Marital status: Single     Spouse name: Not on file    Number of children: Not on file    Years of education: Not on file    Highest education level: Not on file   Occupational History    Not on file   Tobacco Use    Smoking status: Former     Current packs/day: 0.00     Types: Cigarettes     Quit date: 1967     Years since quittin.1    Smokeless tobacco: Never   Vaping Use    Vaping status: Never Used   Substance and Sexual Activity    Alcohol use: Yes     Alcohol/week: 0.6 - 1.2 oz     Types: 1 - 2 Glasses of wine per week     Comment: Several times a week.  21: 2-4/month    Drug use: No    Sexual activity: Not Currently   Other Topics Concern    Not on file   Social History Narrative    Retired from medical billing. She has one daughter, 2 grandkids (one is starting med school), son in law is an ICU hospitalist at [employer]. She enjoys knitting and traveling, belongs to a travel group.  She is  after 30y of marriage, lives alone, has a good support group.      Social Drivers of Health     Financial Resource Strain: Low Risk  (2024)    Overall Financial Resource Strain (CARDIA)     Difficulty of Paying Living Expenses: Not hard at all   Food Insecurity: No Food Insecurity (2024)    Hunger Vital Sign     Worried About Running Out of Food in the Last Year: Never true     Ran Out of Food in the Last Year: Never true   Transportation Needs: No Transportation Needs (2024)    PRAPARE - Transportation     Lack of Transportation (Medical): No     Lack of Transportation (Non-Medical): No   Physical Activity: Not on file   Stress: Not on file   Social  "Connections: Not on file   Intimate Partner Violence: Not on file   Housing Stability: Low Risk  (6/26/2024)    Housing Stability Vital Sign     Unable to Pay for Housing in the Last Year: No     Number of Places Lived in the Last Year: 1     Unstable Housing in the Last Year: No         EXAMINATION     Physical Exam:   Vitals: /76 (BP Location: Right arm, Patient Position: Sitting, BP Cuff Size: Adult)   Pulse 65   Temp 36.3 °C (97.3 °F) (Temporal)   Ht 1.549 m (5' 1\")   Wt 73.7 kg (162 lb 7.7 oz)   SpO2 98%     Constitutional:   Body Habitus: Body mass index is 30.7 kg/m².  Cooperation: Fully cooperates with exam  Appearance: Well-groomed, well-nourished  Respiratory:  Breathing comfortable on room air, no audible wheezing  Cardiovascular: Skin appears well-perfused  Psychiatric: Appropriate affect  Gait: no use of ambulatory device, slightly antalgic gait      MEDICAL DECISION MAKING    DATA    Labs:   Lab Results   Component Value Date/Time    SODIUM 140 09/04/2024 10:17 AM    POTASSIUM 4.2 09/04/2024 10:17 AM    CHLORIDE 107 09/04/2024 10:17 AM    CO2 23 09/04/2024 10:17 AM    GLUCOSE 86 09/04/2024 10:17 AM    BUN 20 09/04/2024 10:17 AM    CREATININE 1.01 09/04/2024 10:17 AM    CREATININE 0.83 03/25/2011 11:05 AM    BUNCREATRAT 20 03/25/2011 11:05 AM    GLOMRATE >59 03/25/2011 11:05 AM        Lab Results   Component Value Date/Time    PROTHROMBTM 13.7 04/16/2021 03:38 PM    INR 1.02 04/16/2021 03:38 PM        Lab Results   Component Value Date/Time    WBC 22.4 (H) 09/04/2024 10:17 AM    WBC 10.8 (H) 03/25/2011 11:05 AM    RBC 3.68 (L) 09/04/2024 10:17 AM    RBC 4.30 03/25/2011 11:05 AM    HEMOGLOBIN 11.3 (L) 09/04/2024 10:17 AM    HEMATOCRIT 35.7 (L) 09/04/2024 10:17 AM    MCV 97.0 09/04/2024 10:17 AM    MCV 95 03/25/2011 11:05 AM    MCH 30.7 09/04/2024 10:17 AM    MCH 31.2 03/25/2011 11:05 AM    MCHC 31.7 (L) 09/04/2024 10:17 AM    MPV 9.3 09/04/2024 10:17 AM    NEUTSPOLYS 24.00 (L) 09/04/2024 10:17 " AM    LYMPHOCYTES 71.00 (H) 09/04/2024 10:17 AM    MONOCYTES 3.00 09/04/2024 10:17 AM    EOSINOPHILS 2.00 09/04/2024 10:17 AM    BASOPHILS 0.00 09/04/2024 10:17 AM    HYPOCHROMIA 1+ 06/07/2021 10:34 AM    ANISOCYTOSIS 1+ 02/13/2023 12:31 PM        Lab Results   Component Value Date/Time    HBA1C 5.5 01/27/2022 12:54 PM          Imaging:   Prior personal imaging review:  MRI Lumbar Spine 12/8/24: Grade 1 L4-5 anterolisthesis. Mild vertebral height loss at T11 and T12 with degenerative disc changes at T11, T12, L1. Mild L3-4 disc bulge with minimal canal stenosis. L4-5 disc bulge with mild to moderate canal stenosis. Multilevel facet arthropathy.        I reviewed the following radiology reports                 Results for orders placed during the hospital encounter of 09/23/24     DX-KNEE COMPLETE 4+ LEFT     Impression  Tricompartment degenerative change which involves the medial femorotibial articulation to the greatest degree.   Results for orders placed during the hospital encounter of 09/23/24     DX-LUMBAR SPINE-2 OR 3 VIEWS     Impression  1.  Multilevel degenerative disc disease and facet degeneration.     2.  Grade 1 anterior subluxation at L4-5.     3.  Convex left scoliotic curvature.      DIAGNOSIS   Visit Diagnoses     ICD-10-CM   1. Lumbar facet arthropathy  M47.816   2. Chronic bilateral low back pain without sciatica  M54.50    G89.29         ASSESSMENT and PLAN:     Shanice Magana is a 79 y.o. female who returns to clinic for follow-up of bilateral low back pain.    Shanice was seen today for follow-up.    Diagnoses and all orders for this visit:    Lumbar facet arthropathy  -     Referral to Physical Medicine Rehab    Chronic bilateral low back pain without sciatica        Assessment & Plan  Lumbar facet arthropathy  Bilateral low back pain without radiation into the lower extremities  Patient presents for follow-up of chronic bilateral low back pain without radiation into the lower  extremities in the setting of lumbar facet arthropathy. She had a positive response to MBB#1 targeting the bilateral L4-5 and L5-S1 facet joints on 2/11/25, with 90% improvement in her usual back pain for 6 hours prior to return of her usual baseline low back pain. A repeat of the medial branch block injections targeting the bilateral L4-5 and L5-S1 facet joints has therefore been ordered. She has been informed about the potential benefits and risks associated with this procedure. A follow-up appointment will be scheduled 3 to 5 days post-procedure to assess the outcome and discuss the possibility of proceeding with nerve ablation if the results are favorable.      Follow up: For MBB#2 targeting the bilateral L4-5 and L5-S1 facet joints, then 3-5 days after injections in clinic    Thank you for allowing me to participate in the care of this patient. If you have any questions please not hesitate to contact me.         Please note that this dictation was created using voice recognition software. I have made every reasonable attempt to correct obvious errors but there may be errors of grammar and content that I may have overlooked prior to finalization of this note.    Tomasa Chiu MD  Interventional Spine and Sports Physiatry  Physical Medicine and Rehabilitation  [facility]

## 2025-02-12 NOTE — PROGRESS NOTES
Verbal consent was acquired by the patient to use [facility] ambient listening note generation during this visit Yes     FOLLOW-UP PATIENT VISIT    Interventional Spine and Pain  Physiatry (Physical Medicine and Rehabilitation)     Date of Service: 25   Chief Complaint:   Chief Complaint   Patient presents with    Follow-Up     Lumbar facet arthropathy      Patient Name: Shanice Magana   : 1945   MRN: [id number]       PRIOR HISTORY    Please see new patient note by Dr. Chiu for more details.     Interval History  Patient identification: Shanice Magana,  1945, with history of CLL, HTN, gastric ulcer, who presents today for follow-up of low back pain.    History of Present Illness  The patient is a 79-year-old female who presents today for follow-up of low back pain.    She underwent diagnostic medial branch block number 1 targeting the bilateral L4-L5 and L5-S1 facet joints on 2025. She reports that the injections administered on both sides of her lower back on Tuesday provided significant relief until she retired to bed. Since then she has noted gradual return of her usual right greater than left low back pain, which has escalated to a level of 7 today on the right and 4 on the left. Over the past few days, she has been managing well, but today, she reports experiencing the most severe pain. She also mentions that prolonged standing or walking exacerbates the pain on both sides. She denies any changes to her pain location, character, or new areas of pain, numbness, or weakness.      Procedures:  24: Left knee intraarticular steroid injection, 80-90% improvement ~4 weeks  10/30/24: Right greater trochanteric bursa steroid injection, 60-70% improvement in lateral thigh pain  24: Right buttock trigger point injection, several days of mild improvement  25: MBB#1 targeting bilateral L4-5 and L5-S1 facet joints, 90% pain improvement for ~8 hours      PMHx:  "  Past Medical History:   Diagnosis Date    Acute reaction to situational stress 01/04/2023    Depression Screening (1/2023)     Little interest or pleasure in doing things?  3 - nearly every day   Feeling down, depressed , or hopeless? 3 - nearly every day   Trouble falling or staying asleep, or sleeping too much?  0 - not at all   Feeling tired or having little energy?  0 - not at all   Poor appetite or overeating?  0 - not at all   Feeling bad about yourself - or that you are a failure o    Anemia 04/16/2021    She sees Dr. Gonzalez for GERD and hiatal hernia who monitors her lab values. Has had 2 episodes of a drop in her hgb requiring a transfusion. She says her GI thinks this might be related to her hiatal hernia. Is on protonix bid and has prn pepcid she uses on occasion.    Arthritis     slight. 4/27/21: Knees \"Very little\"    BMI 34.0-34.9,adult 09/11/2014    Breath shortness     Cancer (HCC) 2011    CLL- \"Wait and watch\"    CATARACT     surgical correction margo     Celiac disease     CLL (chronic lymphocytic leukemia) (HCC) 03/03/2011    Asymptomatic being monitored/Dr Davidson, Q 6 months. Not on medications.     Family history of adverse effect to anesthesia     Daughter/Hard time waking     GERD (gastroesophageal reflux disease) 03/03/2011    Sees GI specialist     Heart burn 04/27/2021    GERD    Hiatal hernia 03/03/2011    Hot flashes, menopausal 03/03/2011    Hypertension 03/03/2011       PSHx:   Past Surgical History:   Procedure Laterality Date    LUMBAR MEDIAL BRANCH BLOCKS Bilateral 2/11/2025    Procedure: Diagnostic medial branch blocks targeting the BILATERAL L4-5 and L5-S1 facet joints with fluoroscopic guidance;  Surgeon: Tomasa Chiu M.D.;  Location: [facility];  Service: Pain Management    DC UPPER GI ENDOSCOPY,DIAGNOSIS N/A 4/28/2021    Procedure: GASTROSCOPY;  Surgeon: Mg Evans M.D.;  Location: SURGERY SAME DAY [facility];  Service: Gastroenterology    DC UPPER " GI ENDOSCOPY,BIOPSY N/A 4/28/2021    Procedure: GASTROSCOPY, WITH BIOPSY;  Surgeon: Mg Evans M.D.;  Location: SURGERY SAME DAY [facility];  Service: Gastroenterology    RECTUS REPAIR Right 11/15/2018    Procedure: RECTUS REPAIR- SUPERIOR RECTUS RECESSION, ADJUSTABLE SUTURE INFERIOR RECTUS PLICATION/RESECTION;  Surgeon: Jane Larsen M.D.;  Location: SURGERY SAME DAY [facility];  Service: Ophthalmology    VITRECTOMY POSTERIOR Right 1/17/2018    Procedure: VITRECTOMY POSTERIOR W/AIR FLUID EXCHANGE, ENDO LASER, 23 GAUGE SF6 GAS, CRYOTHERAPY;  Surgeon: Sea Franklin M.D.;  Location: SURGERY SAME DAY [facility];  Service: Ophthalmology    MAMMOPLASTY REDUCTION Bilateral 11/3/2015    Procedure: MAMMOPLASTY REDUCTION;  Surgeon: Talia Barton M.D.;  Location: SURGERY [facility];  Service:     KNEE ARTHROSCOPY  9/17/2014    Performed by Dany Owens M.D. at [facility]    MENISCECTOMY  9/17/2014    Performed by Dany Owens M.D. at [facility]    SHOULDER ARTHROSCOPY W/ ROTATOR CUFF REPAIR  8/22/2013    Performed by Dany Owens M.D. at [facility]    SHOULDER DECOMPRESSION ARTHROSCOPIC  8/22/2013    Performed by Dany Owens M.D. at [facility]    CLAVICLE DISTAL EXCISION  8/22/2013    Performed by Dany Owens M.D. at [facility]    OTHER  7/2013    laser procedure right eye    CATARACT EXTRACTION WITH IOL  5/29/13    right eye    ABDOMINAL HYSTERECTOMY TOTAL  1975    CHOLECYSTECTOMY  1973    open    TONSILLECTOMY  1950    BLADDER SUSPENSION  1973, 1978    x2       Family history   Family History   Problem Relation Age of Onset    Heart Disease Mother     Hypertension Mother     Other Mother         pancreatitis    Arthritis Father     Cancer Father         Lymphoma, colon, skin    Hyperlipidemia Father     Other Father         Clogged artery in leg    Heart Disease Father         A Fib    Diabetes Sister          Pre    Other Sister         Obesity    Other Daughter         Gluten allergies    Other Son         Suicide       Medications:   Outpatient Medications Marked as Taking for the 2/14/25 encounter (Office Visit) with Tomasa Chiu M.D.   Medication Sig Dispense Refill    valACYclovir (VALTREX) 500 MG Tab Take 2 Tablets by mouth 2 times a day. 100 Tablet 3    gabapentin (NEURONTIN) 300 MG Cap Take 2 Capsules by mouth every evening. 60 Capsule 1    atenolol (TENORMIN) 50 MG Tab Take 1 Tablet by mouth every day. 100 Tablet 3    estradiol (ESTRACE) 0.5 MG tablet TAKE 1/2 TABLET BY MOUTH DAILY 50 Tablet 3    pantoprazole (PROTONIX) 40 MG Tablet Delayed Response TAKE ONE TABLET BY MOUTH TWICE A  Tablet 3    triamcinolone acetonide (KENALOG) 0.1 % Cream APPLY TOPICALLY TO THE AFFECTED AREA(S) OF BODY TWO TIMES A DAY AS NEEDED FOR ITCHING, DO NOT APPLY TO FACE, AXILLA OR GROIN. 454 g 0    famotidine (PEPCID) 20 MG Tab Take 1 Tablet by mouth as needed (breakthrough heartburn). One by mouth once daily at bedtime 90 Tablet 3        Current Outpatient Medications on File Prior to Visit   Medication Sig Dispense Refill    valACYclovir (VALTREX) 500 MG Tab Take 2 Tablets by mouth 2 times a day. 100 Tablet 3    gabapentin (NEURONTIN) 300 MG Cap Take 2 Capsules by mouth every evening. 60 Capsule 1    atenolol (TENORMIN) 50 MG Tab Take 1 Tablet by mouth every day. 100 Tablet 3    estradiol (ESTRACE) 0.5 MG tablet TAKE 1/2 TABLET BY MOUTH DAILY 50 Tablet 3    pantoprazole (PROTONIX) 40 MG Tablet Delayed Response TAKE ONE TABLET BY MOUTH TWICE A  Tablet 3    triamcinolone acetonide (KENALOG) 0.1 % Cream APPLY TOPICALLY TO THE AFFECTED AREA(S) OF BODY TWO TIMES A DAY AS NEEDED FOR ITCHING, DO NOT APPLY TO FACE, AXILLA OR GROIN. 454 g 0    famotidine (PEPCID) 20 MG Tab Take 1 Tablet by mouth as needed (breakthrough heartburn). One by mouth once daily at bedtime 90 Tablet 3     No current facility-administered medications  on file prior to visit.       Allergies:   Allergies   Allergen Reactions    Grass Pollen(K-O-R-T-Swt [place]) Runny Nose     Sneezing, dizziniess    Other Misc      All household chemicals, soap powders, perfumes. Tongue and lips go numb, itching.    Wheat      Gluten. Stomach ache.        Social Hx:   Social History     Socioeconomic History    Marital status: Single     Spouse name: Not on file    Number of children: Not on file    Years of education: Not on file    Highest education level: Not on file   Occupational History    Not on file   Tobacco Use    Smoking status: Former     Current packs/day: 0.00     Types: Cigarettes     Quit date: 1967     Years since quittin.1    Smokeless tobacco: Never   Vaping Use    Vaping status: Never Used   Substance and Sexual Activity    Alcohol use: Yes     Alcohol/week: 0.6 - 1.2 oz     Types: 1 - 2 Glasses of wine per week     Comment: Several times a week.  21: 2-4/month    Drug use: No    Sexual activity: Not Currently   Other Topics Concern    Not on file   Social History Narrative    Retired from medical billing. She has one daughter, 2 grandkids (one is starting med school), son in law is an ICU hospitalist at [employer]. She enjoys knitting and traveling, belongs to a travel group.  She is  after 30y of marriage, lives alone, has a good support group.      Social Drivers of Health     Financial Resource Strain: Low Risk  (2024)    Overall Financial Resource Strain (CARDIA)     Difficulty of Paying Living Expenses: Not hard at all   Food Insecurity: No Food Insecurity (2024)    Hunger Vital Sign     Worried About Running Out of Food in the Last Year: Never true     Ran Out of Food in the Last Year: Never true   Transportation Needs: No Transportation Needs (2024)    PRAPARE - Transportation     Lack of Transportation (Medical): No     Lack of Transportation (Non-Medical): No   Physical Activity: Not on file   Stress: Not on file   Social  "Connections: Not on file   Intimate Partner Violence: Not on file   Housing Stability: Low Risk  (6/26/2024)    Housing Stability Vital Sign     Unable to Pay for Housing in the Last Year: No     Number of Places Lived in the Last Year: 1     Unstable Housing in the Last Year: No         EXAMINATION     Physical Exam:   Vitals: /76 (BP Location: Right arm, Patient Position: Sitting, BP Cuff Size: Adult)   Pulse 65   Temp 36.3 °C (97.3 °F) (Temporal)   Ht 1.549 m (5' 1\")   Wt 73.7 kg (162 lb 7.7 oz)   SpO2 98%     Constitutional:   Body Habitus: Body mass index is 30.7 kg/m².  Cooperation: Fully cooperates with exam  Appearance: Well-groomed, well-nourished  Respiratory:  Breathing comfortable on room air, no audible wheezing  Cardiovascular: Skin appears well-perfused  Psychiatric: Appropriate affect  Gait: no use of ambulatory device, slightly antalgic gait      MEDICAL DECISION MAKING    DATA    Labs:   Lab Results   Component Value Date/Time    SODIUM 140 09/04/2024 10:17 AM    POTASSIUM 4.2 09/04/2024 10:17 AM    CHLORIDE 107 09/04/2024 10:17 AM    CO2 23 09/04/2024 10:17 AM    GLUCOSE 86 09/04/2024 10:17 AM    BUN 20 09/04/2024 10:17 AM    CREATININE 1.01 09/04/2024 10:17 AM    CREATININE 0.83 03/25/2011 11:05 AM    BUNCREATRAT 20 03/25/2011 11:05 AM    GLOMRATE >59 03/25/2011 11:05 AM        Lab Results   Component Value Date/Time    PROTHROMBTM 13.7 04/16/2021 03:38 PM    INR 1.02 04/16/2021 03:38 PM        Lab Results   Component Value Date/Time    WBC 22.4 (H) 09/04/2024 10:17 AM    WBC 10.8 (H) 03/25/2011 11:05 AM    RBC 3.68 (L) 09/04/2024 10:17 AM    RBC 4.30 03/25/2011 11:05 AM    HEMOGLOBIN 11.3 (L) 09/04/2024 10:17 AM    HEMATOCRIT 35.7 (L) 09/04/2024 10:17 AM    MCV 97.0 09/04/2024 10:17 AM    MCV 95 03/25/2011 11:05 AM    MCH 30.7 09/04/2024 10:17 AM    MCH 31.2 03/25/2011 11:05 AM    MCHC 31.7 (L) 09/04/2024 10:17 AM    MPV 9.3 09/04/2024 10:17 AM    NEUTSPOLYS 24.00 (L) 09/04/2024 10:17 " AM    LYMPHOCYTES 71.00 (H) 09/04/2024 10:17 AM    MONOCYTES 3.00 09/04/2024 10:17 AM    EOSINOPHILS 2.00 09/04/2024 10:17 AM    BASOPHILS 0.00 09/04/2024 10:17 AM    HYPOCHROMIA 1+ 06/07/2021 10:34 AM    ANISOCYTOSIS 1+ 02/13/2023 12:31 PM        Lab Results   Component Value Date/Time    HBA1C 5.5 01/27/2022 12:54 PM          Imaging:   Prior personal imaging review:  MRI Lumbar Spine 12/8/24: Grade 1 L4-5 anterolisthesis. Mild vertebral height loss at T11 and T12 with degenerative disc changes at T11, T12, L1. Mild L3-4 disc bulge with minimal canal stenosis. L4-5 disc bulge with mild to moderate canal stenosis. Multilevel facet arthropathy.        I reviewed the following radiology reports                 Results for orders placed during the hospital encounter of 09/23/24     DX-KNEE COMPLETE 4+ LEFT     Impression  Tricompartment degenerative change which involves the medial femorotibial articulation to the greatest degree.   Results for orders placed during the hospital encounter of 09/23/24     DX-LUMBAR SPINE-2 OR 3 VIEWS     Impression  1.  Multilevel degenerative disc disease and facet degeneration.     2.  Grade 1 anterior subluxation at L4-5.     3.  Convex left scoliotic curvature.      DIAGNOSIS   Visit Diagnoses     ICD-10-CM   1. Lumbar facet arthropathy  M47.816   2. Chronic bilateral low back pain without sciatica  M54.50    G89.29         ASSESSMENT and PLAN:     Shanice Magana is a 79 y.o. female who returns to clinic for follow-up of bilateral low back pain.    Shanice was seen today for follow-up.    Diagnoses and all orders for this visit:    Lumbar facet arthropathy  -     Referral to Physical Medicine Rehab    Chronic bilateral low back pain without sciatica        Assessment & Plan  Lumbar facet arthropathy  Bilateral low back pain without radiation into the lower extremities  Patient presents for follow-up of chronic bilateral low back pain without radiation into the lower  extremities in the setting of lumbar facet arthropathy. She had a positive response to MBB#1 targeting the bilateral L4-5 and L5-S1 facet joints on 2/11/25, with 90% improvement in her usual back pain for 6 hours prior to return of her usual baseline low back pain. A repeat of the medial branch block injections targeting the bilateral L4-5 and L5-S1 facet joints has therefore been ordered. She has been informed about the potential benefits and risks associated with this procedure. A follow-up appointment will be scheduled 3 to 5 days post-procedure to assess the outcome and discuss the possibility of proceeding with nerve ablation if the results are favorable.      Follow up: For MBB#2 targeting the bilateral L4-5 and L5-S1 facet joints, then 3-5 days after injections in clinic    Thank you for allowing me to participate in the care of this patient. If you have any questions please not hesitate to contact me.         Please note that this dictation was created using voice recognition software. I have made every reasonable attempt to correct obvious errors but there may be errors of grammar and content that I may have overlooked prior to finalization of this note.    Tomasa Chiu MD  Interventional Spine and Sports Physiatry  Physical Medicine and Rehabilitation  [facility]

## 2025-02-14 ENCOUNTER — OFFICE VISIT (OUTPATIENT)
Dept: PHYSICAL MEDICINE AND REHAB | Facility: MEDICAL CENTER | Age: 80
End: 2025-02-14
Payer: MEDICARE

## 2025-02-14 VITALS
HEIGHT: 61 IN | TEMPERATURE: 97.3 F | SYSTOLIC BLOOD PRESSURE: 130 MMHG | WEIGHT: 162.48 LBS | HEART RATE: 65 BPM | BODY MASS INDEX: 30.68 KG/M2 | DIASTOLIC BLOOD PRESSURE: 76 MMHG | OXYGEN SATURATION: 98 %

## 2025-02-14 DIAGNOSIS — G89.29 CHRONIC BILATERAL LOW BACK PAIN WITHOUT SCIATICA: ICD-10-CM

## 2025-02-14 DIAGNOSIS — M54.50 CHRONIC BILATERAL LOW BACK PAIN WITHOUT SCIATICA: ICD-10-CM

## 2025-02-14 DIAGNOSIS — M47.816 LUMBAR FACET ARTHROPATHY: Primary | ICD-10-CM

## 2025-02-14 PROCEDURE — 3075F SYST BP GE 130 - 139MM HG: CPT | Performed by: STUDENT IN AN ORGANIZED HEALTH CARE EDUCATION/TRAINING PROGRAM

## 2025-02-14 PROCEDURE — 1125F AMNT PAIN NOTED PAIN PRSNT: CPT | Performed by: STUDENT IN AN ORGANIZED HEALTH CARE EDUCATION/TRAINING PROGRAM

## 2025-02-14 PROCEDURE — 99213 OFFICE O/P EST LOW 20 MIN: CPT | Performed by: STUDENT IN AN ORGANIZED HEALTH CARE EDUCATION/TRAINING PROGRAM

## 2025-02-14 PROCEDURE — 3078F DIAST BP <80 MM HG: CPT | Performed by: STUDENT IN AN ORGANIZED HEALTH CARE EDUCATION/TRAINING PROGRAM

## 2025-02-14 ASSESSMENT — FIBROSIS 4 INDEX: FIB4 SCORE: 1.83

## 2025-02-14 ASSESSMENT — PAIN SCALES - GENERAL: PAINLEVEL_OUTOF10: 7=MODERATE-SEVERE PAIN

## 2025-02-14 ASSESSMENT — PATIENT HEALTH QUESTIONNAIRE - PHQ9: CLINICAL INTERPRETATION OF PHQ2 SCORE: 0

## 2025-03-04 ENCOUNTER — APPOINTMENT (OUTPATIENT)
Dept: RADIOLOGY | Facility: REHABILITATION | Age: 80
End: 2025-03-04
Attending: STUDENT IN AN ORGANIZED HEALTH CARE EDUCATION/TRAINING PROGRAM
Payer: MEDICARE

## 2025-03-04 ENCOUNTER — HOSPITAL ENCOUNTER (OUTPATIENT)
Facility: REHABILITATION | Age: 80
End: 2025-03-04
Attending: STUDENT IN AN ORGANIZED HEALTH CARE EDUCATION/TRAINING PROGRAM | Admitting: STUDENT IN AN ORGANIZED HEALTH CARE EDUCATION/TRAINING PROGRAM
Payer: MEDICARE

## 2025-03-04 VITALS
OXYGEN SATURATION: 95 % | TEMPERATURE: 97.9 F | BODY MASS INDEX: 30.39 KG/M2 | RESPIRATION RATE: 16 BRPM | HEIGHT: 61 IN | DIASTOLIC BLOOD PRESSURE: 69 MMHG | HEART RATE: 67 BPM | WEIGHT: 160.94 LBS | SYSTOLIC BLOOD PRESSURE: 145 MMHG

## 2025-03-04 PROCEDURE — 700111 HCHG RX REV CODE 636 W/ 250 OVERRIDE (IP): Mod: JZ

## 2025-03-04 PROCEDURE — 700117 HCHG RX CONTRAST REV CODE 255

## 2025-03-04 PROCEDURE — 64493 INJ PARAVERT F JNT L/S 1 LEV: CPT

## 2025-03-04 PROCEDURE — 700101 HCHG RX REV CODE 250

## 2025-03-04 PROCEDURE — 64494 INJ PARAVERT F JNT L/S 2 LEV: CPT

## 2025-03-04 RX ORDER — LIDOCAINE HYDROCHLORIDE 10 MG/ML
INJECTION, SOLUTION EPIDURAL; INFILTRATION; INTRACAUDAL; PERINEURAL
Status: COMPLETED
Start: 2025-03-04 | End: 2025-03-04

## 2025-03-04 RX ORDER — LIDOCAINE HYDROCHLORIDE 20 MG/ML
INJECTION, SOLUTION EPIDURAL; INFILTRATION; INTRACAUDAL; PERINEURAL
Status: COMPLETED
Start: 2025-03-04 | End: 2025-03-04

## 2025-03-04 RX ADMIN — IOHEXOL 5 ML: 240 INJECTION, SOLUTION INTRATHECAL; INTRAVASCULAR; INTRAVENOUS; ORAL at 13:10

## 2025-03-04 RX ADMIN — LIDOCAINE HYDROCHLORIDE 10 ML: 10 INJECTION, SOLUTION EPIDURAL; INFILTRATION; INTRACAUDAL; PERINEURAL at 13:10

## 2025-03-04 RX ADMIN — LIDOCAINE HYDROCHLORIDE 10 ML: 20 INJECTION, SOLUTION EPIDURAL; INFILTRATION; INTRACAUDAL; PERINEURAL at 13:10

## 2025-03-04 ASSESSMENT — FIBROSIS 4 INDEX: FIB4 SCORE: 1.83

## 2025-03-04 ASSESSMENT — PAIN DESCRIPTION - PAIN TYPE
TYPE: CHRONIC PAIN
TYPE: CHRONIC PAIN

## 2025-03-04 NOTE — INTERVAL H&P NOTE
Consented Procedure: Diagnostic medial branch blocks targeting the BILATERAL L4-5 and L5-S1 facet joints with fluoroscopic guidance #2…Note: Diagnostic medial branch block #2 is only be done if procedure #1 is a positive block.  This will be on a separate date from procedure #1.  I have examined the patient, provided the risks, benefits, and alternatives to the procedure(s) indicated on the signed consent form, and the patient wishes to proceed.    H&P reviewed. The patient was examined and there are no changes to the H&P      Tomasa Chiu M.D.  03/04/25 12:56 PM

## 2025-03-04 NOTE — PROGRESS NOTES
1226 Pt received to pre procedure area. ID band and allergies verified. Vital signs taken and stable. Verified that patient has not taken NSAIDS, anticoagulants or blood thinners in past 5 days. Pt's history reviewed. Reviewed post op instructions with patient, questions answered, verbalized understanding. Pt seen by Dr. Chiu  pre procedure discussed, questions answered.       1332  Pt received to recovery area, report received from procedure RN Kayli . Vitals taken and stable. Patient tolerated po fluids and snack without difficulty. Dressing clean, dry and intact. Ice pack applied over dressing.     1342  Pt seen by Dr. Chiu post procedure, orders received for discharge. Pt sent home with Pain Diary. Patient ambulatory without difficulty. Pt discharged to designated .

## 2025-03-04 NOTE — OP REPORT
Date of Service: 3/4/2025     Patient: Shanice Magana 79 y.o. female     MRN: [id number]     Physician/s: Tomasa Chiu MD    Pre-operative Diagnosis: Lumbosacral spondylosis, facet arthropathy. The patient was NOT seen for lumbar radiculopathy today.     Post-operative Diagnosis: Lumbosacral spondylosis, facet arthropathy. The patient was NOT seen for lumbar radiculopathy today.     Procedure: Medial Branch Blocks targeting the right and left L4-5 and L5-S1  facet joints     Description of procedure:    The risks, benefits, and alternatives of the procedure were reviewed and discussed with the patient.  Written informed consent was freely obtained. A pre-procedural time-out was conducted by the physician verifying patient’s identity, procedure to be performed, procedure site and side, and allergy verification. Appropriate equipment was determined to be in place for the procedure.     In the fluoroscopy suite the patient was placed in a prone position and the skin was prepped and draped in the usual sterile fashion. The fluoroscope was placed over the lower back at the appropriate angles, and the targets for injection were marked. A 27g needle was placed into each of the markings at the levels below, and approx 1mL of 1% Lidocaine was injected subcutaneously into the epidermal and dermal layers. The needle was removed intact.     Using an oblique view, A 22g 5 inch needle was then placed at the intersection of the transverse process and superior articular process at the L4-5 facet joint where the L3 medial branch runs on the right side. The needle tips were then verified by AP, oblique, and lateral views.     Using an oblique view, A 22g 5 inch needle was then placed at the intersection of the transverse process and superior articular process at the L5-S1 facet joint where the L4 medial branch runs  on the right side. The needle tips were then verified by AP, oblique, and lateral views.     Using an oblique  view, A 22g 5 inch needle was then placed at the intersection of the transverse process and superior articular process at the S1 facet where the L5 dorsal ramus runs  on the right side. The needle tips were then verified by AP, oblique, and lateral views.     Using an oblique view, A 22g 5 inch needle was then placed at the intersection of the transverse process and superior articular process at the S1 facet where the L5 dorsal ramus runs  on the left side. The needle tips were then verified by AP, oblique, and lateral views.       Using an oblique view, A 22g 5 inch needle was then placed at the intersection of the transverse process and superior articular process at the L4-5 facet joint where the L3 medial branch runs  on the left side. The needle tips were then verified by AP, oblique, and lateral views.     Using an oblique view, A 22g 5 inch needle was then placed at the intersection of the transverse process and superior articular process at the L5-S1 facet joint where the L4 medial branch runs  on the left side. The needle tips were then verified by AP, oblique, and lateral views.      In the AP view, less than 1mL of contrast dye was used to highlight the medial branch while the fluoroscope was running live at the levels above. Following negative aspiration, approximately 0.5mL of a 50-50 mixture of 2% lidocaine and 0.5% bupivacaine was then injected at the above levels, and the needles were removed intact after restyleted. The patient's back was covered with a 4x4 gauze, the area was cleansed with sterile normal saline, and a dressing was applied.     There were no complications noted, the patient was hemodynamically stable, and tolerated the procedure well.       Preprocedure pain 6/10 NRS  Postprocedure pain 3/10 NRS      Follow-up as scheduled      Tomasa Chiu MD  Physical Medicine and Rehabilitation  Interventional Spine and Sports Physiatry  [facility]            CPT  Intraarticular joint  or medial branch block (MBB) - lumbar or sacral (1st level):  14381-03-mx  Intraarticular joint or medial branch block (MBB) - lumbar or sacral (2nd level):  25041-39-qp

## 2025-03-06 NOTE — PROGRESS NOTES
Verbal consent was acquired by the patient to use [employer] ambient listening note generation during this visit Yes     FOLLOW-UP PATIENT VISIT    Interventional Spine and Pain  Physiatry (Physical Medicine and Rehabilitation)     Date of Service: 25   Chief Complaint:   Chief Complaint   Patient presents with    Follow-Up     Lumbar facet arthropathy      Patient Name: Shanice Magana   : 1945   MRN: [id number]       PRIOR HISTORY    Please see new patient note by Dr. Chiu for more details.     Interval History  Patient identification: Shanice Magana,  1945, with history of CLL, HTN, gastric ulcer, who presents today for follow-up of low back pain.     History of Present Illness  The patient is a 79-year-old female who presents today for follow-up of bilateral low back pain.    She reports that the second set of medial branch blocks provided relief for approximately 6 hours, after which the effects began to diminish. She experienced pain not only in her back but also in her abdomen after the injections, which she attributes to muscle tension due to pain during the injections. Her pain level was minimal yesterday but escalated to a 7 today, which she believes was due to mopping her floor and jumping with her dog. The pain lessened after she massaged her back and rested. Currently, her pain has returned to its previous state without any significant changes.    Procedures:  24: Left knee intraarticular steroid injection, 80-90% improvement ~4 weeks  10/30/24: Right greater trochanteric bursa steroid injection, 60-70% improvement in lateral thigh pain  24: Right buttock trigger point injection, several days of mild improvement  25: MBB#1 targeting bilateral L4-5 and L5-S1 facet joints, 90% pain improvement for ~8 hours  3/4/25: MBB#2 targeting bilateral L4-5 and L5-S1 facet joints, 90% pain improvement for ~6 hours      PMHx:   Past Medical History:   Diagnosis  "Date    Acute reaction to situational stress 01/04/2023    Depression Screening (1/2023)     Little interest or pleasure in doing things?  3 - nearly every day   Feeling down, depressed , or hopeless? 3 - nearly every day   Trouble falling or staying asleep, or sleeping too much?  0 - not at all   Feeling tired or having little energy?  0 - not at all   Poor appetite or overeating?  0 - not at all   Feeling bad about yourself - or that you are a failure o    Anemia 04/16/2021    She sees Dr. Gonzalez for GERD and hiatal hernia who monitors her lab values. Has had 2 episodes of a drop in her hgb requiring a transfusion. She says her GI thinks this might be related to her hiatal hernia. Is on protonix bid and has prn pepcid she uses on occasion.    Arthritis     slight. 4/27/21: Knees \"Very little\"    BMI 34.0-34.9,adult 09/11/2014    Breath shortness     Cancer (HCC) 2011    CLL- \"Wait and watch\"    CATARACT     surgical correction margo     Celiac disease     CLL (chronic lymphocytic leukemia) (HCC) 03/03/2011    Asymptomatic being monitored/Dr Davidson, Q 6 months. Not on medications.     Family history of adverse effect to anesthesia     Daughter/Hard time waking     GERD (gastroesophageal reflux disease) 03/03/2011    Sees GI specialist     Heart burn 04/27/2021    GERD    Hiatal hernia 03/03/2011    Hot flashes, menopausal 03/03/2011    Hypertension 03/03/2011       PSHx:   Past Surgical History:   Procedure Laterality Date    LUMBAR MEDIAL BRANCH BLOCKS Bilateral 3/4/2025    Procedure: Diagnostic medial branch blocks targeting the BILATERAL L4-5 and L5-S1 facet joints with fluoroscopic guidance #2…Note: Diagnostic medial branch block #2 is only be done if procedure #1 is a positive block.  This will be on a separate date from procedure #1.;  Surgeon: Tomasa Chiu M.D.;  [facility];  Service: Pain Management    LUMBAR MEDIAL BRANCH BLOCKS Bilateral 2/11/2025    Procedure: Diagnostic " medial branch blocks targeting the BILATERAL L4-5 and L5-S1 facet joints with fluoroscopic guidance;  Surgeon: Tomasa Chiu M.D.;  [facility];  Service: Pain Management    DE UPPER GI ENDOSCOPY,DIAGNOSIS N/A 4/28/2021    Procedure: GASTROSCOPY;  Surgeon: Mg Evans M.D.;  Location: SURGERY SAME DAY [facility];  Service: Gastroenterology    DE UPPER GI ENDOSCOPY,BIOPSY N/A 4/28/2021    Procedure: GASTROSCOPY, WITH BIOPSY;  Surgeon: Mg Evans M.D.;  Location: SURGERY SAME DAY [facility];  Service: Gastroenterology    RECTUS REPAIR Right 11/15/2018    Procedure: RECTUS REPAIR- SUPERIOR RECTUS RECESSION, ADJUSTABLE SUTURE INFERIOR RECTUS PLICATION/RESECTION;  Surgeon: Jane Larsen M.D.;  Location: SURGERY SAME DAY [facility];  Service: Ophthalmology    VITRECTOMY POSTERIOR Right 1/17/2018    Procedure: VITRECTOMY POSTERIOR W/AIR FLUID EXCHANGE, ENDO LASER, 23 GAUGE SF6 GAS, CRYOTHERAPY;  Surgeon: Sea Franklin M.D.;  Location: SURGERY SAME DAY [facility];  Service: Ophthalmology    MAMMOPLASTY REDUCTION Bilateral 11/3/2015    Procedure: MAMMOPLASTY REDUCTION;  Surgeon: Talia Barton M.D.;  Location: [facility];  Service:     KNEE ARTHROSCOPY  9/17/2014    Performed by Dany Owens M.D. at [facility]    MENISCECTOMY  9/17/2014    Performed by Dany Owens M.D. at [facility]    SHOULDER ARTHROSCOPY W/ ROTATOR CUFF REPAIR  8/22/2013    Performed by Dany Owens M.D. at [facility]    SHOULDER DECOMPRESSION ARTHROSCOPIC  8/22/2013    Performed by Dany Owens M.D. at [facility]    CLAVICLE DISTAL EXCISION  8/22/2013    Performed by Dany Owens M.D. at [facility]    OTHER  7/2013    laser procedure right eye    CATARACT EXTRACTION WITH IOL  5/29/13    right eye    ABDOMINAL HYSTERECTOMY TOTAL  1975    CHOLECYSTECTOMY  1973    open    TONSILLECTOMY  1950    BLADDER  SUSPENSION  1973, 1978    x2       Family history   Family History   Problem Relation Age of Onset    Heart Disease Mother     Hypertension Mother     Other Mother         pancreatitis    Arthritis Father     Cancer Father         Lymphoma, colon, skin    Hyperlipidemia Father     Other Father         Clogged artery in leg    Heart Disease Father         A Fib    Diabetes Sister         Pre    Other Sister         Obesity    Other Daughter         Gluten allergies    Other Son         Suicide       Medications:   Outpatient Medications Marked as Taking for the 3/7/25 encounter (Office Visit) with Tomasa Chiu M.D.   Medication Sig Dispense Refill    valACYclovir (VALTREX) 500 MG Tab Take 2 Tablets by mouth 2 times a day. 100 Tablet 3    gabapentin (NEURONTIN) 300 MG Cap Take 2 Capsules by mouth every evening. 60 Capsule 1    atenolol (TENORMIN) 50 MG Tab Take 1 Tablet by mouth every day. 100 Tablet 3    estradiol (ESTRACE) 0.5 MG tablet TAKE 1/2 TABLET BY MOUTH DAILY 50 Tablet 3    pantoprazole (PROTONIX) 40 MG Tablet Delayed Response TAKE ONE TABLET BY MOUTH TWICE A  Tablet 3    triamcinolone acetonide (KENALOG) 0.1 % Cream APPLY TOPICALLY TO THE AFFECTED AREA(S) OF BODY TWO TIMES A DAY AS NEEDED FOR ITCHING, DO NOT APPLY TO FACE, AXILLA OR GROIN. 454 g 0    famotidine (PEPCID) 20 MG Tab Take 1 Tablet by mouth as needed (breakthrough heartburn). One by mouth once daily at bedtime 90 Tablet 3        Current Outpatient Medications on File Prior to Visit   Medication Sig Dispense Refill    valACYclovir (VALTREX) 500 MG Tab Take 2 Tablets by mouth 2 times a day. 100 Tablet 3    gabapentin (NEURONTIN) 300 MG Cap Take 2 Capsules by mouth every evening. 60 Capsule 1    atenolol (TENORMIN) 50 MG Tab Take 1 Tablet by mouth every day. 100 Tablet 3    estradiol (ESTRACE) 0.5 MG tablet TAKE 1/2 TABLET BY MOUTH DAILY 50 Tablet 3    pantoprazole (PROTONIX) 40 MG Tablet Delayed Response TAKE ONE TABLET BY MOUTH TWICE A   Tablet 3    triamcinolone acetonide (KENALOG) 0.1 % Cream APPLY TOPICALLY TO THE AFFECTED AREA(S) OF BODY TWO TIMES A DAY AS NEEDED FOR ITCHING, DO NOT APPLY TO FACE, AXILLA OR GROIN. 454 g 0    famotidine (PEPCID) 20 MG Tab Take 1 Tablet by mouth as needed (breakthrough heartburn). One by mouth once daily at bedtime 90 Tablet 3     No current facility-administered medications on file prior to visit.       Allergies:   Allergies   Allergen Reactions    Grass Pollen(K-O-R-T-Swt [place]) Runny Nose     Sneezing, dizziniess    Other Misc      All household chemicals, soap powders, perfumes. Tongue and lips go numb, itching.    Wheat      Gluten. Stomach ache.        Social Hx:   Social History     Socioeconomic History    Marital status: Single     Spouse name: Not on file    Number of children: Not on file    Years of education: Not on file    Highest education level: Not on file   Occupational History    Not on file   Tobacco Use    Smoking status: Former     Current packs/day: 0.00     Types: Cigarettes     Quit date: 1967     Years since quittin.2    Smokeless tobacco: Never   Vaping Use    Vaping status: Never Used   Substance and Sexual Activity    Alcohol use: Yes     Alcohol/week: 0.6 - 1.2 oz     Types: 1 - 2 Glasses of wine per week     Comment: Several times a week.  21: 2-4/month    Drug use: No    Sexual activity: Not Currently   Other Topics Concern    Not on file   Social History Narrative    Retired from medical billing. She has one daughter, 2 grandkids (one is starting med school), son in law is an ICU hospitalist at [employer]. She enjoys knitting and traveling, belongs to a travel group.  She is  after 30y of marriage, lives alone, has a good support group.      Social Drivers of Health     Financial Resource Strain: Low Risk  (2024)    Overall Financial Resource Strain (CARDIA)     Difficulty of Paying Living Expenses: Not hard at all   Food Insecurity: No Food  "Insecurity (6/26/2024)    Hunger Vital Sign     Worried About Running Out of Food in the Last Year: Never true     Ran Out of Food in the Last Year: Never true   Transportation Needs: No Transportation Needs (6/26/2024)    PRAPARE - Transportation     Lack of Transportation (Medical): No     Lack of Transportation (Non-Medical): No   Physical Activity: Not on file   Stress: Not on file   Social Connections: Not on file   Intimate Partner Violence: Not on file   Housing Stability: Low Risk  (6/26/2024)    Housing Stability Vital Sign     Unable to Pay for Housing in the Last Year: No     Number of Places Lived in the Last Year: 1     Unstable Housing in the Last Year: No         EXAMINATION     Physical Exam:   Vitals: BP (!) 152/72 (BP Location: Right arm, Patient Position: Sitting, BP Cuff Size: Adult)   Pulse 69   Temp 36.6 °C (97.9 °F) (Temporal)   Ht 1.549 m (5' 1\")   Wt 73.8 kg (162 lb 11.2 oz)   SpO2 95%       Constitutional:   Body Habitus: Body mass index is 30.74 kg/m².  Cooperation: Fully cooperates with exam  Appearance: Well-groomed, well-nourished  Respiratory:  Breathing comfortable on room air, no audible wheezing  Cardiovascular: Skin appears well-perfused  Psychiatric: Appropriate affect  Gait: normal gait, no use of ambulatory device, mildly antalgic.      MEDICAL DECISION MAKING    DATA    Labs:   Lab Results   Component Value Date/Time    SODIUM 140 09/04/2024 10:17 AM    POTASSIUM 4.2 09/04/2024 10:17 AM    CHLORIDE 107 09/04/2024 10:17 AM    CO2 23 09/04/2024 10:17 AM    GLUCOSE 86 09/04/2024 10:17 AM    BUN 20 09/04/2024 10:17 AM    CREATININE 1.01 09/04/2024 10:17 AM    CREATININE 0.83 03/25/2011 11:05 AM    BUNCREATRAT 20 03/25/2011 11:05 AM    GLOMRATE >59 03/25/2011 11:05 AM        Lab Results   Component Value Date/Time    PROTHROMBTM 13.7 04/16/2021 03:38 PM    INR 1.02 04/16/2021 03:38 PM        Lab Results   Component Value Date/Time    WBC 22.4 (H) 09/04/2024 10:17 AM    WBC 10.8 " (H) 03/25/2011 11:05 AM    RBC 3.68 (L) 09/04/2024 10:17 AM    RBC 4.30 03/25/2011 11:05 AM    HEMOGLOBIN 11.3 (L) 09/04/2024 10:17 AM    HEMATOCRIT 35.7 (L) 09/04/2024 10:17 AM    MCV 97.0 09/04/2024 10:17 AM    MCV 95 03/25/2011 11:05 AM    MCH 30.7 09/04/2024 10:17 AM    MCH 31.2 03/25/2011 11:05 AM    MCHC 31.7 (L) 09/04/2024 10:17 AM    MPV 9.3 09/04/2024 10:17 AM    NEUTSPOLYS 24.00 (L) 09/04/2024 10:17 AM    LYMPHOCYTES 71.00 (H) 09/04/2024 10:17 AM    MONOCYTES 3.00 09/04/2024 10:17 AM    EOSINOPHILS 2.00 09/04/2024 10:17 AM    BASOPHILS 0.00 09/04/2024 10:17 AM    HYPOCHROMIA 1+ 06/07/2021 10:34 AM    ANISOCYTOSIS 1+ 02/13/2023 12:31 PM        Lab Results   Component Value Date/Time    HBA1C 5.5 01/27/2022 12:54 PM          Imaging:   Prior personal imaging review:  MRI Lumbar Spine 12/8/24: Grade 1 L4-5 anterolisthesis. Mild vertebral height loss at T11 and T12 with degenerative disc changes at T11, T12, L1. Mild L3-4 disc bulge with minimal canal stenosis. L4-5 disc bulge with mild to moderate canal stenosis. Multilevel facet arthropathy.       I reviewed the following radiology reports                 Results for orders placed during the hospital encounter of 09/23/24     DX-KNEE COMPLETE 4+ LEFT     Impression  Tricompartment degenerative change which involves the medial femorotibial articulation to the greatest degree.   Results for orders placed during the hospital encounter of 09/23/24     DX-LUMBAR SPINE-2 OR 3 VIEWS     Impression  1.  Multilevel degenerative disc disease and facet degeneration.     2.  Grade 1 anterior subluxation at L4-5.     3.  Convex left scoliotic curvature.      DIAGNOSIS   Visit Diagnoses     ICD-10-CM   1. Lumbar facet arthropathy  M47.816   2. Chronic bilateral low back pain without sciatica  M54.50    G89.29         ASSESSMENT and PLAN:     Shanice Magana is a 79 y.o. female who returns to clinic for follow-up of low back pain.    Shanice was seen today for  follow-up.    Diagnoses and all orders for this visit:    Lumbar facet arthropathy  -     Referral to Physical Medicine Rehab    Chronic bilateral low back pain without sciatica        Assessment & Plan  Bilateral low back pain without radiation into the lower extremities  Lumbar facet arthropathy  Patient presents for follow-up of chronic bilateral low back pain without radiation into the lower extremities in the setting of lumbar facet arthropathy. They had a positive response to MBB#2 targeting the bilaterL L4-5 and L5-S1 facet joints on 3/4/25, with 90% improvement in their usual back pain for 6 hours prior to return of their usual baseline low back pain. Given positive response to two diagnostic medial branch blocks, a radiofrequency ablation targeting the bilateral L4-5 and L5-S1 facet joints has therefore been ordered. The risks, including bleeding, infection, and potential nerve irritation or damage, have been discussed. We also discussed that research has shown around 70% of patient who get relief from the diagnostic medial branch blocks x2 experience at least 50% improvement in their pain. Pain relief from the procedure typically lasts 6 to 18 months, and it can be repeated every 6 months if necessary.      Follow up: For radiofrequency ablation targeting the bilateral L4-5 and L5-S1 facet joints, then 6 weeks after ablation in clinic    Thank you for allowing me to participate in the care of this patient. If you have any questions please not hesitate to contact me.         Please note that this dictation was created using voice recognition software. I have made every reasonable attempt to correct obvious errors but there may be errors of grammar and content that I may have overlooked prior to finalization of this note.    Tomasa Chiu MD  Interventional Spine and Sports Physiatry  Physical Medicine and Rehabilitation  [facility]

## 2025-03-06 NOTE — H&P (VIEW-ONLY)
Verbal consent was acquired by the patient to use [employer] ambient listening note generation during this visit Yes     FOLLOW-UP PATIENT VISIT    Interventional Spine and Pain  Physiatry (Physical Medicine and Rehabilitation)     Date of Service: 25   Chief Complaint:   Chief Complaint   Patient presents with    Follow-Up     Lumbar facet arthropathy      Patient Name: Shanice Magana   : 1945   MRN: [id number]       PRIOR HISTORY    Please see new patient note by Dr. Chiu for more details.     Interval History  Patient identification: Shanice Magana,  1945, with history of CLL, HTN, gastric ulcer, who presents today for follow-up of low back pain.     History of Present Illness  The patient is a 79-year-old female who presents today for follow-up of bilateral low back pain.    She reports that the second set of medial branch blocks provided relief for approximately 6 hours, after which the effects began to diminish. She experienced pain not only in her back but also in her abdomen after the injections, which she attributes to muscle tension due to pain during the injections. Her pain level was minimal yesterday but escalated to a 7 today, which she believes was due to mopping her floor and jumping with her dog. The pain lessened after she massaged her back and rested. Currently, her pain has returned to its previous state without any significant changes.    Procedures:  24: Left knee intraarticular steroid injection, 80-90% improvement ~4 weeks  10/30/24: Right greater trochanteric bursa steroid injection, 60-70% improvement in lateral thigh pain  24: Right buttock trigger point injection, several days of mild improvement  25: MBB#1 targeting bilateral L4-5 and L5-S1 facet joints, 90% pain improvement for ~8 hours  3/4/25: MBB#2 targeting bilateral L4-5 and L5-S1 facet joints, 90% pain improvement for ~6 hours      PMHx:   Past Medical History:   Diagnosis  "Date    Acute reaction to situational stress 01/04/2023    Depression Screening (1/2023)     Little interest or pleasure in doing things?  3 - nearly every day   Feeling down, depressed , or hopeless? 3 - nearly every day   Trouble falling or staying asleep, or sleeping too much?  0 - not at all   Feeling tired or having little energy?  0 - not at all   Poor appetite or overeating?  0 - not at all   Feeling bad about yourself - or that you are a failure o    Anemia 04/16/2021    She sees Dr. Gonzalez for GERD and hiatal hernia who monitors her lab values. Has had 2 episodes of a drop in her hgb requiring a transfusion. She says her GI thinks this might be related to her hiatal hernia. Is on protonix bid and has prn pepcid she uses on occasion.    Arthritis     slight. 4/27/21: Knees \"Very little\"    BMI 34.0-34.9,adult 09/11/2014    Breath shortness     Cancer (HCC) 2011    CLL- \"Wait and watch\"    CATARACT     surgical correction margo     Celiac disease     CLL (chronic lymphocytic leukemia) (HCC) 03/03/2011    Asymptomatic being monitored/Dr Davidson, Q 6 months. Not on medications.     Family history of adverse effect to anesthesia     Daughter/Hard time waking     GERD (gastroesophageal reflux disease) 03/03/2011    Sees GI specialist     Heart burn 04/27/2021    GERD    Hiatal hernia 03/03/2011    Hot flashes, menopausal 03/03/2011    Hypertension 03/03/2011       PSHx:   Past Surgical History:   Procedure Laterality Date    LUMBAR MEDIAL BRANCH BLOCKS Bilateral 3/4/2025    Procedure: Diagnostic medial branch blocks targeting the BILATERAL L4-5 and L5-S1 facet joints with fluoroscopic guidance #2…Note: Diagnostic medial branch block #2 is only be done if procedure #1 is a positive block.  This will be on a separate date from procedure #1.;  Surgeon: Tomasa Chiu M.D.;  [facility];  Service: Pain Management    LUMBAR MEDIAL BRANCH BLOCKS Bilateral 2/11/2025    Procedure: Diagnostic " medial branch blocks targeting the BILATERAL L4-5 and L5-S1 facet joints with fluoroscopic guidance;  Surgeon: Tomasa Chiu M.D.;  Location[facility];  Service: Pain Management    NY UPPER GI ENDOSCOPY,DIAGNOSIS N/A 4/28/2021    Procedure: GASTROSCOPY;  Surgeon: Mg Evans M.D.;  Location: SURGERY SAME DAY [facility];  Service: Gastroenterology    NY UPPER GI ENDOSCOPY,BIOPSY N/A 4/28/2021    Procedure: GASTROSCOPY, WITH BIOPSY;  Surgeon: Mg Evans M.D.;  Location: SURGERY SAME DAY [facility];  Service: Gastroenterology    RECTUS REPAIR Right 11/15/2018    Procedure: RECTUS REPAIR- SUPERIOR RECTUS RECESSION, ADJUSTABLE SUTURE INFERIOR RECTUS PLICATION/RESECTION;  Surgeon: Jane Larsen M.D.;  Location: SURGERY SAME DAY [facility];  Service: Ophthalmology    VITRECTOMY POSTERIOR Right 1/17/2018    Procedure: VITRECTOMY POSTERIOR W/AIR FLUID EXCHANGE, ENDO LASER, 23 GAUGE SF6 GAS, CRYOTHERAPY;  Surgeon: Sea Franklin M.D.;  Location: SURGERY SAME DAY [facility];  Service: Ophthalmology    MAMMOPLASTY REDUCTION Bilateral 11/3/2015    Procedure: MAMMOPLASTY REDUCTION;  Surgeon: Talia Barton M.D.;  Location: [facility];  Service:     KNEE ARTHROSCOPY  9/17/2014    Performed by Dany Owens M.D. at [facility]    MENISCECTOMY  9/17/2014    Performed by Dany Owens M.D. at [facility]    SHOULDER ARTHROSCOPY W/ ROTATOR CUFF REPAIR  8/22/2013    Performed by Dany Owens M.D. at [facility]    SHOULDER DECOMPRESSION ARTHROSCOPIC  8/22/2013    Performed by Dany Owens M.D. at [facility]    CLAVICLE DISTAL EXCISION  8/22/2013    Performed by Dany Owens M.D. at [facility]    OTHER  7/2013    laser procedure right eye    CATARACT EXTRACTION WITH IOL  5/29/13    right eye    ABDOMINAL HYSTERECTOMY TOTAL  1975    CHOLECYSTECTOMY  1973    open    TONSILLECTOMY  1950    BLADDER  SUSPENSION  1973, 1978    x2       Family history   Family History   Problem Relation Age of Onset    Heart Disease Mother     Hypertension Mother     Other Mother         pancreatitis    Arthritis Father     Cancer Father         Lymphoma, colon, skin    Hyperlipidemia Father     Other Father         Clogged artery in leg    Heart Disease Father         A Fib    Diabetes Sister         Pre    Other Sister         Obesity    Other Daughter         Gluten allergies    Other Son         Suicide       Medications:   Outpatient Medications Marked as Taking for the 3/7/25 encounter (Office Visit) with Tomasa Chiu M.D.   Medication Sig Dispense Refill    valACYclovir (VALTREX) 500 MG Tab Take 2 Tablets by mouth 2 times a day. 100 Tablet 3    gabapentin (NEURONTIN) 300 MG Cap Take 2 Capsules by mouth every evening. 60 Capsule 1    atenolol (TENORMIN) 50 MG Tab Take 1 Tablet by mouth every day. 100 Tablet 3    estradiol (ESTRACE) 0.5 MG tablet TAKE 1/2 TABLET BY MOUTH DAILY 50 Tablet 3    pantoprazole (PROTONIX) 40 MG Tablet Delayed Response TAKE ONE TABLET BY MOUTH TWICE A  Tablet 3    triamcinolone acetonide (KENALOG) 0.1 % Cream APPLY TOPICALLY TO THE AFFECTED AREA(S) OF BODY TWO TIMES A DAY AS NEEDED FOR ITCHING, DO NOT APPLY TO FACE, AXILLA OR GROIN. 454 g 0    famotidine (PEPCID) 20 MG Tab Take 1 Tablet by mouth as needed (breakthrough heartburn). One by mouth once daily at bedtime 90 Tablet 3        Current Outpatient Medications on File Prior to Visit   Medication Sig Dispense Refill    valACYclovir (VALTREX) 500 MG Tab Take 2 Tablets by mouth 2 times a day. 100 Tablet 3    gabapentin (NEURONTIN) 300 MG Cap Take 2 Capsules by mouth every evening. 60 Capsule 1    atenolol (TENORMIN) 50 MG Tab Take 1 Tablet by mouth every day. 100 Tablet 3    estradiol (ESTRACE) 0.5 MG tablet TAKE 1/2 TABLET BY MOUTH DAILY 50 Tablet 3    pantoprazole (PROTONIX) 40 MG Tablet Delayed Response TAKE ONE TABLET BY MOUTH TWICE A   Tablet 3    triamcinolone acetonide (KENALOG) 0.1 % Cream APPLY TOPICALLY TO THE AFFECTED AREA(S) OF BODY TWO TIMES A DAY AS NEEDED FOR ITCHING, DO NOT APPLY TO FACE, AXILLA OR GROIN. 454 g 0    famotidine (PEPCID) 20 MG Tab Take 1 Tablet by mouth as needed (breakthrough heartburn). One by mouth once daily at bedtime 90 Tablet 3     No current facility-administered medications on file prior to visit.       Allergies:   Allergies   Allergen Reactions    Grass Pollen(K-O-R-T-Swt [place]) Runny Nose     Sneezing, dizziniess    Other Misc      All household chemicals, soap powders, perfumes. Tongue and lips go numb, itching.    Wheat      Gluten. Stomach ache.        Social Hx:   Social History     Socioeconomic History    Marital status: Single     Spouse name: Not on file    Number of children: Not on file    Years of education: Not on file    Highest education level: Not on file   Occupational History    Not on file   Tobacco Use    Smoking status: Former     Current packs/day: 0.00     Types: Cigarettes     Quit date: 1967     Years since quittin.2    Smokeless tobacco: Never   Vaping Use    Vaping status: Never Used   Substance and Sexual Activity    Alcohol use: Yes     Alcohol/week: 0.6 - 1.2 oz     Types: 1 - 2 Glasses of wine per week     Comment: Several times a week.  21: 2-4/month    Drug use: No    Sexual activity: Not Currently   Other Topics Concern    Not on file   Social History Narrative    Retired from medical billing. She has one daughter, 2 grandkids (one is starting med school), son in law is an ICU hospitalist at [employer]. She enjoys knitting and traveling, belongs to a travel group.  She is  after 30y of marriage, lives alone, has a good support group.      Social Drivers of Health     Financial Resource Strain: Low Risk  (2024)    Overall Financial Resource Strain (CARDIA)     Difficulty of Paying Living Expenses: Not hard at all   Food Insecurity: No Food  "Insecurity (6/26/2024)    Hunger Vital Sign     Worried About Running Out of Food in the Last Year: Never true     Ran Out of Food in the Last Year: Never true   Transportation Needs: No Transportation Needs (6/26/2024)    PRAPARE - Transportation     Lack of Transportation (Medical): No     Lack of Transportation (Non-Medical): No   Physical Activity: Not on file   Stress: Not on file   Social Connections: Not on file   Intimate Partner Violence: Not on file   Housing Stability: Low Risk  (6/26/2024)    Housing Stability Vital Sign     Unable to Pay for Housing in the Last Year: No     Number of Places Lived in the Last Year: 1     Unstable Housing in the Last Year: No         EXAMINATION     Physical Exam:   Vitals: BP (!) 152/72 (BP Location: Right arm, Patient Position: Sitting, BP Cuff Size: Adult)   Pulse 69   Temp 36.6 °C (97.9 °F) (Temporal)   Ht 1.549 m (5' 1\")   Wt 73.8 kg (162 lb 11.2 oz)   SpO2 95%       Constitutional:   Body Habitus: Body mass index is 30.74 kg/m².  Cooperation: Fully cooperates with exam  Appearance: Well-groomed, well-nourished  Respiratory:  Breathing comfortable on room air, no audible wheezing  Cardiovascular: Skin appears well-perfused  Psychiatric: Appropriate affect  Gait: normal gait, no use of ambulatory device, mildly antalgic.      MEDICAL DECISION MAKING    DATA    Labs:   Lab Results   Component Value Date/Time    SODIUM 140 09/04/2024 10:17 AM    POTASSIUM 4.2 09/04/2024 10:17 AM    CHLORIDE 107 09/04/2024 10:17 AM    CO2 23 09/04/2024 10:17 AM    GLUCOSE 86 09/04/2024 10:17 AM    BUN 20 09/04/2024 10:17 AM    CREATININE 1.01 09/04/2024 10:17 AM    CREATININE 0.83 03/25/2011 11:05 AM    BUNCREATRAT 20 03/25/2011 11:05 AM    GLOMRATE >59 03/25/2011 11:05 AM        Lab Results   Component Value Date/Time    PROTHROMBTM 13.7 04/16/2021 03:38 PM    INR 1.02 04/16/2021 03:38 PM        Lab Results   Component Value Date/Time    WBC 22.4 (H) 09/04/2024 10:17 AM    WBC 10.8 " (H) 03/25/2011 11:05 AM    RBC 3.68 (L) 09/04/2024 10:17 AM    RBC 4.30 03/25/2011 11:05 AM    HEMOGLOBIN 11.3 (L) 09/04/2024 10:17 AM    HEMATOCRIT 35.7 (L) 09/04/2024 10:17 AM    MCV 97.0 09/04/2024 10:17 AM    MCV 95 03/25/2011 11:05 AM    MCH 30.7 09/04/2024 10:17 AM    MCH 31.2 03/25/2011 11:05 AM    MCHC 31.7 (L) 09/04/2024 10:17 AM    MPV 9.3 09/04/2024 10:17 AM    NEUTSPOLYS 24.00 (L) 09/04/2024 10:17 AM    LYMPHOCYTES 71.00 (H) 09/04/2024 10:17 AM    MONOCYTES 3.00 09/04/2024 10:17 AM    EOSINOPHILS 2.00 09/04/2024 10:17 AM    BASOPHILS 0.00 09/04/2024 10:17 AM    HYPOCHROMIA 1+ 06/07/2021 10:34 AM    ANISOCYTOSIS 1+ 02/13/2023 12:31 PM        Lab Results   Component Value Date/Time    HBA1C 5.5 01/27/2022 12:54 PM          Imaging:   Prior personal imaging review:  MRI Lumbar Spine 12/8/24: Grade 1 L4-5 anterolisthesis. Mild vertebral height loss at T11 and T12 with degenerative disc changes at T11, T12, L1. Mild L3-4 disc bulge with minimal canal stenosis. L4-5 disc bulge with mild to moderate canal stenosis. Multilevel facet arthropathy.       I reviewed the following radiology reports                 Results for orders placed during the hospital encounter of 09/23/24     DX-KNEE COMPLETE 4+ LEFT     Impression  Tricompartment degenerative change which involves the medial femorotibial articulation to the greatest degree.   Results for orders placed during the hospital encounter of 09/23/24     DX-LUMBAR SPINE-2 OR 3 VIEWS     Impression  1.  Multilevel degenerative disc disease and facet degeneration.     2.  Grade 1 anterior subluxation at L4-5.     3.  Convex left scoliotic curvature.      DIAGNOSIS   Visit Diagnoses     ICD-10-CM   1. Lumbar facet arthropathy  M47.816   2. Chronic bilateral low back pain without sciatica  M54.50    G89.29         ASSESSMENT and PLAN:     Shanice Magana is a 79 y.o. female who returns to clinic for follow-up of low back pain.    Shanice was seen today for  follow-up.    Diagnoses and all orders for this visit:    Lumbar facet arthropathy  -     Referral to Physical Medicine Rehab    Chronic bilateral low back pain without sciatica        Assessment & Plan  Bilateral low back pain without radiation into the lower extremities  Lumbar facet arthropathy  Patient presents for follow-up of chronic bilateral low back pain without radiation into the lower extremities in the setting of lumbar facet arthropathy. They had a positive response to MBB#2 targeting the bilaterL L4-5 and L5-S1 facet joints on 3/4/25, with 90% improvement in their usual back pain for 6 hours prior to return of their usual baseline low back pain. Given positive response to two diagnostic medial branch blocks, a radiofrequency ablation targeting the bilateral L4-5 and L5-S1 facet joints has therefore been ordered. The risks, including bleeding, infection, and potential nerve irritation or damage, have been discussed. We also discussed that research has shown around 70% of patient who get relief from the diagnostic medial branch blocks x2 experience at least 50% improvement in their pain. Pain relief from the procedure typically lasts 6 to 18 months, and it can be repeated every 6 months if necessary.      Follow up: For radiofrequency ablation targeting the bilateral L4-5 and L5-S1 facet joints, then 6 weeks after ablation in clinic    Thank you for allowing me to participate in the care of this patient. If you have any questions please not hesitate to contact me.         Please note that this dictation was created using voice recognition software. I have made every reasonable attempt to correct obvious errors but there may be errors of grammar and content that I may have overlooked prior to finalization of this note.    Tomasa Chiu MD  Interventional Spine and Sports Physiatry  Physical Medicine and Rehabilitation  [facility]

## 2025-03-07 ENCOUNTER — APPOINTMENT (OUTPATIENT)
Dept: PHYSICAL MEDICINE AND REHAB | Facility: MEDICAL CENTER | Age: 80
End: 2025-03-07
Payer: MEDICARE

## 2025-03-07 VITALS
HEART RATE: 69 BPM | OXYGEN SATURATION: 95 % | BODY MASS INDEX: 30.72 KG/M2 | TEMPERATURE: 97.9 F | DIASTOLIC BLOOD PRESSURE: 72 MMHG | SYSTOLIC BLOOD PRESSURE: 152 MMHG | HEIGHT: 61 IN | WEIGHT: 162.7 LBS

## 2025-03-07 DIAGNOSIS — G89.29 CHRONIC BILATERAL LOW BACK PAIN WITHOUT SCIATICA: ICD-10-CM

## 2025-03-07 DIAGNOSIS — M47.816 LUMBAR FACET ARTHROPATHY: Primary | ICD-10-CM

## 2025-03-07 DIAGNOSIS — M54.50 CHRONIC BILATERAL LOW BACK PAIN WITHOUT SCIATICA: ICD-10-CM

## 2025-03-07 PROCEDURE — 99213 OFFICE O/P EST LOW 20 MIN: CPT | Performed by: STUDENT IN AN ORGANIZED HEALTH CARE EDUCATION/TRAINING PROGRAM

## 2025-03-07 PROCEDURE — 1125F AMNT PAIN NOTED PAIN PRSNT: CPT | Performed by: STUDENT IN AN ORGANIZED HEALTH CARE EDUCATION/TRAINING PROGRAM

## 2025-03-07 PROCEDURE — 3077F SYST BP >= 140 MM HG: CPT | Performed by: STUDENT IN AN ORGANIZED HEALTH CARE EDUCATION/TRAINING PROGRAM

## 2025-03-07 PROCEDURE — 3078F DIAST BP <80 MM HG: CPT | Performed by: STUDENT IN AN ORGANIZED HEALTH CARE EDUCATION/TRAINING PROGRAM

## 2025-03-07 ASSESSMENT — PAIN SCALES - GENERAL: PAINLEVEL_OUTOF10: 7=MODERATE-SEVERE PAIN

## 2025-03-07 ASSESSMENT — PATIENT HEALTH QUESTIONNAIRE - PHQ9: CLINICAL INTERPRETATION OF PHQ2 SCORE: 0

## 2025-03-07 ASSESSMENT — FIBROSIS 4 INDEX: FIB4 SCORE: 1.83

## 2025-03-16 DIAGNOSIS — K21.9 GASTROESOPHAGEAL REFLUX DISEASE WITHOUT ESOPHAGITIS: ICD-10-CM

## 2025-03-17 RX ORDER — PANTOPRAZOLE SODIUM 40 MG/1
40 TABLET, DELAYED RELEASE ORAL 2 TIMES DAILY
Qty: 200 TABLET | Refills: 3 | Status: SHIPPED | OUTPATIENT
Start: 2025-03-17

## 2025-04-01 ENCOUNTER — HOSPITAL ENCOUNTER (OUTPATIENT)
Facility: REHABILITATION | Age: 80
End: 2025-04-01
Attending: STUDENT IN AN ORGANIZED HEALTH CARE EDUCATION/TRAINING PROGRAM | Admitting: STUDENT IN AN ORGANIZED HEALTH CARE EDUCATION/TRAINING PROGRAM
Payer: MEDICARE

## 2025-04-01 ENCOUNTER — APPOINTMENT (OUTPATIENT)
Dept: RADIOLOGY | Facility: REHABILITATION | Age: 80
End: 2025-04-01
Attending: STUDENT IN AN ORGANIZED HEALTH CARE EDUCATION/TRAINING PROGRAM
Payer: MEDICARE

## 2025-04-01 VITALS
WEIGHT: 160.94 LBS | RESPIRATION RATE: 16 BRPM | BODY MASS INDEX: 30.39 KG/M2 | HEIGHT: 61 IN | DIASTOLIC BLOOD PRESSURE: 87 MMHG | HEART RATE: 61 BPM | OXYGEN SATURATION: 93 % | SYSTOLIC BLOOD PRESSURE: 125 MMHG | TEMPERATURE: 97.5 F

## 2025-04-01 PROCEDURE — 64635 DESTROY LUMB/SAC FACET JNT: CPT

## 2025-04-01 PROCEDURE — 700111 HCHG RX REV CODE 636 W/ 250 OVERRIDE (IP): Mod: JZ

## 2025-04-01 PROCEDURE — 99153 MOD SED SAME PHYS/QHP EA: CPT

## 2025-04-01 PROCEDURE — 64636 DESTROY L/S FACET JNT ADDL: CPT

## 2025-04-01 PROCEDURE — 99152 MOD SED SAME PHYS/QHP 5/>YRS: CPT

## 2025-04-01 RX ORDER — ROPIVACAINE HYDROCHLORIDE 5 MG/ML
INJECTION, SOLUTION EPIDURAL; INFILTRATION; PERINEURAL
Status: COMPLETED
Start: 2025-04-01 | End: 2025-04-01

## 2025-04-01 RX ORDER — LIDOCAINE HYDROCHLORIDE 10 MG/ML
INJECTION, SOLUTION EPIDURAL; INFILTRATION; INTRACAUDAL; PERINEURAL
Status: COMPLETED
Start: 2025-04-01 | End: 2025-04-01

## 2025-04-01 RX ORDER — MIDAZOLAM HYDROCHLORIDE 1 MG/ML
INJECTION INTRAMUSCULAR; INTRAVENOUS
Status: COMPLETED
Start: 2025-04-01 | End: 2025-04-01

## 2025-04-01 RX ADMIN — ROPIVACAINE HYDROCHLORIDE 20 ML: 5 INJECTION, SOLUTION EPIDURAL; INFILTRATION; PERINEURAL at 13:19

## 2025-04-01 RX ADMIN — MIDAZOLAM HYDROCHLORIDE 1 MG: 1 INJECTION, SOLUTION INTRAMUSCULAR; INTRAVENOUS at 13:11

## 2025-04-01 RX ADMIN — LIDOCAINE HYDROCHLORIDE 30 ML: 10 INJECTION, SOLUTION EPIDURAL; INFILTRATION; INTRACAUDAL; PERINEURAL at 13:17

## 2025-04-01 RX ADMIN — FENTANYL CITRATE 50 MCG: 50 INJECTION, SOLUTION INTRAMUSCULAR; INTRAVENOUS at 13:11

## 2025-04-01 ASSESSMENT — PAIN DESCRIPTION - PAIN TYPE
TYPE: CHRONIC PAIN

## 2025-04-01 ASSESSMENT — FIBROSIS 4 INDEX: FIB4 SCORE: 1.83

## 2025-04-01 NOTE — OP REPORT
Patient: Shanice Magana 79 y.o. female MRN: [id number]     Date of Service: 4/1/2025     Physician/s: Tomasa Chiu MD    Pre-operative Diagnosis: Lumbar spondylosis, facet arthropathy.      Post-operative Diagnosis: Lumbar spondylosis, facet arthropathy.     Procedure: Medial Branch Radiofrequency neurotomy targeting the bilateral L4-5 and L5-S1 facet joint(s) with sedation.     Description of procedure:    The patient was not treated for radiculopathy at this time    The risks, benefits, and alternatives of the procedure were reviewed and discussed with the patient.  Written informed consent was freely obtained. A pre-procedural time-out was conducted by the physician verifying patient’s identity, procedure to be performed, procedure site and side, and allergy verification. Appropriate equipment was determined to be in place for the procedure.     Moderate sedation was achieved with Versed (1mg) and Fentanyl (50mcg). Monitoring of the patients vital signs and respiratory status was provided by trained independent registered nurse during the entire course of the procedures and under my supervision and recoded in the patient’s medical record. The duration of sedation was over 10 minutes.      The patient's vital signs were carefully monitored before, throughout, and after the procedure.     In the fluoroscopy suite the patient was placed in a prone position, and a pillow was placed underneath the level of the umbilicus. The skin was prepped and draped in the usual sterile fashion. The fluoroscope was placed over the low back at the appropriate angles, and the targets for needle/probe placement were marked. A 25g needle was placed into each of the markings at three levels, and approx 1cc of 1% Lidocaine was injected subcutaneously into the epidermal and dermal layers. The needle was removed.     A 18 guage, 15 cm RFN cannula with a 10 mm active tip was then placed into the skin using fluoroscopic guidance and  advanced with an oblique view towards the intersection of the transverse process and superior articular process L4-5 facet joint where the L3 medial branch runs  levels on the right side, where the medial branch runs. The needle/probe tips were then verified by AP, oblique, and lateral views.     A 18 guage, 15 cm RFN cannula with a 10 mm active tip was then placed into the skin using fluoroscopic guidance and advanced with an oblique view towards the intersection of the transverse process and superior articular process L5-S1 facet joint where the L4 medial branch runs levels on the right side, where the medial branch runs. The needle/probe tips were then verified by AP, oblique, and lateral views.     Then A 18 guage, 15 cm RFN cannula with a 10 mm active tip was then placed into the skin using fluoroscopic guidance and advanced with an oblique view towards the intersection of the transverse process and superior articular process S1 facet where the L5 dorsal ramus runs  levels on the right side, where the medial branch runs. The needle/probe tips were then verified by AP, oblique, and lateral views.     Motor stimulation is used as an extra precaution to ensure the needle tips are off the lumbar nerve roots prior to each lesion. Following negative aspiration, a syringe was prepared with 5 mL of 1% lidocaine and 5 ml of 0.5% Marcaine.  1.5mL of this syringe was injected at each level.  The needles are not moved, but fluoroscope guidance is used to ensure the needles have not moved. After a wait period of approximately 2 minutes, a radiofrequency lesion was then created at each level with a temperature of 80 degrees centigrade for 90 seconds.     The probes were adjusted to a 2nd location and images were saved in 2+ views. Motor testing was done which confirmed no twitching in the leg.  And another radiofrequency lesion was made of 80 °C for 90 seconds. A 2nd radiofrequency lesion was made with 80°C for 90 seconds.       The cannulas were restyletted, and were then removed intact.     A 18 guage, 15 cm RFN cannula with a 10 mm active tip was then placed into the skin using fluoroscopic guidance and advanced with an oblique view towards the intersection of the transverse process and superior articular process S1 facet where the L5 dorsal ramus runs  levels on the left side, where the medial branch runs. The needle/probe tips were then verified by AP, oblique, and lateral views.     A 18 guage, 15 cm RFN cannula with a 10 mm active tip was then placed into the skin using fluoroscopic guidance and advanced with an oblique view towards the intersection of the transverse process and superior articular process L4-5 facet joint where the L3 medial branch runs levels on the left side, where the medial branch runs. The needle/probe tips were then verified by AP, oblique, and lateral views.     Then A 18 guage, 15 cm RFN cannula with a 10 mm active tip was then placed into the skin using fluoroscopic guidance and advanced with an oblique view towards the intersection of the transverse process and superior articular process L5-S1 facet joint where the L4 medial branch runs  levels on the left side, where the medial branch runs. The needle/probe tips were then verified by AP, oblique, and lateral views.     Motor stimulation is used as an extra precaution to ensure the needle tips are off the lumbar nerve roots prior to each lesion. Following negative aspiration, a syringe was prepared with 5 mL of 1% lidocaine and 5 mL of 0.5% Marcaine.  1.5mL of this syringe was injected at each level.  The needles are not moved, but fluoroscope guidance is used to ensure the needles have not moved. After a wait period of approximately 2 minutes, a radiofrequency lesion was then created at each level with a temperature of 80 degrees centigrade for 90 seconds.     The probes were adjusted to a 2nd location and images were saved in 2+ views. Motor testing  was done which confirmed no twitching in the leg.  And another radiofrequency lesion was made of 80 °C for 90 seconds. A 2nd radiofrequency lesion was made with 80°C for 90 seconds.      The cannulas were restyletted, and were then removed intact.     Fluoroscopic images in AP and lateral view were saved prior to each radiofrequency neurotomy.    The patient's back was covered with a 4x4 gauze, the area was cleansed with sterile normal saline, and a dressing was applied. There were no complications noted, the patient remained hemodynamically stable, and the patient tolerated the procedure well. The patient was examined in the postoperative area and her strength exam was identical as prior to the procedure.      Preprocedure pain 3/10 NRS  Postprocedure pain 1/10 NRS     Follow-up as scheduled      Tomasa Chiu MD  Physical Medicine and Rehabilitation  Interventional Spine and Sports Physiatry  [facility]        CPT  Radiofrequency ablation (RFA) - lumbar or sacral (1st joint):  13394-93  Radiofrequency ablation (RFA) - lumbar or sacral (each additional joint):  87581-11   moderate procedural sedation first 15 minutes: [id number]

## 2025-04-01 NOTE — INTERVAL H&P NOTE
Consented Procedure: RIGHT and LEFT radiofrequency neurotomies medial branch targeting the L4-5 and L5-S1 facet joints with fluoroscopic guidance and sedation…Note: plan for sedation with 1mg versed and 50 mcg fentanyl. Plan for 80 °C for 90 seconds for each neurotomy…Special Needs: extra long probes, required 22g 5 inch needles….Procedural Sedation: yes, plan for 1 mg midazolam and 50 mcg Fentanyl  I have examined the patient, provided the risks, benefits, and alternatives to the procedure(s) indicated on the signed consent form, and the patient wishes to proceed.    H&P reviewed. The patient was examined and there are no changes to the H&P      Tomasa Chiu M.D.  04/01/25 1:02 PM

## 2025-04-07 ENCOUNTER — HOSPITAL ENCOUNTER (OUTPATIENT)
Dept: RADIOLOGY | Facility: MEDICAL CENTER | Age: 80
End: 2025-04-07
Attending: ORTHOPAEDIC SURGERY
Payer: MEDICARE

## 2025-04-07 PROCEDURE — 73723 MRI JOINT LWR EXTR W/O&W/DYE: CPT | Mod: LT

## 2025-04-07 PROCEDURE — A9579 GAD-BASE MR CONTRAST NOS,1ML: HCPCS | Mod: JZ | Performed by: ORTHOPAEDIC SURGERY

## 2025-04-07 PROCEDURE — 700117 HCHG RX CONTRAST REV CODE 255: Mod: JZ | Performed by: ORTHOPAEDIC SURGERY

## 2025-04-07 RX ADMIN — GADOTERIDOL 15 ML: 279.3 INJECTION, SOLUTION INTRAVENOUS at 13:44

## 2025-04-08 ENCOUNTER — TELEPHONE (OUTPATIENT)
Facility: MEDICAL CENTER | Age: 80
End: 2025-04-08
Payer: MEDICARE

## 2025-04-08 NOTE — TELEPHONE ENCOUNTER
----- Message from Physician Golden Fleming D.O. sent at 4/8/2025 12:08 PM PDT -----  Regarding: follow up  Please call and schedule Shanice for follow up of her MRI.     Thanks,  Golden

## 2025-04-10 ENCOUNTER — TELEPHONE (OUTPATIENT)
Dept: PHYSICAL MEDICINE AND REHAB | Facility: MEDICAL CENTER | Age: 80
End: 2025-04-10
Payer: MEDICARE

## 2025-04-10 NOTE — TELEPHONE ENCOUNTER
Called for post-sp check-up. Pt reported the following regarding the procedure site:RIGHT and LEFT radiofrequency neurotomies medial branch targeting the L4-5 and L5-S1 facet joints with fluoroscopic guidance and sedation    Change in pain?: LVDIONNE    Concerns?: LVM    Confirmed [facility] appt?: YASMEEN, 5/16

## 2025-04-15 ENCOUNTER — TELEPHONE (OUTPATIENT)
Dept: PHYSICAL MEDICINE AND REHAB | Facility: MEDICAL CENTER | Age: 80
End: 2025-04-15
Payer: MEDICARE

## 2025-04-15 NOTE — TELEPHONE ENCOUNTER
Caller Name: jasmeet Prasad  Call Back Number:     How would the patient prefer to be contacted with a response: MyChart message    Pt complains of new pain in left lower back that she considers worse than the pain prior to her procedure with Dr. Chiu. She says her activity level is limited right now. She would like to talk with Dr. Chiu.

## 2025-04-17 NOTE — PROGRESS NOTES
Verbal consent was acquired by the patient to use [employer] ambient listening note generation during this visit Yes     FOLLOW-UP PATIENT VISIT    Interventional Spine and Pain  Physiatry (Physical Medicine and Rehabilitation)     Date of Service: 25   Chief Complaint:   Chief Complaint   Patient presents with    Follow-Up      Patient Name: Shanice Magana   : 1945   MRN: [id number]       PRIOR HISTORY    Please see new patient note by Dr. Chiu for more details.     Interval History  Patient identification: Shanice Magana,  1945, with history of CLL, HTN, gastric ulcer, who presents today for follow-up of left low back pain.    History of Present Illness  The patient is a 79-year-old female who presents today for evaluation of severe left low back pain. She underwent RFA targeting the bilateral L4-L5 and L5-S1 facet joints on 2025. She reports that her pain, initially worse on the right side, has now migrated to the left with increased severity, around 10 out of 10 in intensity. The pain is described as stinging and poking, with radiation around to the left groin and down the left buttocks, extending into the left buttock and posterior thigh, terminating at the knee. She is uncertain about any areas of complete numbness due to previous nerve-affecting surgery. She reports no weakness or change in strength compared to her pre-hip surgery state. She is very careful while getting up and down from a chair. She also reports no recent falls or injuries. She has previously found relief with hydrocodone, which did not induce drowsiness, but she abstains from driving while on this medication. She does not experience claustrophobia in MRI machines. She is currently on gabapentin 300 mg, taken at 7 or 8 PM and again at 10 PM, but she does not take the morning dose due to associated fogginess. Despite this regimen, she reports difficulty falling asleep until 1  "AM.        Procedures:  9/26/24: Left knee intraarticular steroid injection, 80-90% improvement ~4 weeks  10/30/24: Right greater trochanteric bursa steroid injection, 60-70% improvement in lateral thigh pain  12/27/24: Right buttock trigger point injection, several days of mild improvement  2/11/25: MBB#1 targeting bilateral L4-5 and L5-S1 facet joints, 90% pain improvement for ~8 hours  3/4/25: MBB#2 targeting bilateral L4-5 and L5-S1 facet joints, 90% pain improvement for ~6 hours  4/1/25: RFA targeting bilateral L4-5 and L5-S1 facet joints      PMHx:   Past Medical History:   Diagnosis Date    Acute reaction to situational stress 01/04/2023    Depression Screening (1/2023)     Little interest or pleasure in doing things?  3 - nearly every day   Feeling down, depressed , or hopeless? 3 - nearly every day   Trouble falling or staying asleep, or sleeping too much?  0 - not at all   Feeling tired or having little energy?  0 - not at all   Poor appetite or overeating?  0 - not at all   Feeling bad about yourself - or that you are a failure o    Anemia 04/16/2021    She sees Dr. Gonzalez for GERD and hiatal hernia who monitors her lab values. Has had 2 episodes of a drop in her hgb requiring a transfusion. She says her GI thinks this might be related to her hiatal hernia. Is on protonix bid and has prn pepcid she uses on occasion.    Arthritis     slight. 4/27/21: Knees \"Very little\"    BMI 34.0-34.9,adult 09/11/2014    Breath shortness     Cancer (HCC) 2011    CLL- \"Wait and watch\"    CATARACT     surgical correction margo     Celiac disease     CLL (chronic lymphocytic leukemia) ([facility]) 03/03/2011    Asymptomatic being monitored/Dr Davidson, Q 6 months. Not on medications.     Family history of adverse effect to anesthesia     Daughter/Hard time waking     GERD (gastroesophageal reflux disease) 03/03/2011    Sees GI specialist     Heart burn 04/27/2021    GERD    Hiatal hernia 03/03/2011    Hot flashes, menopausal " 03/03/2011    Hypertension 03/03/2011       PSHx:   Past Surgical History:   Procedure Laterality Date    RADIO FREQUENCY ABLATION ADDITIONAL LEVEL Bilateral 4/1/2025    Procedure: RIGHT and LEFT radiofrequency neurotomies medial branch targeting the L4-5 and L5-S1 facet joints with fluoroscopic guidance and sedation…Note: plan for sedation with 1mg versed and 50 mcg fentanyl. Plan for 80 °C for 90 seconds for each neurotomy…Special Needs: extra long probes, required 22g 5 inch needles….Procedural Sedation: yes, plan for 1 mg midazolam and 50 mcg Fentanyl;  Surgeon    LUMBAR MEDIAL BRANCH BLOCKS Bilateral 3/4/2025    Procedure: Diagnostic medial branch blocks targeting the BILATERAL L4-5 and L5-S1 facet joints with fluoroscopic guidance #2…Note: Diagnostic medial branch block #2 is only be done if procedure #1 is a positive block.  This will be on a separate date from procedure #1.;  Surgeon: Tomasa Chiu M.D.;  [facility];  Service: Pain Management    LUMBAR MEDIAL BRANCH BLOCKS Bilateral 2/11/2025    Procedure: Diagnostic medial branch blocks targeting the BILATERAL L4-5 and L5-S1 facet joints with fluoroscopic guidance;  Surgeon: Tomasa Chiu M.D.;  Location[facility];  Service: Pain Management    VA UPPER GI ENDOSCOPY,DIAGNOSIS N/A 4/28/2021    Procedure: GASTROSCOPY;  Surgeon: Mg Evans M.D.;  Location: SURGERY SAME DAY [facility];  Service: Gastroenterology    VA UPPER GI ENDOSCOPY,BIOPSY N/A 4/28/2021    Procedure: GASTROSCOPY, WITH BIOPSY;  Surgeon: Mg Evans M.D.;  Location: SURGERY SAME DAY [facility];  Service: Gastroenterology    RECTUS REPAIR Right 11/15/2018    Procedure: RECTUS REPAIR- SUPERIOR RECTUS RECESSION, ADJUSTABLE SUTURE INFERIOR RECTUS PLICATION/RESECTION;  Surgeon: Jane Larsen M.D.;  Location: SURGERY SAME DAY [facility];  Service: Ophthalmology    VITRECTOMY POSTERIOR Right 1/17/2018    Procedure: VITRECTOMY  POSTERIOR W/AIR FLUID EXCHANGE, ENDO LASER, 23 GAUGE SF6 GAS, CRYOTHERAPY;  Surgeon: Sea Franklin M.D.;  Location: SURGERY SAME DAY [facility];  Service: Ophthalmology    MAMMOPLASTY REDUCTION Bilateral 11/3/2015    Procedure: MAMMOPLASTY REDUCTION;  Surgeon: Talia Barton M.D.;  Location: SURGERY [facility];  Service:     KNEE ARTHROSCOPY  9/17/2014    Performed by Dany Owens M.D. at [facility]    MENISCECTOMY  9/17/2014    Performed by Dany Owens M.D. at [facility]    SHOULDER ARTHROSCOPY W/ ROTATOR CUFF REPAIR  8/22/2013    Performed by Dany Owens M.D. at [facility]    SHOULDER DECOMPRESSION ARTHROSCOPIC  8/22/2013    Performed by Dany Owens M.D. at [facility]    CLAVICLE DISTAL EXCISION  8/22/2013    Performed by Dany Owens M.D. at [facility]    OTHER  7/2013    laser procedure right eye    CATARACT EXTRACTION WITH IOL  5/29/13    right eye    ABDOMINAL HYSTERECTOMY TOTAL  1975    CHOLECYSTECTOMY  1973    open    TONSILLECTOMY  1950    BLADDER SUSPENSION  1973, 1978    x2       Family history   Family History   Problem Relation Age of Onset    Heart Disease Mother     Hypertension Mother     Other Mother         pancreatitis    Arthritis Father     Cancer Father         Lymphoma, colon, skin    Hyperlipidemia Father     Other Father         Clogged artery in leg    Heart Disease Father         A Fib    Diabetes Sister         Pre    Other Sister         Obesity    Other Daughter         Gluten allergies    Other Son         Suicide       Medications:   Outpatient Medications Marked as Taking for the 4/18/25 encounter (Office Visit) with Tomasa Chiu M.D.   Medication Sig Dispense Refill    HYDROcodone-acetaminophen (NORCO) 5-325 MG Tab per tablet Take 1 Tablet by mouth every 6 hours as needed (Severe pain) for up to 7 days. 28 Tablet 0    pantoprazole (PROTONIX) 40 MG Tablet Delayed Response  TAKE 1 TABLET BY MOUTH TWICE A  Tablet 3    valACYclovir (VALTREX) 500 MG Tab Take 2 Tablets by mouth 2 times a day. 100 Tablet 3    gabapentin (NEURONTIN) 300 MG Cap Take 2 Capsules by mouth every evening. 60 Capsule 1    atenolol (TENORMIN) 50 MG Tab Take 1 Tablet by mouth every day. 100 Tablet 3    estradiol (ESTRACE) 0.5 MG tablet TAKE 1/2 TABLET BY MOUTH DAILY 50 Tablet 3    triamcinolone acetonide (KENALOG) 0.1 % Cream APPLY TOPICALLY TO THE AFFECTED AREA(S) OF BODY TWO TIMES A DAY AS NEEDED FOR ITCHING, DO NOT APPLY TO FACE, AXILLA OR GROIN. 454 g 0    famotidine (PEPCID) 20 MG Tab Take 1 Tablet by mouth as needed (breakthrough heartburn). One by mouth once daily at bedtime 90 Tablet 3        Current Outpatient Medications on File Prior to Visit   Medication Sig Dispense Refill    pantoprazole (PROTONIX) 40 MG Tablet Delayed Response TAKE 1 TABLET BY MOUTH TWICE A  Tablet 3    valACYclovir (VALTREX) 500 MG Tab Take 2 Tablets by mouth 2 times a day. 100 Tablet 3    gabapentin (NEURONTIN) 300 MG Cap Take 2 Capsules by mouth every evening. 60 Capsule 1    atenolol (TENORMIN) 50 MG Tab Take 1 Tablet by mouth every day. 100 Tablet 3    estradiol (ESTRACE) 0.5 MG tablet TAKE 1/2 TABLET BY MOUTH DAILY 50 Tablet 3    triamcinolone acetonide (KENALOG) 0.1 % Cream APPLY TOPICALLY TO THE AFFECTED AREA(S) OF BODY TWO TIMES A DAY AS NEEDED FOR ITCHING, DO NOT APPLY TO FACE, AXILLA OR GROIN. 454 g 0    famotidine (PEPCID) 20 MG Tab Take 1 Tablet by mouth as needed (breakthrough heartburn). One by mouth once daily at bedtime 90 Tablet 3     No current facility-administered medications on file prior to visit.       Allergies:   Allergies   Allergen Reactions    Grass Pollen(K-O-R-T-Swt [place]) Runny Nose     Sneezing, dizziniess    Other Misc      All household chemicals, soap powders, perfumes. Tongue and lips go numb, itching.    Wheat      Gluten. Stomach ache.        Social Hx:   Social History      Socioeconomic History    Marital status: Single     Spouse name: Not on file    Number of children: Not on file    Years of education: Not on file    Highest education level: Not on file   Occupational History    Not on file   Tobacco Use    Smoking status: Former     Current packs/day: 0.00     Types: Cigarettes     Quit date: 1967     Years since quittin.3    Smokeless tobacco: Never   Vaping Use    Vaping status: Never Used   Substance and Sexual Activity    Alcohol use: Yes     Alcohol/week: 0.6 - 1.2 oz     Types: 1 - 2 Glasses of wine per week     Comment: Several times a week.  21: 2-4/month    Drug use: No    Sexual activity: Not Currently   Other Topics Concern    Not on file   Social History Narrative    Retired from medical billing. She has one daughter, 2 grandkids (one is starting med school), son in law is an ICU hospitalist at [employer]. She enjoys knitting and traveling, belongs to a travel group.  She is  after 30y of marriage, lives alone, has a good support group.      Social Drivers of Health     Financial Resource Strain: Low Risk  (2024)    Overall Financial Resource Strain (CARDIA)     Difficulty of Paying Living Expenses: Not hard at all   Food Insecurity: No Food Insecurity (2024)    Hunger Vital Sign     Worried About Running Out of Food in the Last Year: Never true     Ran Out of Food in the Last Year: Never true   Transportation Needs: No Transportation Needs (2024)    PRAPARE - Transportation     Lack of Transportation (Medical): No     Lack of Transportation (Non-Medical): No   Physical Activity: Not on file   Stress: Not on file   Social Connections: Not on file   Intimate Partner Violence: Not on file   Housing Stability: Low Risk  (2024)    Housing Stability Vital Sign     Unable to Pay for Housing in the Last Year: No     Number of Places Lived in the Last Year: 1     Unstable Housing in the Last Year: No         EXAMINATION     Physical  "Exam:   Vitals: BP (!) 150/83 (BP Location: Left arm, Patient Position: Sitting, BP Cuff Size: Adult)   Pulse 71   Temp 36 °C (96.8 °F) (Temporal)   Ht 1.562 m (5' 1.5\")   Wt 74.2 kg (163 lb 9.3 oz)   SpO2 97%       Constitutional:   Body Habitus: Body mass index is 30.41 kg/m².  Cooperation: Fully cooperates with exam  Appearance: Well-groomed, well-nourished, uncomfortable appearing  Respiratory:  Breathing comfortable on room air, no audible wheezing  Cardiovascular: Skin appears well-perfused  Psychiatric: Appropriate affect  Gait: antalgic gait    Neurologic:  Strength:    Bilateral LE 5/5 in hip flexors, knee extensors and flexors, ankle dorsiflexors and plantarflexors  Sensation: grossly intact bilaterally dermatomes L3-S1      MEDICAL DECISION MAKING    DATA    Labs:   Lab Results   Component Value Date/Time    SODIUM 140 09/04/2024 10:17 AM    POTASSIUM 4.2 09/04/2024 10:17 AM    CHLORIDE 107 09/04/2024 10:17 AM    CO2 23 09/04/2024 10:17 AM    GLUCOSE 86 09/04/2024 10:17 AM    BUN 20 09/04/2024 10:17 AM    CREATININE 1.01 09/04/2024 10:17 AM    CREATININE 0.83 03/25/2011 11:05 AM    BUNCREATRAT 20 03/25/2011 11:05 AM    GLOMRATE >59 03/25/2011 11:05 AM        Lab Results   Component Value Date/Time    PROTHROMBTM 13.7 04/16/2021 03:38 PM    INR 1.02 04/16/2021 03:38 PM        Lab Results   Component Value Date/Time    WBC 22.4 (H) 09/04/2024 10:17 AM    WBC 10.8 (H) 03/25/2011 11:05 AM    RBC 3.68 (L) 09/04/2024 10:17 AM    RBC 4.30 03/25/2011 11:05 AM    HEMOGLOBIN 11.3 (L) 09/04/2024 10:17 AM    HEMATOCRIT 35.7 (L) 09/04/2024 10:17 AM    MCV 97.0 09/04/2024 10:17 AM    MCV 95 03/25/2011 11:05 AM    MCH 30.7 09/04/2024 10:17 AM    MCH 31.2 03/25/2011 11:05 AM    MCHC 31.7 (L) 09/04/2024 10:17 AM    MPV 9.3 09/04/2024 10:17 AM    NEUTSPOLYS 24.00 (L) 09/04/2024 10:17 AM    LYMPHOCYTES 71.00 (H) 09/04/2024 10:17 AM    MONOCYTES 3.00 09/04/2024 10:17 AM    EOSINOPHILS 2.00 09/04/2024 10:17 AM    " BASOPHILS 0.00 09/04/2024 10:17 AM    HYPOCHROMIA 1+ 06/07/2021 10:34 AM    ANISOCYTOSIS 1+ 02/13/2023 12:31 PM        Lab Results   Component Value Date/Time    HBA1C 5.5 01/27/2022 12:54 PM          Imaging:   Prior personal imaging review:  MRI Lumbar Spine 12/8/24: Grade 1 L4-5 anterolisthesis. Mild vertebral height loss at T11 and T12 with degenerative disc changes at T11, T12, L1. Mild L3-4 disc bulge with minimal canal stenosis. L4-5 disc bulge with mild to moderate canal stenosis. Multilevel facet arthropathy.        I reviewed the following radiology reports                 Results for orders placed during the hospital encounter of 09/23/24     DX-KNEE COMPLETE 4+ LEFT     Impression  Tricompartment degenerative change which involves the medial femorotibial articulation to the greatest degree.   Results for orders placed during the hospital encounter of 09/23/24     DX-LUMBAR SPINE-2 OR 3 VIEWS     Impression  1.  Multilevel degenerative disc disease and facet degeneration.     2.  Grade 1 anterior subluxation at L4-5.     3.  Convex left scoliotic curvature.      DIAGNOSIS   Visit Diagnoses     ICD-10-CM   1. Acute left-sided low back pain with left-sided sciatica  M54.42         ASSESSMENT and PLAN:     Shanice Magana is a 79 y.o. female who returns to clinic for follow-up of left low back and leg pain.    Shanice was seen today for follow-up.    Diagnoses and all orders for this visit:    Acute left-sided low back pain with left-sided sciatica  -     MR-LUMBAR SPINE-W/O; Future  -     HYDROcodone-acetaminophen (NORCO) 5-325 MG Tab per tablet; Take 1 Tablet by mouth every 6 hours as needed (Severe pain) for up to 7 days.  -     DX-HIP-COMPLETE - UNILATERAL 2+ LEFT; Future    Other orders  -     Consent for Opiate Prescription        Assessment & Plan  Acute severe left low back pain  She reports severe left low back pain radiating to the left buttocks and groin and down to the posterior knee,  described as stinging and poking, with onset soon after RFA targeting the bilateral L4-5 and L5-S1 facet joints on 4/1/25. There is no significant numbness or weakness reported and none on exam today. She is currently taking gabapentin 300 mg at night but experiences fogginess and hangover effects if she takes it during the. An updated stat MRI of the lumbar spine and an x-ray of the left hip have been ordered to rule out any new nerve compression, damage, or fracture. A prescription for Norco 5-325 mg, to be taken up to every 6 hours daily as needed, has been provided for 7 days. She has been informed about the potential risks associated with this medication, including drowsiness and fogginess, and advised to take it as minimally as possible. She will sign an opioid consent form acknowledging the risks and proper use of the medication.        Follow up: In 1 week    Thank you for allowing me to participate in the care of this patient. If you have any questions please not hesitate to contact me.         Please note that this dictation was created using voice recognition software. I have made every reasonable attempt to correct obvious errors but there may be errors of grammar and content that I may have overlooked prior to finalization of this note.    Tomasa Chiu MD  Interventional Spine and Sports Physiatry  Physical Medicine and Rehabilitation  [facility]

## 2025-04-18 ENCOUNTER — HOSPITAL ENCOUNTER (OUTPATIENT)
Dept: RADIOLOGY | Facility: MEDICAL CENTER | Age: 80
End: 2025-04-18
Attending: STUDENT IN AN ORGANIZED HEALTH CARE EDUCATION/TRAINING PROGRAM
Payer: MEDICARE

## 2025-04-18 ENCOUNTER — HOSPITAL ENCOUNTER (OUTPATIENT)
Facility: MEDICAL CENTER | Age: 80
End: 2025-04-18
Attending: INTERNAL MEDICINE
Payer: MEDICARE

## 2025-04-18 ENCOUNTER — OFFICE VISIT (OUTPATIENT)
Dept: PHYSICAL MEDICINE AND REHAB | Facility: MEDICAL CENTER | Age: 80
End: 2025-04-18
Payer: MEDICARE

## 2025-04-18 VITALS
BODY MASS INDEX: 30.1 KG/M2 | TEMPERATURE: 96.8 F | DIASTOLIC BLOOD PRESSURE: 83 MMHG | HEART RATE: 71 BPM | HEIGHT: 62 IN | OXYGEN SATURATION: 97 % | SYSTOLIC BLOOD PRESSURE: 150 MMHG | WEIGHT: 163.58 LBS

## 2025-04-18 DIAGNOSIS — I10 PRIMARY HYPERTENSION: ICD-10-CM

## 2025-04-18 DIAGNOSIS — M54.42 ACUTE LEFT-SIDED LOW BACK PAIN WITH LEFT-SIDED SCIATICA: ICD-10-CM

## 2025-04-18 DIAGNOSIS — M54.42 ACUTE LEFT-SIDED LOW BACK PAIN WITH LEFT-SIDED SCIATICA: Primary | ICD-10-CM

## 2025-04-18 LAB
25(OH)D3 SERPL-MCNC: 46 NG/ML (ref 30–100)
ALBUMIN SERPL BCP-MCNC: 4.4 G/DL (ref 3.2–4.9)
ALBUMIN/GLOB SERPL: 1.9 G/DL
ALP SERPL-CCNC: 59 U/L (ref 30–99)
ALT SERPL-CCNC: 8 U/L (ref 2–50)
ANION GAP SERPL CALC-SCNC: 10 MMOL/L (ref 7–16)
AST SERPL-CCNC: 15 U/L (ref 12–45)
BASOPHILS # BLD AUTO: 0 % (ref 0–1.8)
BASOPHILS # BLD: 0 K/UL (ref 0–0.12)
BILIRUB SERPL-MCNC: 0.3 MG/DL (ref 0.1–1.5)
BUN SERPL-MCNC: 16 MG/DL (ref 8–22)
CALCIUM ALBUM COR SERPL-MCNC: 9.1 MG/DL (ref 8.5–10.5)
CALCIUM SERPL-MCNC: 9.4 MG/DL (ref 8.5–10.5)
CHLORIDE SERPL-SCNC: 108 MMOL/L (ref 96–112)
CHOLEST SERPL-MCNC: 129 MG/DL (ref 100–199)
CO2 SERPL-SCNC: 23 MMOL/L (ref 20–33)
COMMENT NL1176: NORMAL
CREAT SERPL-MCNC: 0.88 MG/DL (ref 0.5–1.4)
EOSINOPHIL # BLD AUTO: 0 K/UL (ref 0–0.51)
EOSINOPHIL NFR BLD: 0 % (ref 0–6.9)
ERYTHROCYTE [DISTWIDTH] IN BLOOD BY AUTOMATED COUNT: 54.5 FL (ref 35.9–50)
FASTING STATUS PATIENT QL REPORTED: NORMAL
FASTING STATUS PATIENT QL REPORTED: NORMAL
GFR SERPLBLD CREATININE-BSD FMLA CKD-EPI: 67 ML/MIN/1.73 M 2
GLOBULIN SER CALC-MCNC: 2.3 G/DL (ref 1.9–3.5)
GLUCOSE SERPL-MCNC: 83 MG/DL (ref 65–99)
HCT VFR BLD AUTO: 37.1 % (ref 37–47)
HDLC SERPL-MCNC: 70 MG/DL
HGB BLD-MCNC: 11.5 G/DL (ref 12–16)
LDH SERPL L TO P-CCNC: 160 U/L (ref 107–266)
LDLC SERPL CALC-MCNC: 37 MG/DL
LYMPHOCYTES # BLD AUTO: 19.56 K/UL (ref 1–4.8)
LYMPHOCYTES NFR BLD: 85.8 % (ref 22–41)
MANUAL DIFF BLD: NORMAL
MCH RBC QN AUTO: 30.3 PG (ref 27–33)
MCHC RBC AUTO-ENTMCNC: 31 G/DL (ref 32.2–35.5)
MCV RBC AUTO: 97.6 FL (ref 81.4–97.8)
MONOCYTES # BLD AUTO: 0.21 K/UL (ref 0–0.85)
MONOCYTES NFR BLD AUTO: 0.9 % (ref 0–13.4)
NEUTROPHILS # BLD AUTO: 3.03 K/UL (ref 1.82–7.42)
NEUTROPHILS NFR BLD: 13.3 % (ref 44–72)
NRBC # BLD AUTO: 0 K/UL
NRBC BLD-RTO: 0 /100 WBC (ref 0–0.2)
PLATELET # BLD AUTO: 227 K/UL (ref 164–446)
PLATELET BLD QL SMEAR: NORMAL
PMV BLD AUTO: 10.2 FL (ref 9–12.9)
POTASSIUM SERPL-SCNC: 4 MMOL/L (ref 3.6–5.5)
PROT SERPL-MCNC: 6.7 G/DL (ref 6–8.2)
RBC # BLD AUTO: 3.8 M/UL (ref 4.2–5.4)
RBC BLD AUTO: PRESENT
SODIUM SERPL-SCNC: 141 MMOL/L (ref 135–145)
TRIGL SERPL-MCNC: 109 MG/DL (ref 0–149)
TSH SERPL DL<=0.005 MIU/L-ACNC: 1.45 UIU/ML (ref 0.38–5.33)
VARIANT LYMPHS BLD QL SMEAR: NORMAL
VIT B12 SERPL-MCNC: 460 PG/ML (ref 211–911)
WBC # BLD AUTO: 22.8 K/UL (ref 4.8–10.8)

## 2025-04-18 PROCEDURE — 80061 LIPID PANEL: CPT

## 2025-04-18 PROCEDURE — 85007 BL SMEAR W/DIFF WBC COUNT: CPT

## 2025-04-18 PROCEDURE — 3079F DIAST BP 80-89 MM HG: CPT | Performed by: STUDENT IN AN ORGANIZED HEALTH CARE EDUCATION/TRAINING PROGRAM

## 2025-04-18 PROCEDURE — 82607 VITAMIN B-12: CPT

## 2025-04-18 PROCEDURE — 82306 VITAMIN D 25 HYDROXY: CPT

## 2025-04-18 PROCEDURE — 84443 ASSAY THYROID STIM HORMONE: CPT

## 2025-04-18 PROCEDURE — 36415 COLL VENOUS BLD VENIPUNCTURE: CPT

## 2025-04-18 PROCEDURE — 72148 MRI LUMBAR SPINE W/O DYE: CPT

## 2025-04-18 PROCEDURE — 1125F AMNT PAIN NOTED PAIN PRSNT: CPT | Performed by: STUDENT IN AN ORGANIZED HEALTH CARE EDUCATION/TRAINING PROGRAM

## 2025-04-18 PROCEDURE — 85027 COMPLETE CBC AUTOMATED: CPT

## 2025-04-18 PROCEDURE — 73502 X-RAY EXAM HIP UNI 2-3 VIEWS: CPT | Mod: LT

## 2025-04-18 PROCEDURE — 80053 COMPREHEN METABOLIC PANEL: CPT

## 2025-04-18 PROCEDURE — 3077F SYST BP >= 140 MM HG: CPT | Performed by: STUDENT IN AN ORGANIZED HEALTH CARE EDUCATION/TRAINING PROGRAM

## 2025-04-18 PROCEDURE — 99214 OFFICE O/P EST MOD 30 MIN: CPT | Performed by: STUDENT IN AN ORGANIZED HEALTH CARE EDUCATION/TRAINING PROGRAM

## 2025-04-18 PROCEDURE — 83615 LACTATE (LD) (LDH) ENZYME: CPT

## 2025-04-18 RX ORDER — HYDROCODONE BITARTRATE AND ACETAMINOPHEN 5; 325 MG/1; MG/1
1 TABLET ORAL EVERY 6 HOURS PRN
Qty: 28 TABLET | Refills: 0 | Status: SHIPPED | OUTPATIENT
Start: 2025-04-18 | End: 2025-04-25

## 2025-04-18 ASSESSMENT — PAIN SCALES - GENERAL: PAINLEVEL_OUTOF10: 10=SEVERE PAIN

## 2025-04-18 ASSESSMENT — FIBROSIS 4 INDEX: FIB4 SCORE: 1.83

## 2025-04-22 ENCOUNTER — RESULTS FOLLOW-UP (OUTPATIENT)
Dept: MEDICAL GROUP | Facility: PHYSICIAN GROUP | Age: 80
End: 2025-04-22

## 2025-04-23 DIAGNOSIS — M54.50 CHRONIC BILATERAL LOW BACK PAIN WITHOUT SCIATICA: ICD-10-CM

## 2025-04-23 DIAGNOSIS — G89.29 CHRONIC BILATERAL LOW BACK PAIN WITHOUT SCIATICA: ICD-10-CM

## 2025-04-23 DIAGNOSIS — M25.562 CHRONIC PAIN OF LEFT KNEE: ICD-10-CM

## 2025-04-23 DIAGNOSIS — G89.29 CHRONIC PAIN OF LEFT KNEE: ICD-10-CM

## 2025-04-23 RX ORDER — GABAPENTIN 300 MG/1
CAPSULE ORAL
Qty: 180 CAPSULE | Refills: 0 | Status: SHIPPED | OUTPATIENT
Start: 2025-04-23

## 2025-04-23 NOTE — TELEPHONE ENCOUNTER
gabapentin (NEURONTIN) 300 MG Cap    Received request via: Pharmacy    Was the patient seen in the last year in this department? Yes    Does the patient have an active prescription (recently filled or refills available) for medication(s) requested?  yes    Pharmacy Name: [facility] [address]    Does the patient have assisted Plus and need 100-day supply? (This applies to ALL medications) no 100 day supply requested

## 2025-04-24 NOTE — PROGRESS NOTES
Verbal consent was acquired by the patient to use [employer] ambient listening note generation during this visit Yes     FOLLOW-UP PATIENT VISIT    Interventional Spine and Pain  Physiatry (Physical Medicine and Rehabilitation)     Date of Service: 25   Chief Complaint:   Chief Complaint   Patient presents with    Follow-Up     Acute left-sided low back pain with left-sided sciatica      Patient Name: Shanice Magana   : 1945   MRN: [id number]       PRIOR HISTORY    Please see new patient note by Dr. Chiu for more details.     Interval History  Patient identification: Shanice Magana,  1945, with history of CLL, HTN, gastric ulcer, who presents today for follow-up of left low back pain.     History of Present Illness  The patient is a 79-year-old female who presents today for follow-up of low back pain. She reports a significant improvement in her acute low back pain since her last visit, and has actually noticed increased ability to stand up straight and walk compared to before the ablation. She experiences discomfort at the base of her spine/tailbone, which she believes may be due to prolonged sitting, and is around 1 out of 10 but manageable. Despite this, she expresses satisfaction with her ability to walk upright and without assistance. However, she notes that extended periods of walking, such as during grocery shopping, do still result in fatigue. She has been managing her pain with Tylenol, taking two doses this morning and one dose yesterday morning and before bedtime. She has not needed to use the Norco prescribed for acute pain.    MEDICATIONS  Tylenol    Procedures:  24: Left knee intraarticular steroid injection, 80-90% improvement ~4 weeks  10/30/24: Right greater trochanteric bursa steroid injection, 60-70% improvement in lateral thigh pain  24: Right buttock trigger point injection, several days of mild improvement  25: MBB#1 targeting bilateral  "L4-5 and L5-S1 facet joints, 90% pain improvement for ~8 hours  3/4/25: MBB#2 targeting bilateral L4-5 and L5-S1 facet joints, 90% pain improvement for ~6 hours  4/1/25: RFA targeting bilateral L4-5 and L5-S1 facet joints        PMHx:   Past Medical History:   Diagnosis Date    Acute reaction to situational stress 01/04/2023    Depression Screening (1/2023)     Little interest or pleasure in doing things?  3 - nearly every day   Feeling down, depressed , or hopeless? 3 - nearly every day   Trouble falling or staying asleep, or sleeping too much?  0 - not at all   Feeling tired or having little energy?  0 - not at all   Poor appetite or overeating?  0 - not at all   Feeling bad about yourself - or that you are a failure o    Anemia 04/16/2021    She sees Dr. Gonzalez for GERD and hiatal hernia who monitors her lab values. Has had 2 episodes of a drop in her hgb requiring a transfusion. She says her GI thinks this might be related to her hiatal hernia. Is on protonix bid and has prn pepcid she uses on occasion.    Arthritis     slight. 4/27/21: Knees \"Very little\"    BMI 34.0-34.9,adult 09/11/2014    Breath shortness     Cancer (Formerly [facility]) 2011    CLL- \"Wait and watch\"    CATARACT     surgical correction margo     Celiac disease     CLL (chronic lymphocytic leukemia) (Formerly [facility]) 03/03/2011    Asymptomatic being monitored/Dr Davidson, Q 6 months. Not on medications.     Family history of adverse effect to anesthesia     Daughter/Hard time waking     GERD (gastroesophageal reflux disease) 03/03/2011    Sees GI specialist     Heart burn 04/27/2021    GERD    Hiatal hernia 03/03/2011    Hot flashes, menopausal 03/03/2011    Hypertension 03/03/2011       PSHx:   Past Surgical History:   Procedure Laterality Date    RADIO FREQUENCY ABLATION ADDITIONAL LEVEL Bilateral 4/1/2025    Procedure: RIGHT and LEFT radiofrequency neurotomies medial branch targeting the L4-5 and L5-S1 facet joints with fluoroscopic guidance and sedation…Note: plan " for sedation with 1mg versed and 50 mcg fentanyl. Plan for 80 °C for 90 seconds for each neurotomy…Special Needs: extra long probes, required 22g 5 inch needles….Procedural Sedation: yes, plan for 1 mg midazolam and 50 mcg Fentanyl;  Surgeon    LUMBAR MEDIAL BRANCH BLOCKS Bilateral 3/4/2025    Procedure: Diagnostic medial branch blocks targeting the BILATERAL L4-5 and L5-S1 facet joints with fluoroscopic guidance #2…Note: Diagnostic medial branch block #2 is only be done if procedure #1 is a positive block.  This will be on a separate date from procedure #1.;  Surgeon: Tomasa Chiu M.D.;  [facility];  Service: Pain Management    LUMBAR MEDIAL BRANCH BLOCKS Bilateral 2/11/2025    Procedure: Diagnostic medial branch blocks targeting the BILATERAL L4-5 and L5-S1 facet joints with fluoroscopic guidance;  Surgeon: Tomasa Chiu M.D.;  Location[facility];  Service: Pain Management    CT UPPER GI ENDOSCOPY,DIAGNOSIS N/A 4/28/2021    Procedure: GASTROSCOPY;  Surgeon: Mg Evans M.D.;  Location: SURGERY SAME DAY [facility];  Service: Gastroenterology    CT UPPER GI ENDOSCOPY,BIOPSY N/A 4/28/2021    Procedure: GASTROSCOPY, WITH BIOPSY;  Surgeon: Mg Evans M.D.;  Location: SURGERY SAME DAY [facility];  Service: Gastroenterology    RECTUS REPAIR Right 11/15/2018    Procedure: RECTUS REPAIR- SUPERIOR RECTUS RECESSION, ADJUSTABLE SUTURE INFERIOR RECTUS PLICATION/RESECTION;  Surgeon: Jane Larsen M.D.;  Location: SURGERY SAME DAY [facility];  Service: Ophthalmology    VITRECTOMY POSTERIOR Right 1/17/2018    Procedure: VITRECTOMY POSTERIOR W/AIR FLUID EXCHANGE, ENDO LASER, 23 GAUGE SF6 GAS, CRYOTHERAPY;  Surgeon: Sea Franklin M.D.;  Location: SURGERY SAME DAY [facility];  Service: Ophthalmology    MAMMOPLASTY REDUCTION Bilateral 11/3/2015    Procedure: MAMMOPLASTY REDUCTION;  Surgeon: Talia Barton M.D.;  Location: [facility];   Service:     KNEE ARTHROSCOPY  9/17/2014    Performed by Dany Owens M.D. at SURGERY [facility]    MENISCECTOMY  9/17/2014    Performed by Dany Owens M.D. at [facility]    SHOULDER ARTHROSCOPY W/ ROTATOR CUFF REPAIR  8/22/2013    Performed by Dany Owens M.D. at SURGERY [facility]    SHOULDER DECOMPRESSION ARTHROSCOPIC  8/22/2013    Performed by Dany Owens M.D. at SURGERY [facility]    CLAVICLE DISTAL EXCISION  8/22/2013    Performed by Dany Owens M.D. at SURGERY [facility]    OTHER  7/2013    laser procedure right eye    CATARACT EXTRACTION WITH IOL  5/29/13    right eye    ABDOMINAL HYSTERECTOMY TOTAL  1975    CHOLECYSTECTOMY  1973    open    TONSILLECTOMY  1950    BLADDER SUSPENSION  1973, 1978    x2       Family history   Family History   Problem Relation Age of Onset    Heart Disease Mother     Hypertension Mother     Other Mother         pancreatitis    Arthritis Father     Cancer Father         Lymphoma, colon, skin    Hyperlipidemia Father     Other Father         Clogged artery in leg    Heart Disease Father         A Fib    Diabetes Sister         Pre    Other Sister         Obesity    Other Daughter         Gluten allergies    Other Son         Suicide       Medications:   Outpatient Medications Marked as Taking for the 4/25/25 encounter (Office Visit) with Tomasa Chiu M.D.   Medication Sig Dispense Refill    gabapentin (NEURONTIN) 300 MG Cap TAKE 1 CAPSULE BY MOUTH EVERY MORNING AND TAKE TWO CAPSULES BY MOUTH EVERY EVENING 180 Capsule 0    HYDROcodone-acetaminophen (NORCO) 5-325 MG Tab per tablet Take 1 Tablet by mouth every 6 hours as needed (Severe pain) for up to 7 days. 28 Tablet 0    pantoprazole (PROTONIX) 40 MG Tablet Delayed Response TAKE 1 TABLET BY MOUTH TWICE A  Tablet 3    valACYclovir (VALTREX) 500 MG Tab Take 2 Tablets by mouth 2 times a day. 100 Tablet 3    atenolol (TENORMIN) 50 MG Tab Take 1 Tablet by mouth every  day. 100 Tablet 3    estradiol (ESTRACE) 0.5 MG tablet TAKE 1/2 TABLET BY MOUTH DAILY 50 Tablet 3    triamcinolone acetonide (KENALOG) 0.1 % Cream APPLY TOPICALLY TO THE AFFECTED AREA(S) OF BODY TWO TIMES A DAY AS NEEDED FOR ITCHING, DO NOT APPLY TO FACE, AXILLA OR GROIN. 454 g 0    famotidine (PEPCID) 20 MG Tab Take 1 Tablet by mouth as needed (breakthrough heartburn). One by mouth once daily at bedtime 90 Tablet 3        Current Outpatient Medications on File Prior to Visit   Medication Sig Dispense Refill    gabapentin (NEURONTIN) 300 MG Cap TAKE 1 CAPSULE BY MOUTH EVERY MORNING AND TAKE TWO CAPSULES BY MOUTH EVERY EVENING 180 Capsule 0    HYDROcodone-acetaminophen (NORCO) 5-325 MG Tab per tablet Take 1 Tablet by mouth every 6 hours as needed (Severe pain) for up to 7 days. 28 Tablet 0    pantoprazole (PROTONIX) 40 MG Tablet Delayed Response TAKE 1 TABLET BY MOUTH TWICE A  Tablet 3    valACYclovir (VALTREX) 500 MG Tab Take 2 Tablets by mouth 2 times a day. 100 Tablet 3    atenolol (TENORMIN) 50 MG Tab Take 1 Tablet by mouth every day. 100 Tablet 3    estradiol (ESTRACE) 0.5 MG tablet TAKE 1/2 TABLET BY MOUTH DAILY 50 Tablet 3    triamcinolone acetonide (KENALOG) 0.1 % Cream APPLY TOPICALLY TO THE AFFECTED AREA(S) OF BODY TWO TIMES A DAY AS NEEDED FOR ITCHING, DO NOT APPLY TO FACE, AXILLA OR GROIN. 454 g 0    famotidine (PEPCID) 20 MG Tab Take 1 Tablet by mouth as needed (breakthrough heartburn). One by mouth once daily at bedtime 90 Tablet 3     No current facility-administered medications on file prior to visit.       Allergies:   Allergies   Allergen Reactions    Grass Pollen(K-O-R-T-Swt [place]) Runny Nose     Sneezing, dizziniess    Other Misc      All household chemicals, soap powders, perfumes. Tongue and lips go numb, itching.    Wheat      Gluten. Stomach ache.        Social Hx:   Social History     Socioeconomic History    Marital status: Single     Spouse name: Not on file    Number of children: Not  on file    Years of education: Not on file    Highest education level: Not on file   Occupational History    Not on file   Tobacco Use    Smoking status: Former     Current packs/day: 0.00     Types: Cigarettes     Quit date: 1967     Years since quittin.3    Smokeless tobacco: Never   Vaping Use    Vaping status: Never Used   Substance and Sexual Activity    Alcohol use: Yes     Alcohol/week: 0.6 - 1.2 oz     Types: 1 - 2 Glasses of wine per week     Comment: Several times a week.  21: 2-4/month    Drug use: No    Sexual activity: Not Currently   Other Topics Concern    Not on file   Social History Narrative    Retired from medical billing. She has one daughter, 2 grandkids (one is starting med school), son in law is an ICU hospitalist at [employer]. She enjoys knitting and traveling, belongs to a travel group.  She is  after 30y of marriage, lives alone, has a good support group.      Social Drivers of Health     Financial Resource Strain: Low Risk  (2024)    Overall Financial Resource Strain (CARDIA)     Difficulty of Paying Living Expenses: Not hard at all   Food Insecurity: No Food Insecurity (2024)    Hunger Vital Sign     Worried About Running Out of Food in the Last Year: Never true     Ran Out of Food in the Last Year: Never true   Transportation Needs: No Transportation Needs (2024)    PRAPARE - Transportation     Lack of Transportation (Medical): No     Lack of Transportation (Non-Medical): No   Physical Activity: Not on file   Stress: Not on file   Social Connections: Not on file   Intimate Partner Violence: Not on file   Housing Stability: Low Risk  (2024)    Housing Stability Vital Sign     Unable to Pay for Housing in the Last Year: No     Number of Places Lived in the Last Year: 1     Unstable Housing in the Last Year: No         EXAMINATION     Physical Exam:   Vitals: BP (!) 165/89 (BP Location: Left arm, Patient Position: Sitting, BP Cuff Size: Adult)   Pulse  "68   Temp 36.6 °C (97.8 °F) (Temporal)   Ht 1.562 m (5' 1.5\")   Wt 74.9 kg (165 lb 2 oz)   SpO2 98%       Constitutional:   Body Habitus: Body mass index is 30.69 kg/m².  Cooperation: Fully cooperates with exam  Appearance: Well-groomed, well-nourished  Respiratory:  Breathing comfortable on room air, no audible wheezing  Cardiovascular: Skin appears well-perfused  Psychiatric: Appropriate affect  Gait: normal gait, no use of ambulatory device, nonantalgic.     Neurologic:  Strength:    Bilateral LE 5/5 in hip flexors, knee extensors and flexors, ankle dorsiflexors and plantarflexors      MEDICAL DECISION MAKING    DATA    Labs:   Lab Results   Component Value Date/Time    SODIUM 141 04/18/2025 10:43 AM    POTASSIUM 4.0 04/18/2025 10:43 AM    CHLORIDE 108 04/18/2025 10:43 AM    CO2 23 04/18/2025 10:43 AM    GLUCOSE 83 04/18/2025 10:43 AM    BUN 16 04/18/2025 10:43 AM    CREATININE 0.88 04/18/2025 10:43 AM    CREATININE 0.83 03/25/2011 11:05 AM    BUNCREATRAT 20 03/25/2011 11:05 AM    GLOMRATE >59 03/25/2011 11:05 AM        Lab Results   Component Value Date/Time    PROTHROMBTM 13.7 04/16/2021 03:38 PM    INR 1.02 04/16/2021 03:38 PM        Lab Results   Component Value Date/Time    WBC 22.8 (H) 04/18/2025 10:43 AM    WBC 10.8 (H) 03/25/2011 11:05 AM    RBC 3.80 (L) 04/18/2025 10:43 AM    RBC 4.30 03/25/2011 11:05 AM    HEMOGLOBIN 11.5 (L) 04/18/2025 10:43 AM    HEMATOCRIT 37.1 04/18/2025 10:43 AM    MCV 97.6 04/18/2025 10:43 AM    MCV 95 03/25/2011 11:05 AM    MCH 30.3 04/18/2025 10:43 AM    MCH 31.2 03/25/2011 11:05 AM    MCHC 31.0 (L) 04/18/2025 10:43 AM    MPV 10.2 04/18/2025 10:43 AM    NEUTSPOLYS 13.30 (L) 04/18/2025 10:43 AM    LYMPHOCYTES 85.80 (H) 04/18/2025 10:43 AM    MONOCYTES 0.90 04/18/2025 10:43 AM    EOSINOPHILS 0.00 04/18/2025 10:43 AM    BASOPHILS 0.00 04/18/2025 10:43 AM    HYPOCHROMIA 1+ 06/07/2021 10:34 AM    ANISOCYTOSIS 1+ 02/13/2023 12:31 PM        Lab Results   Component Value Date/Time "    HBA1C 5.5 01/27/2022 12:54 PM          Imaging:   I personally reviewed the following images, below are my independent reads:  MRI Lumbar Spine 4/18/25: Lumbar scoliosis. Grade 1 T12-L1 retrolisthesis and L4-5 anterolisthesis. Type 1 Modic changes T12 and L1. T12-L1 moderate bilateral foraminal stenosis. L3-4 mild right foraminal stenosis. Multilevel facet arthropathy. Increased STIR signal in bilateral multifidus at S1 consistent with muscular sprain versus recent radiofrequency ablation.      I reviewed the following radiology reports  MRI Lumbar Spine 4/18/25:  IMPRESSION:     Levocurvature of the lumbar spine as described above.     Grade 1 retrolisthesis of T12 or L1 grade 1 anterolisthesis of L4 over L5.     There is no significant spinal canal stenosis. There is bilateral moderate foraminal narrowing at T12-L1.     These findings remain stable compared to the previous study.     Mild high STIR signal is noted in the bilateral multifidus musculature at the S1 level which may related to muscular sprain.      DIAGNOSIS   Visit Diagnoses     ICD-10-CM   1. Acute left-sided low back pain without sciatica  M54.50   2. Chronic bilateral low back pain without sciatica  M54.50    G89.29   3. Coccyx pain  M53.3   4. Lumbar facet arthropathy  M47.816         ASSESSMENT and PLAN:     Shanice Magana is a 79 y.o. female who returns to clinic for follow-up of acute left low back pain.    Shanice was seen today for follow-up.    Diagnoses and all orders for this visit:    Acute left-sided low back pain without sciatica    Chronic bilateral low back pain without sciatica    Coccyx pain    Lumbar facet arthropathy        Assessment & Plan  Acute left low back pain  Chronic bilateral low back pain  Patient presents for follow-up of acute left low back pain in the setting of RFA targeting the bilateral L4-5 and L5-S1 facet joints on 4/1/25. The MRI results were reassuring, showing no new issues but some inflammation in  the lower back muscles, which is normal post-ablation. The severe pain has significantly improved and patient actually reports an overall improvement in her back pain post ablation compared to before the ablation, with greater ability to stand up straight and walk with less pain. The full effects of the ablation procedure will be assessed over the next few weeks. She does report some mild tailbone pain associated with sitting, and we discussed that if tailbone pain persists, we could consider a referral to Dr. Ann for a ganglion impar block, but patient reports that this current level of pain is manageable with as needed Tylenol.        Follow up: In 3 weeks    Thank you for allowing me to participate in the care of this patient. If you have any questions please not hesitate to contact me.         Please note that this dictation was created using voice recognition software. I have made every reasonable attempt to correct obvious errors but there may be errors of grammar and content that I may have overlooked prior to finalization of this note.    Tomasa Chiu MD  Interventional Spine and Sports Physiatry  Physical Medicine and Rehabilitation  [facility]

## 2025-04-25 ENCOUNTER — OFFICE VISIT (OUTPATIENT)
Dept: PHYSICAL MEDICINE AND REHAB | Facility: MEDICAL CENTER | Age: 80
End: 2025-04-25
Payer: MEDICARE

## 2025-04-25 VITALS
HEART RATE: 68 BPM | BODY MASS INDEX: 30.39 KG/M2 | TEMPERATURE: 97.8 F | SYSTOLIC BLOOD PRESSURE: 165 MMHG | HEIGHT: 62 IN | OXYGEN SATURATION: 98 % | DIASTOLIC BLOOD PRESSURE: 89 MMHG | WEIGHT: 165.12 LBS

## 2025-04-25 DIAGNOSIS — M47.816 LUMBAR FACET ARTHROPATHY: ICD-10-CM

## 2025-04-25 DIAGNOSIS — M54.50 CHRONIC BILATERAL LOW BACK PAIN WITHOUT SCIATICA: ICD-10-CM

## 2025-04-25 DIAGNOSIS — G89.29 CHRONIC BILATERAL LOW BACK PAIN WITHOUT SCIATICA: ICD-10-CM

## 2025-04-25 DIAGNOSIS — M53.3 COCCYX PAIN: ICD-10-CM

## 2025-04-25 DIAGNOSIS — M54.50 ACUTE LEFT-SIDED LOW BACK PAIN WITHOUT SCIATICA: Primary | ICD-10-CM

## 2025-04-25 PROCEDURE — 3079F DIAST BP 80-89 MM HG: CPT | Performed by: STUDENT IN AN ORGANIZED HEALTH CARE EDUCATION/TRAINING PROGRAM

## 2025-04-25 PROCEDURE — 99214 OFFICE O/P EST MOD 30 MIN: CPT | Performed by: STUDENT IN AN ORGANIZED HEALTH CARE EDUCATION/TRAINING PROGRAM

## 2025-04-25 PROCEDURE — 3077F SYST BP >= 140 MM HG: CPT | Performed by: STUDENT IN AN ORGANIZED HEALTH CARE EDUCATION/TRAINING PROGRAM

## 2025-04-25 PROCEDURE — 1125F AMNT PAIN NOTED PAIN PRSNT: CPT | Performed by: STUDENT IN AN ORGANIZED HEALTH CARE EDUCATION/TRAINING PROGRAM

## 2025-04-25 ASSESSMENT — FIBROSIS 4 INDEX: FIB4 SCORE: 1.85

## 2025-04-25 ASSESSMENT — PAIN SCALES - GENERAL: PAINLEVEL_OUTOF10: 1=MINIMAL PAIN

## 2025-05-01 ENCOUNTER — OFFICE VISIT (OUTPATIENT)
Facility: MEDICAL CENTER | Age: 80
End: 2025-05-01
Payer: MEDICARE

## 2025-05-01 VITALS
HEART RATE: 81 BPM | OXYGEN SATURATION: 96 % | BODY MASS INDEX: 30.22 KG/M2 | DIASTOLIC BLOOD PRESSURE: 62 MMHG | HEIGHT: 62 IN | WEIGHT: 164.24 LBS | SYSTOLIC BLOOD PRESSURE: 124 MMHG | TEMPERATURE: 97.2 F

## 2025-05-01 DIAGNOSIS — M17.12 PRIMARY OSTEOARTHRITIS OF LEFT KNEE: ICD-10-CM

## 2025-05-01 PROCEDURE — 3074F SYST BP LT 130 MM HG: CPT | Performed by: ORTHOPAEDIC SURGERY

## 2025-05-01 PROCEDURE — 3078F DIAST BP <80 MM HG: CPT | Performed by: ORTHOPAEDIC SURGERY

## 2025-05-01 PROCEDURE — 99213 OFFICE O/P EST LOW 20 MIN: CPT | Mod: 25 | Performed by: ORTHOPAEDIC SURGERY

## 2025-05-01 PROCEDURE — 20610 DRAIN/INJ JOINT/BURSA W/O US: CPT | Mod: LT | Performed by: ORTHOPAEDIC SURGERY

## 2025-05-01 RX ORDER — TRIAMCINOLONE ACETONIDE 40 MG/ML
40 INJECTION, SUSPENSION INTRA-ARTICULAR; INTRAMUSCULAR ONCE
Status: COMPLETED | OUTPATIENT
Start: 2025-05-01 | End: 2025-05-01

## 2025-05-01 RX ADMIN — Medication 4 ML: at 09:32

## 2025-05-01 RX ADMIN — TRIAMCINOLONE ACETONIDE 40 MG: 40 INJECTION, SUSPENSION INTRA-ARTICULAR; INTRAMUSCULAR at 09:32

## 2025-05-01 ASSESSMENT — ENCOUNTER SYMPTOMS
BACK PAIN: 0
TINGLING: 1
WEAKNESS: 0
SENSORY CHANGE: 0

## 2025-05-01 ASSESSMENT — FIBROSIS 4 INDEX: FIB4 SCORE: 1.85

## 2025-05-01 NOTE — PROGRESS NOTES
" [facility] Orthopedic Surgery              HPI: Shanice returns for follow up of her left knee and leg.  She completed her MRI and is here to review those results.  She states since I last saw her she did have an ablation in her back and that has largely resolved the tingling and shooting pain down her leg.  Occasionally she still gets some tingling into the foot, but this is intermittent.  She reports that most of her pain now is in the medial compartment of her left knee.  She has previously had corticosteroid injections which helped.  She cannot take any NSAIDs, but will use Tylenol occasionally.    Past Medical History:   Past Medical History:   Diagnosis Date    Acute reaction to situational stress 01/04/2023    Depression Screening (1/2023)     Little interest or pleasure in doing things?  3 - nearly every day   Feeling down, depressed , or hopeless? 3 - nearly every day   Trouble falling or staying asleep, or sleeping too much?  0 - not at all   Feeling tired or having little energy?  0 - not at all   Poor appetite or overeating?  0 - not at all   Feeling bad about yourself - or that you are a failure o    Anemia 04/16/2021    She sees Dr. Gonzalez for GERD and hiatal hernia who monitors her lab values. Has had 2 episodes of a drop in her hgb requiring a transfusion. She says her GI thinks this might be related to her hiatal hernia. Is on protonix bid and has prn pepcid she uses on occasion.    Arthritis     slight. 4/27/21: Knees \"Very little\"    BMI 34.0-34.9,adult 09/11/2014    Breath shortness     Cancer (HCC) 2011    CLL- \"Wait and watch\"    CATARACT     surgical correction margo     Celiac disease     CLL (chronic lymphocytic leukemia) (HCC) 03/03/2011    Asymptomatic being monitored/Dr Davidson, Q 6 months. Not on medications.     Family history of adverse effect to anesthesia     Daughter/Hard time waking     GERD (gastroesophageal reflux disease) 03/03/2011    Sees GI specialist     Heart burn 04/27/2021    " GERD    Hiatal hernia 03/03/2011    Hot flashes, menopausal 03/03/2011    Hypertension 03/03/2011       Past Surgical History:  Past Surgical History:   Procedure Laterality Date    RADIO FREQUENCY ABLATION ADDITIONAL LEVEL Bilateral 4/1/2025    Procedure: RIGHT and LEFT radiofrequency neurotomies medial branch targeting the L4-5 and L5-S1 facet joints with fluoroscopic guidance and sedation…Note: plan for sedation with 1mg versed and 50 mcg fentanyl. Plan for 80 °C for 90 seconds for each neurotomy…Special Needs: extra long probes, required 22g 5 inch needles….Procedural Sedation: yes, plan for 1 mg midazolam and 50 mcg Fentanyl;  Surgeon    LUMBAR MEDIAL BRANCH BLOCKS Bilateral 3/4/2025    Procedure: Diagnostic medial branch blocks targeting the BILATERAL L4-5 and L5-S1 facet joints with fluoroscopic guidance #2…Note: Diagnostic medial branch block #2 is only be done if procedure #1 is a positive block.  This will be on a separate date from procedure #1.;  Surgeon: Tomasa Chiu M.D.;  [facility];  Service: Pain Management    LUMBAR MEDIAL BRANCH BLOCKS Bilateral 2/11/2025    Procedure: Diagnostic medial branch blocks targeting the BILATERAL L4-5 and L5-S1 facet joints with fluoroscopic guidance;  Surgeon: Tomasa Chiu M.D.;  Location[facility];  Service: Pain Management    VT UPPER GI ENDOSCOPY,DIAGNOSIS N/A 4/28/2021    Procedure: GASTROSCOPY;  Surgeon: Mg Evans M.D.;  Location: SURGERY SAME DAY [facility];  Service: Gastroenterology    VT UPPER GI ENDOSCOPY,BIOPSY N/A 4/28/2021    Procedure: GASTROSCOPY, WITH BIOPSY;  Surgeon: Mg Evans M.D.;  Location: SURGERY SAME DAY [facility];  Service: Gastroenterology    RECTUS REPAIR Right 11/15/2018    Procedure: RECTUS REPAIR- SUPERIOR RECTUS RECESSION, ADJUSTABLE SUTURE INFERIOR RECTUS PLICATION/RESECTION;  Surgeon: Jane Larsen M.D.;  Location: SURGERY SAME DAY [facility];  Service:  Ophthalmology    VITRECTOMY POSTERIOR Right 1/17/2018    Procedure: VITRECTOMY POSTERIOR W/AIR FLUID EXCHANGE, ENDO LASER, 23 GAUGE SF6 GAS, CRYOTHERAPY;  Surgeon: Sea Franklin M.D.;  Location: SURGERY SAME DAY [facility];  Service: Ophthalmology    MAMMOPLASTY REDUCTION Bilateral 11/3/2015    Procedure: MAMMOPLASTY REDUCTION;  Surgeon: Talia Barton M.D.;  Location: SURGERY [facility];  Service:     KNEE ARTHROSCOPY  9/17/2014    Performed by Dany Owens M.D. at SURGERY [facility]    MENISCECTOMY  9/17/2014    Performed by Dany Owens M.D. at [facility]    SHOULDER ARTHROSCOPY W/ ROTATOR CUFF REPAIR  8/22/2013    Performed by Dany Owens M.D. at [facility]    SHOULDER DECOMPRESSION ARTHROSCOPIC  8/22/2013    Performed by Dany Owens M.D. at [facility]    CLAVICLE DISTAL EXCISION  8/22/2013    Performed by Dany Owens M.D. at [facility]    OTHER  7/2013    laser procedure right eye    CATARACT EXTRACTION WITH IOL  5/29/13    right eye    ABDOMINAL HYSTERECTOMY TOTAL  1975    CHOLECYSTECTOMY  1973    open    TONSILLECTOMY  1950    BLADDER SUSPENSION  1973, 1978    x2       Outpatient Encounter Medications as of 5/1/2025   Medication Sig Dispense Refill    gabapentin (NEURONTIN) 300 MG Cap TAKE 1 CAPSULE BY MOUTH EVERY MORNING AND TAKE TWO CAPSULES BY MOUTH EVERY EVENING 180 Capsule 0    pantoprazole (PROTONIX) 40 MG Tablet Delayed Response TAKE 1 TABLET BY MOUTH TWICE A  Tablet 3    valACYclovir (VALTREX) 500 MG Tab Take 2 Tablets by mouth 2 times a day. 100 Tablet 3    atenolol (TENORMIN) 50 MG Tab Take 1 Tablet by mouth every day. 100 Tablet 3    estradiol (ESTRACE) 0.5 MG tablet TAKE 1/2 TABLET BY MOUTH DAILY 50 Tablet 3    triamcinolone acetonide (KENALOG) 0.1 % Cream APPLY TOPICALLY TO THE AFFECTED AREA(S) OF BODY TWO TIMES A DAY AS NEEDED FOR ITCHING, DO NOT APPLY TO FACE, AXILLA OR GROIN. 454 g 0     famotidine (PEPCID) 20 MG Tab Take 1 Tablet by mouth as needed (breakthrough heartburn). One by mouth once daily at bedtime 90 Tablet 3    [] triamcinolone acetonide (Kenalog-40) injection 40 mg       [] lidocaine (Xylocaine) 1 % injection 4 mL        No facility-administered encounter medications on file as of 2025.        Allergies:   Allergies   Allergen Reactions    Grass Pollen(K-O-R-T-Swt [place]) Runny Nose     Sneezing, dizziniess    Other Misc      All household chemicals, soap powders, perfumes. Tongue and lips go numb, itching.    Wheat      Gluten. Stomach ache.        Family History:   Family History   Problem Relation Age of Onset    Heart Disease Mother     Hypertension Mother     Other Mother         pancreatitis    Arthritis Father     Cancer Father         Lymphoma, colon, skin    Hyperlipidemia Father     Other Father         Clogged artery in leg    Heart Disease Father         A Fib    Diabetes Sister         Pre    Other Sister         Obesity    Other Daughter         Gluten allergies    Other Son         Suicide       Social History:   Social History     Tobacco Use   Smoking Status Former    Current packs/day: 0.00    Types: Cigarettes    Quit date: 1967    Years since quittin.3   Smokeless Tobacco Never     Social History     Substance and Sexual Activity   Alcohol Use Yes    Alcohol/week: 0.6 - 1.2 oz    Types: 1 - 2 Glasses of wine per week    Comment: Several times a week.  21: 2-4/month     Social History     Substance and Sexual Activity   Drug Use No     Social History     Socioeconomic History    Marital status: Single     Spouse name: Not on file    Number of children: Not on file    Years of education: Not on file    Highest education level: Not on file   Occupational History    Not on file   Tobacco Use    Smoking status: Former     Current packs/day: 0.00     Types: Cigarettes     Quit date: 1967     Years since quittin.3    Smokeless  "tobacco: Never   Vaping Use    Vaping status: Never Used   Substance and Sexual Activity    Alcohol use: Yes     Alcohol/week: 0.6 - 1.2 oz     Types: 1 - 2 Glasses of wine per week     Comment: Several times a week.  4/27/21: 2-4/month    Drug use: No    Sexual activity: Not Currently   Other Topics Concern    Not on file   Social History Narrative    Retired from medical billing. She has one daughter, 2 grandkids (one is starting med school), son in law is an ICU hospitalist at [employer]. She enjoys knitting and traveling, belongs to a travel group.  She is  after 30y of marriage, lives alone, has a good support group.      Social Drivers of Health     Financial Resource Strain: Low Risk  (6/26/2024)    Overall Financial Resource Strain (CARDIA)     Difficulty of Paying Living Expenses: Not hard at all   Food Insecurity: No Food Insecurity (6/26/2024)    Hunger Vital Sign     Worried About Running Out of Food in the Last Year: Never true     Ran Out of Food in the Last Year: Never true   Transportation Needs: No Transportation Needs (6/26/2024)    PRAPARE - Transportation     Lack of Transportation (Medical): No     Lack of Transportation (Non-Medical): No   Physical Activity: Not on file   Stress: Not on file   Social Connections: Not on file   Intimate Partner Violence: Not on file   Housing Stability: Low Risk  (6/26/2024)    Housing Stability Vital Sign     Unable to Pay for Housing in the Last Year: No     Number of Places Lived in the Last Year: 1     Unstable Housing in the Last Year: No       Review of Systems:  Review of Systems   Musculoskeletal:  Positive for joint pain. Negative for back pain.   Neurological:  Positive for tingling. Negative for sensory change and weakness.       Physical Exam:  /62 (BP Location: Right arm, Patient Position: Sitting, BP Cuff Size: Adult)   Pulse 81   Temp 36.2 °C (97.2 °F) (Temporal)   Ht 1.562 m (5' 1.5\")   Wt 74.5 kg (164 lb 3.9 oz)   SpO2 96%   BMI " 30.53 kg/m²     Physical Exam  Constitutional:       General: She is not in acute distress.     Appearance: Normal appearance. She is not ill-appearing.   HENT:      Head: Normocephalic and atraumatic.   Pulmonary:      Effort: Pulmonary effort is normal. No respiratory distress.   Musculoskeletal:      Cervical back: Normal range of motion and neck supple.   Skin:     General: Skin is warm and dry.      Capillary Refill: Capillary refill takes less than 2 seconds.   Neurological:      General: No focal deficit present.      Mental Status: She is alert and oriented to person, place, and time.   Psychiatric:         Mood and Affect: Mood normal.         Behavior: Behavior normal.         left knee: Overlying skin demonstrates no erythema, ecchymoses or wounds. Knee ROM is full.  SILT spn, dpn, plantar and sural nerves. Motor intact to knee extension, ankle dorsiflexion, ankle plantar flexion, and eversion. DP/PT pulse 2+. There is tenderness at the medial joint line. There is not tenderness elsewhere. negative Lachman.  stable varus/valgus stress.     Imaging:   MRI knee left w/wo contrast demonstrates no mass impinging on the peroneal nerve. She has severe degenerative changes of the medial compartment of the knee. Baker's cyst      Assessment and Plan:  Left knee osteoarthritis    I counseled the patient regarding the above clinical and imaging findings. Her neurologic symptoms seem to have improved after the back ablation procedure. Her knee pain is secondary to osteoarthritis. We discussed conservative and surgical treatment options in detail. Conservative treatment options include weight loss, low impact exercise (pool, bike, etc), oral NSAIDs, corticosteroid injections, bracing, and ablative procedures of the sensory nerves to the joint. Surgical treatment includes total joint arthroplasty.     She would like to try a corticosteroid injection. Risks and benefits discussed. She understands these can be repeated  3-4 times a year.       All questions were answered and they were in agreement with the plan.     Referrals: none  Restrictions: none  New medications/refills: none  Imaging studies: none  Follow-up: 3-4 months    Golden Fleming DO  Orthopedics and Orthopedic Oncology

## 2025-05-01 NOTE — PROCEDURES
Procedure: Intra-articular corticosteroid injection left knee    Diagnosis: left knee osteoarthritis    Consent: Verbal consent obtained from patient. We discussed risks of infection, blood loss, damage to nerves/vessels in the area, and pain.     Performed by: Golden Fleming DO    Procedure details:  A time out was held to confirm the site and patient. The site was sterilized with chloraprep swabs. Then a mixture of 4 cc of 1% lidocaine and 1 cc of 40 mg/ml of kenalog was injected into the knee through the anterolateral portal. A band aid was placed. The patient tolerated the procedure well. They were instructed on signs of infection and to call should any of these symptoms occur.    Golden Fleming DO  Orthopedic Oncology and General Orthopedics   [facility]

## 2025-05-16 ENCOUNTER — APPOINTMENT (OUTPATIENT)
Dept: PHYSICAL MEDICINE AND REHAB | Facility: MEDICAL CENTER | Age: 80
End: 2025-05-16
Payer: MEDICARE

## 2025-06-06 NOTE — PROGRESS NOTES
Verbal consent was acquired by the patient to use [employer] ambient listening note generation during this visit Yes     FOLLOW-UP PATIENT VISIT    Interventional Spine and Pain  Physiatry (Physical Medicine and Rehabilitation)     Date of Service: 06/10/25   Chief Complaint:   Chief Complaint   Patient presents with    Follow-Up     Hip pain      Patient Name: Shanice Magana   : 1945   MRN: [id number]       PRIOR HISTORY    Please see new patient note by Dr. Chiu for more details.     Interval History  Patient identification: Shanice Magana,  1945, with history of CLL, HTN, gastric ulcer, who presents today for follow-up of low back pain.    History of Present Illness  The patient is a 79-year-old female who presents today for follow-up of low back and right hip pain. She underwent radiofrequency ablation targeting the bilateral low back approximately 2 months ago, which resulted in significant pain improvement in her bilateral low back pain. She reports an overall improvement in her condition, with the exception of intermittent discomfort in her right lateral hip and buttock. The pain is described as dull and is particularly noticeable when she transitions from sitting to standing. However, once she begins to move, the pain subsides. Currently, she rates her pain at 1 or 2 on a scale of 10, but notes that it can escalate to an 8 during periods of prolonged standing or leaning forward, such as when preparing for a party. She also experiences restless sleep and takes gabapentin to aid in sleep. The pain occasionally radiates down her right lateral proximal thigh but not lower than the knee. She has been managing her pain with Tylenol and has received a left knee steroid injection which she reports as beneficial. She has decided against surgical intervention and is open to receiving additional knee injections if necessary.    MEDICATIONS  Tylenol, gabapentin    Procedures:  24:  Left knee intraarticular steroid injection, 80-90% improvement ~4 weeks  10/30/24: Right greater trochanteric bursa steroid injection, 60-70% improvement in lateral thigh pain  24: Right buttock trigger point injection, several days of mild improvement  25: MBB#1 targeting bilateral L4-5 and L5-S1 facet joints, 90% pain improvement for ~8 hours  3/4/25: MBB#2 targeting bilateral L4-5 and L5-S1 facet joints, 90% pain improvement for ~6 hours  25: RFA targeting bilateral L4-5 and L5-S1 facet joints, 90% pain improvement      PMHx:   Past Medical History[1]    PSHx:   Past Surgical History[2]    Family history   Family History   Problem Relation Age of Onset    Heart Disease Mother     Hypertension Mother     Other Mother         pancreatitis    Arthritis Father     Cancer Father         Lymphoma, colon, skin    Hyperlipidemia Father     Other Father         Clogged artery in leg    Heart Disease Father         A Fib    Diabetes Sister         Pre    Other Sister         Obesity    Other Daughter         Gluten allergies    Other Son         Suicide       Medications:   Active Medications[3]     Medications Ordered Prior to Encounter[4]    Allergies:   Allergies[5]    Social Hx:   Social History     Socioeconomic History    Marital status: Single     Spouse name: Not on file    Number of children: Not on file    Years of education: Not on file    Highest education level: Not on file   Occupational History    Not on file   Tobacco Use    Smoking status: Former     Current packs/day: 0.00     Types: Cigarettes     Quit date: 1967     Years since quittin.4    Smokeless tobacco: Never   Vaping Use    Vaping status: Never Used   Substance and Sexual Activity    Alcohol use: Yes     Alcohol/week: 0.6 - 1.2 oz     Types: 1 - 2 Glasses of wine per week     Comment: Several times a week.  21: 2-4/month    Drug use: No    Sexual activity: Not Currently   Other Topics Concern    Not on file   Social  "History Narrative    Retired from medical billing. She has one daughter, 2 grandkids (one is starting med school), son in law is an ICU hospitalist at [employer]. She enjoys knitting and traveling, belongs to a travel group.  She is  after 30y of marriage, lives alone, has a good support group.      Social Drivers of Health     Financial Resource Strain: Low Risk  (6/26/2024)    Overall Financial Resource Strain (CARDIA)     Difficulty of Paying Living Expenses: Not hard at all   Food Insecurity: No Food Insecurity (6/26/2024)    Hunger Vital Sign     Worried About Running Out of Food in the Last Year: Never true     Ran Out of Food in the Last Year: Never true   Transportation Needs: No Transportation Needs (6/26/2024)    PRAPARE - Transportation     Lack of Transportation (Medical): No     Lack of Transportation (Non-Medical): No   Physical Activity: Not on file   Stress: Not on file   Social Connections: Not on file   Intimate Partner Violence: Not on file   Housing Stability: Low Risk  (6/26/2024)    Housing Stability Vital Sign     Unable to Pay for Housing in the Last Year: No     Number of Places Lived in the Last Year: 1     Unstable Housing in the Last Year: No         EXAMINATION     Physical Exam:   Vitals: /66   Pulse (!) 58   Temp 36.3 °C (97.4 °F) (Temporal)   Ht 1.549 m (5' 1\")   Wt 74.1 kg (163 lb 5.8 oz)   SpO2 94%       Constitutional:   Body Habitus: Body mass index is 30.87 kg/m².  Cooperation: Fully cooperates with exam  Appearance: Well-groomed, well-nourished  Respiratory:  Breathing comfortable on room air, no audible wheezing  Cardiovascular: Skin appears well-perfused  Psychiatric: Appropriate affect  Gait: normal gait, no use of ambulatory device, nonantalgic.     MSK:?No?pain with right hip internal or external rotation, concordant pain at right greater trochanter      MEDICAL DECISION MAKING    DATA    Labs:   Lab Results   Component Value Date/Time    SODIUM 141 " 04/18/2025 10:43 AM    POTASSIUM 4.0 04/18/2025 10:43 AM    CHLORIDE 108 04/18/2025 10:43 AM    CO2 23 04/18/2025 10:43 AM    GLUCOSE 83 04/18/2025 10:43 AM    BUN 16 04/18/2025 10:43 AM    CREATININE 0.88 04/18/2025 10:43 AM    CREATININE 0.83 03/25/2011 11:05 AM    BUNCREATRAT 20 03/25/2011 11:05 AM    GLOMRATE >59 03/25/2011 11:05 AM        Lab Results   Component Value Date/Time    PROTHROMBTM 13.7 04/16/2021 03:38 PM    INR 1.02 04/16/2021 03:38 PM        Lab Results   Component Value Date/Time    WBC 22.8 (H) 04/18/2025 10:43 AM    WBC 10.8 (H) 03/25/2011 11:05 AM    RBC 3.80 (L) 04/18/2025 10:43 AM    RBC 4.30 03/25/2011 11:05 AM    HEMOGLOBIN 11.5 (L) 04/18/2025 10:43 AM    HEMATOCRIT 37.1 04/18/2025 10:43 AM    MCV 97.6 04/18/2025 10:43 AM    MCV 95 03/25/2011 11:05 AM    MCH 30.3 04/18/2025 10:43 AM    MCH 31.2 03/25/2011 11:05 AM    MCHC 31.0 (L) 04/18/2025 10:43 AM    MPV 10.2 04/18/2025 10:43 AM    NEUTSPOLYS 13.30 (L) 04/18/2025 10:43 AM    LYMPHOCYTES 85.80 (H) 04/18/2025 10:43 AM    MONOCYTES 0.90 04/18/2025 10:43 AM    EOSINOPHILS 0.00 04/18/2025 10:43 AM    BASOPHILS 0.00 04/18/2025 10:43 AM    HYPOCHROMIA 1+ 06/07/2021 10:34 AM    ANISOCYTOSIS 1+ 02/13/2023 12:31 PM        Lab Results   Component Value Date/Time    HBA1C 5.5 01/27/2022 12:54 PM          Imaging:   Prior personal imaging review:  MRI Lumbar Spine 4/18/25: Lumbar scoliosis. Grade 1 T12-L1 retrolisthesis and L4-5 anterolisthesis. Type 1 Modic changes T12 and L1. T12-L1 moderate bilateral foraminal stenosis. L3-4 mild right foraminal stenosis. Multilevel facet arthropathy. Increased STIR signal in bilateral multifidus at S1 consistent with muscular sprain versus recent radiofrequency ablation.        I reviewed the following radiology reports  MRI Lumbar Spine 4/18/25:  IMPRESSION:     Levocurvature of the lumbar spine as described above.     Grade 1 retrolisthesis of T12 or L1 grade 1 anterolisthesis of L4 over L5.     There is no  significant spinal canal stenosis. There is bilateral moderate foraminal narrowing at T12-L1.     These findings remain stable compared to the previous study.     Mild high STIR signal is noted in the bilateral multifidus musculature at the S1 level which may related to muscular sprain.      DIAGNOSIS   Visit Diagnoses     ICD-10-CM   1. Chronic bilateral low back pain without sciatica  M54.50    G89.29   2. Lumbar facet arthropathy  M47.816   3. Trochanteric bursitis of right hip  M70.61   4. Lateral pain of right hip  M25.551         ASSESSMENT and PLAN:     Shanice Magana is a 79 y.o. female who returns to clinic for follow-up of low back and right lateral hip pain.    Shanice was seen today for follow-up.    Diagnoses and all orders for this visit:    Chronic bilateral low back pain without sciatica    Lumbar facet arthropathy    Trochanteric bursitis of right hip    Lateral pain of right hip        Assessment & Plan  Bilateral low back pain without radiation into the lower extremities  Lumbar facet arthropathy  Patient underwent radiofrequency ablation targeting the bilateral L4-5 and L5-S1 facet joints on 4/1/25 with approximately 90% improvement in her bilateral low back pain. Should her pain return we can consider repeating RFA as soon as 6 months after initial procedure.     Right lateral hip pain  Right trochanteric bursitis  Patient reports ongoing intermittent right lateral hip and buttock pain most noticeable with transitioning from sitting to standing and prolonged standing without walking. The patient's right hip pain is most consistent with right trochanteric bursitis, causing tenderness in the area. The pain improves with rest and the use of Tylenol as well as gabapentin at night, which she will continue at current doses. She has been advised to perform specific exercises aimed at strengthening the muscles around the hip and thigh. These exercises include side-lying stretches, bridges, squats,  "hip hikes, and single-leg stands, to be performed 3 to 4 times a week for a duration of one month, with exercise program provided today. If there is no improvement, an injection to the bursa may be considered to reduce inflammation.      Follow up: In 1 month    Thank you for allowing me to participate in the care of this patient. If you have any questions please not hesitate to contact me.         Please note that this dictation was created using voice recognition software. I have made every reasonable attempt to correct obvious errors but there may be errors of grammar and content that I may have overlooked prior to finalization of this note.    Tomasa Chiu MD  Interventional Spine and Sports Physiatry  Physical Medicine and Rehabilitation  [facility]         [1]   Past Medical History:  Diagnosis Date    Acute reaction to situational stress 01/04/2023    Depression Screening (1/2023)     Little interest or pleasure in doing things?  3 - nearly every day   Feeling down, depressed , or hopeless? 3 - nearly every day   Trouble falling or staying asleep, or sleeping too much?  0 - not at all   Feeling tired or having little energy?  0 - not at all   Poor appetite or overeating?  0 - not at all   Feeling bad about yourself - or that you are a failure o    Anemia 04/16/2021    She sees Dr. Gonzalez for GERD and hiatal hernia who monitors her lab values. Has had 2 episodes of a drop in her hgb requiring a transfusion. She says her GI thinks this might be related to her hiatal hernia. Is on protonix bid and has prn pepcid she uses on occasion.    Arthritis     slight. 4/27/21: Knees \"Very little\"    BMI 34.0-34.9,adult 09/11/2014    Breath shortness     Cancer ([facility]) 2011    CLL- \"Wait and watch\"    CATARACT     surgical correction margo     Celiac disease     CLL (chronic lymphocytic leukemia) ([facility]) 03/03/2011    Asymptomatic being monitored/Dr Davidson, Q 6 months. Not on medications.     Family history of " adverse effect to anesthesia     Daughter/Hard time waking     GERD (gastroesophageal reflux disease) 03/03/2011    Sees GI specialist     Heart burn 04/27/2021    GERD    Hiatal hernia 03/03/2011    Hot flashes, menopausal 03/03/2011    Hypertension 03/03/2011   [2]   Past Surgical History:  Procedure Laterality Date    RADIO FREQUENCY ABLATION ADDITIONAL LEVEL Bilateral 4/1/2025    Procedure: RIGHT and LEFT radiofrequency neurotomies medial branch targeting the L4-5 and L5-S1 facet joints with fluoroscopic guidance and sedation…Note: plan for sedation with 1mg versed and 50 mcg fentanyl. Plan for 80 °C for 90 seconds for each neurotomy…Special Needs: extra long probes, required 22g 5 inch needles….Procedural Sedation: yes, plan for 1 mg midazolam and 50 mcg Fentanyl;  Surgeon    LUMBAR MEDIAL BRANCH BLOCKS Bilateral 3/4/2025    Procedure: Diagnostic medial branch blocks targeting the BILATERAL L4-5 and L5-S1 facet joints with fluoroscopic guidance #2…Note: Diagnostic medial branch block #2 is only be done if procedure #1 is a positive block.  This will be on a separate date from procedure #1.;  Surgeon: Tomasa Chiu M.D.;  [facility];  Service: Pain Management    LUMBAR MEDIAL BRANCH BLOCKS Bilateral 2/11/2025    Procedure: Diagnostic medial branch blocks targeting the BILATERAL L4-5 and L5-S1 facet joints with fluoroscopic guidance;  Surgeon: Tomasa Chiu M.D.;  Location: [facility];  Service: Pain Management    [place] UPPER GI ENDOSCOPY,DIAGNOSIS N/A 4/28/2021    Procedure: GASTROSCOPY;  Surgeon: Mg Evans M.D.;  Location: SURGERY SAME DAY [facility];  Service: Gastroenterology    [place] UPPER GI ENDOSCOPY,BIOPSY N/A 4/28/2021    Procedure: GASTROSCOPY, WITH BIOPSY;  Surgeon: Mg Evans M.D.;  Location: SURGERY SAME DAY [facility];  Service: Gastroenterology    RECTUS REPAIR Right 11/15/2018    Procedure: RECTUS REPAIR- SUPERIOR RECTUS RECESSION,  ADJUSTABLE SUTURE INFERIOR RECTUS PLICATION/RESECTION;  Surgeon: Jane Larsen M.D.;  Location: SURGERY SAME DAY [facility];  Service: Ophthalmology    VITRECTOMY POSTERIOR Right 1/17/2018    Procedure: VITRECTOMY POSTERIOR W/AIR FLUID EXCHANGE, ENDO LASER, 23 GAUGE SF6 GAS, CRYOTHERAPY;  Surgeon: Sea Franklin M.D.;  Location: SURGERY SAME DAY [facility];  Service: Ophthalmology    MAMMOPLASTY REDUCTION Bilateral 11/3/2015    Procedure: MAMMOPLASTY REDUCTION;  Surgeon: Talia Barton M.D.;  Location: SURGERY [facility];  Service:     KNEE ARTHROSCOPY  9/17/2014    Performed by Dany Owens M.D. at [facility]    MENISCECTOMY  9/17/2014    Performed by Dany Owens M.D. at [facility]    SHOULDER ARTHROSCOPY W/ ROTATOR CUFF REPAIR  8/22/2013    Performed by Dany Owens M.D. at [facility]    SHOULDER DECOMPRESSION ARTHROSCOPIC  8/22/2013    Performed by Dany Owens M.D. at [facility]    CLAVICLE DISTAL EXCISION  8/22/2013    Performed by Dany Owens M.D. at [facility]    OTHER  7/2013    laser procedure right eye    CATARACT EXTRACTION WITH IOL  5/29/13    right eye    ABDOMINAL HYSTERECTOMY TOTAL  1975    CHOLECYSTECTOMY  1973    open    TONSILLECTOMY  1950    BLADDER SUSPENSION  1973, 1978    x2   [3]   Outpatient Medications Marked as Taking for the 6/10/25 encounter (Office Visit) with Tomasa Chiu M.D.   Medication Sig Dispense Refill    gabapentin (NEURONTIN) 300 MG Cap TAKE 1 CAPSULE BY MOUTH EVERY MORNING AND TAKE TWO CAPSULES BY MOUTH EVERY EVENING 180 Capsule 0    pantoprazole (PROTONIX) 40 MG Tablet Delayed Response TAKE 1 TABLET BY MOUTH TWICE A  Tablet 3    valACYclovir (VALTREX) 500 MG Tab Take 2 Tablets by mouth 2 times a day. 100 Tablet 3    atenolol (TENORMIN) 50 MG Tab Take 1 Tablet by mouth every day. 100 Tablet 3    estradiol (ESTRACE) 0.5 MG tablet TAKE 1/2 TABLET BY MOUTH DAILY  50 Tablet 3    triamcinolone acetonide (KENALOG) 0.1 % Cream APPLY TOPICALLY TO THE AFFECTED AREA(S) OF BODY TWO TIMES A DAY AS NEEDED FOR ITCHING, DO NOT APPLY TO FACE, AXILLA OR GROIN. 454 g 0    famotidine (PEPCID) 20 MG Tab Take 1 Tablet by mouth as needed (breakthrough heartburn). One by mouth once daily at bedtime 90 Tablet 3   [4]   Current Outpatient Medications on File Prior to Visit   Medication Sig Dispense Refill    gabapentin (NEURONTIN) 300 MG Cap TAKE 1 CAPSULE BY MOUTH EVERY MORNING AND TAKE TWO CAPSULES BY MOUTH EVERY EVENING 180 Capsule 0    pantoprazole (PROTONIX) 40 MG Tablet Delayed Response TAKE 1 TABLET BY MOUTH TWICE A  Tablet 3    valACYclovir (VALTREX) 500 MG Tab Take 2 Tablets by mouth 2 times a day. 100 Tablet 3    atenolol (TENORMIN) 50 MG Tab Take 1 Tablet by mouth every day. 100 Tablet 3    estradiol (ESTRACE) 0.5 MG tablet TAKE 1/2 TABLET BY MOUTH DAILY 50 Tablet 3    triamcinolone acetonide (KENALOG) 0.1 % Cream APPLY TOPICALLY TO THE AFFECTED AREA(S) OF BODY TWO TIMES A DAY AS NEEDED FOR ITCHING, DO NOT APPLY TO FACE, AXILLA OR GROIN. 454 g 0    famotidine (PEPCID) 20 MG Tab Take 1 Tablet by mouth as needed (breakthrough heartburn). One by mouth once daily at bedtime 90 Tablet 3     No current facility-administered medications on file prior to visit.   [5]   Allergies  Allergen Reactions    Grass Pollen(K-O-R-T-Swt [place]) Runny Nose     Sneezing, dizziniess    Other Misc      All household chemicals, soap powders, perfumes. Tongue and lips go numb, itching.    Wheat      Gluten. Stomach ache.

## 2025-06-10 ENCOUNTER — APPOINTMENT (OUTPATIENT)
Dept: PHYSICAL MEDICINE AND REHAB | Facility: MEDICAL CENTER | Age: 80
End: 2025-06-10
Payer: MEDICARE

## 2025-06-10 VITALS
OXYGEN SATURATION: 94 % | HEART RATE: 58 BPM | TEMPERATURE: 97.4 F | DIASTOLIC BLOOD PRESSURE: 66 MMHG | WEIGHT: 163.36 LBS | BODY MASS INDEX: 30.84 KG/M2 | SYSTOLIC BLOOD PRESSURE: 139 MMHG | HEIGHT: 61 IN

## 2025-06-10 DIAGNOSIS — M54.50 CHRONIC BILATERAL LOW BACK PAIN WITHOUT SCIATICA: Primary | ICD-10-CM

## 2025-06-10 DIAGNOSIS — M25.551 LATERAL PAIN OF RIGHT HIP: ICD-10-CM

## 2025-06-10 DIAGNOSIS — M47.816 LUMBAR FACET ARTHROPATHY: ICD-10-CM

## 2025-06-10 DIAGNOSIS — M70.61 TROCHANTERIC BURSITIS OF RIGHT HIP: ICD-10-CM

## 2025-06-10 DIAGNOSIS — G89.29 CHRONIC BILATERAL LOW BACK PAIN WITHOUT SCIATICA: Primary | ICD-10-CM

## 2025-06-10 PROCEDURE — 99214 OFFICE O/P EST MOD 30 MIN: CPT | Performed by: STUDENT IN AN ORGANIZED HEALTH CARE EDUCATION/TRAINING PROGRAM

## 2025-06-10 PROCEDURE — 3075F SYST BP GE 130 - 139MM HG: CPT | Performed by: STUDENT IN AN ORGANIZED HEALTH CARE EDUCATION/TRAINING PROGRAM

## 2025-06-10 PROCEDURE — 3078F DIAST BP <80 MM HG: CPT | Performed by: STUDENT IN AN ORGANIZED HEALTH CARE EDUCATION/TRAINING PROGRAM

## 2025-06-10 PROCEDURE — 1125F AMNT PAIN NOTED PAIN PRSNT: CPT | Performed by: STUDENT IN AN ORGANIZED HEALTH CARE EDUCATION/TRAINING PROGRAM

## 2025-06-10 ASSESSMENT — PATIENT HEALTH QUESTIONNAIRE - PHQ9: CLINICAL INTERPRETATION OF PHQ2 SCORE: 0

## 2025-06-10 ASSESSMENT — PAIN SCALES - GENERAL: PAINLEVEL_OUTOF10: 1=MINIMAL PAIN

## 2025-06-10 ASSESSMENT — FIBROSIS 4 INDEX: FIB4 SCORE: 1.85

## 2025-06-11 DIAGNOSIS — N95.1 HOT FLASHES, MENOPAUSAL: ICD-10-CM

## 2025-06-11 RX ORDER — ESTRADIOL 0.5 MG/1
0.25 TABLET ORAL DAILY
Qty: 50 TABLET | Refills: 3 | Status: SHIPPED | OUTPATIENT
Start: 2025-06-11

## 2025-06-18 DIAGNOSIS — G89.29 CHRONIC BILATERAL LOW BACK PAIN WITHOUT SCIATICA: ICD-10-CM

## 2025-06-18 DIAGNOSIS — M25.562 CHRONIC PAIN OF LEFT KNEE: ICD-10-CM

## 2025-06-18 DIAGNOSIS — G89.29 CHRONIC PAIN OF LEFT KNEE: ICD-10-CM

## 2025-06-18 DIAGNOSIS — M54.50 CHRONIC BILATERAL LOW BACK PAIN WITHOUT SCIATICA: ICD-10-CM

## 2025-06-18 RX ORDER — GABAPENTIN 300 MG/1
CAPSULE ORAL
Qty: 180 CAPSULE | Refills: 0 | Status: SHIPPED | OUTPATIENT
Start: 2025-06-18

## 2025-06-18 NOTE — TELEPHONE ENCOUNTER
gabapentin (NEURONTIN) 300 MG Cap    Received request via: Pharmacy    Was the patient seen in the last year in this department? Yes    Does the patient have an active prescription (recently filled or refills available) for medication(s) requested? Yes    Pharmacy Name: [facility] [address]    Does the patient have custodial Plus and need 100-day supply? (This applies to ALL medications) No 100 d supply requested

## 2025-07-08 NOTE — PROGRESS NOTES
Verbal consent was acquired by the patient to use [employer] Ordering ambient listening note generation during this visit Yes     FOLLOW-UP PATIENT VISIT    Interventional Spine and Pain  Physiatry (Physical Medicine and Rehabilitation)     Date of Service: 25   Chief Complaint:   Chief Complaint   Patient presents with    Follow-Up     Right hip pain      Patient Name: Shanice Magana   : 1945   MRN: [id number]       PRIOR HISTORY    Please see new patient note by Dr. Chiu for more details.     Interval History  Patient identification: Shanice Magana,  1945, with history of CLL, HTN, gastric ulcer, who presents today for follow-up of low back pain.     History of Present Illness  The patient is a 79-year-old female who presents today for follow-up of right greater than left lateral hip pain. She reports an improvement in her right lateral hip pain since her last visit, with the addition of home exercises. She does notice the right lateral hip continues to ache when she walks for a prolonged period, around 3 out of 10 in intensity. However, she experiences occasional sharp pains on the left side, similar to the other side, which she attributes to her chair's tilt. These episodes are brief, lasting no more than 3 minutes. If the pain persists beyond this duration, she takes Tylenol, typically 2 tablets in the morning and afternoon. This regimen seems to alleviate her symptoms. On a few occasions, she has had to take additional Tylenol at night due to discomfort from sleeping in an awkward position. She ensures not to exceed a total of 6 tablets per day. She continues to take gabapentin 600 mg at night and notes benefit with this.      Procedures:  24: Left knee intraarticular steroid injection, 80-90% improvement ~4 weeks  10/30/24: Right greater trochanteric bursa steroid injection, 60-70% improvement in lateral thigh pain  24: Right buttock trigger point injection, several  days of mild improvement  25: MBB#1 targeting bilateral L4-5 and L5-S1 facet joints, 90% pain improvement for ~8 hours  3/4/25: MBB#2 targeting bilateral L4-5 and L5-S1 facet joints, 90% pain improvement for ~6 hours  25: RFA targeting bilateral L4-5 and L5-S1 facet joints, 90% pain improvement      PMHx:   Past Medical History[1]    PSHx:   Past Surgical History[2]    Family history   Family History   Problem Relation Age of Onset    Heart Disease Mother     Hypertension Mother     Other Mother         pancreatitis    Arthritis Father     Cancer Father         Lymphoma, colon, skin    Hyperlipidemia Father     Other Father         Clogged artery in leg    Heart Disease Father         A Fib    Diabetes Sister         Pre    Other Sister         Obesity    Other Daughter         Gluten allergies    Other Son         Suicide       Medications:   Active Medications[3]     Medications Ordered Prior to Encounter[4]    Allergies:   Allergies[5]    Social Hx:   Social History     Socioeconomic History    Marital status: Single     Spouse name: Not on file    Number of children: Not on file    Years of education: Not on file    Highest education level: Not on file   Occupational History    Not on file   Tobacco Use    Smoking status: Former     Current packs/day: 0.00     Types: Cigarettes     Quit date: 1967     Years since quittin.5    Smokeless tobacco: Never   Vaping Use    Vaping status: Never Used   Substance and Sexual Activity    Alcohol use: Yes     Alcohol/week: 0.6 - 1.2 oz     Types: 1 - 2 Glasses of wine per week     Comment: Several times a week.  21: 2-4/month    Drug use: No    Sexual activity: Not Currently   Other Topics Concern    Not on file   Social History Narrative    Retired from medical billing. She has one daughter, 2 grandkids (one is starting med school), son in law is an ICU hospitalist at [employer]. She enjoys knitting and traveling, belongs to a travel group.  She is  " after 30y of marriage, lives alone, has a good support group.      Social Drivers of Health     Financial Resource Strain: Low Risk  (6/26/2024)    Overall Financial Resource Strain (CARDIA)     Difficulty of Paying Living Expenses: Not hard at all   Food Insecurity: No Food Insecurity (6/26/2024)    Hunger Vital Sign     Worried About Running Out of Food in the Last Year: Never true     Ran Out of Food in the Last Year: Never true   Transportation Needs: No Transportation Needs (6/26/2024)    PRAPARE - Transportation     Lack of Transportation (Medical): No     Lack of Transportation (Non-Medical): No   Physical Activity: Not on file   Stress: Not on file   Social Connections: Not on file   Intimate Partner Violence: Not on file   Housing Stability: Low Risk  (6/26/2024)    Housing Stability Vital Sign     Unable to Pay for Housing in the Last Year: No     Number of Places Lived in the Last Year: 1     Unstable Housing in the Last Year: No         EXAMINATION     Physical Exam:   Vitals: BP (!) 136/90   Pulse 68   Temp 36.5 °C (97.7 °F) (Temporal)   Ht 1.549 m (5' 1\")   Wt 74.2 kg (163 lb 9.3 oz)   SpO2 95%       Constitutional:   Body Habitus: Body mass index is 30.91 kg/m².  Cooperation: Fully cooperates with exam  Appearance: Well-groomed, well-nourished  Respiratory:  Breathing comfortable on room air, no audible wheezing  Cardiovascular: Skin appears well-perfused  Psychiatric: Appropriate affect  Gait: normal gait, no use of ambulatory device, nonantalgic.     MSK:?No?TTP bilateral greater trochanters      MEDICAL DECISION MAKING    DATA    Labs:   Lab Results   Component Value Date/Time    SODIUM 141 04/18/2025 10:43 AM    POTASSIUM 4.0 04/18/2025 10:43 AM    CHLORIDE 108 04/18/2025 10:43 AM    CO2 23 04/18/2025 10:43 AM    GLUCOSE 83 04/18/2025 10:43 AM    BUN 16 04/18/2025 10:43 AM    CREATININE 0.88 04/18/2025 10:43 AM    CREATININE 0.83 03/25/2011 11:05 AM    BUNCREATRAT 20 03/25/2011 11:05 " AM    GLOMRATE >59 03/25/2011 11:05 AM        Lab Results   Component Value Date/Time    PROTHROMBTM 13.7 04/16/2021 03:38 PM    INR 1.02 04/16/2021 03:38 PM        Lab Results   Component Value Date/Time    WBC 22.8 (H) 04/18/2025 10:43 AM    WBC 10.8 (H) 03/25/2011 11:05 AM    RBC 3.80 (L) 04/18/2025 10:43 AM    RBC 4.30 03/25/2011 11:05 AM    HEMOGLOBIN 11.5 (L) 04/18/2025 10:43 AM    HEMATOCRIT 37.1 04/18/2025 10:43 AM    MCV 97.6 04/18/2025 10:43 AM    MCV 95 03/25/2011 11:05 AM    MCH 30.3 04/18/2025 10:43 AM    MCH 31.2 03/25/2011 11:05 AM    MCHC 31.0 (L) 04/18/2025 10:43 AM    MPV 10.2 04/18/2025 10:43 AM    NEUTSPOLYS 13.30 (L) 04/18/2025 10:43 AM    LYMPHOCYTES 85.80 (H) 04/18/2025 10:43 AM    MONOCYTES 0.90 04/18/2025 10:43 AM    EOSINOPHILS 0.00 04/18/2025 10:43 AM    BASOPHILS 0.00 04/18/2025 10:43 AM    HYPOCHROMIA 1+ 06/07/2021 10:34 AM    ANISOCYTOSIS 1+ 02/13/2023 12:31 PM        Lab Results   Component Value Date/Time    HBA1C 5.5 01/27/2022 12:54 PM          Imaging:   Prior personal imaging review:  MRI Lumbar Spine 4/18/25: Lumbar scoliosis. Grade 1 T12-L1 retrolisthesis and L4-5 anterolisthesis. Type 1 Modic changes T12 and L1. T12-L1 moderate bilateral foraminal stenosis. L3-4 mild right foraminal stenosis. Multilevel facet arthropathy. Increased STIR signal in bilateral multifidus at S1 consistent with muscular sprain versus recent radiofrequency ablation.        I reviewed the following radiology reports  MRI Lumbar Spine 4/18/25:  IMPRESSION:     Levocurvature of the lumbar spine as described above.     Grade 1 retrolisthesis of T12 or L1 grade 1 anterolisthesis of L4 over L5.     There is no significant spinal canal stenosis. There is bilateral moderate foraminal narrowing at T12-L1.     These findings remain stable compared to the previous study.     Mild high STIR signal is noted in the bilateral multifidus musculature at the S1 level which may related to muscular sprain.      DIAGNOSIS    Visit Diagnoses     ICD-10-CM   1. Trochanteric bursitis of right hip  M70.61   2. Lateral pain of right hip  M25.551         ASSESSMENT and PLAN:     Shanice Magana is a 79 y.o. female who returns to clinic for follow-up of right greater than left lateral hip pain.    Shanice was seen today for follow-up.    Diagnoses and all orders for this visit:    Trochanteric bursitis of right hip    Lateral pain of right hip        Assessment & Plan  Right greater than left lateral hip pain  Right trochanteric bursitis  Her right greater than left lateral hip pain appears to be well-managed with her current regimen of Tylenol and nighttime gabapentin. She reports taking two Tylenol tablets in the morning and sometimes in the afternoon, with occasional use at night if needed. She is not exceeding six tablets in a day. There is no tenderness upon physical examination today. She is advised to continue her current medication regimen of Tylenol and gabapentin. If her symptoms worsen or become more bothersome, she should contact our office but otherwise we will follow-up in 2 months.        Follow up: In 2 months    Thank you for allowing me to participate in the care of this patient. If you have any questions please not hesitate to contact me.         Please note that this dictation was created using voice recognition software. I have made every reasonable attempt to correct obvious errors but there may be errors of grammar and content that I may have overlooked prior to finalization of this note.    Tomasa Chiu MD  Interventional Spine and Sports Physiatry  Physical Medicine and Rehabilitation  [facility]         [1]   Past Medical History:  Diagnosis Date    Acute reaction to situational stress 01/04/2023    Depression Screening (1/2023)     Little interest or pleasure in doing things?  3 - nearly every day   Feeling down, depressed , or hopeless? 3 - nearly every day   Trouble falling or staying asleep, or  "sleeping too much?  0 - not at all   Feeling tired or having little energy?  0 - not at all   Poor appetite or overeating?  0 - not at all   Feeling bad about yourself - or that you are a failure o    Anemia 04/16/2021    She sees Dr. Gonzalez for GERD and hiatal hernia who monitors her lab values. Has had 2 episodes of a drop in her hgb requiring a transfusion. She says her GI thinks this might be related to her hiatal hernia. Is on protonix bid and has prn pepcid she uses on occasion.    Arthritis     slight. 4/27/21: Knees \"Very little\"    BMI 34.0-34.9,adult 09/11/2014    Breath shortness     Cancer (HCC) 2011    CLL- \"Wait and watch\"    CATARACT     surgical correction margo     Celiac disease     CLL (chronic lymphocytic leukemia) (HCC) 03/03/2011    Asymptomatic being monitored/Dr Davidson, Q 6 months. Not on medications.     Family history of adverse effect to anesthesia     Daughter/Hard time waking     GERD (gastroesophageal reflux disease) 03/03/2011    Sees GI specialist     Heart burn 04/27/2021    GERD    Hiatal hernia 03/03/2011    Hot flashes, menopausal 03/03/2011    Hypertension 03/03/2011   [2]   Past Surgical History:  Procedure Laterality Date    RADIO FREQUENCY ABLATION ADDITIONAL LEVEL Bilateral 4/1/2025    Procedure: RIGHT and LEFT radiofrequency neurotomies medial branch targeting the L4-5 and L5-S1 facet joints with fluoroscopic guidance and sedation…Note: plan for sedation with 1mg versed and 50 mcg fentanyl. Plan for 80 °C for 90 seconds for each neurotomy…Special Needs: extra long probes, required 22g 5 inch needles….Procedural Sedation: yes, plan for 1 mg midazolam and 50 mcg Fentanyl;  Surgeon    LUMBAR MEDIAL BRANCH BLOCKS Bilateral 3/4/2025    Procedure: Diagnostic medial branch blocks targeting the BILATERAL L4-5 and L5-S1 facet joints with fluoroscopic guidance #2…Note: Diagnostic medial branch block #2 is only be done if procedure #1 is a positive block.  This will be on a separate " date from procedure #1.;  Surgeon: Tomasa Chiu M.D.;  [facility];  Service: Pain Management    LUMBAR MEDIAL BRANCH BLOCKS Bilateral 2/11/2025    Procedure: Diagnostic medial branch blocks targeting the BILATERAL L4-5 and L5-S1 facet joints with fluoroscopic guidance;  Surgeon: Tomasa Chiu M.D.;  [facility];  Service: Pain Management    IA UPPER GI ENDOSCOPY,DIAGNOSIS N/A 4/28/2021    Procedure: GASTROSCOPY;  Surgeon: Mg Evans M.D.;  Location: SURGERY [facility] DAY [facility];  Service: Gastroenterology    IA UPPER GI ENDOSCOPY,BIOPSY N/A 4/28/2021    Procedure: GASTROSCOPY, WITH BIOPSY;  Surgeon: Mg Evans M.D.;  Location: SURGERY SAME DAY [facility];  Service: Gastroenterology    RECTUS REPAIR Right 11/15/2018    Procedure: RECTUS REPAIR- SUPERIOR RECTUS RECESSION, ADJUSTABLE SUTURE INFERIOR RECTUS PLICATION/RESECTION;  Surgeon: Jane Larsen M.D.;  Location: SURGERY SAME DAY [facility];  Service: Ophthalmology    VITRECTOMY POSTERIOR Right 1/17/2018    Procedure: VITRECTOMY POSTERIOR W/AIR FLUID EXCHANGE, ENDO LASER, 23 GAUGE SF6 GAS, CRYOTHERAPY;  Surgeon: Sea Franklin M.D.;  Location: SURGERY SAME DAY [facility];  Service: Ophthalmology    MAMMOPLASTY REDUCTION Bilateral 11/3/2015    Procedure: MAMMOPLASTY REDUCTION;  Surgeon: Talia Barton M.D.;  Location: [facility];  Service:     KNEE ARTHROSCOPY  9/17/2014    Performed by Dany Owens M.D. at [facility]    MENISCECTOMY  9/17/2014    Performed by Dany Owens M.D. at [facility]    SHOULDER ARTHROSCOPY W/ ROTATOR CUFF REPAIR  8/22/2013    Performed by Dany Owens M.D. at [facility]    SHOULDER DECOMPRESSION ARTHROSCOPIC  8/22/2013    Performed by Dany Owens M.D. at [facility]    CLAVICLE DISTAL EXCISION  8/22/2013    Performed by Dany Owens M.D. at [facility]     OTHER  7/2013    laser procedure right eye    CATARACT EXTRACTION WITH IOL  5/29/13    right eye    ABDOMINAL HYSTERECTOMY TOTAL  1975    CHOLECYSTECTOMY  1973    open    TONSILLECTOMY  1950    BLADDER SUSPENSION  1973, 1978    x2   [3]   Outpatient Medications Marked as Taking for the 7/9/25 encounter (Office Visit) with Tomasa Chiu M.D.   Medication Sig Dispense Refill    gabapentin (NEURONTIN) 300 MG Cap TAKE 1 CAPSULE BY MOUTH EVERY MORNING AND TAKE 2 CAPSULES EVERY EVENING 180 Capsule 0    estradiol (ESTRACE) 0.5 MG tablet TAKE 1/2 TABLET BY MOUTH DAILY 50 Tablet 3    pantoprazole (PROTONIX) 40 MG Tablet Delayed Response TAKE 1 TABLET BY MOUTH TWICE A  Tablet 3    valACYclovir (VALTREX) 500 MG Tab Take 2 Tablets by mouth 2 times a day. 100 Tablet 3    atenolol (TENORMIN) 50 MG Tab Take 1 Tablet by mouth every day. 100 Tablet 3    triamcinolone acetonide (KENALOG) 0.1 % Cream APPLY TOPICALLY TO THE AFFECTED AREA(S) OF BODY TWO TIMES A DAY AS NEEDED FOR ITCHING, DO NOT APPLY TO FACE, AXILLA OR GROIN. 454 g 0    famotidine (PEPCID) 20 MG Tab Take 1 Tablet by mouth as needed (breakthrough heartburn). One by mouth once daily at bedtime 90 Tablet 3   [4]   Current Outpatient Medications on File Prior to Visit   Medication Sig Dispense Refill    gabapentin (NEURONTIN) 300 MG Cap TAKE 1 CAPSULE BY MOUTH EVERY MORNING AND TAKE 2 CAPSULES EVERY EVENING 180 Capsule 0    estradiol (ESTRACE) 0.5 MG tablet TAKE 1/2 TABLET BY MOUTH DAILY 50 Tablet 3    pantoprazole (PROTONIX) 40 MG Tablet Delayed Response TAKE 1 TABLET BY MOUTH TWICE A  Tablet 3    valACYclovir (VALTREX) 500 MG Tab Take 2 Tablets by mouth 2 times a day. 100 Tablet 3    atenolol (TENORMIN) 50 MG Tab Take 1 Tablet by mouth every day. 100 Tablet 3    triamcinolone acetonide (KENALOG) 0.1 % Cream APPLY TOPICALLY TO THE AFFECTED AREA(S) OF BODY TWO TIMES A DAY AS NEEDED FOR ITCHING, DO NOT APPLY TO FACE, AXILLA OR GROIN. 454 g 0    famotidine  (PEPCID) 20 MG Tab Take 1 Tablet by mouth as needed (breakthrough heartburn). One by mouth once daily at bedtime 90 Tablet 3     No current facility-administered medications on file prior to visit.   [5]   Allergies  Allergen Reactions    Grass Pollen([id number]-Swt [place]) Runny Nose     Sneezing, dizziniess    Other Misc      All household chemicals, soap powders, perfumes. Tongue and lips go numb, itching.    Wheat      Gluten. Stomach ache.

## 2025-07-09 ENCOUNTER — APPOINTMENT (OUTPATIENT)
Dept: PHYSICAL MEDICINE AND REHAB | Facility: MEDICAL CENTER | Age: 80
End: 2025-07-09
Payer: MEDICARE

## 2025-07-09 VITALS
DIASTOLIC BLOOD PRESSURE: 90 MMHG | OXYGEN SATURATION: 95 % | HEIGHT: 61 IN | SYSTOLIC BLOOD PRESSURE: 136 MMHG | WEIGHT: 163.58 LBS | BODY MASS INDEX: 30.88 KG/M2 | HEART RATE: 68 BPM | TEMPERATURE: 97.7 F

## 2025-07-09 DIAGNOSIS — M70.61 TROCHANTERIC BURSITIS OF RIGHT HIP: Primary | ICD-10-CM

## 2025-07-09 DIAGNOSIS — M25.551 LATERAL PAIN OF RIGHT HIP: ICD-10-CM

## 2025-07-09 PROCEDURE — 3080F DIAST BP >= 90 MM HG: CPT | Performed by: STUDENT IN AN ORGANIZED HEALTH CARE EDUCATION/TRAINING PROGRAM

## 2025-07-09 PROCEDURE — 3075F SYST BP GE 130 - 139MM HG: CPT | Performed by: STUDENT IN AN ORGANIZED HEALTH CARE EDUCATION/TRAINING PROGRAM

## 2025-07-09 PROCEDURE — 99213 OFFICE O/P EST LOW 20 MIN: CPT | Performed by: STUDENT IN AN ORGANIZED HEALTH CARE EDUCATION/TRAINING PROGRAM

## 2025-07-09 PROCEDURE — 1125F AMNT PAIN NOTED PAIN PRSNT: CPT | Performed by: STUDENT IN AN ORGANIZED HEALTH CARE EDUCATION/TRAINING PROGRAM

## 2025-07-09 ASSESSMENT — PAIN SCALES - GENERAL: PAINLEVEL_OUTOF10: 3=SLIGHT PAIN

## 2025-07-09 ASSESSMENT — PATIENT HEALTH QUESTIONNAIRE - PHQ9: CLINICAL INTERPRETATION OF PHQ2 SCORE: 0

## 2025-07-09 ASSESSMENT — FIBROSIS 4 INDEX: FIB4 SCORE: 1.85

## 2025-08-04 ENCOUNTER — TELEPHONE (OUTPATIENT)
Dept: MEDICAL GROUP | Facility: PHYSICIAN GROUP | Age: 80
End: 2025-08-04
Payer: MEDICARE

## 2025-08-04 DIAGNOSIS — E78.1 HYPERTRIGLYCERIDEMIA: ICD-10-CM

## 2025-08-04 DIAGNOSIS — C91.11 CHRONIC LYMPHOID LEUKEMIA IN REMISSION (HCC): Primary | ICD-10-CM

## 2025-08-04 DIAGNOSIS — R79.89 AZOTEMIA: ICD-10-CM

## 2025-08-04 DIAGNOSIS — E53.8 B12 DEFICIENCY: ICD-10-CM

## 2025-08-04 DIAGNOSIS — R73.01 ELEVATED FASTING BLOOD SUGAR: ICD-10-CM

## 2025-08-04 DIAGNOSIS — N18.31 CKD STAGE 3A, GFR 45-59 ML/MIN: ICD-10-CM

## 2025-08-11 ENCOUNTER — HOSPITAL ENCOUNTER (OUTPATIENT)
Facility: MEDICAL CENTER | Age: 80
End: 2025-08-11
Attending: INTERNAL MEDICINE
Payer: MEDICARE

## 2025-08-11 DIAGNOSIS — R79.89 AZOTEMIA: ICD-10-CM

## 2025-08-11 DIAGNOSIS — C91.11 CHRONIC LYMPHOID LEUKEMIA IN REMISSION (HCC): ICD-10-CM

## 2025-08-11 DIAGNOSIS — E53.8 B12 DEFICIENCY: ICD-10-CM

## 2025-08-11 DIAGNOSIS — E78.1 HYPERTRIGLYCERIDEMIA: ICD-10-CM

## 2025-08-11 LAB
25(OH)D3 SERPL-MCNC: 54 NG/ML (ref 30–100)
ALBUMIN SERPL BCP-MCNC: 4.2 G/DL (ref 3.2–4.9)
ALBUMIN/GLOB SERPL: 1.8 G/DL
ALP SERPL-CCNC: 60 U/L (ref 30–99)
ALT SERPL-CCNC: 9 U/L (ref 2–50)
ANION GAP SERPL CALC-SCNC: 13 MMOL/L (ref 7–16)
AST SERPL-CCNC: 17 U/L (ref 12–45)
BASOPHILS # BLD AUTO: 0.9 % (ref 0–1.8)
BASOPHILS # BLD: 0.21 K/UL (ref 0–0.12)
BILIRUB SERPL-MCNC: 0.3 MG/DL (ref 0.1–1.5)
BUN SERPL-MCNC: 21 MG/DL (ref 8–22)
CALCIUM ALBUM COR SERPL-MCNC: 9.2 MG/DL (ref 8.5–10.5)
CALCIUM SERPL-MCNC: 9.4 MG/DL (ref 8.5–10.5)
CHLORIDE SERPL-SCNC: 109 MMOL/L (ref 96–112)
CHOLEST SERPL-MCNC: 134 MG/DL (ref 100–199)
CO2 SERPL-SCNC: 21 MMOL/L (ref 20–33)
COMMENT NL1176: NORMAL
CREAT SERPL-MCNC: 1.08 MG/DL (ref 0.5–1.4)
EOSINOPHIL # BLD AUTO: 0.39 K/UL (ref 0–0.51)
EOSINOPHIL NFR BLD: 1.7 % (ref 0–6.9)
ERYTHROCYTE [DISTWIDTH] IN BLOOD BY AUTOMATED COUNT: 47.3 FL (ref 35.9–50)
FASTING STATUS PATIENT QL REPORTED: NORMAL
GFR SERPLBLD CREATININE-BSD FMLA CKD-EPI: 52 ML/MIN/1.73 M 2
GLOBULIN SER CALC-MCNC: 2.4 G/DL (ref 1.9–3.5)
GLUCOSE SERPL-MCNC: 87 MG/DL (ref 65–99)
HCT VFR BLD AUTO: 37.5 % (ref 37–47)
HDLC SERPL-MCNC: 82 MG/DL
HGB BLD-MCNC: 11.5 G/DL (ref 12–16)
LDLC SERPL CALC-MCNC: 31 MG/DL
LYMPHOCYTES # BLD AUTO: 21.2 K/UL (ref 1–4.8)
LYMPHOCYTES NFR BLD: 91.4 % (ref 22–41)
MANUAL DIFF BLD: NORMAL
MCH RBC QN AUTO: 29.6 PG (ref 27–33)
MCHC RBC AUTO-ENTMCNC: 30.7 G/DL (ref 32.2–35.5)
MCV RBC AUTO: 96.4 FL (ref 81.4–97.8)
MONOCYTES # BLD AUTO: 0 K/UL (ref 0–0.85)
MONOCYTES NFR BLD AUTO: 0 % (ref 0–13.4)
NEUTROPHILS # BLD AUTO: 1.39 K/UL (ref 1.82–7.42)
NEUTROPHILS NFR BLD: 6 % (ref 44–72)
NRBC # BLD AUTO: 0 K/UL
NRBC BLD-RTO: 0 /100 WBC (ref 0–0.2)
PLATELET # BLD AUTO: 217 K/UL (ref 164–446)
PLATELET BLD QL SMEAR: NORMAL
PMV BLD AUTO: 10.6 FL (ref 9–12.9)
POTASSIUM SERPL-SCNC: 3.9 MMOL/L (ref 3.6–5.5)
PROT SERPL-MCNC: 6.6 G/DL (ref 6–8.2)
RBC # BLD AUTO: 3.89 M/UL (ref 4.2–5.4)
RBC BLD AUTO: NORMAL
SODIUM SERPL-SCNC: 143 MMOL/L (ref 135–145)
T4 FREE SERPL-MCNC: 1.24 NG/DL (ref 0.93–1.7)
TRIGL SERPL-MCNC: 107 MG/DL (ref 0–149)
TSH SERPL DL<=0.005 MIU/L-ACNC: 1.44 UIU/ML (ref 0.38–5.33)
VIT B12 SERPL-MCNC: 875 PG/ML (ref 211–911)
WBC # BLD AUTO: 23.2 K/UL (ref 4.8–10.8)

## 2025-08-11 PROCEDURE — 80061 LIPID PANEL: CPT

## 2025-08-11 PROCEDURE — 85007 BL SMEAR W/DIFF WBC COUNT: CPT

## 2025-08-11 PROCEDURE — 84439 ASSAY OF FREE THYROXINE: CPT

## 2025-08-11 PROCEDURE — 84443 ASSAY THYROID STIM HORMONE: CPT

## 2025-08-11 PROCEDURE — 82306 VITAMIN D 25 HYDROXY: CPT

## 2025-08-11 PROCEDURE — 36415 COLL VENOUS BLD VENIPUNCTURE: CPT

## 2025-08-11 PROCEDURE — 80053 COMPREHEN METABOLIC PANEL: CPT

## 2025-08-11 PROCEDURE — 82607 VITAMIN B-12: CPT

## 2025-08-11 PROCEDURE — 85027 COMPLETE CBC AUTOMATED: CPT

## 2025-08-14 ENCOUNTER — OFFICE VISIT (OUTPATIENT)
Dept: MEDICAL GROUP | Facility: PHYSICIAN GROUP | Age: 80
End: 2025-08-14
Payer: MEDICARE

## 2025-08-14 VITALS
TEMPERATURE: 98.2 F | HEIGHT: 61 IN | RESPIRATION RATE: 16 BRPM | HEART RATE: 72 BPM | OXYGEN SATURATION: 95 % | BODY MASS INDEX: 31.08 KG/M2 | WEIGHT: 164.6 LBS | DIASTOLIC BLOOD PRESSURE: 74 MMHG | SYSTOLIC BLOOD PRESSURE: 116 MMHG

## 2025-08-14 DIAGNOSIS — E66.811 CLASS 1 OBESITY DUE TO EXCESS CALORIES WITH SERIOUS COMORBIDITY AND BODY MASS INDEX (BMI) OF 31.0 TO 31.9 IN ADULT: ICD-10-CM

## 2025-08-14 DIAGNOSIS — L57.0 ACTINIC KERATOSIS: ICD-10-CM

## 2025-08-14 DIAGNOSIS — G89.29 CHRONIC BILATERAL LOW BACK PAIN WITHOUT SCIATICA: ICD-10-CM

## 2025-08-14 DIAGNOSIS — L82.1 SEBORRHEIC KERATOSIS: ICD-10-CM

## 2025-08-14 DIAGNOSIS — E66.09 CLASS 1 OBESITY DUE TO EXCESS CALORIES WITH SERIOUS COMORBIDITY AND BODY MASS INDEX (BMI) OF 31.0 TO 31.9 IN ADULT: ICD-10-CM

## 2025-08-14 DIAGNOSIS — F33.42 RECURRENT MAJOR DEPRESSIVE DISORDER, IN FULL REMISSION (HCC): ICD-10-CM

## 2025-08-14 DIAGNOSIS — Z98.890 HISTORY OF BLADDER SURGERY: ICD-10-CM

## 2025-08-14 DIAGNOSIS — K21.9 GASTROESOPHAGEAL REFLUX DISEASE, UNSPECIFIED WHETHER ESOPHAGITIS PRESENT: ICD-10-CM

## 2025-08-14 DIAGNOSIS — N39.41 URGE INCONTINENCE: ICD-10-CM

## 2025-08-14 DIAGNOSIS — M54.50 CHRONIC BILATERAL LOW BACK PAIN WITHOUT SCIATICA: ICD-10-CM

## 2025-08-14 DIAGNOSIS — G89.29 CHRONIC PAIN OF LEFT KNEE: ICD-10-CM

## 2025-08-14 DIAGNOSIS — K21.9 GASTROESOPHAGEAL REFLUX DISEASE WITHOUT ESOPHAGITIS: ICD-10-CM

## 2025-08-14 DIAGNOSIS — M72.0 DUPUYTREN CONTRACTURE OF LEFT HAND: ICD-10-CM

## 2025-08-14 DIAGNOSIS — I10 PRIMARY HYPERTENSION: ICD-10-CM

## 2025-08-14 DIAGNOSIS — N18.31 CKD STAGE 3A, GFR 45-59 ML/MIN: Primary | ICD-10-CM

## 2025-08-14 DIAGNOSIS — Z12.31 ENCOUNTER FOR SCREENING MAMMOGRAM FOR BREAST CANCER: ICD-10-CM

## 2025-08-14 DIAGNOSIS — Z12.11 COLON CANCER SCREENING: ICD-10-CM

## 2025-08-14 DIAGNOSIS — M25.562 CHRONIC PAIN OF LEFT KNEE: ICD-10-CM

## 2025-08-14 PROCEDURE — G2211 COMPLEX E/M VISIT ADD ON: HCPCS | Performed by: INTERNAL MEDICINE

## 2025-08-14 PROCEDURE — 3078F DIAST BP <80 MM HG: CPT | Performed by: INTERNAL MEDICINE

## 2025-08-14 PROCEDURE — 99214 OFFICE O/P EST MOD 30 MIN: CPT | Performed by: INTERNAL MEDICINE

## 2025-08-14 PROCEDURE — 3074F SYST BP LT 130 MM HG: CPT | Performed by: INTERNAL MEDICINE

## 2025-08-14 RX ORDER — ATENOLOL 50 MG/1
50 TABLET ORAL DAILY
Qty: 100 TABLET | Refills: 3 | Status: SHIPPED | OUTPATIENT
Start: 2025-08-14

## 2025-08-14 RX ORDER — GABAPENTIN 300 MG/1
CAPSULE ORAL
Qty: 180 CAPSULE | Refills: 0 | Status: SHIPPED | OUTPATIENT
Start: 2025-08-14

## 2025-08-14 ASSESSMENT — FIBROSIS 4 INDEX: FIB4 SCORE: 2.06

## 2025-09-11 ENCOUNTER — APPOINTMENT (OUTPATIENT)
Dept: PHYSICAL MEDICINE AND REHAB | Facility: MEDICAL CENTER | Age: 80
End: 2025-09-11
Payer: MEDICARE

## (undated) DEVICE — SENSOR SPO2 NEO LNCS ADHESIVE (20/BX) SEE USER NOTES

## (undated) DEVICE — KIT CUSTOM PROCEDURE SINGLE FOR ENDO  (15/CA)

## (undated) DEVICE — MASK ANESTHESIA ADULT  - (100/CA)

## (undated) DEVICE — CAUTERY OPTH ENDOTHERMY 25GA - (5/PK)

## (undated) DEVICE — TUBING CLEARLINK DUO-VENT - C-FLO (48EA/CA)

## (undated) DEVICE — GOWN WARMING STANDARD FLEX - (30/CA)

## (undated) DEVICE — GLOVE BIOGEL PI INDICATOR SZ 7.0 SURGICAL PF LF - (50/BX 4BX/CA)

## (undated) DEVICE — PAD EYE GAUZE COVERED OVAL 1 5/8 X 2 5/8" STERILE"

## (undated) DEVICE — KIT ANESTHESIA W/CIRCUIT & 3/LT BAG W/FILTER (20EA/CA)

## (undated) DEVICE — DRESSING TRANSPARENT FILM TEGADERM 2.375 X 2.75"  (100EA/BX)"

## (undated) DEVICE — SYRINGE SAFETY 10 ML 18 GA X 1 1/2 BLUNT LL (100/BX 4BX/CA)

## (undated) DEVICE — DRAPE MAGNETIC (INSTRA-MAG) - (30/CA)

## (undated) DEVICE — SET LEADWIRE 5 LEAD BEDSIDE DISPOSABLE ECG (1SET OF 5/EA)

## (undated) DEVICE — SUTURE 6-0 PLAIN GUT G-1 D/A 18 (12PK/BX)"

## (undated) DEVICE — KIT  I.V. START (100EA/CA)

## (undated) DEVICE — TOWEL STOP TIMEOUT SAFETY FLAG (40EA/CA)

## (undated) DEVICE — FILM CASSETTE ENDO

## (undated) DEVICE — CANISTER SUCTION RIGID RED 1500CC (40EA/CA)

## (undated) DEVICE — CANISTER SUCTION 3000ML MECHANICAL FILTER AUTO SHUTOFF MEDI-VAC NONSTERILE LF DISP  (40EA/CA)

## (undated) DEVICE — WATER IRRIGATION STERILE 1000ML (12EA/CA)

## (undated) DEVICE — SUTURE 7-0 VICRYL TG140-8 (12PK/BX)

## (undated) DEVICE — SHIELD OPTH AL GRTR CVR FOX (50EA/BX)

## (undated) DEVICE — NEPTUNE 4 PORT MANIFOLD - (20/PK)

## (undated) DEVICE — LACTATED RINGERS INJ 1000 ML - (14EA/CA 60CA/PF)

## (undated) DEVICE — SODIUM CHL IRRIGATION 0.9% 1000ML (12EA/CA)

## (undated) DEVICE — ELECTRODE 850 FOAM ADHESIVE - HYDROGEL RADIOTRNSPRNT (50/PK)

## (undated) DEVICE — LACTATED RINGERS INJ. 500 ML - (24EA/CA)

## (undated) DEVICE — PROTECTOR ULNA NERVE - (36PR/CA)

## (undated) DEVICE — SUTURE EYE

## (undated) DEVICE — CORDS BIPOLAR COAGULATION - 12FT STERILE DISP. (10EA/BX)

## (undated) DEVICE — NEEDLE FILTER ASPIRATION 18 GA X 1 1/2 IN (100EA/BX)

## (undated) DEVICE — SUCTION INSTRUMENT YANKAUER BULBOUS TIP W/O VENT (50EA/CA)

## (undated) DEVICE — CONTAINER, SPECIMEN, STERILE

## (undated) DEVICE — CAUTERY OPTHALMIC WECK FINE TIP (10EA/SP)

## (undated) DEVICE — DRESSING TEGADERM 8 X 12 - (10/BX 8BX/CA)

## (undated) DEVICE — SUTURE 5-0 MERSIL S-14 (12PK/BX)

## (undated) DEVICE — MASK ANESTHESIA CHILD/SMALL ADULT SIZE 4 (50/CA)

## (undated) DEVICE — SPEAR EYE SPNG 3ANG MLBL HNDL - (10/ST18ST/PK 180/PK 1PK/SP)

## (undated) DEVICE — BITE BLOCK ADULT 60FR (100EA/CA)

## (undated) DEVICE — CATHETER IV 20 GA X 1-1/4 ---SURG.& SDS ONLY--- (50EA/BX)

## (undated) DEVICE — Device

## (undated) DEVICE — MASK AIRWAY FLEXIBLE SINGLE-USE SIZE 4 ADULTS (10EA/BX)

## (undated) DEVICE — PROBE 23 GA ILLUM FLEX CURVED - LASER(6/BX)

## (undated) DEVICE — GLOVE BIOGEL SURGICAL PF LATEX M SIZE 8.5 (50PR/BX 4BX/CA)

## (undated) DEVICE — SLEEVE, VASO, THIGH, MED

## (undated) DEVICE — FORCEP RADIAL JAW 4 STANDARD CAPACITY W/NEEDLE 240CM (40EA/BX)

## (undated) DEVICE — GLOVE BIOGEL SZ 6 PF LATEX - (50EA/BX 4BX/CA)

## (undated) DEVICE — MICRODRIP PRIMARY VENTED 60 (48EA/CA) THIS WAS PART #2C8428 WHICH WAS DISCONTINUED